# Patient Record
Sex: FEMALE | Race: WHITE | Employment: OTHER | URBAN - METROPOLITAN AREA
[De-identification: names, ages, dates, MRNs, and addresses within clinical notes are randomized per-mention and may not be internally consistent; named-entity substitution may affect disease eponyms.]

---

## 2017-01-12 PROBLEM — R07.2 PRECORDIAL PAIN: Status: ACTIVE | Noted: 2017-01-12

## 2017-07-19 ENCOUNTER — HOSPITAL ENCOUNTER (OUTPATIENT)
Dept: LAB | Age: 72
Discharge: HOME | End: 2017-07-19
Attending: INTERNAL MEDICINE
Payer: MEDICARE

## 2017-07-19 DIAGNOSIS — E78.2 MIXED HYPERLIPIDEMIA: ICD-10-CM

## 2017-07-19 LAB
ALBUMIN SERPL BCP-MCNC: 3.9 G/DL (ref 3.2–4.6)
ALBUMIN/GLOB SERPL: 1.1 {RATIO}
ALP SERPL-CCNC: 66 U/L (ref 50–136)
ALT SERPL-CCNC: 25 U/L (ref 12–65)
AST SERPL W P-5'-P-CCNC: 17 U/L (ref 15–37)
BILIRUB DIRECT SERPL-MCNC: <0.1 MG/DL
BILIRUB SERPL-MCNC: 0.3 MG/DL (ref 0.2–1.1)
GLOBULIN SER CALC-MCNC: 3.5 G/DL
PROT SERPL-MCNC: 7.4 G/DL (ref 6.3–8.2)

## 2017-07-19 PROCEDURE — 36415 COLL VENOUS BLD VENIPUNCTURE: CPT | Performed by: INTERNAL MEDICINE

## 2017-07-19 PROCEDURE — 80076 HEPATIC FUNCTION PANEL: CPT | Performed by: INTERNAL MEDICINE

## 2017-11-16 ENCOUNTER — HOSPITAL ENCOUNTER (OUTPATIENT)
Dept: MAMMOGRAPHY | Age: 72
Discharge: HOME | End: 2017-11-16
Attending: INTERNAL MEDICINE
Payer: MEDICARE

## 2017-11-16 DIAGNOSIS — Z12.31 VISIT FOR SCREENING MAMMOGRAM: ICD-10-CM

## 2017-11-16 PROCEDURE — 77067 SCR MAMMO BI INCL CAD: CPT

## 2018-02-05 ENCOUNTER — HOSPITAL ENCOUNTER (OUTPATIENT)
Dept: MAMMOGRAPHY | Age: 73
Discharge: HOME | End: 2018-02-05
Attending: INTERNAL MEDICINE
Payer: MEDICARE

## 2018-02-05 DIAGNOSIS — M94.9 DISORDER OF BONE AND CARTILAGE: ICD-10-CM

## 2018-02-05 DIAGNOSIS — M89.9 DISORDER OF BONE AND CARTILAGE: ICD-10-CM

## 2018-02-05 PROCEDURE — 77080 DXA BONE DENSITY AXIAL: CPT

## 2019-02-21 ENCOUNTER — HOSPITAL ENCOUNTER (OUTPATIENT)
Dept: MAMMOGRAPHY | Age: 74
Discharge: HOME | End: 2019-02-21
Attending: INTERNAL MEDICINE
Payer: MEDICARE

## 2019-02-21 DIAGNOSIS — Z12.31 VISIT FOR SCREENING MAMMOGRAM: ICD-10-CM

## 2019-02-21 PROCEDURE — 77067 SCR MAMMO BI INCL CAD: CPT

## 2022-01-05 ENCOUNTER — HOSPITAL ENCOUNTER (OUTPATIENT)
Dept: MAMMOGRAPHY | Age: 77
Discharge: HOME | End: 2022-01-05
Attending: INTERNAL MEDICINE
Payer: MEDICARE

## 2022-01-05 DIAGNOSIS — Z12.31 ENCOUNTER FOR SCREENING MAMMOGRAM FOR MALIGNANT NEOPLASM OF BREAST: ICD-10-CM

## 2022-01-05 DIAGNOSIS — Z78.0 POSTMENOPAUSAL STATE: ICD-10-CM

## 2022-01-05 PROCEDURE — 77080 DXA BONE DENSITY AXIAL: CPT

## 2022-01-05 PROCEDURE — 77067 SCR MAMMO BI INCL CAD: CPT

## 2022-01-22 ENCOUNTER — APPOINTMENT (OUTPATIENT)
Dept: CT IMAGING | Age: 77
DRG: 329 | End: 2022-01-22
Attending: EMERGENCY MEDICINE
Payer: MEDICARE

## 2022-01-22 ENCOUNTER — ANESTHESIA (OUTPATIENT)
Dept: SURGERY | Age: 77
DRG: 329 | End: 2022-01-22
Payer: MEDICARE

## 2022-01-22 ENCOUNTER — ANESTHESIA EVENT (OUTPATIENT)
Dept: SURGERY | Age: 77
DRG: 329 | End: 2022-01-22
Payer: MEDICARE

## 2022-01-22 ENCOUNTER — HOSPITAL ENCOUNTER (INPATIENT)
Age: 77
LOS: 9 days | Discharge: HOME HEALTH | DRG: 329 | End: 2022-01-31
Attending: EMERGENCY MEDICINE | Admitting: SURGERY
Payer: MEDICARE

## 2022-01-22 DIAGNOSIS — K63.1 PERFORATION OF COLON (HCC): ICD-10-CM

## 2022-01-22 DIAGNOSIS — K56.600 PARTIAL INTESTINAL OBSTRUCTION, UNSPECIFIED CAUSE (HCC): ICD-10-CM

## 2022-01-22 DIAGNOSIS — K63.1 BOWEL PERFORATION (HCC): ICD-10-CM

## 2022-01-22 DIAGNOSIS — K63.89 COLONIC MASS: ICD-10-CM

## 2022-01-22 DIAGNOSIS — C18.2 PRIMARY ADENOCARCINOMA OF ASCENDING COLON (HCC): ICD-10-CM

## 2022-01-22 DIAGNOSIS — K65.9 PERITONITIS (HCC): Primary | ICD-10-CM

## 2022-01-22 LAB
ABO + RH BLD: NORMAL
ALBUMIN SERPL-MCNC: 3.2 G/DL (ref 3.2–4.6)
ALBUMIN/GLOB SERPL: 0.9 {RATIO} (ref 1.2–3.5)
ALP SERPL-CCNC: 74 U/L (ref 50–136)
ALT SERPL-CCNC: 26 U/L (ref 12–65)
ANION GAP SERPL CALC-SCNC: 7 MMOL/L (ref 7–16)
AST SERPL-CCNC: 15 U/L (ref 15–37)
BACTERIA URNS QL MICRO: 0 /HPF
BASOPHILS # BLD: 0 K/UL (ref 0–0.2)
BASOPHILS NFR BLD: 0 % (ref 0–2)
BILIRUB SERPL-MCNC: 0.3 MG/DL (ref 0.2–1.1)
BLOOD GROUP ANTIBODIES SERPL: NORMAL
BUN SERPL-MCNC: 13 MG/DL (ref 8–23)
CALCIUM SERPL-MCNC: 8.5 MG/DL (ref 8.3–10.4)
CASTS URNS QL MICRO: NORMAL /LPF
CHLORIDE SERPL-SCNC: 112 MMOL/L (ref 98–107)
CO2 SERPL-SCNC: 22 MMOL/L (ref 21–32)
COVID-19 RAPID TEST, COVR: NOT DETECTED
CREAT SERPL-MCNC: 0.79 MG/DL (ref 0.6–1)
DIFFERENTIAL METHOD BLD: ABNORMAL
EOSINOPHIL # BLD: 0.2 K/UL (ref 0–0.8)
EOSINOPHIL NFR BLD: 1 % (ref 0.5–7.8)
EPI CELLS #/AREA URNS HPF: 0 /HPF
ERYTHROCYTE [DISTWIDTH] IN BLOOD BY AUTOMATED COUNT: 13.7 % (ref 11.9–14.6)
GLOBULIN SER CALC-MCNC: 3.5 G/DL (ref 2.3–3.5)
GLUCOSE SERPL-MCNC: 141 MG/DL (ref 65–100)
HCT VFR BLD AUTO: 34.1 % (ref 35.8–46.3)
HGB BLD-MCNC: 10.7 G/DL (ref 11.7–15.4)
IMM GRANULOCYTES # BLD AUTO: 0.1 K/UL (ref 0–0.5)
IMM GRANULOCYTES NFR BLD AUTO: 0 % (ref 0–5)
LACTATE SERPL-SCNC: 1.2 MMOL/L (ref 0.4–2)
LACTATE SERPL-SCNC: 2.6 MMOL/L (ref 0.4–2)
LACTATE SERPL-SCNC: 3.4 MMOL/L (ref 0.4–2)
LIPASE SERPL-CCNC: 82 U/L (ref 73–393)
LYMPHOCYTES # BLD: 0.4 K/UL (ref 0.5–4.6)
LYMPHOCYTES NFR BLD: 3 % (ref 13–44)
MCH RBC QN AUTO: 26.6 PG (ref 26.1–32.9)
MCHC RBC AUTO-ENTMCNC: 31.4 G/DL (ref 31.4–35)
MCV RBC AUTO: 84.8 FL (ref 79.6–97.8)
MONOCYTES # BLD: 1.2 K/UL (ref 0.1–1.3)
MONOCYTES NFR BLD: 9 % (ref 4–12)
NEUTS SEG # BLD: 12.6 K/UL (ref 1.7–8.2)
NEUTS SEG NFR BLD: 87 % (ref 43–78)
NRBC # BLD: 0 K/UL (ref 0–0.2)
PLATELET # BLD AUTO: 202 K/UL (ref 150–450)
PMV BLD AUTO: 10.8 FL (ref 9.4–12.3)
POTASSIUM SERPL-SCNC: 3.7 MMOL/L (ref 3.5–5.1)
PROT SERPL-MCNC: 6.7 G/DL (ref 6.3–8.2)
RBC # BLD AUTO: 4.02 M/UL (ref 4.05–5.2)
RBC #/AREA URNS HPF: NORMAL /HPF
SODIUM SERPL-SCNC: 141 MMOL/L (ref 136–145)
SOURCE, COVRS: NORMAL
SPECIMEN EXP DATE BLD: NORMAL
WBC # BLD AUTO: 14.5 K/UL (ref 4.3–11.1)
WBC URNS QL MICRO: NORMAL /HPF

## 2022-01-22 PROCEDURE — 96375 TX/PRO/DX INJ NEW DRUG ADDON: CPT

## 2022-01-22 PROCEDURE — 77030002996 HC SUT SLK J&J -A: Performed by: SURGERY

## 2022-01-22 PROCEDURE — 88307 TISSUE EXAM BY PATHOLOGIST: CPT

## 2022-01-22 PROCEDURE — 76010000162 HC OR TIME 1.5 TO 2 HR INTENSV-TIER 1: Performed by: SURGERY

## 2022-01-22 PROCEDURE — 80053 COMPREHEN METABOLIC PANEL: CPT

## 2022-01-22 PROCEDURE — 85025 COMPLETE CBC W/AUTO DIFF WBC: CPT

## 2022-01-22 PROCEDURE — 74011000258 HC RX REV CODE- 258: Performed by: EMERGENCY MEDICINE

## 2022-01-22 PROCEDURE — 74011250636 HC RX REV CODE- 250/636: Performed by: EMERGENCY MEDICINE

## 2022-01-22 PROCEDURE — 87040 BLOOD CULTURE FOR BACTERIA: CPT

## 2022-01-22 PROCEDURE — 74177 CT ABD & PELVIS W/CONTRAST: CPT

## 2022-01-22 PROCEDURE — 74011250636 HC RX REV CODE- 250/636: Performed by: ANESTHESIOLOGY

## 2022-01-22 PROCEDURE — 77030016154: Performed by: SURGERY

## 2022-01-22 PROCEDURE — 74011000258 HC RX REV CODE- 258: Performed by: NURSE PRACTITIONER

## 2022-01-22 PROCEDURE — 77030003602 HC NDL NRV BLK BBMI -B: Performed by: ANESTHESIOLOGY

## 2022-01-22 PROCEDURE — 96365 THER/PROPH/DIAG IV INF INIT: CPT

## 2022-01-22 PROCEDURE — 81479 UNLISTED MOLECULAR PATHOLOGY: CPT

## 2022-01-22 PROCEDURE — 83690 ASSAY OF LIPASE: CPT

## 2022-01-22 PROCEDURE — 83605 ASSAY OF LACTIC ACID: CPT

## 2022-01-22 PROCEDURE — 81003 URINALYSIS AUTO W/O SCOPE: CPT

## 2022-01-22 PROCEDURE — 77030009979 HC RELD STPLR TCR J&J -C: Performed by: SURGERY

## 2022-01-22 PROCEDURE — 77030002966 HC SUT PDS J&J -A: Performed by: SURGERY

## 2022-01-22 PROCEDURE — 81301 MICROSATELLITE INSTABILITY: CPT

## 2022-01-22 PROCEDURE — 74011250636 HC RX REV CODE- 250/636: Performed by: NURSE PRACTITIONER

## 2022-01-22 PROCEDURE — 81015 MICROSCOPIC EXAM OF URINE: CPT

## 2022-01-22 PROCEDURE — 87635 SARS-COV-2 COVID-19 AMP PRB: CPT

## 2022-01-22 PROCEDURE — 0DTF0ZZ RESECTION OF RIGHT LARGE INTESTINE, OPEN APPROACH: ICD-10-PCS | Performed by: SURGERY

## 2022-01-22 PROCEDURE — 74011000250 HC RX REV CODE- 250: Performed by: NURSE ANESTHETIST, CERTIFIED REGISTERED

## 2022-01-22 PROCEDURE — 65270000029 HC RM PRIVATE

## 2022-01-22 PROCEDURE — 74011250636 HC RX REV CODE- 250/636: Performed by: NURSE ANESTHETIST, CERTIFIED REGISTERED

## 2022-01-22 PROCEDURE — 74011000258 HC RX REV CODE- 258: Performed by: NURSE ANESTHETIST, CERTIFIED REGISTERED

## 2022-01-22 PROCEDURE — 77030036731 HC STPLR ENDOSC J&J -F: Performed by: SURGERY

## 2022-01-22 PROCEDURE — 74011000250 HC RX REV CODE- 250: Performed by: EMERGENCY MEDICINE

## 2022-01-22 PROCEDURE — 76060000034 HC ANESTHESIA 1.5 TO 2 HR: Performed by: SURGERY

## 2022-01-22 PROCEDURE — 86900 BLOOD TYPING SEROLOGIC ABO: CPT

## 2022-01-22 PROCEDURE — 74011250636 HC RX REV CODE- 250/636: Performed by: SURGERY

## 2022-01-22 PROCEDURE — 2709999900 HC NON-CHARGEABLE SUPPLY: Performed by: SURGERY

## 2022-01-22 PROCEDURE — 77030019908 HC STETH ESOPH SIMS -A: Performed by: NURSE ANESTHETIST, CERTIFIED REGISTERED

## 2022-01-22 PROCEDURE — 77030037088 HC TUBE ENDOTRACH ORAL NSL COVD-A: Performed by: NURSE ANESTHETIST, CERTIFIED REGISTERED

## 2022-01-22 PROCEDURE — 36415 COLL VENOUS BLD VENIPUNCTURE: CPT

## 2022-01-22 PROCEDURE — 77030039425 HC BLD LARYNG TRULITE DISP TELE -A: Performed by: NURSE ANESTHETIST, CERTIFIED REGISTERED

## 2022-01-22 PROCEDURE — 76210000017 HC OR PH I REC 1.5 TO 2 HR: Performed by: SURGERY

## 2022-01-22 PROCEDURE — 77010033678 HC OXYGEN DAILY

## 2022-01-22 PROCEDURE — 99284 EMERGENCY DEPT VISIT MOD MDM: CPT

## 2022-01-22 PROCEDURE — 74011000636 HC RX REV CODE- 636: Performed by: EMERGENCY MEDICINE

## 2022-01-22 RX ORDER — SODIUM CHLORIDE, SODIUM LACTATE, POTASSIUM CHLORIDE, CALCIUM CHLORIDE 600; 310; 30; 20 MG/100ML; MG/100ML; MG/100ML; MG/100ML
75 INJECTION, SOLUTION INTRAVENOUS CONTINUOUS
Status: DISCONTINUED | OUTPATIENT
Start: 2022-01-22 | End: 2022-01-22 | Stop reason: HOSPADM

## 2022-01-22 RX ORDER — MORPHINE SULFATE 4 MG/ML
4 INJECTION INTRAVENOUS
Status: COMPLETED | OUTPATIENT
Start: 2022-01-22 | End: 2022-01-22

## 2022-01-22 RX ORDER — SODIUM CHLORIDE 0.9 % (FLUSH) 0.9 %
5-10 SYRINGE (ML) INJECTION EVERY 8 HOURS
Status: DISCONTINUED | OUTPATIENT
Start: 2022-01-22 | End: 2022-01-31 | Stop reason: HOSPADM

## 2022-01-22 RX ORDER — DIPHENHYDRAMINE HYDROCHLORIDE 50 MG/ML
12.5 INJECTION, SOLUTION INTRAMUSCULAR; INTRAVENOUS
Status: DISCONTINUED | OUTPATIENT
Start: 2022-01-22 | End: 2022-01-22 | Stop reason: HOSPADM

## 2022-01-22 RX ORDER — SODIUM CHLORIDE 0.9 % (FLUSH) 0.9 %
5-10 SYRINGE (ML) INJECTION AS NEEDED
Status: DISCONTINUED | OUTPATIENT
Start: 2022-01-22 | End: 2022-01-31 | Stop reason: HOSPADM

## 2022-01-22 RX ORDER — DEXAMETHASONE SODIUM PHOSPHATE 4 MG/ML
INJECTION, SOLUTION INTRA-ARTICULAR; INTRALESIONAL; INTRAMUSCULAR; INTRAVENOUS; SOFT TISSUE AS NEEDED
Status: DISCONTINUED | OUTPATIENT
Start: 2022-01-22 | End: 2022-01-22 | Stop reason: HOSPADM

## 2022-01-22 RX ORDER — PROPOFOL 10 MG/ML
INJECTION, EMULSION INTRAVENOUS AS NEEDED
Status: DISCONTINUED | OUTPATIENT
Start: 2022-01-22 | End: 2022-01-22 | Stop reason: HOSPADM

## 2022-01-22 RX ORDER — EPHEDRINE SULFATE/0.9% NACL/PF 50 MG/5 ML
SYRINGE (ML) INTRAVENOUS AS NEEDED
Status: DISCONTINUED | OUTPATIENT
Start: 2022-01-22 | End: 2022-01-22 | Stop reason: HOSPADM

## 2022-01-22 RX ORDER — SODIUM CHLORIDE, SODIUM LACTATE, POTASSIUM CHLORIDE, CALCIUM CHLORIDE 600; 310; 30; 20 MG/100ML; MG/100ML; MG/100ML; MG/100ML
25 INJECTION, SOLUTION INTRAVENOUS CONTINUOUS
Status: DISCONTINUED | OUTPATIENT
Start: 2022-01-22 | End: 2022-01-22 | Stop reason: HOSPADM

## 2022-01-22 RX ORDER — SODIUM CHLORIDE 9 MG/ML
75 INJECTION, SOLUTION INTRAVENOUS CONTINUOUS
Status: DISCONTINUED | OUTPATIENT
Start: 2022-01-22 | End: 2022-01-25

## 2022-01-22 RX ORDER — FENTANYL CITRATE 50 UG/ML
INJECTION, SOLUTION INTRAMUSCULAR; INTRAVENOUS AS NEEDED
Status: DISCONTINUED | OUTPATIENT
Start: 2022-01-22 | End: 2022-01-22 | Stop reason: HOSPADM

## 2022-01-22 RX ORDER — LIDOCAINE HYDROCHLORIDE 20 MG/ML
INJECTION, SOLUTION EPIDURAL; INFILTRATION; INTRACAUDAL; PERINEURAL AS NEEDED
Status: DISCONTINUED | OUTPATIENT
Start: 2022-01-22 | End: 2022-01-22 | Stop reason: HOSPADM

## 2022-01-22 RX ORDER — ONDANSETRON 2 MG/ML
4 INJECTION INTRAMUSCULAR; INTRAVENOUS
Status: DISCONTINUED | OUTPATIENT
Start: 2022-01-22 | End: 2022-01-31 | Stop reason: HOSPADM

## 2022-01-22 RX ORDER — NALOXONE HYDROCHLORIDE 0.4 MG/ML
0.1 INJECTION, SOLUTION INTRAMUSCULAR; INTRAVENOUS; SUBCUTANEOUS AS NEEDED
Status: DISCONTINUED | OUTPATIENT
Start: 2022-01-22 | End: 2022-01-22 | Stop reason: HOSPADM

## 2022-01-22 RX ORDER — OXYCODONE HYDROCHLORIDE 5 MG/1
5 TABLET ORAL
Status: DISCONTINUED | OUTPATIENT
Start: 2022-01-22 | End: 2022-01-22 | Stop reason: HOSPADM

## 2022-01-22 RX ORDER — FLUMAZENIL 0.1 MG/ML
0.2 INJECTION INTRAVENOUS
Status: DISCONTINUED | OUTPATIENT
Start: 2022-01-22 | End: 2022-01-22 | Stop reason: HOSPADM

## 2022-01-22 RX ORDER — HYDROMORPHONE HYDROCHLORIDE 2 MG/ML
0.5 INJECTION, SOLUTION INTRAMUSCULAR; INTRAVENOUS; SUBCUTANEOUS
Status: DISCONTINUED | OUTPATIENT
Start: 2022-01-22 | End: 2022-01-22 | Stop reason: HOSPADM

## 2022-01-22 RX ORDER — NEOSTIGMINE METHYLSULFATE 1 MG/ML
INJECTION, SOLUTION INTRAVENOUS AS NEEDED
Status: DISCONTINUED | OUTPATIENT
Start: 2022-01-22 | End: 2022-01-22 | Stop reason: HOSPADM

## 2022-01-22 RX ORDER — SODIUM CHLORIDE 0.9 % (FLUSH) 0.9 %
10 SYRINGE (ML) INJECTION
Status: COMPLETED | OUTPATIENT
Start: 2022-01-22 | End: 2022-01-22

## 2022-01-22 RX ORDER — DEXAMETHASONE SODIUM PHOSPHATE 4 MG/ML
INJECTION, SOLUTION INTRA-ARTICULAR; INTRALESIONAL; INTRAMUSCULAR; INTRAVENOUS; SOFT TISSUE
Status: COMPLETED | OUTPATIENT
Start: 2022-01-22 | End: 2022-01-22

## 2022-01-22 RX ORDER — CHOLECALCIFEROL TAB 125 MCG (5000 UNIT) 125 MCG
5000 TAB ORAL DAILY
COMMUNITY
End: 2022-05-20

## 2022-01-22 RX ORDER — MORPHINE SULFATE 4 MG/ML
2 INJECTION INTRAVENOUS
Status: DISCONTINUED | OUTPATIENT
Start: 2022-01-22 | End: 2022-01-23 | Stop reason: CLARIF

## 2022-01-22 RX ORDER — KETAMINE HYDROCHLORIDE 50 MG/ML
INJECTION, SOLUTION INTRAMUSCULAR; INTRAVENOUS AS NEEDED
Status: DISCONTINUED | OUTPATIENT
Start: 2022-01-22 | End: 2022-01-22 | Stop reason: HOSPADM

## 2022-01-22 RX ORDER — ROPIVACAINE HYDROCHLORIDE 5 MG/ML
INJECTION, SOLUTION EPIDURAL; INFILTRATION; PERINEURAL
Status: COMPLETED | OUTPATIENT
Start: 2022-01-22 | End: 2022-01-22

## 2022-01-22 RX ORDER — ROCURONIUM BROMIDE 10 MG/ML
INJECTION, SOLUTION INTRAVENOUS AS NEEDED
Status: DISCONTINUED | OUTPATIENT
Start: 2022-01-22 | End: 2022-01-22 | Stop reason: HOSPADM

## 2022-01-22 RX ORDER — ASCORBIC ACID 500 MG
1000 TABLET ORAL DAILY
COMMUNITY
End: 2022-04-13

## 2022-01-22 RX ORDER — LIDOCAINE HYDROCHLORIDE 10 MG/ML
0.1 INJECTION INFILTRATION; PERINEURAL AS NEEDED
Status: DISCONTINUED | OUTPATIENT
Start: 2022-01-22 | End: 2022-01-22 | Stop reason: HOSPADM

## 2022-01-22 RX ORDER — METRONIDAZOLE 500 MG/100ML
500 INJECTION, SOLUTION INTRAVENOUS EVERY 12 HOURS
Status: DISCONTINUED | OUTPATIENT
Start: 2022-01-22 | End: 2022-01-22

## 2022-01-22 RX ORDER — SUCCINYLCHOLINE CHLORIDE 20 MG/ML
INJECTION INTRAMUSCULAR; INTRAVENOUS AS NEEDED
Status: DISCONTINUED | OUTPATIENT
Start: 2022-01-22 | End: 2022-01-22 | Stop reason: HOSPADM

## 2022-01-22 RX ORDER — GLYCOPYRROLATE 0.2 MG/ML
INJECTION INTRAMUSCULAR; INTRAVENOUS AS NEEDED
Status: DISCONTINUED | OUTPATIENT
Start: 2022-01-22 | End: 2022-01-22 | Stop reason: HOSPADM

## 2022-01-22 RX ORDER — SODIUM CHLORIDE, SODIUM LACTATE, POTASSIUM CHLORIDE, CALCIUM CHLORIDE 600; 310; 30; 20 MG/100ML; MG/100ML; MG/100ML; MG/100ML
150 INJECTION, SOLUTION INTRAVENOUS CONTINUOUS
Status: DISCONTINUED | OUTPATIENT
Start: 2022-01-22 | End: 2022-01-25

## 2022-01-22 RX ORDER — ONDANSETRON 2 MG/ML
INJECTION INTRAMUSCULAR; INTRAVENOUS AS NEEDED
Status: DISCONTINUED | OUTPATIENT
Start: 2022-01-22 | End: 2022-01-22 | Stop reason: HOSPADM

## 2022-01-22 RX ORDER — SODIUM CHLORIDE, SODIUM LACTATE, POTASSIUM CHLORIDE, CALCIUM CHLORIDE 600; 310; 30; 20 MG/100ML; MG/100ML; MG/100ML; MG/100ML
1000 INJECTION, SOLUTION INTRAVENOUS
Status: COMPLETED | OUTPATIENT
Start: 2022-01-22 | End: 2022-01-22

## 2022-01-22 RX ORDER — MIDAZOLAM HYDROCHLORIDE 1 MG/ML
2 INJECTION, SOLUTION INTRAMUSCULAR; INTRAVENOUS
Status: COMPLETED | OUTPATIENT
Start: 2022-01-22 | End: 2022-01-22

## 2022-01-22 RX ORDER — ONDANSETRON 2 MG/ML
4 INJECTION INTRAMUSCULAR; INTRAVENOUS
Status: COMPLETED | OUTPATIENT
Start: 2022-01-22 | End: 2022-01-22

## 2022-01-22 RX ORDER — METRONIDAZOLE 500 MG/100ML
500 INJECTION, SOLUTION INTRAVENOUS EVERY 8 HOURS
Status: DISCONTINUED | OUTPATIENT
Start: 2022-01-22 | End: 2022-01-22

## 2022-01-22 RX ORDER — SODIUM CHLORIDE, SODIUM LACTATE, POTASSIUM CHLORIDE, CALCIUM CHLORIDE 600; 310; 30; 20 MG/100ML; MG/100ML; MG/100ML; MG/100ML
100 INJECTION, SOLUTION INTRAVENOUS CONTINUOUS
Status: DISCONTINUED | OUTPATIENT
Start: 2022-01-22 | End: 2022-01-22 | Stop reason: HOSPADM

## 2022-01-22 RX ADMIN — PHENYLEPHRINE HYDROCHLORIDE 120 MCG: 10 INJECTION INTRAVENOUS at 12:42

## 2022-01-22 RX ADMIN — MORPHINE SULFATE 2 MG: 4 INJECTION INTRAVENOUS at 20:21

## 2022-01-22 RX ADMIN — PHENYLEPHRINE HYDROCHLORIDE 120 MCG: 10 INJECTION INTRAVENOUS at 12:38

## 2022-01-22 RX ADMIN — ONDANSETRON 4 MG: 2 INJECTION INTRAMUSCULAR; INTRAVENOUS at 08:12

## 2022-01-22 RX ADMIN — Medication 4.2 MG: at 13:52

## 2022-01-22 RX ADMIN — SODIUM CHLORIDE, PRESERVATIVE FREE 10 ML: 5 INJECTION INTRAVENOUS at 21:53

## 2022-01-22 RX ADMIN — HYDROMORPHONE HYDROCHLORIDE 0.5 MG: 2 INJECTION, SOLUTION INTRAMUSCULAR; INTRAVENOUS; SUBCUTANEOUS at 15:11

## 2022-01-22 RX ADMIN — FENTANYL CITRATE 100 MCG: 50 INJECTION INTRAMUSCULAR; INTRAVENOUS at 12:35

## 2022-01-22 RX ADMIN — ROPIVACAINE HYDROCHLORIDE 15 ML: 5 INJECTION, SOLUTION EPIDURAL; INFILTRATION; PERINEURAL at 13:55

## 2022-01-22 RX ADMIN — DEXAMETHASONE SODIUM PHOSPHATE 4 MG: 4 INJECTION, SOLUTION INTRA-ARTICULAR; INTRALESIONAL; INTRAMUSCULAR; INTRAVENOUS; SOFT TISSUE at 13:55

## 2022-01-22 RX ADMIN — SODIUM CHLORIDE, SODIUM LACTATE, POTASSIUM CHLORIDE, AND CALCIUM CHLORIDE 100 ML/HR: 600; 310; 30; 20 INJECTION, SOLUTION INTRAVENOUS at 10:59

## 2022-01-22 RX ADMIN — LIDOCAINE HYDROCHLORIDE 60 MG: 20 INJECTION, SOLUTION EPIDURAL; INFILTRATION; INTRACAUDAL; PERINEURAL at 12:35

## 2022-01-22 RX ADMIN — ROPIVACAINE HYDROCHLORIDE 15 ML: 150 INJECTION, SOLUTION EPIDURAL; INFILTRATION; PERINEURAL at 13:58

## 2022-01-22 RX ADMIN — SODIUM CHLORIDE 1000 ML: 900 INJECTION, SOLUTION INTRAVENOUS at 04:58

## 2022-01-22 RX ADMIN — SODIUM CHLORIDE 125 ML/HR: 900 INJECTION, SOLUTION INTRAVENOUS at 16:51

## 2022-01-22 RX ADMIN — MIDAZOLAM 2 MG: 1 INJECTION INTRAMUSCULAR; INTRAVENOUS at 11:02

## 2022-01-22 RX ADMIN — MORPHINE SULFATE 4 MG: 4 INJECTION INTRAVENOUS at 08:12

## 2022-01-22 RX ADMIN — CEFTRIAXONE 1 G: 1 INJECTION, POWDER, FOR SOLUTION INTRAMUSCULAR; INTRAVENOUS at 12:47

## 2022-01-22 RX ADMIN — SODIUM CHLORIDE 100 ML: 900 INJECTION, SOLUTION INTRAVENOUS at 06:24

## 2022-01-22 RX ADMIN — ROCURONIUM BROMIDE 5 MG: 10 INJECTION, SOLUTION INTRAVENOUS at 13:20

## 2022-01-22 RX ADMIN — DEXAMETHASONE SODIUM PHOSPHATE 4 MG: 4 INJECTION, SOLUTION INTRAMUSCULAR; INTRAVENOUS at 12:39

## 2022-01-22 RX ADMIN — ONDANSETRON 4 MG: 2 INJECTION INTRAMUSCULAR; INTRAVENOUS at 20:21

## 2022-01-22 RX ADMIN — GLYCOPYRROLATE 0.3 MG: 0.2 INJECTION, SOLUTION INTRAMUSCULAR; INTRAVENOUS at 13:52

## 2022-01-22 RX ADMIN — ONDANSETRON 4 MG: 2 INJECTION INTRAMUSCULAR; INTRAVENOUS at 12:39

## 2022-01-22 RX ADMIN — KETAMINE HYDROCHLORIDE 20 MG: 50 INJECTION INTRAMUSCULAR; INTRAVENOUS at 12:56

## 2022-01-22 RX ADMIN — PHENYLEPHRINE HYDROCHLORIDE 120 MCG: 10 INJECTION INTRAVENOUS at 12:44

## 2022-01-22 RX ADMIN — ROCURONIUM BROMIDE 20 MG: 10 INJECTION, SOLUTION INTRAVENOUS at 12:50

## 2022-01-22 RX ADMIN — DEXAMETHASONE SODIUM PHOSPHATE 4 MG: 4 INJECTION, SOLUTION INTRA-ARTICULAR; INTRALESIONAL; INTRAMUSCULAR; INTRAVENOUS; SOFT TISSUE at 13:58

## 2022-01-22 RX ADMIN — DIATRIZOATE MEGLUMINE AND DIATRIZOATE SODIUM 15 ML: 660; 100 LIQUID ORAL; RECTAL at 04:53

## 2022-01-22 RX ADMIN — IOPAMIDOL 100 ML: 755 INJECTION, SOLUTION INTRAVENOUS at 06:23

## 2022-01-22 RX ADMIN — MORPHINE SULFATE 2 MG: 4 INJECTION INTRAVENOUS at 22:36

## 2022-01-22 RX ADMIN — SUCCINYLCHOLINE CHLORIDE 140 MG: 20 INJECTION, SOLUTION INTRAMUSCULAR; INTRAVENOUS at 12:35

## 2022-01-22 RX ADMIN — SODIUM CHLORIDE, PRESERVATIVE FREE 10 ML: 5 INJECTION INTRAVENOUS at 16:52

## 2022-01-22 RX ADMIN — PROPOFOL 150 MG: 10 INJECTION, EMULSION INTRAVENOUS at 12:35

## 2022-01-22 RX ADMIN — CEFTRIAXONE 1 G: 1 INJECTION, POWDER, FOR SOLUTION INTRAMUSCULAR; INTRAVENOUS at 08:35

## 2022-01-22 RX ADMIN — SODIUM CHLORIDE, SODIUM LACTATE, POTASSIUM CHLORIDE, AND CALCIUM CHLORIDE 1000 ML: 600; 310; 30; 20 INJECTION, SOLUTION INTRAVENOUS at 08:12

## 2022-01-22 RX ADMIN — Medication 10 MG: at 12:44

## 2022-01-22 RX ADMIN — Medication 10 ML: at 06:24

## 2022-01-22 RX ADMIN — HYDROMORPHONE HYDROCHLORIDE 0.5 MG: 2 INJECTION, SOLUTION INTRAMUSCULAR; INTRAVENOUS; SUBCUTANEOUS at 15:26

## 2022-01-22 NOTE — OP NOTES
1/22/22    Pre op dx; perforated cecum    Post ope: perforated cecum, + ascending colon obstructive mass     Procedure; Right extended colectomy with Ileo-traverse colon stapled anastomosis     Surgeon ; Bean Norwood     Specimen; right colon    IVF; 2000 cc    EBL; minimal    No drains    Antibiotics: Mefoxin 2 gm     findings : 3 cm mass in the ascending colon, with proximal cecal dilatation, patchy necrosis and perforation , no SB dilatation     Specimen; right colon     Description; Taken to the OR, sedated, intubated and general anesthesia provided. Midline laparotomy performed. Access to the abdomen obtained. Greater omentum and transverse colon packed in the upper abdomen. Terminal ileum and right colon were mobilized Along the white line of told, hepatic flexure of the colon was mobilized until the duodenum was clearly appreciated. The entire right colon was eviscerated, terminal ileum mesentery was enter and the TI divided with an Endo KINGA 75 mm linear cutter. The mid trans verse was also divided in the same manner. With the help of the ligature device the gretaer omentum was  from the proximal transverse colon. The mesentery of the right colon and proximal transverse was divided with the liga sure device, the ileocecal pedical and right colic were suture ligated with 2-0 silk . The anastomosis was created along the anti-mesenteric border using a 75 mm blue load of the Endo KINGA linear cutter. All the staples line were imbricated with 3-0 silk. The anastomosis was well perfused. Instruments and sponge count was correct  Abdominal fascia was closed with 0 loop PDS.   Skin was approximated with skin staples

## 2022-01-22 NOTE — ANESTHESIA PROCEDURE NOTES
Peripheral Block    Start time: 1/22/2022 1:57 PM  End time: 1/22/2022 1:59 PM  Performed by: Daniel Jasso MD  Authorized by: Daniel Jasso MD       Pre-procedure:    Indications: at surgeon's request and post-op pain management    Preanesthetic Checklist: patient identified, risks and benefits discussed, site marked, timeout performed, anesthesia consent given and patient being monitored    Timeout Time: 13:57 EST          Block Type:   Block Type:  TAP (Quadratus Lumborum)  Laterality:  Right  Monitoring:  Standard ASA monitoring, continuous pulse ox, frequent vital sign checks, heart rate and oxygen  Injection Technique:  Single shot  Procedures: ultrasound guided    Patient Position: supine  Prep: chlorhexidine    Location:  Abdominal  Needle Gauge:  20 G  Needle Localization:  Ultrasound guidance  Medication Injected:  Ropivacaine (PF) (NAROPIN)(0.5%) 5 mg/mL injection, 15 mL  dexamethasone (DECADRON) 4 mg/mL injection, 4 mg  Med Admin Time: 1/22/2022 1:58 PM    Assessment:  Number of attempts:  1  Injection Assessment:  Incremental injection every 5 mL, negative aspiration for blood, no intravascular symptoms and ultrasound image on chart  Patient tolerance:  Patient tolerated the procedure well with no immediate complications

## 2022-01-22 NOTE — ED PROVIDER NOTES
The history is provided by the patient. Abdominal Pain   This is a new problem. The current episode started yesterday. The problem occurs constantly. The problem has been gradually worsening. The pain is associated with vomiting. The pain is located in the epigastric region. The quality of the pain is sharp and cramping. The pain is severe. Associated symptoms include nausea, vomiting and constipation. Pertinent negatives include no fever, no diarrhea, no hematochezia, no melena, no dysuria and no frequency. The pain is worsened by certain positions and palpation. The pain is relieved by nothing (Better when remaining still). Her past medical history is significant for GERD. Past medical history comments: Coronary artery disease with 1 stent.  Colonoscopy 10 years ago, corneal transplant, breast cyst removal       Past Medical History:   Diagnosis Date    Actinic keratosis     Adjustment disorder with mixed anxiety and depressed mood 2/11/2015    Adverse effect of anesthesia     cystoscope - was awake but could not move - dont remember the anesthesetic given    Corneal dystrophy 2/11/2015    Coronary atherosclerosis of native coronary artery 2/11/2015    GERD (gastroesophageal reflux disease)     Irritable bowel syndrome 2/11/2015    Menopause     Mixed hyperlipidemia 2/11/2015    Psychiatric disorder     anxiety       Past Surgical History:   Procedure Laterality Date    HX BREAST BIOPSY Left     HX COLONOSCOPY      2014    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    HX CORNEAL TRANSPLANT Left     HX CYST REMOVAL      breast         Family History:   Problem Relation Age of Onset    Breast Cancer Maternal Aunt     Heart Disease Father         Arterosclerotic Cardiovascular Disease    Heart Attack Father        Social History     Socioeconomic History    Marital status: LEGALLY      Spouse name: Not on file    Number of children: Not on file    Years of education: Not on file    Highest education level: Not on file   Occupational History    Not on file   Tobacco Use    Smoking status: Never Smoker    Smokeless tobacco: Never Used   Substance and Sexual Activity    Alcohol use: No     Alcohol/week: 0.0 standard drinks    Drug use: Yes     Types: Prescription, OTC    Sexual activity: Not on file   Other Topics Concern    Not on file   Social History Narrative    Not on file     Social Determinants of Health     Financial Resource Strain:     Difficulty of Paying Living Expenses: Not on file   Food Insecurity:     Worried About Running Out of Food in the Last Year: Not on file    Onur of Food in the Last Year: Not on file   Transportation Needs:     Lack of Transportation (Medical): Not on file    Lack of Transportation (Non-Medical): Not on file   Physical Activity:     Days of Exercise per Week: Not on file    Minutes of Exercise per Session: Not on file   Stress:     Feeling of Stress : Not on file   Social Connections:     Frequency of Communication with Friends and Family: Not on file    Frequency of Social Gatherings with Friends and Family: Not on file    Attends Orthodoxy Services: Not on file    Active Member of [address] [place] [address] or Organizations: Not on file    Attends Club or Organization Meetings: Not on file    Marital Status: Not on file   Intimate Partner Violence:     Fear of Current or Ex-Partner: Not on file    Emotionally Abused: Not on file    Physically Abused: Not on file    Sexually Abused: Not on file   Housing Stability:     Unable to Pay for Housing in the Last Year: Not on file    Number of [place] in the Last Year: Not on file    Unstable Housing in the Last Year: Not on file         ALLERGIES: Bactrim [sulfamethoprim ds]    Review of Systems   Constitutional: Negative for chills and fever. HENT: Negative for congestion and rhinorrhea. Respiratory: Negative for cough and shortness of breath. Gastrointestinal: Positive for abdominal pain, constipation, nausea and vomiting. Negative for diarrhea, hematochezia and melena. Genitourinary: Negative for dysuria and frequency. All other systems reviewed and are negative. Vitals:    01/22/22 0158 01/22/22 0512   BP: (!) 134/56 (!) 137/47   Pulse: 82 82   Resp: 18 19   Temp: 98.5 °F (36.9 °C) 98 °F (36.7 °C)   SpO2: 96% 99%   Weight: 59.9 kg (132 lb)    Height: 5' 2\" (1.575 m)             Physical Exam  Vitals and nursing note reviewed. Constitutional:       General: She is not in acute distress. Appearance: She is well-developed. She is not ill-appearing. HENT:      Head: Normocephalic. Eyes:      Pupils: Pupils are equal, round, and reactive to light. Cardiovascular:      Rate and Rhythm: Normal rate and regular rhythm. Heart sounds: Normal heart sounds. Pulmonary:      Effort: Pulmonary effort is normal.      Breath sounds: Normal breath sounds. Abdominal:      General: There is no distension. Palpations: Abdomen is soft. There is no mass. Tenderness: There is generalized abdominal tenderness. There is guarding and rebound. Musculoskeletal:         General: Normal range of motion. Lymphadenopathy:      Cervical: No cervical adenopathy. Skin:     General: Skin is warm and dry. Neurological:      Mental Status: She is alert. MDM  Number of Diagnoses or Management Options  Diagnosis management comments: Patient does not appear ill or toxic but clinically has peritonitis and CT scan imaging shows some free air as a result of a ascending colonic mass and partial obstruction. IV hydration, blood cultures and antibiotics provided. Surgery consulted for admission. If they wish the hospitalist service to do the admission then I will contact them. Analgesia provided. Patient had COVID in September and rapid COVID ordered as screening.        Amount and/or Complexity of Data Reviewed  Clinical lab tests: reviewed and ordered (Results for orders placed or performed during the hospital encounter of 01/22/22  -CBC WITH AUTOMATED DIFF:        Result                      Value             Ref Range           WBC                         14.5 (H)          4.3 - 11.1 K*       RBC                         4.02 (L)          4.05 - 5.2 M*       HGB                         10.7 (L)          11.7 - 15.4 *       HCT                         34.1 (L)          35.8 - 46.3 %       MCV                         84.8              79.6 - 97.8 *       MCH                         26.6              26.1 - 32.9 *       MCHC                        31.4              31.4 - 35.0 *       RDW                         13.7              11.9 - 14.6 %       PLATELET                    202               150 - 450 K/*       MPV                         10.8              9.4 - 12.3 FL       ABSOLUTE NRBC               0.00              0.0 - 0.2 K/*       DF                          AUTOMATED                             NEUTROPHILS                 87 (H)            43 - 78 %           LYMPHOCYTES                 3 (L)             13 - 44 %           MONOCYTES                   9                 4.0 - 12.0 %        EOSINOPHILS                 1                 0.5 - 7.8 %         BASOPHILS                   0                 0.0 - 2.0 %         IMMATURE GRANULOCYTES       0                 0.0 - 5.0 %         ABS. NEUTROPHILS            12.6 (H)          1.7 - 8.2 K/*       ABS. LYMPHOCYTES            0.4 (L)           0.5 - 4.6 K/*       ABS. MONOCYTES              1.2               0.1 - 1.3 K/*       ABS. EOSINOPHILS            0.2               0.0 - 0.8 K/*       ABS. BASOPHILS              0.0               0.0 - 0.2 K/*       ABS. IMM.  GRANS.            0.1               0.0 - 0.5 K/*  -METABOLIC PANEL, COMPREHENSIVE:        Result                      Value             Ref Range           Sodium                      141               136 - 145 mm* Potassium                   3.7               3.5 - 5.1 mm*       Chloride                    112 (H)           98 - 107 mmo*       CO2                         22                21 - 32 mmol*       Anion gap                   7                 7 - 16 mmol/L       Glucose                     141 (H)           65 - 100 mg/*       BUN                         13                8 - 23 MG/DL        Creatinine                  0.79              0.6 - 1.0 MG*       GFR est AA                  >60               >60 ml/min/1*       GFR est non-AA              >60               >60 ml/min/1*       Calcium                     8.5               8.3 - 10.4 M*       Bilirubin, total            0.3               0.2 - 1.1 MG*       ALT (SGPT)                  26                12 - 65 U/L         AST (SGOT)                  15                15 - 37 U/L         Alk. phosphatase            74                50 - 136 U/L        Protein, total              6.7               6.3 - 8.2 g/*       Albumin                     3.2               3.2 - 4.6 g/*       Globulin                    3.5               2.3 - 3.5 g/*       A-G Ratio                   0.9 (L)           1.2 - 3.5      -LIPASE:        Result                      Value             Ref Range           Lipase                      82                73 - 393 U/L   -LACTIC ACID:        Result                      Value             Ref Range           Lactic acid                 2.6 (H)           0.4 - 2.0 MM*  )  Tests in the radiology section of CPT®: reviewed and ordered (CT ABD PELV W CONT    Result Date: 1/22/2022  EXAM: CT ABD PELV W CONT HISTORY: abd pain. TECHNIQUE: Axial images of the abdomen and pelvis were performed following the administration of intravenous contrast. Images were obtained in the axial plane and coronal reformatted images were created and reviewed. Dose reduction technique used:  Automated exposure control/Adjustment of the mA and/or kV according to patient size/Use of iterative reconstruction technique. COMPARISON: None. FINDINGS: The visualized lung bases show chronic changes. The visualized mediastinum is unremarkable. The liver, spleen, pancreas, adrenal glands, gallbladder, and kidneys are within normal limits. Small low-density renal lesion. This is too small to characterize. The bladder appears grossly normal. Distal esophagus and stomach are unremarkable. Small bowel shows no evidence of obstruction. There is a 2.3 cm enhancing mass in the mid ascending colon. There are few adjacent enlarged lymph nodes. Distal to this point the colon is normal in caliber. The proximal ascending colon and cecum are significantly distended and contain stool and an air-fluid level. There is a small amount of free fluid in the pelvis and in the right abdomen. There are multiple small foci of free intraperitoneal air. No abdominal aortic aneurysm. There is a an enlarged lymph node in the anterior aspect of the mid lower abdomen. Osseous structures are without evidence of acute fracture or suspicious lesion. There is a 2.3 cm enhancing mass in the mid ascending colon. There are few adjacent enlarged lymph nodes. The mass is causing a degree of obstruction as the ascending colon and cecum are quite distended and contain a large air-fluid level. Multiple small foci of free intraperitoneal air are seen suggesting perforation. I cannot identify a definitive point of perforation. There is a small amount of free fluid associated with the mass and in the pelvis. There is a an enlarged lymph node in the anterior aspect of the mid lower abdomen. This case was discussed with Dr. Valeria Tirado 7:17 AM at on 1/22/2022.  He verbalized his understanding.    )  Decide to obtain previous medical records or to obtain history from someone other than the patient: yes  Discuss the patient with other providers: yes    Risk of Complications, Morbidity, and/or Mortality  Presenting problems: moderate  Diagnostic procedures: low  Management options: moderate    Patient Progress  Patient progress: (serious)         Procedures

## 2022-01-22 NOTE — ANESTHESIA PROCEDURE NOTES
Peripheral Block    Start time: 1/22/2022 1:55 PM  End time: 1/22/2022 1:57 PM  Performed by: Liana Grimaldo MD  Authorized by: Liana Grimaldo MD       Pre-procedure:    Indications: at surgeon's request and post-op pain management    Preanesthetic Checklist: patient identified, risks and benefits discussed, site marked, timeout performed, anesthesia consent given and patient being monitored    Timeout Time: 13:55 EST          Block Type:   Block Type:  TAP (Quadratus Lumborum)  Laterality:  Left  Monitoring:  Standard ASA monitoring, continuous pulse ox, frequent vital sign checks, heart rate and oxygen  Injection Technique:  Single shot  Procedures: ultrasound guided    Patient Position: supine  Prep: chlorhexidine    Location:  Abdominal  Needle Gauge:  20 G  Needle Localization:  Ultrasound guidance  Medication Injected:  Ropivacaine (PF) (NAROPIN)(0.5%) 5 mg/mL injection, 15 mL  dexamethasone (DECADRON) 4 mg/mL injection, 4 mg  Med Admin Time: 1/22/2022 1:55 PM    Assessment:  Number of attempts:  1  Injection Assessment:  Incremental injection every 5 mL, negative aspiration for blood, no intravascular symptoms and ultrasound image on chart  Patient tolerance:  Patient tolerated the procedure well with no immediate complications

## 2022-01-22 NOTE — ANESTHESIA POSTPROCEDURE EVALUATION
Procedure(s):  LAPAROTOMY EXPLORATORY/RIGHT COLECTOMY.     general    Anesthesia Post Evaluation      Multimodal analgesia: multimodal analgesia used between 6 hours prior to anesthesia start to PACU discharge  Patient location during evaluation: PACU  Patient participation: complete - patient participated  Level of consciousness: awake and alert  Pain management: adequate  Airway patency: patent  Anesthetic complications: no  Cardiovascular status: acceptable  Respiratory status: acceptable  Hydration status: acceptable  Post anesthesia nausea and vomiting:  controlled  Final Post Anesthesia Temperature Assessment:  Normothermia (36.0-37.5 degrees C)      INITIAL Post-op Vital signs:   Vitals Value Taken Time   /55 01/22/22 1549   Temp 36.7 °C (98 °F) 01/22/22 1549   Pulse 96 01/22/22 1553   Resp 16 01/22/22 1549   SpO2 96 % 01/22/22 1553

## 2022-01-22 NOTE — PERIOP NOTES
TRANSFER - OUT REPORT:    Verbal report given to Jacqueline Smith RN on [id number] [place] [address]  being transferred to [phone] for routine post - op       Report consisted of patients Situation, Background, Assessment and   Recommendations(SBAR). Information from the following report(s) SBAR, OR Summary, MAR and Cardiac Rhythm nsr was reviewed with the receiving nurse. Lines:   Peripheral IV Left Antecubital (Active)   Site Assessment Clean, dry, & intact 01/22/22 1414   Phlebitis Assessment 0 01/22/22 1414   Infiltration Assessment 0 01/22/22 1414   Dressing Status Clean, dry, & intact 01/22/22 1414   Dressing Type Transparent;Tape 01/22/22 1414   Hub Color/Line Status Patent 01/22/22 1414   Alcohol Cap Used No 01/22/22 1414       Peripheral IV 01/22/22 Right Antecubital (Active)   Site Assessment Clean, dry, & intact 01/22/22 1414   Phlebitis Assessment 0 01/22/22 1414   Infiltration Assessment 0 01/22/22 1414   Dressing Status Clean, dry, & intact 01/22/22 1414   Dressing Type Transparent;Tape 01/22/22 1414   Hub Color/Line Status Patent 01/22/22 1414   Alcohol Cap Used No 01/22/22 1414        Opportunity for questions and clarification was provided. Patient transported with:   O2 @ 3 liters  Tech    VTE prophylaxis orders have been written for [id number]. Patient and family given floor number and nurses name. Family updated re: pt status after security code verified.

## 2022-01-22 NOTE — H&P
CC: abdominal pain       HPI;  67year old woman who has presented worsening abominal pain for the last 48 H. She came to the ER (CT showed a mass in the right colon and proximal dilatation of the cecum with microperforation of the cecum)  She had a colonoscopy  10 years ago. She had coronary stents placed (not on antiplatelet except baby aspirin)        PMH  CAD  GERD        Family History:   Problem Relation Age of Onset    Breast Cancer Maternal Aunt      Heart Disease Father           Arterosclerotic Cardiovascular Disease    Heart Attack Father           Social History            Socioeconomic History    Marital status: LEGALLY        Spouse name: Not on file    Number of children: Not on file    Years of education: Not on file    Highest education level: Not on file   Occupational History    Not on file   Tobacco Use    Smoking status: Never Smoker    Smokeless tobacco: Never Used   Substance and Sexual Activity    Alcohol use: No       Alcohol/week: 0.0 standard drinks    Drug use: Yes       Types: Prescription, OTC         ALLERGIES: Bactrim [sulfamethoprim ds]     Review of Systems   Constitutional: Negative for chills and fever. HENT: Negative for congestion and rhinorrhea. Respiratory: Negative for cough and shortness of breath. Gastrointestinal: Positive for abdominal pain, constipation, nausea and vomiting. Negative for diarrhea, hematochezia and melena. Genitourinary: Negative for dysuria and frequency. All other systems reviewed and are negative. Physical Exam  Vitals and nursing note reviewed. Constitutional:       General: She is not in acute distress. Appearance: She is well-developed. She is not ill-appearing. HENT:      Head: Normocephalic. Eyes:      Pupils: Pupils are equal, round, and reactive to light. Cardiovascular:      Rate and Rhythm: Normal rate and regular rhythm. Heart sounds: Normal heart sounds.    Pulmonary:      Effort: Pulmonary effort is normal.      Breath sounds: Normal breath sounds. Abdominal:      General: There is no distension. Palpations: Abdomen is soft. There is no mass. Tenderness: There is generalized abdominal tenderness. There is guarding and rebound. Musculoskeletal:         General: Normal range of motion. Lymphadenopathy:      Cervical: No cervical adenopathy. Skin:     General: Skin is warm and dry. Neurological:      Mental Status: She is alert. A/P ; Right colon obstructive mass with microperforation. WBC 15K  CT showed dilatation of cecum and SB.   On exam clear peritoneal irritation with guarding     Will Perform a Right colectomy

## 2022-01-22 NOTE — PERIOP NOTES
TRANSFER - IN REPORT:    Verbal report received from Jace Gabriel, [facility] on [id number] being received from ER 13 for ordered procedure      Report consisted of patients Situation, Background, Assessment and Recommendations(SBAR). Information from the following report(s) SBAR, Kardex, ED Summary, MAR, Recent Results, Procedure Verification and Quality Measures was reviewed with the receiving nurse. Opportunity for questions and clarification was provided. Assessment completed upon patients arrival to unit and care assumed.

## 2022-01-22 NOTE — ED TRIAGE NOTES
Pt arrives from home via [facility]. Called for abdominal pain. Pt states the abdominal pain started this evening. Last BM today. Has had some n/v. Denies dysuria or frequency. 800ml NS and 4mg Zofran with EMS. Had Covid in September, not vaccinated. No known exposure per patient.

## 2022-01-22 NOTE — ANESTHESIA PREPROCEDURE EVALUATION
Relevant Problems   NEUROLOGY   (+) Adjustment disorder with mixed anxiety and depressed mood      CARDIOVASCULAR   (+) Coronary atherosclerosis of native coronary artery       Anesthetic History          Comments: Sounds like she had awareness with a sedation eye case that gives her extreme anxiety over anesthesia      Review of Systems / Medical History  Patient summary reviewed and pertinent labs reviewed    Pulmonary  Within defined limits                 Neuro/Psych         Psychiatric history     Cardiovascular              CAD, cardiac stents (2016 - on bASA) and hyperlipidemia    Exercise tolerance: >4 METS: Active and denied recent chest pain, SOB, syncope      GI/Hepatic/Renal     GERD          Comments: Colonic mass with obstruction and small bowel perf/free air Endo/Other        Arthritis and anemia     Other Findings              Physical Exam    Airway  Mallampati: II  TM Distance: 4 - 6 cm  Neck ROM: normal range of motion   Mouth opening: Normal     Cardiovascular    Rhythm: regular  Rate: normal         Dental  No notable dental hx       Pulmonary  Breath sounds clear to auscultation               Abdominal  GI exam deferred       Other Findings            Anesthetic Plan    ASA: 3, emergent  Anesthesia type: general  ETT, RSI  Ketamine     Post-op pain plan if not by surgeon: peripheral nerve block single    Induction: Intravenous  Anesthetic plan and risks discussed with: Patient and Family      Patient has significant anxiety over being awake for an eye procedure. Will plan on versed, GETA supplemented with ketamine and abdominal wall blocks. Patient and family endorsed understanding and wish to proceed.

## 2022-01-23 PROCEDURE — 65270000029 HC RM PRIVATE

## 2022-01-23 PROCEDURE — 74011250636 HC RX REV CODE- 250/636: Performed by: SURGERY

## 2022-01-23 PROCEDURE — 2709999900 HC NON-CHARGEABLE SUPPLY

## 2022-01-23 PROCEDURE — 74011000250 HC RX REV CODE- 250: Performed by: EMERGENCY MEDICINE

## 2022-01-23 PROCEDURE — 74011000258 HC RX REV CODE- 258: Performed by: NURSE PRACTITIONER

## 2022-01-23 PROCEDURE — 74011250636 HC RX REV CODE- 250/636: Performed by: NURSE PRACTITIONER

## 2022-01-23 PROCEDURE — 74011250637 HC RX REV CODE- 250/637: Performed by: NURSE PRACTITIONER

## 2022-01-23 RX ORDER — NITROGLYCERIN 0.4 MG/1
0.4 TABLET SUBLINGUAL
Status: DISCONTINUED | OUTPATIENT
Start: 2022-01-23 | End: 2022-01-31 | Stop reason: HOSPADM

## 2022-01-23 RX ORDER — DOCUSATE SODIUM 100 MG/1
100 CAPSULE, LIQUID FILLED ORAL 2 TIMES DAILY
Status: DISCONTINUED | OUTPATIENT
Start: 2022-01-23 | End: 2022-01-31 | Stop reason: HOSPADM

## 2022-01-23 RX ORDER — OXYCODONE AND ACETAMINOPHEN 5; 325 MG/1; MG/1
1 TABLET ORAL
Status: DISCONTINUED | OUTPATIENT
Start: 2022-01-23 | End: 2022-01-31 | Stop reason: HOSPADM

## 2022-01-23 RX ORDER — AMLODIPINE BESYLATE 5 MG/1
2.5 TABLET ORAL DAILY
Status: DISCONTINUED | OUTPATIENT
Start: 2022-01-23 | End: 2022-01-31 | Stop reason: HOSPADM

## 2022-01-23 RX ORDER — MORPHINE SULFATE 2 MG/ML
2 INJECTION, SOLUTION INTRAMUSCULAR; INTRAVENOUS
Status: DISCONTINUED | OUTPATIENT
Start: 2022-01-23 | End: 2022-01-31 | Stop reason: HOSPADM

## 2022-01-23 RX ADMIN — MORPHINE SULFATE 2 MG: 4 INJECTION INTRAVENOUS at 06:16

## 2022-01-23 RX ADMIN — SODIUM CHLORIDE 125 ML/HR: 900 INJECTION, SOLUTION INTRAVENOUS at 07:36

## 2022-01-23 RX ADMIN — OXYCODONE AND ACETAMINOPHEN 1 TABLET: 5; 325 TABLET ORAL at 15:56

## 2022-01-23 RX ADMIN — OXYCODONE AND ACETAMINOPHEN 1 TABLET: 5; 325 TABLET ORAL at 21:50

## 2022-01-23 RX ADMIN — CEFTRIAXONE 1 G: 1 INJECTION, POWDER, FOR SOLUTION INTRAMUSCULAR; INTRAVENOUS at 07:36

## 2022-01-23 RX ADMIN — ALUMINUM HYDROXIDE AND MAGNESIUM CARBONATE 15 ML: 95; 358 LIQUID ORAL at 12:12

## 2022-01-23 RX ADMIN — DOCUSATE SODIUM 100 MG: 100 CAPSULE, LIQUID FILLED ORAL at 15:56

## 2022-01-23 RX ADMIN — DOCUSATE SODIUM 100 MG: 100 CAPSULE, LIQUID FILLED ORAL at 10:25

## 2022-01-23 RX ADMIN — MORPHINE SULFATE 2 MG: 4 INJECTION INTRAVENOUS at 08:44

## 2022-01-23 RX ADMIN — MORPHINE SULFATE 2 MG: 4 INJECTION INTRAVENOUS at 03:08

## 2022-01-23 RX ADMIN — SODIUM CHLORIDE, PRESERVATIVE FREE 5 ML: 5 INJECTION INTRAVENOUS at 14:28

## 2022-01-23 RX ADMIN — OXYCODONE AND ACETAMINOPHEN 1 TABLET: 5; 325 TABLET ORAL at 10:25

## 2022-01-23 RX ADMIN — SODIUM CHLORIDE, PRESERVATIVE FREE 10 ML: 5 INJECTION INTRAVENOUS at 05:22

## 2022-01-23 RX ADMIN — SODIUM CHLORIDE, PRESERVATIVE FREE 10 ML: 5 INJECTION INTRAVENOUS at 22:28

## 2022-01-23 RX ADMIN — MORPHINE SULFATE 2 MG: 2 INJECTION, SOLUTION INTRAMUSCULAR; INTRAVENOUS at 11:28

## 2022-01-23 RX ADMIN — AMLODIPINE BESYLATE 2.5 MG: 5 TABLET ORAL at 10:25

## 2022-01-23 NOTE — PROGRESS NOTES
01/22/22 1638   Dual Skin Pressure Injury Assessment   Dual Skin Pressure Injury Assessment WDL   Second Care Provider (Based on [address] [place] [address]) Og Vick RN    Skin Integumentary   Skin Integumentary (WDL) X   Skin Color Appropriate for ethnicity   Skin Condition/Temp Warm;Dry;Fragile   Skin Integrity Incision (comment)  (ABD)   Wound Prevention and Protection Methods   Orientation of Wound Prevention Posterior   Location of Wound Prevention Sacrum/Coccyx   Dressing Present  No   Wound Offloading (Prevention Methods) Bed, pressure reduction mattress

## 2022-01-23 NOTE — PROGRESS NOTES
PLAN:  Clear Liquids  OOB  Decrease IVF to 75mL  Add Colace  DC Sanchez  Use IS  Pain/ nausea control  IV abx - Rocephin  SCD  Add home BP medication    ASSESSMENT:  Admit Date: 1/22/2022   1 Day Post-Op  Procedure(s):  LAPAROTOMY EXPLORATORY/RIGHT COLECTOMY    Principal Problem:    Perforated sigmoid colon ([address] (1/22/2022)         SUBJECTIVE:  Pt awake in bed. Pain controlled. No nausea/ vomiting. Sanchez patent. Instructed on use of IS. AF, NAD. 3L via NC with O2 sat 93%    OBJECTIVE:  Constitutional: Alert oriented cooperative patient in no acute distress; appears stated age   Visit Vitals  BP (!) 108/52 (BP Patient Position: Supine)   Pulse 75   Temp 98.3 °F (36.8 °C)   Resp 16   Ht 5' 2\" (1.575 m)   Wt 130 lb (59 kg)   LMP  (LMP Unknown)   SpO2 93%   BMI 23.78 kg/m²     Eyes: Sclera are clear. ENMT: no external lesions gross hearing normal; no obvious neck masses, no ear or lip lesions  CV: RRR. Normal perfusion  Resp: No JVD. Breathing is  non-labored; no audible wheezing. GI: soft and non-distended, appropriately ttp, dressing c/d/i     : Sanchez  Musculoskeletal: unremarkable with normal function. No embolic signs or cyanosis.    Neuro:  Oriented; moves all 4; no focal deficits  Psychiatric: normal affect and mood, no memory impairment      Patient Vitals for the past 24 hrs:   BP Temp Pulse Resp SpO2 Height Weight   01/23/22 0815 (!) 108/52 98.3 °F (36.8 °C) 75 16 93 %     01/23/22 0318 (!) 112/54 98.3 °F (36.8 °C) 80 18 98 %     01/23/22 0012 (!) 114/55 98.1 °F (36.7 °C) 76 20 96 %     01/22/22 2027 110/62 97.5 °F (36.4 °C) 83 18 96 %     01/22/22 1634 (!) 112/54 98.9 °F (37.2 °C) 88 16 96 %     01/22/22 1553   96  96 %     01/22/22 1549 (!) 111/55 98 °F (36.7 °C) 85 16 96 %     01/22/22 1545 (!) 116/56  85 18 97 %     01/22/22 1540 (!) 115/56  85 18 97 %     01/22/22 1535 (!) 115/57  82 16 98 %     01/22/22 1530 (!) 122/56  81 18 98 %     01/22/22 1525 (!) 124/59  85 16 99 %     01/22/22 1519 (!) 126/59  85 18 98 %     01/22/22 1514 (!) 129/57  83 18 97 %     01/22/22 1509 (!) 134/55  84 16 97 %     01/22/22 1504 124/60  86 16 98 %     01/22/22 1459 125/60  81 18 98 %     01/22/22 1454 (!) 120/59  82 18 97 %     01/22/22 1449 129/60  81 18 98 %     01/22/22 1445 127/60  82 16 99 %     01/22/22 1440 130/60  83 16 98 %     01/22/22 1435 129/61  84 18 99 %     01/22/22 1430 129/60  86 18 98 %     01/22/22 1425 127/60  86 16 98 %     01/22/22 1414 (!) 126/56 98.3 °F (36.8 °C) 94 17 98 %     01/22/22 1140     90 %     01/22/22 1035 (!) 121/56 98.6 °F (37 °C) 76 20 94 % 5' 2\" (1.575 m) 130 lb (59 kg)   01/22/22 1024  99.2 °F (37.3 °C)        01/22/22 1015 (!) 126/57    93 %     01/22/22 1009 (!) 132/53    93 %     01/22/22 0954     93 %     01/22/22 0939     93 %       Labs:    Recent Labs     01/22/22  0203   WBC 14.5*   HGB 10.7*         K 3.7   *   CO2 22   BUN 13   CREA 0.79   *   TBILI 0.3   ALT 26   AP 74   LPSE 82       Marcelene Deter, NP

## 2022-01-23 NOTE — PROGRESS NOTES
Problem: Falls - Risk of  Goal: *Absence of Falls  Description: Document Josefina Ground Fall Risk and appropriate interventions in the flowsheet.   Outcome: Progressing Towards Goal  Note: Fall Risk Interventions:                 Elimination Interventions: Bed/chair exit alarm,Call light in reach              Problem: Patient Education: Go to Patient Education Activity  Goal: Patient/Family Education  Outcome: Progressing Towards Goal     Problem: General Medical Care Plan  Goal: *Vital signs within specified parameters  Outcome: Progressing Towards Goal  Goal: *Labs within defined limits  Outcome: Progressing Towards Goal  Goal: *Absence of infection signs and symptoms  Outcome: Progressing Towards Goal  Goal: *Optimal pain control at patient's stated goal  Outcome: Progressing Towards Goal  Goal: *Skin integrity maintained  Outcome: Progressing Towards Goal  Goal: *Fluid volume balance  Outcome: Progressing Towards Goal  Goal: *Optimize nutritional status  Outcome: Progressing Towards Goal  Goal: *Anxiety reduced or absent  Outcome: Progressing Towards Goal  Goal: *Progressive mobility and function (eg: ADL's)  Outcome: Progressing Towards Goal     Problem: Patient Education: Go to Patient Education Activity  Goal: Patient/Family Education  Outcome: Progressing Towards Goal

## 2022-01-24 LAB
ALBUMIN SERPL-MCNC: 2.1 G/DL (ref 3.2–4.6)
ALBUMIN/GLOB SERPL: 0.7 {RATIO} (ref 1.2–3.5)
ALP SERPL-CCNC: 50 U/L (ref 50–136)
ALT SERPL-CCNC: 23 U/L (ref 12–65)
ANION GAP SERPL CALC-SCNC: 5 MMOL/L (ref 7–16)
AST SERPL-CCNC: 15 U/L (ref 15–37)
BASOPHILS # BLD: 0 K/UL (ref 0–0.2)
BASOPHILS NFR BLD: 0 % (ref 0–2)
BILIRUB SERPL-MCNC: 0.2 MG/DL (ref 0.2–1.1)
BUN SERPL-MCNC: 13 MG/DL (ref 8–23)
CALCIUM SERPL-MCNC: 8 MG/DL (ref 8.3–10.4)
CHLORIDE SERPL-SCNC: 112 MMOL/L (ref 98–107)
CO2 SERPL-SCNC: 25 MMOL/L (ref 21–32)
CREAT SERPL-MCNC: 0.63 MG/DL (ref 0.6–1)
DIFFERENTIAL METHOD BLD: ABNORMAL
EOSINOPHIL # BLD: 0 K/UL (ref 0–0.8)
EOSINOPHIL NFR BLD: 0 % (ref 0.5–7.8)
ERYTHROCYTE [DISTWIDTH] IN BLOOD BY AUTOMATED COUNT: 14.5 % (ref 11.9–14.6)
GLOBULIN SER CALC-MCNC: 3.2 G/DL (ref 2.3–3.5)
GLUCOSE SERPL-MCNC: 103 MG/DL (ref 65–100)
HCT VFR BLD AUTO: 23.8 % (ref 35.8–46.3)
HGB BLD-MCNC: 7.2 G/DL (ref 11.7–15.4)
IMM GRANULOCYTES # BLD AUTO: 0.1 K/UL (ref 0–0.5)
IMM GRANULOCYTES NFR BLD AUTO: 1 % (ref 0–5)
LYMPHOCYTES # BLD: 1.1 K/UL (ref 0.5–4.6)
LYMPHOCYTES NFR BLD: 10 % (ref 13–44)
MCH RBC QN AUTO: 25.5 PG (ref 26.1–32.9)
MCHC RBC AUTO-ENTMCNC: 30.3 G/DL (ref 31.4–35)
MCV RBC AUTO: 84.4 FL (ref 79.6–97.8)
MONOCYTES # BLD: 0.8 K/UL (ref 0.1–1.3)
MONOCYTES NFR BLD: 8 % (ref 4–12)
NEUTS SEG # BLD: 8.9 K/UL (ref 1.7–8.2)
NEUTS SEG NFR BLD: 81 % (ref 43–78)
NRBC # BLD: 0 K/UL (ref 0–0.2)
PLATELET # BLD AUTO: 132 K/UL (ref 150–450)
PMV BLD AUTO: 11.1 FL (ref 9.4–12.3)
POTASSIUM SERPL-SCNC: 3.5 MMOL/L (ref 3.5–5.1)
PROT SERPL-MCNC: 5.3 G/DL (ref 6.3–8.2)
RBC # BLD AUTO: 2.82 M/UL (ref 4.05–5.2)
SODIUM SERPL-SCNC: 142 MMOL/L (ref 136–145)
WBC # BLD AUTO: 11 K/UL (ref 4.3–11.1)

## 2022-01-24 PROCEDURE — 74011250637 HC RX REV CODE- 250/637: Performed by: NURSE PRACTITIONER

## 2022-01-24 PROCEDURE — 74011000250 HC RX REV CODE- 250: Performed by: EMERGENCY MEDICINE

## 2022-01-24 PROCEDURE — 2709999900 HC NON-CHARGEABLE SUPPLY

## 2022-01-24 PROCEDURE — 74011000250 HC RX REV CODE- 250: Performed by: NURSE PRACTITIONER

## 2022-01-24 PROCEDURE — C9113 INJ PANTOPRAZOLE SODIUM, VIA: HCPCS | Performed by: NURSE PRACTITIONER

## 2022-01-24 PROCEDURE — 36415 COLL VENOUS BLD VENIPUNCTURE: CPT

## 2022-01-24 PROCEDURE — 97165 OT EVAL LOW COMPLEX 30 MIN: CPT

## 2022-01-24 PROCEDURE — 74011250636 HC RX REV CODE- 250/636: Performed by: NURSE PRACTITIONER

## 2022-01-24 PROCEDURE — 97161 PT EVAL LOW COMPLEX 20 MIN: CPT

## 2022-01-24 PROCEDURE — 74011000258 HC RX REV CODE- 258: Performed by: NURSE PRACTITIONER

## 2022-01-24 PROCEDURE — 80053 COMPREHEN METABOLIC PANEL: CPT

## 2022-01-24 PROCEDURE — 65270000029 HC RM PRIVATE

## 2022-01-24 PROCEDURE — 97530 THERAPEUTIC ACTIVITIES: CPT

## 2022-01-24 PROCEDURE — 97535 SELF CARE MNGMENT TRAINING: CPT

## 2022-01-24 PROCEDURE — 85025 COMPLETE CBC W/AUTO DIFF WBC: CPT

## 2022-01-24 RX ADMIN — CEFTRIAXONE 1 G: 1 INJECTION, POWDER, FOR SOLUTION INTRAMUSCULAR; INTRAVENOUS at 09:53

## 2022-01-24 RX ADMIN — PANTOPRAZOLE SODIUM 40 MG: 40 INJECTION, POWDER, FOR SOLUTION INTRAVENOUS at 12:00

## 2022-01-24 RX ADMIN — SODIUM CHLORIDE 75 ML/HR: 900 INJECTION, SOLUTION INTRAVENOUS at 09:45

## 2022-01-24 RX ADMIN — AMLODIPINE BESYLATE 2.5 MG: 5 TABLET ORAL at 09:52

## 2022-01-24 RX ADMIN — SODIUM CHLORIDE, PRESERVATIVE FREE 10 ML: 5 INJECTION INTRAVENOUS at 17:54

## 2022-01-24 RX ADMIN — OXYCODONE AND ACETAMINOPHEN 1 TABLET: 5; 325 TABLET ORAL at 05:56

## 2022-01-24 RX ADMIN — SODIUM CHLORIDE, PRESERVATIVE FREE 10 ML: 5 INJECTION INTRAVENOUS at 05:01

## 2022-01-24 RX ADMIN — SODIUM CHLORIDE 75 ML/HR: 900 INJECTION, SOLUTION INTRAVENOUS at 23:37

## 2022-01-24 RX ADMIN — OXYCODONE AND ACETAMINOPHEN 1 TABLET: 5; 325 TABLET ORAL at 14:22

## 2022-01-24 RX ADMIN — DOCUSATE SODIUM 100 MG: 100 CAPSULE, LIQUID FILLED ORAL at 17:53

## 2022-01-24 RX ADMIN — DOCUSATE SODIUM 100 MG: 100 CAPSULE, LIQUID FILLED ORAL at 09:52

## 2022-01-24 RX ADMIN — OXYCODONE AND ACETAMINOPHEN 1 TABLET: 5; 325 TABLET ORAL at 23:20

## 2022-01-24 RX ADMIN — SODIUM CHLORIDE, PRESERVATIVE FREE 10 ML: 5 INJECTION INTRAVENOUS at 23:33

## 2022-01-24 RX ADMIN — OXYCODONE AND ACETAMINOPHEN 1 TABLET: 5; 325 TABLET ORAL at 09:53

## 2022-01-24 RX ADMIN — OXYCODONE AND ACETAMINOPHEN 1 TABLET: 5; 325 TABLET ORAL at 18:32

## 2022-01-24 RX ADMIN — OXYCODONE AND ACETAMINOPHEN 1 TABLET: 5; 325 TABLET ORAL at 02:08

## 2022-01-24 NOTE — PROGRESS NOTES
PLAN:  Clear Liquids  OOB  IVF @ 75mL  Colace  DC Sanchez  Use IS  Protonix  Pain/ nausea control  IV abx - Rocephin  SCD  home BP medication  PT/OT consult  Consult Respiratory for IS education  Ok to shower  Mag/ Phos    ASSESSMENT:  Admit Date: 1/22/2022   2 Day Post-Op  Procedure(s):  LAPAROTOMY EXPLORATORY/RIGHT COLECTOMY    Principal Problem:    Perforated sigmoid colon ([address] (1/22/2022)         SUBJECTIVE:  Pt awake in bed. Pain controlled. Has only been oob to go to bathroom. On Clears. No nausea/ vomiting.-flatus/-BM. C/o acid reflux. Voiding without difficulty. AF, NAD. 2L via NC. OBJECTIVE:  Constitutional: Alert oriented cooperative patient in no acute distress; appears stated age   Visit Vitals  BP (!) 116/58   Pulse 73   Temp 98.7 °F (37.1 °C)   Resp 18   Ht 5' 2\" (1.575 m)   Wt 130 lb (59 kg)   LMP  (LMP Unknown)   SpO2 93%   BMI 23.78 kg/m²     Eyes: Sclera are clear. ENMT: no external lesions gross hearing normal; no obvious neck masses, no ear or lip lesions  CV: RRR. Normal perfusion  Resp: No JVD. Breathing is  non-labored; no audible wheezing. GI: soft and non-distended, appropriately ttp, dressing c/d/i     Musculoskeletal: unremarkable with normal function. No embolic signs or cyanosis.    Neuro:  Oriented; moves all 4; no focal deficits  Psychiatric: normal affect and mood, no memory impairment      Patient Vitals for the past 24 hrs:   BP Temp Pulse Resp SpO2   01/24/22 0745 (!) 116/58 98.7 °F (37.1 °C) 73 18 93 %   01/24/22 0600 (!) 120/59 98.1 °F (36.7 °C) 71 20 95 %   01/24/22 0215 (!) 111/59 97.4 °F (36.3 °C) 72 18 94 %   01/23/22 2156 117/64 98.3 °F (36.8 °C) 72 24 95 %   01/23/22 1600 (!) 106/50 98.2 °F (36.8 °C) 77 16 93 %     Labs:    Recent Labs     01/24/22  0548 01/22/22  0203 01/22/22  0203   WBC 11.0   < > 14.5*   HGB 7.2*   < > 10.7*   *   < > 202      < > 141   K 3.5   < > 3.7   *   < > 112*   CO2 25   < > 22   BUN 13   < > 13   CREA 0.63   < > 0.79 *   < > 141*   TBILI 0.2   < > 0.3   ALT 23   < > 26   AP 50   < > 74   LPSE  --   --  82    < > = values in this interval not displayed.        Valeriano Feldman, NP

## 2022-01-24 NOTE — PROGRESS NOTES
ACUTE OT GOALS:  (Developed with and agreed upon by patient and/or caregiver.)  1. Patient will complete lower body bathing and dressing with setup and adaptive equipment as needed. 2. Patient will complete toileting with supervision. 3. Patient will tolerate 20 minutes of OT treatment with as needed rest breaks to increase activity tolerance for ADLs. 4. Patient will complete functional transfers with modified independence and adaptive equipment as needed. 5. Patient will complete grooming in standing at sink with modified independence. Timeframe: 7 visits     OCCUPATIONAL THERAPY ASSESSMENT: Initial Assessment and Daily Note OT Treatment Day # 1    Nicole CRUZ Case is a 67 y.o. female   PRIMARY DIAGNOSIS: Perforated sigmoid colon ([facility]  Perforated sigmoid colon ([facility] [K63.1]  Procedure(s) (LRB):  LAPAROTOMY EXPLORATORY/RIGHT COLECTOMY (N/A)  2 Days Post-Op  Reason for Referral:  S/p above procedure   ICD-10: Treatment Diagnosis: Generalized Muscle Weakness (M62.81)  Dizziness and Giddiness (R42)  [place] / [place] / Product Type: Medicare /   ASSESSMENT:     REHAB RECOMMENDATIONS:   Recommendation to date pending progress:  [address] [place] [address]  Equipment:    To Be Determined     PRIOR LEVEL OF FUNCTION:  (Prior to Hospitalization)  INITIAL/CURRENT LEVEL OF FUNCTION:  (Based on today's evaluation)   Bathing:   Independent  Dressing:   Independent  Feeding/Grooming:   Independent  Toileting:   Independent  Functional Mobility:   Independent Bathing:   Minimal Assistance  Dressing:   Minimal Assistance  Feeding/Grooming:   Set Up  Toileting:   Minimal Assistance  Functional Mobility:   Contact Guard Assistance     ASSESSMENT:  Ms. Case is a 66 y/o F who is s/p R colectomy. Baseline independent & active. Today reports abdominal pain 6/10. Required CGA HHA for mobility & demonstrates ADLs in standing with CGA.  Supportive daughter at bedside. Will follow for acute OT to increase independence & safety post-op. SUBJECTIVE:   Ms. Banks states, \"I was active before this. \"    SOCIAL HISTORY/LIVING ENVIRONMENT: Lives alone but has supportive children in area. Independent with ADLs, IADLs, driving. Active.    Home Environment: Private residence  # Steps to Enter: 3  One/Two Story Residence: One story  Living Alone: Yes  Support Systems: Child(manisha),Other Family Member(s)    OBJECTIVE:     PAIN: VITAL SIGNS: LINES/DRAINS:   Pre Treatment: Pain Screen  Pain Scale 1: Numeric (0 - 10)  Pain Intensity 1: 6  Pain Location 1: Abdomen  Pain Intervention(s) 1: Position  Post Treatment: same  Vital Signs  O2 Sat (%): 92 %  O2 Device: Nasal cannula  O2 Flow Rate (L/min): 2 l/min IV  O2 Device: Nasal cannula     GROSS EVALUATION:  BUE Within Functional Limits Abnormal/ Functional Abnormal/ Non-Functional (see comments) Not Tested Comments:   AROM [x] [] [] []    PROM [] [] [] [x]    Strength [x] [] [] []    Balance [] [x] [] []    Posture [] [] [] [x]    Sensation [] [] [] [x]    Coordination [] [] [] [x]    Tone [] [] [] [x]    Edema [] [] [] [x]    Activity Tolerance [] [x] [] []     [] [] [] []      COGNITION/  PERCEPTION: Intact Impaired   (see comments) Comments:   Orientation [x] []    Vision [x] []    Hearing [x] []    Judgment/ Insight [x] []    Attention [x] []    Memory [x] []    Command Following [x] []    Emotional Regulation [x] []     [] []      ACTIVITIES OF DAILY LIVING: I Mod I S SBA CGA Min Mod Max Total NT Comments   BASIC ADLs:              Bathing/ Showering [] [] [] [] [] [] [] [] [] []    Toileting [] [] [] [] [] [] [] [] [] []    Dressing [] [] [] [] [x] [] [] [] [] [] Upper body    Feeding [] [] [] [] [] [] [] [] [] []    Grooming [] [] [] [] [x] [] [] [] [] [] Brushing teeth, washing hands in standing    Personal Device Care [] [] [] [] [] [] [] [] [] []    Functional Mobility [] [] [] [] [x] [] [] [] [] []    I=Independent, Mod I=Modified Independent, S=Supervision, SBA=Standby Assistance, CGA=Contact Guard Assistance,   Min=Minimal Assistance, Mod=Moderate Assistance, Max=Maximal Assistance, Total=Total Assistance, NT=Not Tested    MOBILITY: I Mod I S SBA CGA Min Mod Max Total  NT x2 Comments:   Supine to sit [] [] [] [] [] [] [] [] [] [x] []    Sit to supine [] [] [] [] [] [] [] [] [] [x] []    Sit to stand [] [] [] [] [x] [] [] [] [] [] []    Bed to chair [] [] [] [] [x] [] [] [] [] [] []    I=Independent, Mod I=Modified Independent, S=Supervision, SBA=Standby Assistance, CGA=Contact Guard Assistance,   Min=Minimal Assistance, Mod=Moderate Assistance, Max=Maximal Assistance, Total=Total Assistance, NT=Not [facility] [place] [id number]   Daily Activity Inpatient Short Form        How much help from another person does the patient currently need. .. Total A Lot A Little None   1. Putting on and taking off regular lower body clothing? [] 1   [] 2   [x] 3   [] 4   2. Bathing (including washing, rinsing, drying)? [] 1   [] 2   [x] 3   [] 4   3. Toileting, which includes using toilet, bedpan or urinal?   [] 1   [] 2   [x] 3   [] 4   4. Putting on and taking off regular upper body clothing? [] 1   [] 2   [x] 3   [] 4   5. Taking care of personal grooming such as brushing teeth? [] 1   [] 2   [x] 3   [] 4   6. Eating meals? [] 1   [] 2   [x] 3   [] 4   © 2007, Trustees of [address] [place] [address] [address] [address], under license to [email or web address]. All rights reserved     Score:  Initial: 18 Most Recent: X (Date: -- )   Interpretation of Tool:  Represents activities that are increasingly more difficult (i.e. Bed mobility, Transfers, Gait). PLAN:   FREQUENCY/DURATION: OT Plan of Care: 3 times/week for duration of hospital stay or until stated goals are met, whichever comes first.    PROBLEM LIST:   (Skilled intervention is medically necessary to address:)  1. Decreased ADL/Functional Activities  2.  Decreased Activity Tolerance  3. Decreased Balance  4. Decreased Transfer Abilities  5. Increased Pain   INTERVENTIONS PLANNED:   (Benefits and precautions of occupational therapy have been discussed with the patient.)  1. Self Care Training  2. Therapeutic Activity  3. Therapeutic Exercise/HEP  4. Neuromuscular Re-education  5. Education     TREATMENT:     EVALUATION: Low Complexity : (Untimed Charge)    TREATMENT:   ($$ Self Care/Home Management: 8-22 mins    )  Self Care (8 Minutes): Self care including Upper Body Dressing and Grooming to increase independence and activity tolerance. Based on results of today's evaluation, co-treatment by PT/OT at future sessions is likely not warranted.        TREATMENT GRID:  N/A    AFTER TREATMENT POSITION/PRECAUTIONS:  Chair, Needs within reach, RN notified and Visitors at bedside    INTERDISCIPLINARY COLLABORATION:  RN/PCT, PT/PTA and OT/GIVENS    TOTAL TREATMENT DURATION:  OT Patient Time In/Time Out  Time In: 1340  Time Out: Yulissa Johnson [address], OTR/L

## 2022-01-24 NOTE — PROGRESS NOTES
ACUTE PHYSICAL THERAPY GOALS:  (Developed with and agreed upon by patient and/or caregiver. )  LTG:  (1.)Ms. Case will move from supine to sit and sit to supine , scoot up and down and roll side to side in bed with INDEPENDENCE within 7 treatment day(s). (2.)Ms. Case will transfer from bed to chair and chair to bed with MODIFIED INDEPENDENCE using the least restrictive device within 7 treatment day(s). (3.)Ms. Case will ambulate with SUPERVISION for 500 feet with the least restrictive device within 7 treatment day(s). (4.)Ms. Case will ascend and descend 3 steps with STAND BY ASSIST using handrail(s) within 7 days.   ________________________________________________________________________________________________    PHYSICAL THERAPY ASSESSMENT: Initial Assessment and PM PT Treatment Day # 1      Nicole CRUZ Case is a 67 y.o. female   PRIMARY DIAGNOSIS: Perforated sigmoid colon ([address]  Perforated sigmoid colon ([address] [K63.1]  Procedure(s) (LRB):  LAPAROTOMY EXPLORATORY/RIGHT COLECTOMY (N/A)  2 Days Post-Op  Reason for Referral:  Ex lap, R yumiko colectomy  ICD-10: Treatment Diagnosis: Generalized Muscle Weakness (M62.81)  Difficulty in walking, Not elsewhere classified (R26.2)  Other abnormalities of gait and mobility (R26.89)  [place] / Product Type: Medicare /     ASSESSMENT:     REHAB RECOMMENDATIONS:   Recommendation to date pending progress:  [address] [place] [address]  Equipment:    To Be Determined     PRIOR LEVEL OF FUNCTION:  (Prior to Hospitalization) INITIAL/CURRENT LEVEL OF FUNCTION:  (Most Recently Demonstrated)   Bed Mobility:   Independent  Sit to Stand:   Independent  Transfers:   Independent  Gait/Mobility:   Independent Bed Mobility:   Minimal Assistance  Sit to Stand:   Contact Guard Assistance  Transfers:   Contact Guard Assistance  Gait/Mobility:   Contact Guard Assistance-minimal assistance     ASSESSMENT:  Ms. Banks is seen for initial therapy assessment following exploratory laparotomy, right yumiko colectomy. She lives alone and is independent, active at baseline without DME use. Presents today with expected post op soreness, weakness, and impaired activity tolerance. Reviewed log roll technique and transfers to sitting with min assist. Worked on seated and standing activities, functional transfers, standing pre-gait at edge of bed, and ambulation in hallway x 250 ft with RW, CGA-min assist. Pt with slow pace and slightly forward flexed posture, no loss of balance noted or assistance needed. Returned to room for seated rest break, SpO2 90-91% on room air. Performs standing functional tasks at sink to address balance, activity tolerance. Positioned comfortably in chair post session with needs in reach, daughter at bedside. SpO2 was found 86% after activity on room air so 2L re applied. Ms. Banks will benefit from continued therapy during hospital stay to address deficits/maximize safety and independence with mobility. Hopefully can dc home with [place] [facility] and great family support. SUBJECTIVE:   Ms. Banks states, \"I'm ready\"    SOCIAL HISTORY/LIVING ENVIRONMENT: Lives alone. Daughter next door. Independent/active without DME use.   Home Environment: Private residence  # Steps to Enter: 3  One/Two Story Residence: One story  Living Alone: Yes  Support Systems: Child(manisha),Other Family Member(s)  OBJECTIVE:     PAIN: VITAL SIGNS: LINES/DRAINS:   Pre Treatment:    Post Treatment: 0/10   IV  O2 Device: None (Room air)     GROSS EVALUATION:   Within Functional Limits Abnormal/ Functional Abnormal/ Non-Functional (see comments) Not Tested Comments:   AROM [x] [] [] []    PROM [] [] [] []    Strength [] [x] [] []    Balance [] [x] [] []    Posture [] [x] [] []    Sensation [] [] [] []    Coordination [] [] [] []    Tone [] [] [] []    Edema [] [] [] []    Activity Tolerance [] [x] [] []     [] [] [] []      COGNITION/  PERCEPTION: Intact Impaired (see comments) Comments:   Orientation [x] []    Vision [x] []    Hearing [x] []    Command Following [x] []    Safety Awareness [x] []     [] []      MOBILITY: I Mod I S SBA CGA Min Mod Max Total  NT x2 Comments:   Bed Mobility    Rolling [] [] [] [] [] [x] [] [] [] [] []    Supine to Sit [] [] [] [] [] [x] [] [] [] [] []    Scooting [] [] [] [] [x] [] [] [] [] [] []    Sit to Supine [] [] [] [] [] [] [] [] [] [] []    Transfers    Sit to Stand [] [] [] [] [] [x] [] [] [] [] []    Bed to Chair [] [] [] [] [] [x] [] [] [] [] []    Stand to Sit [] [] [] [] [] [x] [] [] [] [] []    I=Independent, Mod I=Modified Independent, S=Supervision, SBA=Standby Assistance, CGA=Contact Guard Assistance,   Min=Minimal Assistance, Mod=Moderate Assistance, Max=Maximal Assistance, Total=Total Assistance, NT=Not Tested  GAIT: I Mod I S SBA CGA Min Mod Max Total  NT x2 Comments:   Level of Assistance [] [] [] [] [] [x] [] [] [] [] []    Distance 250'     DME Rolling Walker    Gait Quality Slow, steady    Weightbearing Status N/A     I=Independent, Mod I=Modified Independent, S=Supervision, SBA=Standby Assistance, CGA=Contact Guard Assistance,   Min=Minimal Assistance, Mod=Moderate Assistance, Max=Maximal Assistance, Total=Total Assistance, NT=Not Tested    [address] [facility] [address] [address] [facility] [address] [facility] Mobility Inpatient Short Form       How much difficulty does the patient currently have. .. Unable A Lot A Little None   1. Turning over in bed (including adjusting bedclothes, sheets and blankets)? [] 1   [] 2   [x] 3   [] 4   2. Sitting down on and standing up from a chair with arms ( e.g., wheelchair, bedside commode, etc.)   [] 1   [] 2   [x] 3   [] 4   3. Moving from lying on back to sitting on the side of the bed? [] 1   [] 2   [x] 3   [] 4   How much help from another person does the patient currently need. .. Total A Lot A Little None   4. Moving to and from a bed to a chair (including a wheelchair)?    [] 1   [] 2   [x] 3 [] 4   5. Need to walk in hospital room? [] 1   [] 2   [x] 3   [] 4   6. Climbing 3-5 steps with a railing? [] 1   [x] 2   [] 3   [] 4   © 2007, Trustees of [facility] [employer], under license to [employer]. All rights reserved     Score:  Initial: 17 Most Recent: X (Date: -- )    Interpretation of Tool:  Represents activities that are increasingly more difficult (i.e. Bed mobility, Transfers, Gait). PLAN:   FREQUENCY/DURATION: PT Plan of Care: 3 times/week for duration of hospital stay or until stated goals are met, whichever comes first.    PROBLEM LIST:   (Skilled intervention is medically necessary to address:)  1. Decreased Activity Tolerance  2. Decreased Balance  3. Decreased Gait Ability  4. Decreased Strength  5. Decreased Transfer Abilities   INTERVENTIONS PLANNED:   (Benefits and precautions of physical therapy have been discussed with the patient.)  1. Therapeutic Activity  2. Therapeutic Exercise/HEP  3. Neuromuscular Re-education  4. Gait Training  5. Manual Therapy  6. Education     TREATMENT:     EVALUATION: Low Complexity : (Untimed Charge)    TREATMENT:   ($$ Therapeutic Activity: 23-37 mins    )  Co-Treatment PT/OT necessary due to patient's decreased overall endurance/tolerance levels, as well as need for high level skilled assistance to complete functional transfers/mobility and functional tasks  Therapeutic Activity (25 Minutes): Therapeutic activity included Rolling, Supine to Sit, Scooting, Transfer Training, Ambulation on level ground, Sitting balance  and Standing balance to improve functional Mobility, Strength, Activity tolerance and balance.     TREATMENT GRID:  N/A    AFTER TREATMENT POSITION/PRECAUTIONS:  Chair, Needs within reach, RN notified and Visitors at bedside    INTERDISCIPLINARY COLLABORATION:  RN/PCT, PT/PTA and OT/GIVENS    TOTAL TREATMENT DURATION:  PT Patient Time In/Time Out  Time In: 1323  Time Out: [address], DPT

## 2022-01-25 LAB
MAGNESIUM SERPL-MCNC: 2.5 MG/DL (ref 1.8–2.4)
PHOSPHATE SERPL-MCNC: 2 MG/DL (ref 2.3–3.7)

## 2022-01-25 PROCEDURE — 74011250637 HC RX REV CODE- 250/637: Performed by: SURGERY

## 2022-01-25 PROCEDURE — 74011250636 HC RX REV CODE- 250/636: Performed by: SURGERY

## 2022-01-25 PROCEDURE — C9113 INJ PANTOPRAZOLE SODIUM, VIA: HCPCS | Performed by: NURSE PRACTITIONER

## 2022-01-25 PROCEDURE — 74011000258 HC RX REV CODE- 258: Performed by: NURSE PRACTITIONER

## 2022-01-25 PROCEDURE — 74011250636 HC RX REV CODE- 250/636: Performed by: NURSE PRACTITIONER

## 2022-01-25 PROCEDURE — 83735 ASSAY OF MAGNESIUM: CPT

## 2022-01-25 PROCEDURE — 74011000250 HC RX REV CODE- 250: Performed by: EMERGENCY MEDICINE

## 2022-01-25 PROCEDURE — 74011000250 HC RX REV CODE- 250: Performed by: NURSE PRACTITIONER

## 2022-01-25 PROCEDURE — 65270000029 HC RM PRIVATE

## 2022-01-25 PROCEDURE — 84100 ASSAY OF PHOSPHORUS: CPT

## 2022-01-25 PROCEDURE — 36415 COLL VENOUS BLD VENIPUNCTURE: CPT

## 2022-01-25 PROCEDURE — 74011250637 HC RX REV CODE- 250/637: Performed by: NURSE PRACTITIONER

## 2022-01-25 RX ORDER — SODIUM,POTASSIUM PHOSPHATES 280-250MG
2 POWDER IN PACKET (EA) ORAL 2 TIMES DAILY
Status: COMPLETED | OUTPATIENT
Start: 2022-01-25 | End: 2022-01-25

## 2022-01-25 RX ORDER — DEXTROSE, SODIUM CHLORIDE, AND POTASSIUM CHLORIDE 5; .45; .15 G/100ML; G/100ML; G/100ML
50 INJECTION INTRAVENOUS CONTINUOUS
Status: DISCONTINUED | OUTPATIENT
Start: 2022-01-25 | End: 2022-01-29

## 2022-01-25 RX ORDER — PANTOPRAZOLE SODIUM 40 MG/1
40 TABLET, DELAYED RELEASE ORAL
Status: DISCONTINUED | OUTPATIENT
Start: 2022-01-25 | End: 2022-01-31 | Stop reason: HOSPADM

## 2022-01-25 RX ADMIN — OXYCODONE AND ACETAMINOPHEN 1 TABLET: 5; 325 TABLET ORAL at 18:32

## 2022-01-25 RX ADMIN — OXYCODONE AND ACETAMINOPHEN 1 TABLET: 5; 325 TABLET ORAL at 03:22

## 2022-01-25 RX ADMIN — POTASSIUM & SODIUM PHOSPHATES POWDER PACK 280-160-250 MG 2 PACKET: 280-160-250 PACK at 08:08

## 2022-01-25 RX ADMIN — AMLODIPINE BESYLATE 2.5 MG: 5 TABLET ORAL at 08:09

## 2022-01-25 RX ADMIN — OXYCODONE AND ACETAMINOPHEN 1 TABLET: 5; 325 TABLET ORAL at 13:06

## 2022-01-25 RX ADMIN — DEXTROSE MONOHYDRATE, SODIUM CHLORIDE, AND POTASSIUM CHLORIDE 75 ML/HR: 50; 4.5; 1.49 INJECTION, SOLUTION INTRAVENOUS at 22:39

## 2022-01-25 RX ADMIN — CEFTRIAXONE 1 G: 1 INJECTION, POWDER, FOR SOLUTION INTRAMUSCULAR; INTRAVENOUS at 09:29

## 2022-01-25 RX ADMIN — SODIUM CHLORIDE, PRESERVATIVE FREE 10 ML: 5 INJECTION INTRAVENOUS at 22:41

## 2022-01-25 RX ADMIN — POTASSIUM & SODIUM PHOSPHATES POWDER PACK 280-160-250 MG 2 PACKET: 280-160-250 PACK at 18:31

## 2022-01-25 RX ADMIN — DOCUSATE SODIUM 100 MG: 100 CAPSULE, LIQUID FILLED ORAL at 18:31

## 2022-01-25 RX ADMIN — SODIUM CHLORIDE, PRESERVATIVE FREE 10 ML: 5 INJECTION INTRAVENOUS at 14:07

## 2022-01-25 RX ADMIN — OXYCODONE AND ACETAMINOPHEN 1 TABLET: 5; 325 TABLET ORAL at 22:39

## 2022-01-25 RX ADMIN — PANTOPRAZOLE SODIUM 40 MG: 40 INJECTION, POWDER, FOR SOLUTION INTRAVENOUS at 08:11

## 2022-01-25 RX ADMIN — OXYCODONE AND ACETAMINOPHEN 1 TABLET: 5; 325 TABLET ORAL at 08:13

## 2022-01-25 RX ADMIN — DEXTROSE MONOHYDRATE, SODIUM CHLORIDE, AND POTASSIUM CHLORIDE 75 ML/HR: 50; 4.5; 1.49 INJECTION, SOLUTION INTRAVENOUS at 08:14

## 2022-01-25 RX ADMIN — DOCUSATE SODIUM 100 MG: 100 CAPSULE, LIQUID FILLED ORAL at 08:10

## 2022-01-25 NOTE — PROGRESS NOTES
PT note: Attempted to see pt multiple times this morning but house supervisor was in room talking with patient and daughter on multiple occasions. Will check back on pt later as schedule/time allows.     Joanne Gardner, PTA

## 2022-01-26 LAB
ANION GAP SERPL CALC-SCNC: 4 MMOL/L (ref 7–16)
BUN SERPL-MCNC: 3 MG/DL (ref 8–23)
CALCIUM SERPL-MCNC: 7.9 MG/DL (ref 8.3–10.4)
CHLORIDE SERPL-SCNC: 110 MMOL/L (ref 98–107)
CO2 SERPL-SCNC: 27 MMOL/L (ref 21–32)
CREAT SERPL-MCNC: 0.56 MG/DL (ref 0.6–1)
ERYTHROCYTE [DISTWIDTH] IN BLOOD BY AUTOMATED COUNT: 13.8 % (ref 11.9–14.6)
GLUCOSE SERPL-MCNC: 116 MG/DL (ref 65–100)
HCT VFR BLD AUTO: 24.8 % (ref 35.8–46.3)
HGB BLD-MCNC: 7.8 G/DL (ref 11.7–15.4)
MAGNESIUM SERPL-MCNC: 2.5 MG/DL (ref 1.8–2.4)
MCH RBC QN AUTO: 26.2 PG (ref 26.1–32.9)
MCHC RBC AUTO-ENTMCNC: 31.5 G/DL (ref 31.4–35)
MCV RBC AUTO: 83.2 FL (ref 79.6–97.8)
NRBC # BLD: 0 K/UL (ref 0–0.2)
PHOSPHATE SERPL-MCNC: 3.3 MG/DL (ref 2.3–3.7)
PLATELET # BLD AUTO: 176 K/UL (ref 150–450)
PMV BLD AUTO: 10.6 FL (ref 9.4–12.3)
POTASSIUM SERPL-SCNC: 3.3 MMOL/L (ref 3.5–5.1)
RBC # BLD AUTO: 2.98 M/UL (ref 4.05–5.2)
SODIUM SERPL-SCNC: 141 MMOL/L (ref 136–145)
WBC # BLD AUTO: 4.1 K/UL (ref 4.3–11.1)

## 2022-01-26 PROCEDURE — 65270000029 HC RM PRIVATE

## 2022-01-26 PROCEDURE — 74011250636 HC RX REV CODE- 250/636: Performed by: NURSE PRACTITIONER

## 2022-01-26 PROCEDURE — 74011250637 HC RX REV CODE- 250/637: Performed by: NURSE PRACTITIONER

## 2022-01-26 PROCEDURE — 74011250636 HC RX REV CODE- 250/636: Performed by: SURGERY

## 2022-01-26 PROCEDURE — 74011000258 HC RX REV CODE- 258: Performed by: NURSE PRACTITIONER

## 2022-01-26 PROCEDURE — 36415 COLL VENOUS BLD VENIPUNCTURE: CPT

## 2022-01-26 PROCEDURE — 74011000250 HC RX REV CODE- 250: Performed by: NURSE PRACTITIONER

## 2022-01-26 PROCEDURE — 80048 BASIC METABOLIC PNL TOTAL CA: CPT

## 2022-01-26 PROCEDURE — 74011250637 HC RX REV CODE- 250/637: Performed by: SURGERY

## 2022-01-26 PROCEDURE — 74011000250 HC RX REV CODE- 250: Performed by: EMERGENCY MEDICINE

## 2022-01-26 PROCEDURE — 84100 ASSAY OF PHOSPHORUS: CPT

## 2022-01-26 PROCEDURE — 85027 COMPLETE CBC AUTOMATED: CPT

## 2022-01-26 PROCEDURE — 83735 ASSAY OF MAGNESIUM: CPT

## 2022-01-26 RX ORDER — POTASSIUM CHLORIDE 20 MEQ/1
40 TABLET, EXTENDED RELEASE ORAL
Status: COMPLETED | OUTPATIENT
Start: 2022-01-26 | End: 2022-01-26

## 2022-01-26 RX ORDER — POTASSIUM CHLORIDE 14.9 MG/ML
20 INJECTION INTRAVENOUS ONCE
Status: COMPLETED | OUTPATIENT
Start: 2022-01-26 | End: 2022-01-26

## 2022-01-26 RX ORDER — PREDNISOLONE ACETATE 10 MG/ML
1 SUSPENSION/ DROPS OPHTHALMIC
Status: DISCONTINUED | OUTPATIENT
Start: 2022-01-26 | End: 2022-01-31 | Stop reason: HOSPADM

## 2022-01-26 RX ADMIN — DEXTROSE MONOHYDRATE, SODIUM CHLORIDE, AND POTASSIUM CHLORIDE 75 ML/HR: 50; 4.5; 1.49 INJECTION, SOLUTION INTRAVENOUS at 22:45

## 2022-01-26 RX ADMIN — PREDNISOLONE ACETATE 1 DROP: 10 SUSPENSION/ DROPS OPHTHALMIC at 21:31

## 2022-01-26 RX ADMIN — OXYCODONE AND ACETAMINOPHEN 1 TABLET: 5; 325 TABLET ORAL at 18:34

## 2022-01-26 RX ADMIN — OXYCODONE AND ACETAMINOPHEN 1 TABLET: 5; 325 TABLET ORAL at 03:48

## 2022-01-26 RX ADMIN — DOCUSATE SODIUM 100 MG: 100 CAPSULE, LIQUID FILLED ORAL at 18:29

## 2022-01-26 RX ADMIN — OXYCODONE AND ACETAMINOPHEN 1 TABLET: 5; 325 TABLET ORAL at 12:50

## 2022-01-26 RX ADMIN — AMLODIPINE BESYLATE 2.5 MG: 5 TABLET ORAL at 08:38

## 2022-01-26 RX ADMIN — CEFTRIAXONE 1 G: 1 INJECTION, POWDER, FOR SOLUTION INTRAMUSCULAR; INTRAVENOUS at 11:30

## 2022-01-26 RX ADMIN — OXYCODONE AND ACETAMINOPHEN 1 TABLET: 5; 325 TABLET ORAL at 22:43

## 2022-01-26 RX ADMIN — POTASSIUM CHLORIDE 20 MEQ: 14.9 INJECTION, SOLUTION INTRAVENOUS at 11:34

## 2022-01-26 RX ADMIN — SODIUM CHLORIDE, PRESERVATIVE FREE 5 ML: 5 INJECTION INTRAVENOUS at 16:09

## 2022-01-26 RX ADMIN — DOCUSATE SODIUM 100 MG: 100 CAPSULE, LIQUID FILLED ORAL at 08:38

## 2022-01-26 RX ADMIN — OXYCODONE AND ACETAMINOPHEN 1 TABLET: 5; 325 TABLET ORAL at 08:38

## 2022-01-26 RX ADMIN — DEXTROSE MONOHYDRATE, SODIUM CHLORIDE, AND POTASSIUM CHLORIDE 75 ML/HR: 50; 4.5; 1.49 INJECTION, SOLUTION INTRAVENOUS at 11:29

## 2022-01-26 RX ADMIN — POTASSIUM CHLORIDE 40 MEQ: 20 TABLET, EXTENDED RELEASE ORAL at 18:28

## 2022-01-26 RX ADMIN — SODIUM CHLORIDE, PRESERVATIVE FREE 10 ML: 5 INJECTION INTRAVENOUS at 21:33

## 2022-01-26 RX ADMIN — PANTOPRAZOLE SODIUM 40 MG: 40 TABLET, DELAYED RELEASE ORAL at 05:35

## 2022-01-26 NOTE — PROGRESS NOTES
OT attempted to see two times. Pt was asleep the first time and daughter requested that she would not be woke up. The second time pt declined. She had been up all morning. Will attempt at another time/date as schedule permits.        Thank you   CHON Gilliland/MARK

## 2022-01-26 NOTE — PROGRESS NOTES
PLAN:  Clear Liquids  OOB  IVF @ 75mL  Colace  DC Sanchez  Use IS  Protonix  Pain/ nausea control  IV abx - Rocephin  SCD  home BP medication  PT/OT consult  Consult Respiratory for IS education  Ok to shower  Mag/ Phos    ASSESSMENT:  Admit Date: 1/22/2022   3 Day Post-Op  Procedure(s):  LAPAROTOMY EXPLORATORY/RIGHT COLECTOMY    Principal Problem:    Perforation of colon ([address] (1/22/2022)         SUBJECTIVE:  Pt awake in bed. Pain controlled. Has only been oob to go to bathroom. Ambulated with PT. On Clears. No nausea/ vomiting.-flatus/-BM. Protonix improved acid reflux. Voiding without difficulty. AF, NAD. 2L via NC. OBJECTIVE:  Constitutional: Alert oriented cooperative patient in no acute distress; appears stated age   Visit Vitals  BP (!) 125/55 (BP 1 Location: Right upper arm, BP Patient Position: Lying left side)   Pulse 78   Temp 97.3 °F (36.3 °C)   Resp 18   Ht 5' 2\" (1.575 m)   Wt 130 lb (59 kg)   LMP  (LMP Unknown)   SpO2 90%   BMI 23.78 kg/m²     Eyes: Sclera are clear. ENMT: no external lesions gross hearing normal; no obvious neck masses, no ear or lip lesions  CV: RRR. Normal perfusion  Resp: No JVD. Breathing is  non-labored; no audible wheezing. GI: soft and non-distended, appropriately ttp, dressing c/d/i     Musculoskeletal: unremarkable with normal function. No embolic signs or cyanosis.    Neuro:  Oriented; moves all 4; no focal deficits  Psychiatric: normal affect and mood, no memory impairment      Patient Vitals for the past 24 hrs:   BP Temp Pulse Resp SpO2   01/25/22 2051 (!) 125/55 97.3 °F (36.3 °C) 78 18 90 %   01/25/22 1607 (!) 122/58 98.4 °F (36.9 °C) 72 18 95 %   01/25/22 1149 (!) 144/66 97.9 °F (36.6 °C) 79 18 94 %   01/25/22 0809 137/62 97.3 °F (36.3 °C) 78 16 95 %   01/25/22 0258 130/63 99.1 °F (37.3 °C) 76 16 92 %     Labs:    Recent Labs     01/24/22  0548   WBC 11.0   HGB 7.2*   *      K 3.5   *   CO2 25   BUN 13   CREA 0.63   *   TBILI 0.2 ALT 23   AP 50     Trae Benavidez MD

## 2022-01-26 NOTE — PROGRESS NOTES
PLAN:  Clear Liquids  OOB  IVF @ 75mL  Colace  Sanchez out  Use IS  Protonix  Pain/ nausea control  IV abx - Rocephin  SCD  home BP medication  PT/OT consulted  Respiratory consulted for IS education  Ok to shower  Follow labs    ASSESSMENT:  Admit Date: 1/22/2022   4 Day Post-Op  Procedure(s):  LAPAROTOMY EXPLORATORY/RIGHT COLECTOMY    Principal Problem:    Perforation of colon ([address] (1/22/2022)         SUBJECTIVE:  Pt awake in bed. Pain controlled. Reflux significantly improved. Tolerating Clears. No nausea/ vomiting.-flatus/-BM. Voiding without difficulty. AF, NAD. 2L via NC. K 3.3. Pt and daughter extremely upset that daughter is not allowed to continue to stay overnight. Pt/Daughter are concerned about pt receiving eye gtts as ordered. Maintaining schedule of eye gtts is critical for pt. Pt/daughter have letter from pts eye doctor recommending daughter stay with pt to administer medication and monitor for problems. OBJECTIVE:  Constitutional: Alert oriented cooperative patient in no acute distress; appears stated age   Visit Vitals  /89 (BP 1 Location: Right upper arm, BP Patient Position: Sitting)   Pulse 74   Temp 97.9 °F (36.6 °C)   Resp 18   Ht 5' 2\" (1.575 m)   Wt 130 lb (59 kg)   LMP  (LMP Unknown)   SpO2 94%   BMI 23.78 kg/m²     Eyes: Sclera are clear. ENMT: no external lesions gross hearing normal; no obvious neck masses, no ear or lip lesions  CV: RRR. Normal perfusion  Resp: No JVD. Breathing is  non-labored; no audible wheezing. GI: soft and non-distended, appropriately ttp, dressing removed staples c/d/i     Musculoskeletal: unremarkable with normal function. No embolic signs or cyanosis.    Neuro:  Oriented; moves all 4; no focal deficits  Psychiatric: normal affect and mood, no memory impairment      Patient Vitals for the past 24 hrs:   BP Temp Pulse Resp SpO2   01/26/22 0730 117/89 97.9 °F (36.6 °C) 74 18 94 %   01/26/22 0310 134/66 97.7 °F (36.5 °C) 80 18 96 %   01/26/22 0027 (!) 118/56 98.8 °F (37.1 °C) 70 18 92 %   01/25/22 2051 (!) 125/55 97.3 °F (36.3 °C) 78 18 90 %   01/25/22 1607 (!) 122/58 98.4 °F (36.9 °C) 72 18 95 %   01/25/22 1149 (!) 144/66 97.9 °F (36.6 °C) 79 18 94 %     Labs:    Recent Labs     01/26/22  0530 01/24/22  0548 01/24/22  0548   WBC 4.1*   < > 11.0   HGB 7.8*   < > 7.2*      < > 132*      < > 142   K 3.3*   < > 3.5   *   < > 112*   CO2 27   < > 25   BUN 3*   < > 13   CREA 0.56*   < > 0.63   *   < > 103*   TBILI  --   --  0.2   ALT  --   --  23   AP  --   --  50    < > = values in this interval not displayed.      Elier Schmid, NP

## 2022-01-26 NOTE — PROGRESS NOTES
CM screened chart for potential discharge needs. No CM consult received. Patient followed by therapy services. CM in to see patient at bedside. Patient is alert and lying in bed. Demographics, PCP and insurance reviewed. CM [facility] and patient would like to see how she progressed inpatient until she is [facility]able to [facility]. CM will continue to follow and assist with any discharge needs. Care Management Interventions  PCP Verified by CM: Yes  Mode of Transport at Discharge: Other (see comment) (Daughter)  Physical Therapy Consult: Yes  Occupational Therapy Consult: Yes  Support Systems: Child(manisha)  Confirm Follow Up Transport: Family  The Plan for Transition of Care is Related to the Following Treatment Goals : Patient will participate in [facility] therapies in order to return to safe baseline independence in ADLs.   The Patient and/or Patient Representative was Provided with a Choice of Provider and Agrees with the Discharge Plan?: Yes  Freedom of Choice List was Provided with Basic Dialogue that Supports the Patient's Individualized Plan of Care/Goals, Treatment Preferences and Shares the Quality Data Associated with the Providers?: Yes  Discharge Location  Patient Expects to be Discharged to[de-identified] Unable to determine at this time

## 2022-01-26 NOTE — PROGRESS NOTES
PT note: Attempted to see patient twice this PM.  Patient was sleeping soundly with daughter present requesting we not wake her. On second attempt patient initially agreeable to exercises only, but ultimately decided to decline treatment today. Daughter did discourage patient from participation as they are afraid mobility will increase pain. Will check back on patient at a later date/time as schedule allows and is appropriate.       Jeannette Appiah, PTA

## 2022-01-27 LAB
25(OH)D3 SERPL-MCNC: 19.6 NG/ML (ref 30–100)
ANION GAP SERPL CALC-SCNC: 5 MMOL/L (ref 7–16)
BACTERIA SPEC CULT: NORMAL
BACTERIA SPEC CULT: NORMAL
BUN SERPL-MCNC: 5 MG/DL (ref 8–23)
CALCIUM SERPL-MCNC: 8.6 MG/DL (ref 8.3–10.4)
CEA SERPL-MCNC: 12.3 NG/ML (ref 0–3)
CHLORIDE SERPL-SCNC: 110 MMOL/L (ref 98–107)
CO2 SERPL-SCNC: 25 MMOL/L (ref 21–32)
CREAT SERPL-MCNC: 0.59 MG/DL (ref 0.6–1)
ERYTHROCYTE [DISTWIDTH] IN BLOOD BY AUTOMATED COUNT: 14 % (ref 11.9–14.6)
FERRITIN SERPL-MCNC: 29 NG/ML (ref 8–388)
FOLATE SERPL-MCNC: 22.5 NG/ML (ref 3.1–17.5)
GLUCOSE SERPL-MCNC: 103 MG/DL (ref 65–100)
HCT VFR BLD AUTO: 26.7 % (ref 35.8–46.3)
HGB BLD-MCNC: 8.4 G/DL (ref 11.7–15.4)
IRON SATN MFR SERPL: 7 %
IRON SERPL-MCNC: 18 UG/DL (ref 35–150)
MAGNESIUM SERPL-MCNC: 2.3 MG/DL (ref 1.8–2.4)
MCH RBC QN AUTO: 26.3 PG (ref 26.1–32.9)
MCHC RBC AUTO-ENTMCNC: 31.5 G/DL (ref 31.4–35)
MCV RBC AUTO: 83.7 FL (ref 79.6–97.8)
NRBC # BLD: 0 K/UL (ref 0–0.2)
PHOSPHATE SERPL-MCNC: 3.6 MG/DL (ref 2.3–3.7)
PLATELET # BLD AUTO: 213 K/UL (ref 150–450)
PMV BLD AUTO: 10.6 FL (ref 9.4–12.3)
POTASSIUM SERPL-SCNC: 4.3 MMOL/L (ref 3.5–5.1)
RBC # BLD AUTO: 3.19 M/UL (ref 4.05–5.2)
SERVICE CMNT-IMP: NORMAL
SERVICE CMNT-IMP: NORMAL
SODIUM SERPL-SCNC: 140 MMOL/L (ref 136–145)
TIBC SERPL-MCNC: 270 UG/DL (ref 250–450)
VIT B12 SERPL-MCNC: 450 PG/ML (ref 193–986)
WBC # BLD AUTO: 4.6 K/UL (ref 4.3–11.1)

## 2022-01-27 PROCEDURE — 97530 THERAPEUTIC ACTIVITIES: CPT

## 2022-01-27 PROCEDURE — 74011250636 HC RX REV CODE- 250/636: Performed by: NURSE PRACTITIONER

## 2022-01-27 PROCEDURE — 82306 VITAMIN D 25 HYDROXY: CPT

## 2022-01-27 PROCEDURE — 82728 ASSAY OF FERRITIN: CPT

## 2022-01-27 PROCEDURE — 97110 THERAPEUTIC EXERCISES: CPT

## 2022-01-27 PROCEDURE — 74011000258 HC RX REV CODE- 258: Performed by: NURSE PRACTITIONER

## 2022-01-27 PROCEDURE — 84100 ASSAY OF PHOSPHORUS: CPT

## 2022-01-27 PROCEDURE — 82746 ASSAY OF FOLIC ACID SERUM: CPT

## 2022-01-27 PROCEDURE — 85027 COMPLETE CBC AUTOMATED: CPT

## 2022-01-27 PROCEDURE — 65270000029 HC RM PRIVATE

## 2022-01-27 PROCEDURE — 80048 BASIC METABOLIC PNL TOTAL CA: CPT

## 2022-01-27 PROCEDURE — 83735 ASSAY OF MAGNESIUM: CPT

## 2022-01-27 PROCEDURE — 97116 GAIT TRAINING THERAPY: CPT

## 2022-01-27 PROCEDURE — 36415 COLL VENOUS BLD VENIPUNCTURE: CPT

## 2022-01-27 PROCEDURE — 74011250636 HC RX REV CODE- 250/636: Performed by: SURGERY

## 2022-01-27 PROCEDURE — 83540 ASSAY OF IRON: CPT

## 2022-01-27 PROCEDURE — APPNB180 APP NON BILLABLE TIME > 60 MINS: Performed by: NURSE PRACTITIONER

## 2022-01-27 PROCEDURE — 99223 1ST HOSP IP/OBS HIGH 75: CPT | Performed by: INTERNAL MEDICINE

## 2022-01-27 PROCEDURE — 74011250637 HC RX REV CODE- 250/637: Performed by: SURGERY

## 2022-01-27 PROCEDURE — 74011000250 HC RX REV CODE- 250: Performed by: EMERGENCY MEDICINE

## 2022-01-27 PROCEDURE — 82378 CARCINOEMBRYONIC ANTIGEN: CPT

## 2022-01-27 PROCEDURE — 82607 VITAMIN B-12: CPT

## 2022-01-27 PROCEDURE — 74011250637 HC RX REV CODE- 250/637: Performed by: NURSE PRACTITIONER

## 2022-01-27 RX ADMIN — PREDNISOLONE ACETATE 1 DROP: 10 SUSPENSION/ DROPS OPHTHALMIC at 22:05

## 2022-01-27 RX ADMIN — CEFTRIAXONE 1 G: 1 INJECTION, POWDER, FOR SOLUTION INTRAMUSCULAR; INTRAVENOUS at 09:14

## 2022-01-27 RX ADMIN — OXYCODONE AND ACETAMINOPHEN 1 TABLET: 5; 325 TABLET ORAL at 12:09

## 2022-01-27 RX ADMIN — AMLODIPINE BESYLATE 2.5 MG: 5 TABLET ORAL at 09:14

## 2022-01-27 RX ADMIN — SODIUM CHLORIDE, PRESERVATIVE FREE 10 ML: 5 INJECTION INTRAVENOUS at 05:37

## 2022-01-27 RX ADMIN — DOCUSATE SODIUM 100 MG: 100 CAPSULE, LIQUID FILLED ORAL at 09:14

## 2022-01-27 RX ADMIN — OXYCODONE AND ACETAMINOPHEN 1 TABLET: 5; 325 TABLET ORAL at 02:31

## 2022-01-27 RX ADMIN — PANTOPRAZOLE SODIUM 40 MG: 40 TABLET, DELAYED RELEASE ORAL at 05:37

## 2022-01-27 RX ADMIN — OXYCODONE AND ACETAMINOPHEN 1 TABLET: 5; 325 TABLET ORAL at 19:10

## 2022-01-27 RX ADMIN — DOCUSATE SODIUM 100 MG: 100 CAPSULE, LIQUID FILLED ORAL at 17:25

## 2022-01-27 RX ADMIN — DEXTROSE MONOHYDRATE, SODIUM CHLORIDE, AND POTASSIUM CHLORIDE 75 ML/HR: 50; 4.5; 1.49 INJECTION, SOLUTION INTRAVENOUS at 17:17

## 2022-01-27 RX ADMIN — OXYCODONE AND ACETAMINOPHEN 1 TABLET: 5; 325 TABLET ORAL at 07:12

## 2022-01-27 RX ADMIN — SODIUM CHLORIDE, PRESERVATIVE FREE 10 ML: 5 INJECTION INTRAVENOUS at 22:05

## 2022-01-27 NOTE — PROGRESS NOTES
ACUTE OT GOALS:  (Developed with and agreed upon by patient and/or caregiver.)    1. Patient will complete lower body bathing and dressing with setup and adaptive equipment as needed. 2. Patient will complete toileting with supervision. 3. Patient will tolerate 20 minutes of OT treatment with as needed rest breaks to increase activity tolerance for ADLs. 4. Patient will complete functional transfers with modified independence and adaptive equipment as needed. 5. Patient will complete grooming in standing at sink with modified independence. OCCUPATIONAL THERAPY: Daily Note OT Treatment Day # 2    Vero CRUZ Case is a 67 y.o. female   PRIMARY DIAGNOSIS: Perforation of colon ([facility] [facility]  Perforated sigmoid colon ([facility] [address] [K63.1]  Procedure(s) (LRB):  LAPAROTOMY EXPLORATORY/RIGHT COLECTOMY (N/A)  5 Days Post-Op  [place] / [place] / Product Type: Medicare /   ASSESSMENT:     REHAB RECOMMENDATIONS: CURRENT LEVEL OF FUNCTION:  (Most Recently Demonstrated)   Recommendation to date pending progress:  [address] [place] [address] Therapy  Equipment:    Rolling Walker   shower chair  Bathing:   Not tested  Dressing:   Not tested  Feeding/Grooming:   Not tested  Toileting:   Not tested  Functional Mobility:   Supervision     ASSESSMENT:  Ms. Banks presents in supine with her daughter in the room. Pt stated that she had completed a.m self care with some assistance from her daughter for LB ADL's. Discussed AE available to assist with LB bathing and dressing. Pt's daughter stated that they have the equipment at home from a previous surgery. Pt  demonstrated good body mechanics with bed mobility to decrease stress on surgical site. Pt was supervision with all mobility in the room. Pt was issued a theraband and completed exercises below. Will follow up 1-2 more sessions to review HEP with pt.      SUBJECTIVE:   Ms. Banks states, \"I'm getting up on my on and cleaning up with a little help from my daughter. \"    SOCIAL HISTORY/LIVING ENVIRONMENT: Home Environment: Private residence  # Steps to Enter: 3  One/Two Story Residence: One story  Living Alone: Yes  Support Systems: Child(manisha)    OBJECTIVE:     PAIN: VITAL SIGNS: LINES/DRAINS:   Pre Treatment:    Post Treatment: none   none  O2 Device: Nasal cannula     ACTIVITIES OF DAILY LIVING: I Mod I S SBA CGA Min Mod Max Total NT Comments   BASIC ADLs:              Bathing/ Showering [] [] [] [] [] [] [] [] [] [x]    Toileting [] [] [] [] [] [] [] [] [] [x]    Dressing [] [] [] [] [] [] [] [] [] [x]    Feeding [] [] [] [] [] [] [] [] [] [x]    Grooming [] [] [] [] [] [] [] [] [] [x]    Personal Device Care [] [] [] [] [] [] [] [] [] [x]    Functional Mobility [] [] [x] [] [] [] [] [] [] []    I=Independent, Mod I=Modified Independent, S=Supervision, SBA=Standby Assistance, CGA=Contact Guard Assistance,   Min=Minimal Assistance, Mod=Moderate Assistance, Max=Maximal Assistance, Total=Total Assistance, NT=Not Tested    MOBILITY: I Mod I S SBA CGA Min Mod Max Total  NT x2 Comments:   Supine to sit [] [] [] [x] [] [] [] [] [] [] []    Sit to supine [] [] [] [x] [] [] [] [] [] [] []    Sit to stand [] [] [x] [x] [] [] [] [] [] [] []    Bed to chair [] [] [] [] [] [] [] [] [] [x] []    I=Independent, Mod I=Modified Independent, S=Supervision, SBA=Standby Assistance, CGA=Contact Guard Assistance,   Min=Minimal Assistance, Mod=Moderate Assistance, Max=Maximal Assistance, Total=Total Assistance, NT=Not Tested    BALANCE: Good Fair+ Fair Fair- Poor NT Comments   Sitting Static [x] [] [] [] [] []    Sitting Dynamic [x] [] [] [] [] []              Standing Static [x] [] [] [] [] []    Standing Dynamic [x] [] [] [] [] []      PLAN:   FREQUENCY/DURATION: OT Plan of Care: 3 times/week for duration of hospital stay or until stated goals are met, whichever comes first.    TREATMENT:   TREATMENT:   ( $$ Therapeutic Activity: 23-37 mins  $$ Therapeutic Exercises: 8-22 mins )  Co-Treatment PT/OT necessary due to patient's decreased overall endurance/tolerance levels, as well as need for high level skilled assistance to complete functional transfers/mobility and functional tasks  Therapeutic Activity (30 Minutes): Therapeutic activity included Rolling, Supine to Sit, Sit to Supine, Lateral Scooting, Transfer Training, Ambulation on level ground, Standing balance and activity tolerance to improve functional Mobility, Strength and Activity tolerance. Therapeutic Exercise (14 Minutes): Therapeutic exercises noted below to improve functional activity tolerance, AROM, strength and mobility.      TREATMENT GRID:   Date:  1/27/22 Date:   Date:     Activity/Exercise Parameters Parameters Parameters   Shoulder flexion/extension 10 reps each arm with red theraband     Shoulder horizontal add/abd 10 reps each arm with red theraband     Punches  10 reps each arm with red theraband     Bicep curls 15 reps each arm with red theraband     Tricep extension 10 reps each arm with red theraband                       AFTER TREATMENT POSITION/PRECAUTIONS:  Bed, Needs within reach, RN notified and family in the room    INTERDISCIPLINARY COLLABORATION:  RN/PCT, PT/PTA and OT/GIVENS    TOTAL TREATMENT DURATION:  OT Patient Time In/Time Out  Time In: [address]   Time Out: [address]

## 2022-01-27 NOTE — PROGRESS NOTES
Comprehensive Nutrition Assessment    Type and Reason for Visit: Initial,NPO/clear liquid    Nutrition Recommendations/Plan:   Meals and Snacks:  Continue current diet. Advance per MD.  Nutrition Supplement Therapy:   Medical food supplement therapy:  Initiate Ensure Clear three times per day (this provides 240 kcal and 8 grams protein per bottle)   If anticipated patient will remain NPO/Clear liquid for greater than 3 more days, consider nutrition support for primary needs.  If nutrition support pursued, order appropriate route and an IP Nutrition Consult for RD to manage. Malnutrition Assessment:  Malnutrition Status: At risk for malnutrition (specify) (s/p colectomy, NPO/CLQ day 5 on assessment)    Nutrition Assessment:   Nutrition History: Pt reports ~6lb weight loss following COVID-19 infection 9/2021. Pt reports loss of taste and smell previously inhibting appetite. Pt states in recent months her appetite and intake improve, enjoys cooking for herself. Pt states UBW of ~130lb. Nutrition Background: Pt with PMH significant for CAD, IBS, and HLD presented [facility] 1/22 w/ worsening abominal pain. CT w/ findings of Right colon obstructive mass with microperforation. Pt s/p colectomy 1/22. 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 Nutrition Interval:  Pt seen at bedside, daughter present. Pt and daughter with appropriate questions regarding diet advancement and current nutrition status, RD answered questions appropriately. Pt and daughter provided nutrition history as above. Pt agreeable to starting Ensure Clear TID, willing to try other supplements pending diet advancement. Discussed pt with RN.     Lab Results   Component Value Date/Time    Sodium 140 01/27/2022 05:47 AM    Potassium 4.3 01/27/2022 05:47 AM    Chloride 110 (H) 01/27/2022 05:47 AM    CO2 25 01/27/2022 05:47 AM    Anion gap 5 (L) 01/27/2022 05:47 AM    Glucose 103 (H) 01/27/2022 05:47 AM    BUN 5 (L) 01/27/2022 05:47 AM    Creatinine 0.59 (L) 01/27/2022 05:47 AM    Calcium 8.6 01/27/2022 05:47 AM    Albumin 2.1 (L) 01/24/2022 05:48 AM    Magnesium 2.3 01/27/2022 05:47 AM    Phosphorus 3.6 01/27/2022 05:47 AM       Nutrition Related Findings:   NFPE without signs of fat or muscle wasting. CLQ day 5 at time of assessment 1/27. Wound Type: Surgical incision    Current Nutrition Therapies:  ADULT DIET Clear Liquid    Current Intake:   Average Meal Intake: 51-75% (of CLQ diet ) Average Supplement Intake: None ordered      Anthropometric Measures:  Height: 5' 2\" (157.5 cm)  Current Body Wt: 63.6 kg (140 lb 3.2 oz) (1/27), Weight source: Bed scale  BMI: 25.6, Overweight (BMI 25.0-29. 9)     Ideal Body Weight (lbs) (Calculated): 110 lbs (50 kg), 127.5 %  Usual Body Wt: 59 kg (130 lb) (per pt), Percent weight change: 7.8          Edema: No data recorded   Estimated Daily Nutrient Needs:  Energy (kcal/day): 0712-7594 (Kcal/kg (22-26), Weight Used: Current (63.6kg))  Protein (g/day): 64-80 (1-1.25g/kg) Weight Used: (Current (63.6kg))  Fluid (ml/day):   (1 ml/kcal)    Nutrition Diagnosis:   · Inadequate oral intake related to altered GI function as evidenced by NPO or clear liquid status due to medical condition (s/p colectomy )       Nutrition Interventions:   Food and/or Nutrient Delivery: Continue current diet,Start oral nutrition supplement (advance diet as medically able)     Coordination of Nutrition Care: Continue to monitor while inpatient,Interdisciplinary rounds  Plan of Care discussed with Dejon Muro RN    Goals: Active Goal: Advance and tolerate diet >CLQ by next RD assessment    Nutrition Monitoring and Evaluation:      Food/Nutrient Intake Outcomes: Diet advancement/tolerance,Food and nutrient intake,Supplement intake  Physical Signs/Symptoms Outcomes: Biochemical data,GI status,Meal time behavior    Discharge Planning:     Too soon to determine    Carnella Oas) KIMBERLY Lauren, LD  Contact: [phone]      Disaster Mode Active

## 2022-01-27 NOTE — PROGRESS NOTES
ACUTE PHYSICAL THERAPY GOALS:  (Developed with and agreed upon by patient and/or caregiver. )  LTG:  (1.)Ms. Banks will move from supine to sit and sit to supine , scoot up and down and roll side to side in bed with INDEPENDENCE within 7 treatment day(s). (2.)Ms. Banks will transfer from bed to chair and chair to bed with MODIFIED INDEPENDENCE using the least restrictive device within 7 treatment day(s). (3.)Ms. Banks will ambulate with SUPERVISION for 500 feet with the least restrictive device within 7 treatment day(s). (4.)Ms. Banks will ascend and descend 3 steps with STAND BY ASSIST using handrail(s) within 7 days. PHYSICAL THERAPY: Daily Note and AM Treatment Day # 2    Yelena Banks is a 67 y.o. female   PRIMARY DIAGNOSIS: Perforation of colon ([address]  Perforated sigmoid colon ([facility] [address] [K63.1]  Procedure(s) (LRB):  LAPAROTOMY EXPLORATORY/RIGHT COLECTOMY (N/A)  5 Days Post-Op    ASSESSMENT:     REHAB RECOMMENDATIONS: CURRENT LEVEL OF FUNCTION:  (Most Recently Demonstrated)   Recommendation to date pending progress:  [address] [place] [address] Therapy  Equipment:    Rolling Walker   shower chair Bed Mobility:   Supervision  Sit to Stand:   Modified Independent  Transfers:   Modified Independent  Gait/Mobility:   Supervision     ASSESSMENT:  Ms. Banks is making good progress toward goals. At this time she is declining ambulation in the crowder due to fear of infections. Bed mobility with supervision and good use of log roll technique. Static and dynamic standing balance during standing exercises with mod I and minimal cueing for exercises. Normal gait pattern in the room with the RW. Discussion with patient and family regarding needs at discharge and goals of OT/PT/mobility. Patient is moving very well and demonstrating good balance and good safety awareness, up ad maddy in the room. See one more time for review of HEP and increase gait distances if patient is agreeable.       SUBJECTIVE:   Ms. Banks states, \"I am walking around the room, bathing, and toileting on my own\"    SOCIAL HISTORY/ LIVING ENVIRONMENT:   Home Environment: Private residence  # Steps to Enter: 3  One/Two Story Residence: One story  Living Alone: Yes  Support Systems: Child(manisha)  OBJECTIVE:     PAIN: VITAL SIGNS: LINES/DRAINS:   Pre Treatment: Pain Screen  Pain Scale 1: Numeric (0 - 10)  Pain Intensity 1: 0  Post Treatment: 0  O2 at 97 on 1L  94% on RA at rest  93% on RA post ambulation IV  O2 Device: Nasal cannula     MOBILITY: I Mod I S SBA CGA Min Mod Max Total  NT x2 Comments:   Bed Mobility    Rolling [x] [] [] [] [] [] [] [] [] [] []    Supine to Sit [] [] [x] [] [] [] [] [] [] [] []    Scooting [] [x] [] [] [] [] [] [] [] [] []    Sit to Supine [] [x] [] [] [] [] [] [] [] [] []    Transfers    Sit to Stand [] [x] [] [] [] [] [] [] [] [] []    Bed to Chair [] [x] [] [] [] [] [] [] [] [] []    Stand to Sit [] [x] [] [] [] [] [] [] [] [] []    I=Independent, Mod I=Modified Independent, S=Supervision, SBA=Standby Assistance, CGA=Contact Guard Assistance,   Min=Minimal Assistance, Mod=Moderate Assistance, Max=Maximal Assistance, Total=Total Assistance, NT=Not Tested    BALANCE: Good Fair+ Fair Fair- Poor NT Comments   Sitting Static [x] [] [] [] [] []    Sitting Dynamic [x] [] [] [] [] []              Standing Static [x] [] [] [] [] []    Standing Dynamic [x] [] [] [] [] []      GAIT: I Mod I S SBA CGA Min Mod Max Total  NT x2 Comments:   Level of Assistance [] [x] [x] [] [] [] [] [] [] [] []    Distance 40'    DME Rolling Walker    Gait Quality WFL    Weightbearing  Status N/A     I=Independent, Mod I=Modified Independent, S=Supervision, SBA=Standby Assistance, CGA=Contact Guard Assistance,   Min=Minimal Assistance, Mod=Moderate Assistance, Max=Maximal Assistance, Total=Total Assistance, NT=Not Tested    PLAN:   FREQUENCY/DURATION: PT Plan of Care: 3 times/week for duration of hospital stay or until stated goals are met, whichever comes first.  TREATMENT:     TREATMENT:   ($$ Gait Trainin-37 mins  $$ Therapeutic Exercises: 8-22 mins    )  Therapeutic Exercise (14 Minutes): Therapeutic exercises noted below to improve functional activity tolerance, strength and mobility. Gait Training (30 Minutes): Gait training for 40 feet utilizing [facility]. Patient required Verbal cueing to improve Activity Pacing. Co-treat appropriate at this time to improve functional activity tolerance for multiple therapy sessions due to repeated refusals.     TREATMENT GRID:   Date:  22 Date:   Date:     Activity/Exercise Parameters Parameters Parameters   Calf raises x10     Standing marching x15     STS x5                                   AFTER TREATMENT POSITION/PRECAUTIONS:  Bed, Needs within reach, RN notified and Visitors at bedside    INTERDISCIPLINARY COLLABORATION:  RN/PCT, PT/PTA and OT/GIVENS    TOTAL TREATMENT DURATION:  PT Patient Time In/Time Out  Time In: 934  Time Out: [address], PTA

## 2022-01-27 NOTE — MANAGEMENT PLAN
[facility] Hematology & Oncology        Inpatient Hematology / Oncology Plan of Care    Reason for Consult:  Perforated sigmoid colon [facility]) [K63.1]  Referring Physician:  Avery Holland MD    History of Present Illness:  Ms. Banks is a 67 y.o. female admitted on 1/22/2022. The primary encounter diagnosis was Peritonitis ([address] Diagnoses of Colonic mass, Partial intestinal obstruction, unspecified cause ([address] and Bowel perforation ([address] were also pertinent to this visit. Ria Maot Her PMH includes CAD s/p stents and GERD. She presented with c/o worsening abdominal pain x 2 days. CT AP with 2.3cm mass in mid-ascending colon with few adjacent enlarged LNs with obstruction and perforation as well as enlarged LN in anterior aspect of mid lower abdomen. She is POD #5 s/p R extended colectomy with ileo-transverse colon staped anastomosis. Path +mod to poorly differentiated adenocarcinoma invades through muscularis propria into pericolonic soft tissue. Margins neg.  2/14 LNs +ve. pT3 N1b. Hgb 8.4. We were consulted for her newly diagnosed colon cancer.     Allergies   Allergen Reactions    Bactrim [Sulfamethoprim Ds] Hives and Rash     Past Medical History:   Diagnosis Date    Actinic keratosis     Adjustment disorder with mixed anxiety and depressed mood 2/11/2015    Adverse effect of anesthesia     cystoscope - was awake but could not move - dont remember the anesthesetic given    Corneal dystrophy 2/11/2015    Coronary atherosclerosis of native coronary artery 2/11/2015    GERD (gastroesophageal reflux disease)     Irritable bowel syndrome 2/11/2015    Menopause     Mixed hyperlipidemia 2/11/2015    Psychiatric disorder     anxiety     Past Surgical History:   Procedure Laterality Date    HX BREAST BIOPSY Left     HX COLONOSCOPY      2014    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    HX CORNEAL TRANSPLANT Left     HX CYST REMOVAL      breast     Family History Problem Relation Age of Onset    Breast Cancer Maternal Aunt     Heart Disease Father         Arterosclerotic Cardiovascular Disease    Heart Attack Father      Social History     Socioeconomic History    Marital status: LEGALLY      Spouse name: Not on file    Number of children: Not on file    Years of education: Not on file    Highest education level: Not on file   Occupational History    Not on file   Tobacco Use    Smoking status: Never Smoker    Smokeless tobacco: Never Used   Substance and Sexual Activity    Alcohol use: No     Alcohol/week: 0.0 standard drinks    Drug use: Yes     Types: Prescription, OTC    Sexual activity: Not on file   Other Topics Concern    Not on file   Social History Narrative    Not on file     Social Determinants of Health     Financial Resource Strain:     Difficulty of Paying Living Expenses: Not on file   Food Insecurity:     Worried About Running Out of Food in the Last Year: Not on file    Onur of Food in the Last Year: Not on file   Transportation Needs:     Lack of Transportation (Medical): Not on file    Lack of Transportation (Non-Medical):  Not on file   Physical Activity:     Days of Exercise per Week: Not on file    Minutes of Exercise per Session: Not on file   Stress:     Feeling of Stress : Not on file   Social Connections:     Frequency of Communication with Friends and Family: Not on file    Frequency of Social Gatherings with Friends and Family: Not on file    Attends Nondenominational Services: Not on file    Active Member of Clubs or Organizations: Not on file    Attends Club or Organization Meetings: Not on file    Marital Status: Not on file   Intimate Partner Violence:     Fear of Current or Ex-Partner: Not on file    Emotionally Abused: Not on file    Physically Abused: Not on file    Sexually Abused: Not on file   Housing Stability:     Unable to Pay for Housing in the Last Year: Not on file    Number of [facility] in the Last Year: Not on file    Unstable Housing in the Last Year: Not on file     Current Facility-Administered Medications   Medication Dose Route Frequency Provider Last Rate Last Admin    prednisoLONE acetate (PRED FORTE) 1 % ophthalmic suspension 1 Drop  1 Drop Both Eyes QHS Frommel, Kimberly A, NP   1 Drop at 01/26/22 2131    dextrose 5% - 0.45% NaCl with KCl 20 mEq/L infusion  75 mL/hr IntraVENous CONTINUOUS Trevor Escalante MD 75 mL/hr at 01/26/22 2245 75 mL/hr at 01/26/22 2245    pantoprazole (PROTONIX) tablet 40 mg  40 mg Oral ACB Adrian RIVERA MD   40 mg at 01/27/22 0537    docusate sodium (COLACE) capsule 100 mg  100 mg Oral BID Richard BACA NP   100 mg at 01/27/22 0914    oxyCODONE-acetaminophen (PERCOCET) 5-325 mg per tablet 1 Tablet  1 Tablet Oral Q4H PRN Dymorro Harness NP   1 Tablet at 01/27/22 2202    amLODIPine (NORVASC) tablet 2.5 mg  2.5 mg Oral DAILY Richard BACA NP   2.5 mg at 01/27/22 0914    nitroglycerin (NITROSTAT) tablet 0.4 mg  0.4 mg SubLINGual Q5MIN PRN Dyke Harness, NP        aluminum hydrox-magnesium carb (GAVISCON) oral suspension 15 mL  15 mL Oral QID PRN Richard BACA NP   15 mL at 01/23/22 1212    morphine injection 2 mg  2 mg IntraVENous Q2H PRN Mi Simpson MD   2 mg at 01/23/22 1128    sodium chloride (NS) flush 5-10 mL  5-10 mL IntraVENous Q8H Daphney Jackson MD   10 mL at 01/27/22 0537    sodium chloride (NS) flush 5-10 mL  5-10 mL IntraVENous PRN Daphney Jackson MD        cefTRIAXone (ROCEPHIN) 1 g in 0.9% sodium chloride (MBP/ADV) 50 mL MBP  1 g IntraVENous Q24H Stefan SNELL  mL/hr at 01/27/22 0914 1 g at 01/27/22 0914    ondansetron (ZOFRAN) injection 4 mg  4 mg IntraVENous Q4H PRN Mi Simpson MD   4 mg at 01/22/22 2021       OBJECTIVE:  Patient Vitals for the past 8 hrs:   BP Temp Pulse Resp SpO2   01/27/22 1108 115/78 98.2 °F (36.8 °C) 73 18 95 %   01/27/22 0824 (!) 123/47 97.7 °F (36.5 °C) 73 18 95 %   22 0427 138/62 98.1 °F (36.7 °C) 66 18 96 %     Temp (24hrs), Av.9 °F (36.6 °C), Min:97.4 °F (36.3 °C), Max:98.5 °F (36.9 °C)    No intake/output data recorded. Physical Exam:  Exam per Dr. Berna Alfaro:    Recent Results (from the past 24 hour(s))   CBC W/O DIFF    Collection Time: 22  5:47 AM   Result Value Ref Range    WBC 4.6 4.3 - 11.1 K/uL    RBC 3.19 (L) 4.05 - 5.2 M/uL    HGB 8.4 (L) 11.7 - 15.4 g/dL    HCT 26.7 (L) 35.8 - 46.3 %    MCV 83.7 79.6 - 97.8 FL    MCH 26.3 26.1 - 32.9 PG    MCHC 31.5 31.4 - 35.0 g/dL    RDW 14.0 11.9 - 14.6 %    PLATELET 797 921 - 001 K/uL    MPV 10.6 9.4 - 12.3 FL    ABSOLUTE NRBC 0.00 0.0 - 0.2 K/uL   METABOLIC PANEL, BASIC    Collection Time: 22  5:47 AM   Result Value Ref Range    Sodium 140 136 - 145 mmol/L    Potassium 4.3 3.5 - 5.1 mmol/L    Chloride 110 (H) 98 - 107 mmol/L    CO2 25 21 - 32 mmol/L    Anion gap 5 (L) 7 - 16 mmol/L    Glucose 103 (H) 65 - 100 mg/dL    BUN 5 (L) 8 - 23 MG/DL    Creatinine 0.59 (L) 0.6 - 1.0 MG/DL    GFR est AA >60 >60 ml/min/1.73m2    GFR est non-AA >60 >60 ml/min/1.73m2    Calcium 8.6 8.3 - 10.4 MG/DL   PHOSPHORUS    Collection Time: 22  5:47 AM   Result Value Ref Range    Phosphorus 3.6 2.3 - 3.7 MG/DL   MAGNESIUM    Collection Time: 22  5:47 AM   Result Value Ref Range    Magnesium 2.3 1.8 - 2.4 mg/dL       Imaging:  CT ABD PELV W CONT [[id number]] Collected: 22 5016   Order Status: Completed Updated: 22   Narrative:     EXAM: CT ABD PELV W CONT     HISTORY: abd pain. TECHNIQUE: Axial images of the abdomen and pelvis were performed following the   administration of intravenous contrast. Images were obtained in the axial plane   and coronal reformatted images were created and reviewed. Dose reduction technique used: Automated exposure control/Adjustment of the mA   and/or kV according to patient size/Use of iterative reconstruction technique. COMPARISON: None. FINDINGS:   The visualized lung bases show chronic changes. The visualized mediastinum is   unremarkable. The liver, spleen, pancreas, adrenal glands, gallbladder, and kidneys are within   normal limits. Small low-density renal lesion. This is too small to   characterize. The bladder appears grossly normal.     Distal esophagus and stomach are unremarkable. Small bowel shows no evidence of   obstruction. There is a 2.3 cm enhancing mass in the mid ascending colon. There are few   adjacent enlarged lymph nodes. Distal to this point the colon is normal in   caliber. The proximal ascending colon and cecum are significantly distended and   contain stool and an air-fluid level. There is a small amount of free fluid in the pelvis and in the right abdomen. There are multiple small foci of free intraperitoneal air. No abdominal aortic aneurysm. There is a an enlarged lymph node in the anterior   aspect of the mid lower abdomen. Osseous structures are without evidence of acute fracture or suspicious lesion. Impression:     There is a 2.3 cm enhancing mass in the mid ascending colon. There are few   adjacent enlarged lymph nodes. The mass is causing a degree of obstruction as the ascending colon and cecum are   quite distended and contain a large air-fluid level. Multiple small foci of free intraperitoneal air are seen suggesting perforation. I cannot identify a definitive point of perforation. There is a small amount of free fluid associated with the mass and in the   pelvis. There is a an enlarged lymph node in the anterior aspect of the mid lower   abdomen. This case was discussed with Dr. Geena Maya 7:17 AM at on 1/22/2022. He   verbalized his understanding.          ASSESSMENT:  Problem List  Date Reviewed: 10/27/2021          Codes Class Noted    * (Principal) Perforation of colon ([facility] ICD-10-CM: K63.1  ICD-9-CM: 569.83  1/22/2022        Precordial pain ICD-10-CM: R07.2  ICD-9-CM: 786.51  1/12/2017        Mixed hyperlipidemia ICD-10-CM: E78.2  ICD-9-CM: 272.2  2/11/2015        Adjustment disorder with mixed anxiety and depressed mood ICD-10-CM: F43.23  ICD-9-CM: 309.28  2/11/2015        Corneal dystrophy ICD-10-CM: H18.509  ICD-9-CM: 371.50  2/11/2015        Irritable bowel syndrome ICD-10-CM: K58.9  ICD-9-CM: 564.1  2/11/2015        Coronary atherosclerosis of native coronary artery ICD-10-CM: I25.10  ICD-9-CM: 414.01  2/11/2015                RECOMMENDATIONS:  Colon adenocarcinoma, pT3 N1b  - CT AP with 2.3cm mass in mid-ascending colon with few adjacent enlarged LNs with obstruction and perforation as well as enlarged LN in anterior aspect of mid lower abdomen. She is POD #5 s/p R extended colectomy with ileo-transverse colon staped anastomosis. Path +mod to poorly differentiated adenocarcinoma invades through muscularis propria into pericolonic soft tissue. Margins neg.  2/14 LNs +ve. - Check CEA    Anemia  - Check iron studies, B12, folate, Vit D  - Transfuse prn to keep Hgb >7-8    Lab studies and imaging studies were personally reviewed. Thank you for allowing us to participate in the care of Ms. Banks. Formal consult note by Dr. Niesha Contreras to follow. Our office will arrange f/u with Dr. Niesha Contreras upon discharge.          Isidro Romero NP   [facility] Hematology & Oncology  [address] [address]  Office : [phone]  Fax : [phone]

## 2022-01-27 NOTE — PROGRESS NOTES
Problem: Falls - Risk of  Goal: *Absence of Falls  Description: Document Ryan Ghosh Fall Risk and appropriate interventions in the flowsheet. Outcome: Progressing Towards Goal  Note: Fall Risk Interventions:  Mobility Interventions: OT consult for ADLs,Patient to call before getting OOB,PT Consult for mobility concerns         Medication Interventions: Patient to call before getting OOB,Teach patient to arise slowly    Elimination Interventions: Call light in reach,Patient to call for help with toileting needs,Toileting schedule/hourly rounds              Problem: Patient Education: Go to Patient Education Activity  Goal: Patient/Family Education  Outcome: Progressing Towards Goal     Problem: General Medical Care Plan  Goal: *Vital signs within specified parameters  Outcome: Progressing Towards Goal  Goal: *Labs within defined limits  Outcome: Progressing Towards Goal  Goal: *Absence of infection signs and symptoms  Outcome: Progressing Towards Goal  Goal: *Optimal pain control at patient's stated goal  Outcome: Progressing Towards Goal  Goal: *Skin integrity maintained  Outcome: Progressing Towards Goal  Goal: *Fluid volume balance  Outcome: Progressing Towards Goal  Goal: *Optimize nutritional status  Outcome: Progressing Towards Goal  Goal: *Anxiety reduced or absent  Outcome: Progressing Towards Goal  Goal: *Progressive mobility and function (eg: ADL's)  Outcome: Progressing Towards Goal     Problem: Patient Education: Go to Patient Education Activity  Goal: Patient/Family Education  Outcome: Progressing Towards Goal     Problem: Pressure Injury - Risk of  Goal: *Prevention of pressure injury  Description: Document Jesus Scale and appropriate interventions in the flowsheet.   Outcome: Progressing Towards Goal  Note: Pressure Injury Interventions:  Sensory Interventions: Minimize linen layers    Moisture Interventions: Absorbent underpads    Activity Interventions: Increase time out of bed,PT/OT evaluation    Mobility Interventions: HOB 30 degrees or less,Pressure redistribution bed/mattress (bed type)    Nutrition Interventions: Document food/fluid/supplement intake,Discuss nutritional consult with provider,Offer support with meals,snacks and hydration    Friction and Shear Interventions: Minimize layers                Problem: Patient Education: Go to Patient Education Activity  Goal: Patient/Family Education  Outcome: Progressing Towards Goal     Problem: Patient Education: Go to Patient Education Activity  Goal: Patient/Family Education  Outcome: Progressing Towards Goal     Problem: Patient Education: Go to Patient Education Activity  Goal: Patient/Family Education  Outcome: Progressing Towards Goal     Problem: Pain  Goal: *Control of Pain  Outcome: Progressing Towards Goal     Problem: Patient Education: Go to Patient Education Activity  Goal: Patient/Family Education  Outcome: Progressing Towards Goal

## 2022-01-27 NOTE — PROGRESS NOTES
Problem: Falls - Risk of  Goal: *Absence of Falls  Description: Document Skylar Ellis Fall Risk and appropriate interventions in the flowsheet. Outcome: Progressing Towards Goal  Note: Fall Risk Interventions:  Mobility Interventions: Communicate number of staff needed for ambulation/transfer,Patient to call before getting OOB         Medication Interventions: Patient to call before getting OOB,Teach patient to arise slowly    Elimination Interventions: Call light in reach,Patient to call for help with toileting needs              Problem: Pressure Injury - Risk of  Goal: *Prevention of pressure injury  Description: Document Jesus Scale and appropriate interventions in the flowsheet.   Outcome: Progressing Towards Goal  Note: Pressure Injury Interventions:  Sensory Interventions: Minimize linen layers    Moisture Interventions: Absorbent underpads    Activity Interventions: Increase time out of bed    Mobility Interventions: HOB 30 degrees or less,Pressure redistribution bed/mattress (bed type)    Nutrition Interventions: Offer support with meals,snacks and hydration,Document food/fluid/supplement intake    Friction and Shear Interventions: Minimize layers                Problem: Pain  Goal: *Control of Pain  Outcome: Progressing Towards Goal

## 2022-01-27 NOTE — PROGRESS NOTES
PLAN:  Clear Liquids  OOB  IVF @ 75mL  Colace  Sanchez out  Use IS  Protonix  Pain/ nausea control  IV abx - Rocephin  SCD  home BP medication  PT/OT consulted  Respiratory consulted for IS education  Ok to shower  Follow labs  Path reviewed with pt/ daughter 1/26  Oncology Consulted    ASSESSMENT:  Admit Date: 1/22/2022   5 Day Post-Op  Procedure(s):  LAPAROTOMY EXPLORATORY/RIGHT COLECTOMY    Principal Problem:    Perforation of colon ([address] (1/22/2022)         SUBJECTIVE:  Pt up walking in room. Pain controlled. No reflux complaints. Tolerating Clears. No nausea/ vomiting.-flatus/-BM. Voiding without difficulty. AF, NAD. 2L via NC. K 4.3  OBJECTIVE:  Constitutional: Alert oriented cooperative patient in no acute distress; appears stated age   Visit Vitals  /78 (BP 1 Location: Right upper arm, BP Patient Position: Other (Comment))   Pulse 73   Temp 98.2 °F (36.8 °C)   Resp 18   Ht 5' 2\" (1.575 m)   Wt 130 lb (59 kg)   LMP  (LMP Unknown)   SpO2 95%   BMI 23.78 kg/m²     Eyes: Sclera are clear. ENMT: no external lesions gross hearing normal; no obvious neck masses, no ear or lip lesions  CV: RRR. Normal perfusion  Resp: No JVD. Breathing is  non-labored; no audible wheezing. GI: soft and non-distended, appropriately ttp, staples c/d/i     Musculoskeletal: unremarkable with normal function. No embolic signs or cyanosis.    Neuro:  Oriented; moves all 4; no focal deficits  Psychiatric: normal affect and mood, no memory impairment      Patient Vitals for the past 24 hrs:   BP Temp Pulse Resp SpO2   01/27/22 1108 115/78 98.2 °F (36.8 °C) 73 18 95 %   01/27/22 0824 (!) 123/47 97.7 °F (36.5 °C) 73 18 95 %   01/27/22 0427 138/62 98.1 °F (36.7 °C) 66 18 96 %   01/27/22 0001 119/63 98.5 °F (36.9 °C) 69 18 94 %   01/26/22 1941 (!) 121/48 97.4 °F (36.3 °C) 66 18 96 %   01/26/22 1500 119/72 97.7 °F (36.5 °C) 76 18    01/26/22 1144 131/63 97.8 °F (36.6 °C) 73 17 96 %     Labs:    Recent Labs     01/27/22  0547   WBC 4. 6   HGB 8.4*         K 4.3   *   CO2 25   BUN 5*   CREA 0.59*   *     Yazmin Breed, NP

## 2022-01-28 ENCOUNTER — APPOINTMENT (OUTPATIENT)
Dept: CT IMAGING | Age: 77
DRG: 329 | End: 2022-01-28
Attending: NURSE PRACTITIONER
Payer: MEDICARE

## 2022-01-28 LAB — CREAT SERPL-MCNC: 0.66 MG/DL (ref 0.6–1)

## 2022-01-28 PROCEDURE — 36415 COLL VENOUS BLD VENIPUNCTURE: CPT

## 2022-01-28 PROCEDURE — 65270000029 HC RM PRIVATE

## 2022-01-28 PROCEDURE — 74011250637 HC RX REV CODE- 250/637: Performed by: NURSE PRACTITIONER

## 2022-01-28 PROCEDURE — 74011250637 HC RX REV CODE- 250/637: Performed by: SURGERY

## 2022-01-28 PROCEDURE — 74011000258 HC RX REV CODE- 258: Performed by: SURGERY

## 2022-01-28 PROCEDURE — 74011000636 HC RX REV CODE- 636: Performed by: SURGERY

## 2022-01-28 PROCEDURE — 74011000250 HC RX REV CODE- 250: Performed by: EMERGENCY MEDICINE

## 2022-01-28 PROCEDURE — 74011250636 HC RX REV CODE- 250/636: Performed by: NURSE PRACTITIONER

## 2022-01-28 PROCEDURE — 82565 ASSAY OF CREATININE: CPT

## 2022-01-28 PROCEDURE — 74011250636 HC RX REV CODE- 250/636: Performed by: SURGERY

## 2022-01-28 PROCEDURE — 2709999900 HC NON-CHARGEABLE SUPPLY

## 2022-01-28 PROCEDURE — APPNB30 APP NON BILLABLE TIME 0-30 MINS: Performed by: NURSE PRACTITIONER

## 2022-01-28 PROCEDURE — 71260 CT THORAX DX C+: CPT

## 2022-01-28 PROCEDURE — 74011000258 HC RX REV CODE- 258: Performed by: NURSE PRACTITIONER

## 2022-01-28 RX ORDER — DIPHENHYDRAMINE HCL 25 MG
25 CAPSULE ORAL
Status: DISCONTINUED | OUTPATIENT
Start: 2022-01-28 | End: 2022-01-31 | Stop reason: HOSPADM

## 2022-01-28 RX ORDER — MELATONIN
1000 DAILY
Status: DISCONTINUED | OUTPATIENT
Start: 2022-01-28 | End: 2022-01-31 | Stop reason: HOSPADM

## 2022-01-28 RX ORDER — SODIUM CHLORIDE 0.9 % (FLUSH) 0.9 %
10 SYRINGE (ML) INJECTION
Status: COMPLETED | OUTPATIENT
Start: 2022-01-28 | End: 2022-01-28

## 2022-01-28 RX ADMIN — DOCUSATE SODIUM 100 MG: 100 CAPSULE, LIQUID FILLED ORAL at 17:45

## 2022-01-28 RX ADMIN — SODIUM CHLORIDE 100 ML: 900 INJECTION, SOLUTION INTRAVENOUS at 14:34

## 2022-01-28 RX ADMIN — OXYCODONE AND ACETAMINOPHEN 1 TABLET: 5; 325 TABLET ORAL at 04:22

## 2022-01-28 RX ADMIN — DIPHENHYDRAMINE HYDROCHLORIDE 25 MG: 25 CAPSULE ORAL at 02:13

## 2022-01-28 RX ADMIN — SODIUM CHLORIDE 25 MG: 9 INJECTION, SOLUTION INTRAVENOUS at 10:20

## 2022-01-28 RX ADMIN — SODIUM CHLORIDE, PRESERVATIVE FREE 10 ML: 5 INJECTION INTRAVENOUS at 21:58

## 2022-01-28 RX ADMIN — DOCUSATE SODIUM 100 MG: 100 CAPSULE, LIQUID FILLED ORAL at 09:28

## 2022-01-28 RX ADMIN — OXYCODONE AND ACETAMINOPHEN 1 TABLET: 5; 325 TABLET ORAL at 17:45

## 2022-01-28 RX ADMIN — Medication 10 ML: at 14:35

## 2022-01-28 RX ADMIN — SODIUM CHLORIDE 975 MG: 9 INJECTION, SOLUTION INTRAVENOUS at 13:18

## 2022-01-28 RX ADMIN — VITAMIN D, TAB 1000IU (100/BT) 1000 UNITS: 25 TAB at 10:20

## 2022-01-28 RX ADMIN — DEXTROSE MONOHYDRATE, SODIUM CHLORIDE, AND POTASSIUM CHLORIDE 75 ML/HR: 50; 4.5; 1.49 INJECTION, SOLUTION INTRAVENOUS at 05:44

## 2022-01-28 RX ADMIN — SODIUM CHLORIDE, PRESERVATIVE FREE 10 ML: 5 INJECTION INTRAVENOUS at 05:45

## 2022-01-28 RX ADMIN — IOPAMIDOL 80 ML: 755 INJECTION, SOLUTION INTRAVENOUS at 14:34

## 2022-01-28 RX ADMIN — PREDNISOLONE ACETATE 1 DROP: 10 SUSPENSION/ DROPS OPHTHALMIC at 21:59

## 2022-01-28 RX ADMIN — OXYCODONE AND ACETAMINOPHEN 1 TABLET: 5; 325 TABLET ORAL at 09:28

## 2022-01-28 RX ADMIN — OXYCODONE AND ACETAMINOPHEN 1 TABLET: 5; 325 TABLET ORAL at 00:47

## 2022-01-28 RX ADMIN — PANTOPRAZOLE SODIUM 40 MG: 40 TABLET, DELAYED RELEASE ORAL at 05:44

## 2022-01-28 RX ADMIN — OXYCODONE AND ACETAMINOPHEN 1 TABLET: 5; 325 TABLET ORAL at 22:29

## 2022-01-28 NOTE — PROGRESS NOTES
PLAN:  Advance to Full Liquids  OOB  Decrease IVF to 50mL  Colace  Sanchez out  Use IS  Protonix  Pain/ nausea control  SCD  home BP medication  PT/OT following  Respiratory consulted for IS education  [facility] to shower  Follow labs  Path reviewed with pt/ daughter 1/26  Oncology Consulted    ASSESSMENT:  Admit Date: 1/22/2022   6 Day Post-Op  Procedure(s):  LAPAROTOMY EXPLORATORY/RIGHT COLECTOMY    Principal Problem:    Perforation of colon ([address] (1/22/2022)         SUBJECTIVE:  Pt sitting in chair. Pain controlled. Tolerating Clears. No nausea/ vomiting.+flatus/-BM. Voiding without difficulty. AF, NAD. On room air. OBJECTIVE:  Constitutional: Alert oriented cooperative patient in no acute distress; appears stated age   Visit Vitals  BP (!) 110/51   Pulse 74   Temp 97.9 °F (36.6 °C)   Resp 18   Ht 5' 2\" (1.575 m)   Wt 140 lb 3.2 oz (63.6 kg)   LMP  (LMP Unknown)   SpO2 94%   BMI 25.64 kg/m²     Eyes: Sclera are clear. ENMT: no external lesions gross hearing normal; no obvious neck masses, no ear or lip lesions  CV: RRR. Normal perfusion  Resp: No JVD. Breathing is  non-labored; no audible wheezing. GI: soft and non-distended, appropriately ttp, staples c/d/i     Musculoskeletal: unremarkable with normal function. No embolic signs or cyanosis.    Neuro:  Oriented; moves all 4; no focal deficits  Psychiatric: normal affect and mood, no memory impairment      Patient Vitals for the past 24 hrs:   BP Temp Pulse Resp SpO2 Height Weight   01/28/22 1059 (!) 110/51 97.9 °F (36.6 °C) 74 18 94 %     01/28/22 0928 (!) 108/57  84       01/28/22 0754 (!) 113/48 98.3 °F (36.8 °C) (!) 116 16 99 %     01/28/22 0408 (!) 113/50 98 °F (36.7 °C) 67 18 93 %     01/27/22 2335 (!) 107/56 98.6 °F (37 °C) 73 19 92 %     01/27/22 1931 (!) 127/58 98.2 °F (36.8 °C) 75 18 94 %     01/27/22 1648 131/65  94       01/27/22 1458 (!) 102/39 97.9 °F (36.6 °C) 76 18 93 %     01/27/22 1439      5' 2\" (1.575 m) 140 lb 3.2 oz (63.6 kg)     Labs:    Recent Labs     01/27/22  0547   WBC 4.6   HGB 8.4*         K 4.3   *   CO2 25   BUN 5*   CREA 0.59*   *     Haskel Share, NP

## 2022-01-28 NOTE — PROGRESS NOTES
Problem: Falls - Risk of  Goal: *Absence of Falls  Description: Document Lauri Blunt Fall Risk and appropriate interventions in the flowsheet.   Outcome: Progressing Towards Goal  Note: Fall Risk Interventions:  Mobility Interventions: OT consult for ADLs,Communicate number of staff needed for ambulation/transfer         Medication Interventions: Bed/chair exit alarm,Evaluate medications/consider consulting pharmacy,Teach patient to arise slowly    Elimination Interventions: Elevated toilet seat,Call light in reach,Bed/chair exit alarm              Problem: Patient Education: Go to Patient Education Activity  Goal: Patient/Family Education  Outcome: Progressing Towards Goal     Problem: General Medical Care Plan  Goal: *Vital signs within specified parameters  Outcome: Progressing Towards Goal  Goal: *Labs within defined limits  Outcome: Progressing Towards Goal  Goal: *Absence of infection signs and symptoms  Outcome: Progressing Towards Goal  Goal: *Optimal pain control at patient's stated goal  Outcome: Progressing Towards Goal  Goal: *Skin integrity maintained  Outcome: Progressing Towards Goal  Goal: *Fluid volume balance  Outcome: Progressing Towards Goal  Goal: *Optimize nutritional status  Outcome: Progressing Towards Goal  Goal: *Anxiety reduced or absent  Outcome: Progressing Towards Goal  Goal: *Progressive mobility and function (eg: ADL's)  Outcome: Progressing Towards Goal

## 2022-01-28 NOTE — CONSULTS
[address] [facility] Hematology and Oncology: Inpatient Hematology / Oncology Consult Note    Reason for Consult:    Referring Physician:  Shaneka Briscoe MD    History of Present Illness:  Ms. Case is a 67 y.o. female admitted on 1/22/2022 with a primary diagnosis of   Encounter Diagnoses   Name Primary?  Peritonitis ([facility] Yes    Colonic mass     Partial intestinal obstruction, unspecified cause ([facility]     Bowel perforation [facility])      Ms Case is a 65yo woman admitted on 1/22/22 with peritonitis, colon mass, obstruction and bowel perforation. PMhx: anxiety, corneal dystrophy s/p transplants (bilat) on steroid gtt, CAD, GERD, IBS, HLD. She p/w abd pain x2 days. CT AP c/w 2.3cm mass in mid-ascending colon and LAD with obstruction and perforation. S/p subsequent right extended colectomy with ileo-transverse staped anastomosis. Path c/w mod-poorly diff adenocarcinoma - [id number] (2/14 LN +). We were consulted for recs. During today's consultation, we discussed the pathophysiology of colon cancer and its staging. We also discussed patient's treatment options. Patient's daughter at bedside and son present via telephone for the consultation (Mr Lupe Noguera). Discussed role of port/logistics of tx administration. Multiple questions were answered. Review of Systems:  Constitutional Denies fever or chills. +fatigue. Denies night sweats. HEENT S/p corneal transplants    Skin Denies lesions or rashes. Lungs Denies dyspnea, cough, sputum production or hemoptysis. Cardiovascular Denies chest pain, palpitations, or lower extremity edema. Gastrointestinal Pain post sx    Denies dysuria, frequency or hesitancy of urination. Neuro Denies headaches, visual changes or ataxia. Denies dizziness, tingling, tremors, sensory change, speech change, focal weakness. Hematology Denies easy bruising or bleeding, denies gingival bleeding or epistaxis.    Endo Denies heat/cold intolerance   MSK Denies back pain, arthralgias, myalgias or frequent falls.      Psychiatric/Behavioral + anxiety          Allergies   Allergen Reactions    Bactrim [Sulfamethoprim Ds] Hives and Rash     Past Medical History:   Diagnosis Date    Actinic keratosis     Adjustment disorder with mixed anxiety and depressed mood 2/11/2015    Adverse effect of anesthesia     cystoscope - was awake but could not move - dont remember the anesthesetic given    Corneal dystrophy 2/11/2015    Coronary atherosclerosis of native coronary artery 2/11/2015    GERD (gastroesophageal reflux disease)     Irritable bowel syndrome 2/11/2015    Menopause     Mixed hyperlipidemia 2/11/2015    Psychiatric disorder     anxiety     Past Surgical History:   Procedure Laterality Date    HX BREAST BIOPSY Left     HX COLONOSCOPY      2014    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    HX CORNEAL TRANSPLANT Left     HX CYST REMOVAL      breast     Family History   Problem Relation Age of Onset    Breast Cancer Maternal Aunt     Heart Disease Father         Arterosclerotic Cardiovascular Disease    Heart Attack Father      Social History     Socioeconomic History    Marital status: LEGALLY      Spouse name: Not on file    Number of children: Not on file    Years of education: Not on file    Highest education level: Not on file   Occupational History    Not on file   Tobacco Use    Smoking status: Never Smoker    Smokeless tobacco: Never Used   Substance and Sexual Activity    Alcohol use: No     Alcohol/week: 0.0 standard drinks    Drug use: Yes     Types: Prescription, OTC    Sexual activity: Not on file   Other Topics Concern    Not on file   Social History Narrative    Not on file     Social Determinants of Health     Financial Resource Strain:     Difficulty of Paying Living Expenses: Not on file   Food Insecurity:     Worried About Running Out of Food in the Last Year: Not on file    [address] in the Last Year: Not on file   Transportation Needs:     Lack of Transportation (Medical): Not on file    Lack of Transportation (Non-Medical):  Not on file   Physical Activity:     Days of Exercise per Week: Not on file    Minutes of Exercise per Session: Not on file   Stress:     Feeling of Stress : Not on file   Social Connections:     Frequency of Communication with Friends and Family: Not on file    Frequency of Social Gatherings with Friends and Family: Not on file    Attends [employer] Services: Not on file    Active Member of [address] [place] [address] or Organizations: Not on file    Attends Club or Organization Meetings: Not on file    Marital Status: Not on file   Intimate Partner Violence:     Fear of Current or Ex-Partner: Not on file    Emotionally Abused: Not on file    Physically Abused: Not on file    Sexually Abused: Not on file   Housing Stability:     Unable to Pay for Housing in the Last Year: Not on file    Number of [place] in the Last Year: Not on file    Unstable Housing in the Last Year: Not on file     Current Facility-Administered Medications   Medication Dose Route Frequency Provider Last Rate Last Admin    prednisoLONE acetate (PRED FORTE) 1 % ophthalmic suspension 1 Drop  1 Drop Both Eyes QHS Frommel, Adonis Sinks, NP   1 Drop at 01/26/22 2131    dextrose 5% - 0.45% NaCl with KCl 20 mEq/L infusion  75 mL/hr IntraVENous CONTINUOUS Gela Escalante MD 75 mL/hr at 01/27/22 1717 75 mL/hr at 01/27/22 1717    pantoprazole (PROTONIX) tablet 40 mg  40 mg Oral ACB Gela Escalante MD   40 mg at 01/27/22 0537    docusate sodium (COLACE) capsule 100 mg  100 mg Oral BID Jose BACA NP   100 mg at 01/27/22 1725    oxyCODONE-acetaminophen (PERCOCET) 5-325 mg per tablet 1 Tablet  1 Tablet Oral Q4H PRN Ross Rolle NP   1 Tablet at 01/27/22 1910    amLODIPine (NORVASC) tablet 2.5 mg  2.5 mg Oral DAILY Jose BACA NP   2.5 mg at 01/27/22 0914    nitroglycerin (NITROSTAT) tablet 0.4 mg  0.4 mg SubLINGual Q5MIN PRN Ulysses Linsey, NP        aluminum hydrox-magnesium carb (GAVISCON) oral suspension 15 mL  15 mL Oral QID PRN Ulysses Linsey, NP   15 mL at 22 1212    morphine injection 2 mg  2 mg IntraVENous Q2H PRN Lemuel Hurtado MD   2 mg at 22 1128    sodium chloride (NS) flush 5-10 mL  5-10 mL IntraVENous Q8H Radha Edwards MD   10 mL at 22 0537    sodium chloride (NS) flush 5-10 mL  5-10 mL IntraVENous PRN Maritza Skinner MD        ondansetron [facility]) injection 4 mg  4 mg IntraVENous Q4H PRN Lemuel Hurtado MD   4 mg at 22       OBJECTIVE:  Patient Vitals for the past 8 hrs:   BP Temp Pulse Resp SpO2 Height Weight   22 1931 (!) 127/58 98.2 °F (36.8 °C) 75 18 94 %     22 1648 131/65  94       22 1458 (!) 102/39 97.9 °F (36.6 °C) 76 18 93 %     22 1439      5' 2\" (1.575 m) 140 lb 3.2 oz (63.6 kg)     Temp (24hrs), Av.1 °F (36.7 °C), Min:97.7 °F (36.5 °C), Max:98.5 °F (36.9 °C)    No intake/output data recorded. Physical Exam:  Constitutional: ECOG - 1-2  Well developed, well nourished female in no acute distress, sitting comfortably in the hospital bed. Daughter at bedside    HEENT: Normocephalic and atraumatic. Oropharynx is clear, mucous membranes are moist.  Pupils are equal, round, and reactive to light. Extraocular muscles are intact. Sclerae anicteric. Lymph node   No palpable submandibular, cervical lymph nodes. Skin Warm and dry. No bruising and no rash noted. No erythema. No pallor. Respiratory Lungs are clear to auscultation bilaterally without wheezes, rales or rhonchi, normal air exchange without accessory muscle use. CVS Normal rate, regular rhythm and normal S1 and S2. Abdomen Slightly distended; staples CDI, no bleeding, no erythema no drainage    Neuro Grossly nonfocal with no obvious sensory or motor deficits.    MSK Normal range of motion in general.  No edema and no tenderness. Psych Appropriate mood and affect.       Labs:    Recent Results (from the past 24 hour(s))   CBC W/O DIFF    Collection Time: 01/27/22  5:47 AM   Result Value Ref Range    WBC 4.6 4.3 - 11.1 K/uL    RBC 3.19 (L) 4.05 - 5.2 M/uL    HGB 8.4 (L) 11.7 - 15.4 g/dL    HCT 26.7 (L) 35.8 - 46.3 %    MCV 83.7 79.6 - 97.8 FL    MCH 26.3 26.1 - 32.9 PG    MCHC 31.5 31.4 - 35.0 g/dL    RDW 14.0 11.9 - 14.6 %    PLATELET 354 094 - 314 K/uL    MPV 10.6 9.4 - 12.3 FL    ABSOLUTE NRBC 0.00 0.0 - 0.2 K/uL   METABOLIC PANEL, BASIC    Collection Time: 01/27/22  5:47 AM   Result Value Ref Range    Sodium 140 136 - 145 mmol/L    Potassium 4.3 3.5 - 5.1 mmol/L    Chloride 110 (H) 98 - 107 mmol/L    CO2 25 21 - 32 mmol/L    Anion gap 5 (L) 7 - 16 mmol/L    Glucose 103 (H) 65 - 100 mg/dL    BUN 5 (L) 8 - 23 MG/DL    Creatinine 0.59 (L) 0.6 - 1.0 MG/DL    GFR est AA >60 >60 ml/min/1.73m2    GFR est non-AA >60 >60 ml/min/1.73m2    Calcium 8.6 8.3 - 10.4 MG/DL   PHOSPHORUS    Collection Time: 01/27/22  5:47 AM   Result Value Ref Range    Phosphorus 3.6 2.3 - 3.7 MG/DL   MAGNESIUM    Collection Time: 01/27/22  5:47 AM   Result Value Ref Range    Magnesium 2.3 1.8 - 2.4 mg/dL   CEA    Collection Time: 01/27/22  1:15 PM   Result Value Ref Range    CEA 12.3 (H) 0.0 - 3.0 ng/mL   TRANSFERRIN SATURATION    Collection Time: 01/27/22  1:15 PM   Result Value Ref Range    Iron 18 (L) 35 - 150 ug/dL    TIBC 270 250 - 450 ug/dL    Transferrin Saturation 7 (L) >20 %   FERRITIN    Collection Time: 01/27/22  1:15 PM   Result Value Ref Range    Ferritin 29 8 - 388 NG/ML   VITAMIN B12    Collection Time: 01/27/22  1:15 PM   Result Value Ref Range    Vitamin B12 450 193 - 986 pg/mL   FOLATE    Collection Time: 01/27/22  1:15 PM   Result Value Ref Range    Folate 22.5 (H) 3.1 - 17.5 ng/mL   VITAMIN D, 25 HYDROXY    Collection Time: 01/27/22  1:15 PM   Result Value Ref Range    Vitamin D 25-Hydroxy 19.6 (L) 30.0 - 100.0 ng/mL       Imaging: reviewed     Pathology:               ASSESSMENT:    Principal Problem:    Perforation of colon ([address] (1/22/2022)        PLAN / RECOMMENDATIONS:  Lab studies and imaging studies were personally reviewed. Pertinent old records were reviewed. 1. Mid-ascending colon adenocarcinoma - [id number] - Stage IIIB (high risk due to poorly diff/perforation/obstruction)  - During today's consultation, we discussed the pathophysiology of colon cancer in general, we then reviewed her pathology and staging. We then discussed her different treatment options which can include FOLFOX versus [address] ox. Logistics of both options reviewed. Role of port discussed. Patient aware that she will FU with me upon d/c at [facility]. MS status will be requested - may be a candidate for clinical trial.    - CEA 12.3 post op   - CT chest to complete staging. FOLFOX: A cycle is every 14 days (6mo)  ? Oxaliplatin   (Eloxatin)  85 mg/m² IV once on day 1  ? Leucovorin  400 mg/m² IV once on day 1  ? Fluorouracil  400 mg/m² IV once on day 1  ? Fluorouracil  1,200 mg/m²/day CIV on days 1 and 2. Total dose = 2,400 mg/m²/cycle. OR CAPoX: A cycle is every 21 days   ? Capecitabine   (Xeloda)  850 mg/m² orally twice daily on days 1-14, then off 7 days  ? Oxaliplatin   (Eloxatin)  130 mg/m² IV day 1    2. Anemia   - will check nutritional studies     Lab studies and imaging studies were personally reviewed. Thank you for allowing us to participate in the care of Ms. Banks. Thank you for allowing me to participate in the care of Ms. Banks.         Radha Leyva MD  [facility] Hematology and Oncology  [address] [place] [address]  Office : [phone]  Fax : [phone]

## 2022-01-28 NOTE — PROGRESS NOTES
Problem: Falls - Risk of  Goal: *Absence of Falls  Description: Document [place] Va Fall Risk and appropriate interventions in the flowsheet. Outcome: Progressing Towards Goal  Note: Fall Risk Interventions:  Mobility Interventions: OT consult for ADLs         Medication Interventions: Teach patient to arise slowly    Elimination Interventions: Call light in reach,Patient to call for help with toileting needs              Problem: Pressure Injury - Risk of  Goal: *Prevention of pressure injury  Description: Document Jesus Scale and appropriate interventions in the flowsheet.   Outcome: Progressing Towards Goal  Note: Pressure Injury Interventions:  Sensory Interventions: Minimize linen layers    Moisture Interventions: Absorbent underpads    Activity Interventions: Increase time out of bed,Pressure redistribution bed/mattress(bed type)    Mobility Interventions: HOB 30 degrees or less    Nutrition Interventions: Document food/fluid/supplement intake    Friction and Shear Interventions: Minimize layers                Problem: Pain  Goal: *Control of Pain  Outcome: Progressing Towards Goal

## 2022-01-28 NOTE — PROGRESS NOTES
01/28/22 0134   Skin Integumentary   Skin Integrity Other (comment); Excoriation   pt noted to have 5 \"itchy\" pin prick spots on the back of her neck. Pt states she noticed them yesterday morning.   gen surg on call notified of pt request for benadryl

## 2022-01-29 PROCEDURE — 99233 SBSQ HOSP IP/OBS HIGH 50: CPT | Performed by: INTERNAL MEDICINE

## 2022-01-29 PROCEDURE — 74011250637 HC RX REV CODE- 250/637: Performed by: NURSE PRACTITIONER

## 2022-01-29 PROCEDURE — 74011250636 HC RX REV CODE- 250/636: Performed by: NURSE PRACTITIONER

## 2022-01-29 PROCEDURE — 65270000029 HC RM PRIVATE

## 2022-01-29 PROCEDURE — 74011000250 HC RX REV CODE- 250: Performed by: EMERGENCY MEDICINE

## 2022-01-29 PROCEDURE — 74011250637 HC RX REV CODE- 250/637: Performed by: SURGERY

## 2022-01-29 RX ORDER — ACETAMINOPHEN 325 MG/1
650 TABLET ORAL
Status: DISCONTINUED | OUTPATIENT
Start: 2022-01-29 | End: 2022-01-31 | Stop reason: HOSPADM

## 2022-01-29 RX ADMIN — OXYCODONE AND ACETAMINOPHEN 1 TABLET: 5; 325 TABLET ORAL at 08:12

## 2022-01-29 RX ADMIN — SODIUM CHLORIDE, PRESERVATIVE FREE 10 ML: 5 INJECTION INTRAVENOUS at 06:00

## 2022-01-29 RX ADMIN — SODIUM CHLORIDE, PRESERVATIVE FREE 10 ML: 5 INJECTION INTRAVENOUS at 22:04

## 2022-01-29 RX ADMIN — OXYCODONE AND ACETAMINOPHEN 1 TABLET: 5; 325 TABLET ORAL at 03:41

## 2022-01-29 RX ADMIN — SODIUM CHLORIDE, PRESERVATIVE FREE 10 ML: 5 INJECTION INTRAVENOUS at 14:58

## 2022-01-29 RX ADMIN — OXYCODONE AND ACETAMINOPHEN 1 TABLET: 5; 325 TABLET ORAL at 20:44

## 2022-01-29 RX ADMIN — ACETAMINOPHEN 650 MG: 325 TABLET ORAL at 11:46

## 2022-01-29 RX ADMIN — PANTOPRAZOLE SODIUM 40 MG: 40 TABLET, DELAYED RELEASE ORAL at 05:45

## 2022-01-29 RX ADMIN — DOCUSATE SODIUM 100 MG: 100 CAPSULE, LIQUID FILLED ORAL at 08:12

## 2022-01-29 RX ADMIN — PREDNISOLONE ACETATE 1 DROP: 10 SUSPENSION/ DROPS OPHTHALMIC at 20:45

## 2022-01-29 RX ADMIN — DOCUSATE SODIUM 100 MG: 100 CAPSULE, LIQUID FILLED ORAL at 17:24

## 2022-01-29 RX ADMIN — AMLODIPINE BESYLATE 2.5 MG: 5 TABLET ORAL at 08:12

## 2022-01-29 RX ADMIN — VITAMIN D, TAB 1000IU (100/BT) 1000 UNITS: 25 TAB at 08:12

## 2022-01-29 RX ADMIN — OXYCODONE AND ACETAMINOPHEN 1 TABLET: 5; 325 TABLET ORAL at 14:57

## 2022-01-29 NOTE — PROGRESS NOTES
Problem: Falls - Risk of  Goal: *Absence of Falls  Description: Document Josefina Ground Fall Risk and appropriate interventions in the flowsheet.   Outcome: Progressing Towards Goal  Note: Fall Risk Interventions:  Mobility Interventions: PT Consult for mobility concerns,OT consult for ADLs         Medication Interventions: Teach patient to arise slowly    Elimination Interventions: Call light in reach,Patient to call for help with toileting needs

## 2022-01-29 NOTE — PROGRESS NOTES
PLAN:  Full Liquids  OOB  DC IVF  Colace  Sanchez out  Use IS  Protonix  Pain/ nausea control  SCD  home BP medication  PT/OT following  Respiratory consulted for IS education  Jackelin Lou to shower  Follow labs  Path reviewed with pt/ daughter 1/26  Oncology Consulted  Possibly home 24-48 hours    ASSESSMENT:  Admit Date: 1/22/2022   7 Day Post-Op  Procedure(s):  LAPAROTOMY EXPLORATORY/RIGHT COLECTOMY    Principal Problem:    Perforation of colon ([address] (1/22/2022)         SUBJECTIVE:  Pt awake in bed. Pain controlled. Tolerating Fulls. No nausea/ vomiting.+flatus/-BM. Voiding without difficulty. Has been ambulating in room. AF, NAD. On room air. 01/29/22 CT Chest  IMPRESSION     1. No definite evidence of metastatic disease in the chest.     2.  A 0.6 cm right middle lobe nodule abuts the major fissure and is most likely  a lymph node. Continued attention on follow-up exams is recommended to exclude  the unlikely possibility of an isolated nodular metastasis. 3.  Small bilateral pleural effusions and bibasilar atelectasis. 4.  Small volume free fluid adjacent to the tip of the spleen. OBJECTIVE:  Constitutional: Alert oriented cooperative patient in no acute distress; appears stated age   Visit Vitals  /65 (BP Patient Position: Sitting)   Pulse 68   Temp 98.7 °F (37.1 °C)   Resp 15   Ht 5' 2\" (1.575 m)   Wt 140 lb 3.2 oz (63.6 kg)   LMP  (LMP Unknown)   SpO2 96%   BMI 25.64 kg/m²     Eyes: Sclera are clear. ENMT: no external lesions gross hearing normal; no obvious neck masses, no ear or lip lesions  CV: RRR. Normal perfusion  Resp: No JVD. Breathing is  non-labored; no audible wheezing. GI: soft and non-distended, appropriately ttp, staples c/d/i     Musculoskeletal: unremarkable with normal function. No embolic signs or cyanosis.    Neuro:  Oriented; moves all 4; no focal deficits  Psychiatric: normal affect and mood, no memory impairment      Patient Vitals for the past 24 hrs:   BP Temp Pulse Resp SpO2   01/29/22 0748 134/65 98.7 °F (37.1 °C) 68 15 96 %   01/29/22 0340 (!) 122/51 97.6 °F (36.4 °C) 73 16 92 %   01/28/22 2334 119/62 98.2 °F (36.8 °C) 65 14 92 %   01/28/22 2113 93/77 99.3 °F (37.4 °C) 69 12 91 %   01/28/22 1556 (!) 110/55 98.8 °F (37.1 °C) 69 15 93 %     Labs:    Recent Labs     01/28/22  1153 01/27/22  0547 01/27/22  0547   WBC  --   --  4.6   HGB  --   --  8.4*   PLT  --   --  213   NA  --   --  140   K  --   --  4.3   CL  --   --  110*   CO2  --   --  25   BUN  --   --  5*   CREA 0.66   < > 0.59*   GLU  --   --  103*    < > = values in this interval not displayed.      Riri Eng NP    I personally interviewed and examined this patient and have discussed plans with SALOME

## 2022-01-30 PROCEDURE — 74011250637 HC RX REV CODE- 250/637: Performed by: NURSE PRACTITIONER

## 2022-01-30 PROCEDURE — 74011250637 HC RX REV CODE- 250/637: Performed by: SURGERY

## 2022-01-30 PROCEDURE — 65270000029 HC RM PRIVATE

## 2022-01-30 PROCEDURE — 74011000250 HC RX REV CODE- 250: Performed by: EMERGENCY MEDICINE

## 2022-01-30 RX ADMIN — ACETAMINOPHEN 650 MG: 325 TABLET ORAL at 00:38

## 2022-01-30 RX ADMIN — DOCUSATE SODIUM 100 MG: 100 CAPSULE, LIQUID FILLED ORAL at 17:36

## 2022-01-30 RX ADMIN — ACETAMINOPHEN 650 MG: 325 TABLET ORAL at 13:25

## 2022-01-30 RX ADMIN — VITAMIN D, TAB 1000IU (100/BT) 1000 UNITS: 25 TAB at 09:28

## 2022-01-30 RX ADMIN — SODIUM CHLORIDE, PRESERVATIVE FREE 5 ML: 5 INJECTION INTRAVENOUS at 22:39

## 2022-01-30 RX ADMIN — DOCUSATE SODIUM 100 MG: 100 CAPSULE, LIQUID FILLED ORAL at 09:28

## 2022-01-30 RX ADMIN — PANTOPRAZOLE SODIUM 40 MG: 40 TABLET, DELAYED RELEASE ORAL at 06:21

## 2022-01-30 RX ADMIN — PREDNISOLONE ACETATE 1 DROP: 10 SUSPENSION/ DROPS OPHTHALMIC at 22:36

## 2022-01-30 RX ADMIN — ACETAMINOPHEN 650 MG: 325 TABLET ORAL at 06:21

## 2022-01-30 RX ADMIN — SODIUM CHLORIDE, PRESERVATIVE FREE 10 ML: 5 INJECTION INTRAVENOUS at 06:23

## 2022-01-30 RX ADMIN — SODIUM CHLORIDE, PRESERVATIVE FREE 10 ML: 5 INJECTION INTRAVENOUS at 15:08

## 2022-01-30 RX ADMIN — ACETAMINOPHEN 650 MG: 325 TABLET ORAL at 18:44

## 2022-01-30 NOTE — PROGRESS NOTES
3 [facility] Hematology and Oncology: Inpatient Hematology / Oncology Progress Note    Admission Date: 2022  5:28 AM    SUBJECTIVE:  No significant events overnight. Daughter at bedside and other daughter via tel. Discussed path and tx options again in detail   Also reviewed SE   + flatus, no BM yet     ROS:  Constitutional: negative for fever, chills, malaise. CV: negative for chest pain, palpitations, edema. Respiratory: negative for dyspnea, cough, wheezing. GI: negative for nausea, diarrhea. 10 point review of systems is otherwise negative with the exception of the elements mentioned above in the HPI. Allergies   Allergen Reactions    Bactrim [Sulfamethoprim Ds] Hives and Rash       OBJECTIVE:  Patient Vitals for the past 8 hrs:   BP Temp Pulse Resp SpO2   229 (!) 111/47 98.4 °F (36.9 °C) 71 16 95 %   22 1641 108/61 98 °F (36.7 °C) 70 16 94 %     Temp (24hrs), Av.2 °F (36.8 °C), Min:97.5 °F (36.4 °C), Max:99.3 °F (37.4 °C)    No intake/output data recorded. Physical Exam:  Constitutional: ECOG - 1 Well developed, well nourished female in no acute distress, sitting comfortably in the hospital bed. HEENT: Normocephalic and atraumatic. Oropharynx is clear, mucous membranes are moist.  Pupils are equal, round, and reactive to light. Extraocular muscles are intact. Sclerae anicteric. Neck supple    Lymph node   Deferred    Skin Warm and dry. No bruising and no rash noted. No erythema. No pallor. Respiratory Lungs are clear to auscultation bilaterally without wheezes, rales or rhonchi, normal air exchange without accessory muscle use. CVS Normal rate, regular rhythm and normal S1 and S2. Abdomen Soft, mildly tender on deeper palpation but less distended today and non-tender. Neuro Grossly nonfocal with no obvious sensory or motor deficits. MSK Normal range of motion in general.  No edema and no tenderness. Psych Appropriate mood and affect. Labs:      Recent Labs     01/27/22  0547   WBC 4.6   RBC 3.19*   HGB 8.4*   HCT 26.7*   MCV 83.7   MCH 26.3   MCHC 31.5   RDW 14.0           Recent Labs     01/28/22  1153 01/27/22  0547   NA  --  140   K  --  4.3   CL  --  110*   CO2  --  25   AGAP  --  5*   GLU  --  103*   BUN  --  5*   CREA 0.66 0.59*   GFRAA  --  >60   GFRNA  --  >60   CA  --  8.6   MG  --  2.3   PHOS  --  3.6       Imaging:      ASSESSMENT:    Principal Problem:    Perforation of colon ([address] (1/22/2022)        PLAN:  Ms Case is a 65yo woman admitted on 1/22/22 with peritonitis, colon mass, obstruction and bowel perforation. PMhx: anxiety, corneal dystrophy s/p transplants (bilat) on steroid drops, CAD, GERD, IBS, HLD. She p/w abd pain x2 days. CT AP c/w 2.3cm mass in mid-ascending colon and LAD with obstruction and perforation. S/p subsequent right extended colectomy with ileo-transverse staped anastomosis. Path c/w mod-poorly diff adenocarcinoma - [id number] (2/14 LN +). We were consulted for recs. 1. Mid-ascending colon adenocarcinoma - [id number] - Stage IIIB (high risk due to poorly diff/perforation/obstruction)  - During today's visit, we discussed the pathophysiology of colon cancer in general, we then reviewed her pathology and staging again. We then discussed her different treatment options which can include FOLFOX versus [address] ox. Logistics of both options reviewed. Role of port discussed. Patient aware that she will FU with me upon d/c at [facility]. MS status will be requested - may be a candidate for clinical trial.    - CEA 12.3 post op   - CT chest to complete staging with small RML nodule - ? LN - will monitor on re-imaging, no definite evid of disease.       FOLFOX: A cycle is every 14 days (6mo)  ? Oxaliplatin   (Eloxatin)  85 mg/m² IV once on day 1  ? Leucovorin  400 mg/m² IV once on day 1  ? Fluorouracil  400 mg/m² IV once on day 1  ? Fluorouracil  1,200 mg/m²/day CIV on days 1 and 2.  Total dose = 2,400 mg/m²/cycle. OR CAPoX: A cycle is every 21 days   ? Capecitabine   (Xeloda)  850 mg/m² orally twice daily on days 1-14, then off 7 days  ? Oxaliplatin   (Eloxatin)  130 mg/m² IV day 1     2.  Supportive care   - JAMES - rec IV iron  - vit D defic - rec replacement   - s/p bilat corneal transplant - sees Dr Cristiana Gomez [phone]    [70min encounter]      Araceli Read MD  [address] [place] [address] Hematology and Oncology  [address] [place] [address]  Office : [phone]  Fax : [phone]

## 2022-01-30 NOTE — PROGRESS NOTES
PLAN:  Full Liquids  OOB  DC IVF  Colace  Sanchez out  Use IS  Protonix  Pain/ nausea control  SCD  home BP medication  PT/OT following  Respiratory consulted for IS education  Heather Noel to shower  Follow labs  Path reviewed with pt/ daughter 1/26  Oncology Consulted  Hopefully home in am    ASSESSMENT:  Admit Date: 1/22/2022   7 Day Post-Op  Procedure(s):  LAPAROTOMY EXPLORATORY/RIGHT COLECTOMY    Principal Problem:    Perforation of colon ([address] (1/22/2022)         SUBJECTIVE:  Pt sitting in chair. Pain controlled. Tolerating Clears. No nausea/ vomiting.+flatus/-BM. Voiding without difficulty. AF, NAD. On room air. OBJECTIVE:  Constitutional: Alert oriented cooperative patient in no acute distress; appears stated age   Visit Vitals  BP (!) 112/96   Pulse 77   Temp 98.4 °F (36.9 °C)   Resp 16   Ht 5' 2\" (1.575 m)   Wt 140 lb 3.2 oz (63.6 kg)   LMP  (LMP Unknown)   SpO2 94%   BMI 25.64 kg/m²     Eyes: Sclera are clear. ENMT: no external lesions gross hearing normal; no obvious neck masses, no ear or lip lesions  CV: RRR. Normal perfusion  Resp: No JVD. Breathing is  non-labored; no audible wheezing. GI: soft and non-distended, appropriately ttp, staples c/d/i     Musculoskeletal: unremarkable with normal function. No embolic signs or cyanosis.    Neuro:  Oriented; moves all 4; no focal deficits  Psychiatric: normal affect and mood, no memory impairment      Patient Vitals for the past 24 hrs:   BP Temp Pulse Resp SpO2   01/30/22 0933 (!) 112/96  77  94 %   01/30/22 0803 96/76 98.4 °F (36.9 °C) 75 16 95 %   01/30/22 0402 126/66 98.3 °F (36.8 °C) 76 17 96 %   01/29/22 2359 (!) 107/49 98.6 °F (37 °C) 70 16 94 %   01/29/22 2039 (!) 111/47 98.4 °F (36.9 °C) 71 16 95 %   01/29/22 1641 108/61 98 °F (36.7 °C) 70 16 94 %   01/29/22 1153 (!) 121/57 97.5 °F (36.4 °C) 73 16 93 %     Labs:    Recent Labs     01/28/22  1153   CREA 0.66     John Schuster NP

## 2022-01-30 NOTE — PROGRESS NOTES
Problem: Falls - Risk of  Goal: *Absence of Falls  Description: Document Moira Nap Fall Risk and appropriate interventions in the flowsheet. Outcome: Progressing Towards Goal  Note: Fall Risk Interventions:  Mobility Interventions: Patient to call before getting OOB         Medication Interventions: Bed/chair exit alarm    Elimination Interventions: Call light in reach,Patient to call for help with toileting needs              Problem: Patient Education: Go to Patient Education Activity  Goal: Patient/Family Education  Outcome: Progressing Towards Goal     Problem: General Medical Care Plan  Goal: *Vital signs within specified parameters  Outcome: Progressing Towards Goal  Goal: *Labs within defined limits  Outcome: Progressing Towards Goal  Goal: *Absence of infection signs and symptoms  Outcome: Progressing Towards Goal  Goal: *Optimal pain control at patient's stated goal  Outcome: Progressing Towards Goal  Goal: *Skin integrity maintained  Outcome: Progressing Towards Goal  Goal: *Fluid volume balance  Outcome: Progressing Towards Goal  Goal: *Optimize nutritional status  Outcome: Progressing Towards Goal  Goal: *Anxiety reduced or absent  Outcome: Progressing Towards Goal  Goal: *Progressive mobility and function (eg: ADL's)  Outcome: Progressing Towards Goal     Problem: Patient Education: Go to Patient Education Activity  Goal: Patient/Family Education  Outcome: Progressing Towards Goal     Problem: Pressure Injury - Risk of  Goal: *Prevention of pressure injury  Description: Document Jesus Scale and appropriate interventions in the flowsheet.   Outcome: Progressing Towards Goal  Note: Pressure Injury Interventions:  Sensory Interventions: Assess changes in LOC    Moisture Interventions: Absorbent underpads,Minimize layers    Activity Interventions: Increase time out of bed,PT/OT evaluation    Mobility Interventions: HOB 30 degrees or less,PT/OT evaluation    Nutrition Interventions: Document food/fluid/supplement intake    Friction and Shear Interventions: HOB 30 degrees or less                Problem: Patient Education: Go to Patient Education Activity  Goal: Patient/Family Education  Outcome: Progressing Towards Goal     Problem: Patient Education: Go to Patient Education Activity  Goal: Patient/Family Education  Outcome: Progressing Towards Goal     Problem: Patient Education: Go to Patient Education Activity  Goal: Patient/Family Education  Outcome: Progressing Towards Goal     Problem: Pain  Goal: *Control of Pain  Outcome: Progressing Towards Goal     Problem: Patient Education: Go to Patient Education Activity  Goal: Patient/Family Education  Outcome: Progressing Towards Goal

## 2022-01-31 VITALS
SYSTOLIC BLOOD PRESSURE: 118 MMHG | WEIGHT: 140.2 LBS | OXYGEN SATURATION: 94 % | RESPIRATION RATE: 16 BRPM | BODY MASS INDEX: 25.8 KG/M2 | DIASTOLIC BLOOD PRESSURE: 56 MMHG | HEART RATE: 70 BPM | TEMPERATURE: 98.2 F | HEIGHT: 62 IN

## 2022-01-31 PROBLEM — C18.2 PRIMARY ADENOCARCINOMA OF ASCENDING COLON (HCC): Status: ACTIVE | Noted: 2022-01-31

## 2022-01-31 PROCEDURE — 74011000250 HC RX REV CODE- 250: Performed by: EMERGENCY MEDICINE

## 2022-01-31 PROCEDURE — 74011250637 HC RX REV CODE- 250/637: Performed by: SURGERY

## 2022-01-31 PROCEDURE — 74011250637 HC RX REV CODE- 250/637: Performed by: NURSE PRACTITIONER

## 2022-01-31 RX ORDER — ACETAMINOPHEN 325 MG/1
650 TABLET ORAL
Qty: 1 TABLET | Refills: 0 | Status: SHIPPED | OUTPATIENT
Start: 2022-01-31

## 2022-01-31 RX ORDER — DOCUSATE SODIUM 100 MG/1
100 CAPSULE, LIQUID FILLED ORAL 2 TIMES DAILY
Qty: 60 CAPSULE | Refills: 2 | Status: SHIPPED | OUTPATIENT
Start: 2022-01-31 | End: 2022-04-13

## 2022-01-31 RX ORDER — PANTOPRAZOLE SODIUM 40 MG/1
40 TABLET, DELAYED RELEASE ORAL
Qty: 30 TABLET | Refills: 0 | Status: SHIPPED | OUTPATIENT
Start: 2022-01-31 | End: 2022-02-28 | Stop reason: SDUPTHER

## 2022-01-31 RX ADMIN — ACETAMINOPHEN 650 MG: 325 TABLET ORAL at 06:22

## 2022-01-31 RX ADMIN — DOCUSATE SODIUM 100 MG: 100 CAPSULE, LIQUID FILLED ORAL at 09:33

## 2022-01-31 RX ADMIN — ACETAMINOPHEN 650 MG: 325 TABLET ORAL at 13:03

## 2022-01-31 RX ADMIN — PANTOPRAZOLE SODIUM 40 MG: 40 TABLET, DELAYED RELEASE ORAL at 06:22

## 2022-01-31 RX ADMIN — VITAMIN D, TAB 1000IU (100/BT) 1000 UNITS: 25 TAB at 09:34

## 2022-01-31 RX ADMIN — SODIUM CHLORIDE, PRESERVATIVE FREE 5 ML: 5 INJECTION INTRAVENOUS at 06:29

## 2022-01-31 RX ADMIN — ACETAMINOPHEN 650 MG: 325 TABLET ORAL at 00:28

## 2022-01-31 NOTE — PROGRESS NOTES
Comprehensive Nutrition Assessment    Type and Reason for Visit: Reassess    Nutrition Recommendations/Plan:   Meals and Snacks:  Continue current diet. Nutrition Supplement Therapy:   Medical food supplement therapy:  Continue Ensure Clear three times per day (this provides 240 kcal and 8 grams protein per bottle) --pt to d/c today; RN has provided x2 Ensure Enlive for pt to take home   Diet education provided for GI soft diet. Pt would benefit with further nutrition counseling outpatient. Noted pt to see oncologist outpatient. Encouraged pt to request seeing oncology RD in outpatient for nutrition follow up. Malnutrition Assessment:  Malnutrition Status: At risk for malnutrition (specify) (s/p colectomy, NPO/CLQ day 5 on assessment)    Nutrition Assessment:   Nutrition History: Pt reports ~6lb weight loss following COVID-19 infection 9/2021. Pt reports loss of taste and smell previously inhibting appetite. Pt states in recent months her appetite and intake improve, enjoys cooking for herself. Pt states UBW of ~130lb. Nutrition Background: Pt with PMH significant for CAD, IBS, and HLD presented to [facility] 1/22 w/ worsening abominal pain. CT w/ findings of Right colon obstructive mass with microperforation. Pt s/p colectomy 1/22. 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 Nutrition Interval:  Pt seen after receiving call from pt's daughter. Diet education provided regarding GI soft diet. Questions answered appropriately. Encouraged pt to continue Ensure after discharge due to pt reporting early satiety while appetite returns. Nutrition Related Findings:   NFPE without signs of fat or muscle wasting.  CLQ day 5 at time of assessment 1/27. (1/28) diet advanced to full liquids; (1/31) diet advanced to GI soft Wound Type: Surgical incision    Current Nutrition Therapies:  ADULT ORAL NUTRITION SUPPLEMENT Breakfast, Lunch, Dinner; Clear Liquid  ADULT DIET Regular; GI [place] (GERD/Peptic Ulcer); Low Fiber    Current Intake:   Average Meal Intake: 1-25% Average Supplement Intake: %      Anthropometric Measures:  Height: 5' 2\" (157.5 cm)  Current Body Wt: 63.6 kg (140 lb 3.2 oz) (1/27), Weight source: Bed scale  BMI: 25.6, Overweight (BMI 25.0-29. 9)     Ideal Body Weight (lbs) (Calculated): 110 lbs (50 kg), 127.5 %  Usual Body Wt: 59 kg (130 lb) (per pt), Percent weight change: 7.8          Edema: No data recorded   Estimated Daily Nutrient Needs:  Energy (kcal/day): 6042-1309 (Kcal/kg (22-26), Weight Used: Current (63.6kg))  Protein (g/day): 64-80 (1-1.25g/kg) Weight Used: (Current (63.6kg))  Fluid (ml/day):   (1 ml/kcal)    Nutrition Diagnosis:   · Inadequate oral intake related to early satiety as evidenced by  (pt stated barrier to intake)    Nutrition Interventions:   Food and/or Nutrient Delivery: Continue current diet,Continue oral nutrition supplement     Coordination of Nutrition Care: Continue to monitor while inpatient  Plan of Care discussed with Yolanda Lugo RN    Goals:   Previous Goal Met: Goal(s) achieved  Active Goal: Meet >75% estimated nutrition needs by RD follow up.     Nutrition Monitoring and Evaluation:      Food/Nutrient Intake Outcomes: Food and nutrient intake,Supplement intake  Physical Signs/Symptoms Outcomes: GI status,Meal time behavior,Weight    Discharge Planning:    Continue oral nutrition supplement,Recommend pursue outpatient nutrition counseling    Electronically signed by Chelsi Pinto MS, RDN, LD 1/31/2022 at 1:58 PM  Contact: [phone]      Disaster Mode Active

## 2022-01-31 NOTE — DISCHARGE SUMMARY
[facility] [address]  [place], [address] [facility]  [phone]   Discharge Summary     Keith Banks  MRN: [id number]     : 1945     Age: 67 y.o. Admit date: 2022     Discharge date:  2022  Attending Physician: Rai Lambert MD, FACS  Primary Discharge Diagnosis:   Principal Problem:    Perforation of colon [facility]) (2022)    Active Problems:    Primary adenocarcinoma of ascending colon ([address] (2022)      Primary Operations or Procedures Performed :  Procedure(s):  LAPAROTOMY EXPLORATORY/RIGHT COLECTOMY     Brief History and Reason for Admission: Monse CRUZ Case was admitted with the following history of present illness. The primary encounter diagnosis was Peritonitis ([address] Diagnoses of Colonic mass, Partial intestinal obstruction, unspecified cause ([address] and Bowel perforation ([address] were also pertinent to this visit. .       Her PMH includes CAD s/p stents and GERD. She presented with c/o worsening abdominal pain x 2 days. CT AP with 2.3cm mass in mid-ascending colon with few adjacent enlarged LNs with obstruction and perforation as well as enlarged LN in anterior aspect of mid lower abdomen. Hospital Course:  CT AP c/w 2.3cm mass in mid-ascending colon and LAD with obstruction and perforation. She underwent R extended colectomy with ileo-transverse colon staped anastomosis by Dr. Susan Eden. Path +mod to poorly differentiated adenocarcinoma invades through muscularis propria into pericolonic soft tissue. Margins neg.  2/14 LNs +ve. pT3 N1b. Hgb 8. 4.      S/p subsequent right extended colectomy with ileo-transverse staped anastomosis. Path c/w mod-poorly diff adenocarcinoma - [id number] (2/14 LN +). Oncology consulted and seen by Dr. Chace Isbell. She progressed and had return of bowel function and is ready for discharge.     Condition at Discharge: good    Discharge Medications:   Current Discharge Medication List      START taking these medications    Details   docusate sodium (COLACE) 100 mg capsule Take 1 Capsule by mouth two (2) times a day for 90 days. Qty: 60 Capsule, Refills: 2      pantoprazole (PROTONIX) 40 mg tablet Take 1 Tablet by mouth Daily (before breakfast). Qty: 30 Tablet, Refills: 0      acetaminophen (TYLENOL) 325 mg tablet Take 2 Tablets by mouth every six (6) hours as needed for Pain. Qty: 1 Tablet, Refills: 0    Comments: No Rx for OTC med         CONTINUE these medications which have NOT CHANGED    Details   ascorbic acid, vitamin C, (Vitamin C) 500 mg tablet Take 1,000 mg by mouth daily. cholecalciferol (VITAMIN D3) (5000 Units/125 mcg) tab tablet Take 5,000 Units by mouth daily. diphenhydrAMINE (BenadryL) 25 mg capsule Take 25 mg by mouth as needed. atorvastatin (LIPITOR) 40 mg tablet Take 1 Tablet by mouth daily. Qty: 90 Tablet, Refills: 3      amLODIPine (NORVASC) 2.5 mg tablet Take 1 Tablet by mouth daily. Qty: 90 Tablet, Refills: 3      clindinium-chlordiazePOXIDE (Librax, with Clinidium,) 5-2.5 mg per capsule 1 bid prn  Indications: adjunct therapy for irritable bowel syndrome  Qty: 60 Cap, Refills: 5    Associated Diagnoses: Irritable bowel syndrome with both constipation and diarrhea      CALCIUM-MAGNESIUM-ZINC PO Take  by mouth.      mag/aluminum/sod bicarb/alginc (GAVISCON PO) Take  by mouth. KRILL OIL-OMEGA-3-DHA-EPA PO Take  by mouth. ubidecarenone/vitamin E mixed (COQ10  PO) Take  by mouth.      prednisoLONE acetate (PRED FORTE) 1 % ophthalmic suspension Administer 1 Drop to both eyes nightly. EC ASPIRIN PO Take 81 mg by mouth.          STOP taking these medications       nitroglycerin (Nitrostat) 0.4 mg SL tablet Comments:   Reason for Stopping:               Disposition: Home    Discharge Instructions/Follow-up Care:    GI soft diet  No lifting >10 lbs for 6 weeks  May shower (no submerged bathing)        Follow-up Appointments   Procedures    FOLLOW UP VISIT Appointment in: One Week Call [facility] for appt time at [phone] to see Luann Croft or Dr. Dillon Jaimes F/SEMAJ with Dr. Carline Veras at [address] [facility] per their recs     Call [facility] for appt time at [phone] to see Luann Croft or Dr. Dillon SUNG/SEMAJ with Dr. Carline Veras at [address] [place] [address] per their recs     Standing Status:   Standing     Number of Occurrences:   1     Order Specific Question:   Appointment in     Answer: One Week        Total time discharging patient took greater than 30 minutes.     Signed:  Kings Felder MD  January 31, 2022  8:16 AM

## 2022-01-31 NOTE — PROGRESS NOTES
Patient is medically ready for discharge. CM in to speak with patient and patient's daughter at bedside. Therapy recommendations reviewed and patient and daughter are agreeable to [facility] services and DME recommendations. List of Medicare certified  [address] reviewed and they would like referral sent to [address] [place] [address]. Liaison notified and referral sent. Patient was provided with rolling walker and signed consent/acknowledgement faxed to [facility].  Patient's daughter will transport her home. Care Management Interventions  PCP Verified by CM: Yes  Mode of Transport at Discharge: Other (see comment) (Daughter)  Transition of Care Consult (CM Consult): [address]: No  Reason Outside [place]:  (Patient preference)  Discharge Durable Medical Equipment: Yes (Rolling Walker)  Physical Therapy Consult: Yes  Occupational Therapy Consult: Yes  Support Systems: Child(manisha)  Confirm Follow Up Transport: Family  The Plan for Transition of Care is Related to the Following Treatment Goals : Patient will participate in [facility] therapies in order to return to safe baseline independence in ADLs.   The Patient and/or Patient Representative was Provided with a Choice of Provider and Agrees with the Discharge Plan?: Yes  Freedom of Choice List was Provided with Basic Dialogue that Supports the Patient's Individualized Plan of Care/Goals, Treatment Preferences and Shares the Quality Data Associated with the Providers?: Yes  Discharge Location  Patient Expects to be Discharged to[de-identified] Home with home health

## 2022-01-31 NOTE — PROGRESS NOTES
PLAN:  Full Liquids tolerated, adv to GI soft  OOB  Colace  Use IS  Protonix  Pain/ nausea control  SCD  home BP medication  PT/OT following  Respiratory consulted for IS education  [place] Ready to shower  Follow labs  Path reviewed with pt/ daughter 1/26  Oncology Consulted  Home today    ASSESSMENT:  Admit Date: 1/22/2022   8 Day Post-Op  Procedure(s):  LAPAROTOMY EXPLORATORY/RIGHT COLECTOMY    Principal Problem:    Perforation of colon ([facility] (1/22/2022)    Active Problems:    Primary adenocarcinoma of ascending colon ([facility] (1/31/2022)         SUBJECTIVE:  Pt sitting in chair. Pain controlled. Tolerating FLD. No nausea/ vomiting.+flatus/+BM. Voiding without difficulty. AF, NAD. On room air. OBJECTIVE:  Constitutional: Alert oriented cooperative patient in no acute distress; appears stated age   Visit Vitals  BP (!) 118/56 (BP 1 Location: Right upper arm, BP Patient Position: Supine)   Pulse 70   Temp 98.2 °F (36.8 °C)   Resp 16   Ht 5' 2\" (1.575 m)   Wt 140 lb 3.2 oz (63.6 kg)   LMP  (LMP Unknown)   SpO2 94%   BMI 25.64 kg/m²     Eyes: Sclera are clear. ENMT: no external lesions gross hearing normal; no obvious neck masses, no ear or lip lesions  CV: RRR. Normal perfusion  Resp: No JVD. Breathing is  non-labored; no audible wheezing. GI: soft and non-distended, appropriately ttp, staples c/d/i     Musculoskeletal: unremarkable with normal function. No embolic signs or cyanosis.    Neuro:  Oriented; moves all 4; no focal deficits  Psychiatric: normal affect and mood, no memory impairment      Patient Vitals for the past 24 hrs:   BP Temp Pulse Resp SpO2   01/31/22 0801 (!) 118/56 98.2 °F (36.8 °C) 70 16 94 %   01/31/22 0554 118/62 97.3 °F (36.3 °C) 71 19 97 %   01/31/22 0031 (!) 101/55 97.7 °F (36.5 °C) 79 18 93 %   01/30/22 1955 131/66 97.6 °F (36.4 °C) 74 18 96 %   01/30/22 1611 (!) 114/59 97.8 °F (36.6 °C) 72 15 96 %   01/30/22 1206 (!) 116/51 98 °F (36.7 °C) 72 16 95 %   01/30/22 0933 (!) 112/96  77  94 % Labs:    Recent Labs     01/28/22  1153   CREA 0.66     Taqueria Love MD

## 2022-01-31 NOTE — PROGRESS NOTES
Problem: Patient Education: Go to Patient Education Activity  Goal: Patient/Family Education  1/31/2022 1259 by Aziza Dover RN  Outcome: Progressing Towards Goal  1/31/2022 1231 by Aziza Dover RN  Outcome: Progressing Towards Goal     Problem: Major Small and Large Bowel Procedure: Day of Surgery (Initiate SCIP Measures for Post-Op Care)  Goal: Off Pathway (Use only if patient is Off Pathway)  1/31/2022 1259 by Aziza Dover RN  Outcome: Progressing Towards Goal  1/31/2022 1231 by Aziza Dover RN  Outcome: Progressing Towards Goal  Goal: Activity/Safety  1/31/2022 1259 by Aziza Dover RN  Outcome: Progressing Towards Goal  1/31/2022 1231 by Aziza Dover RN  Outcome: Progressing Towards Goal  Goal: Consults, if ordered  1/31/2022 1259 by Aziza Dover RN  Outcome: Progressing Towards Goal  1/31/2022 1231 by Aziza Dover RN  Outcome: Progressing Towards Goal  Goal: Nutrition/Diet  1/31/2022 1259 by Aziza Dover RN  Outcome: Progressing Towards Goal  1/31/2022 1231 by Aziza Dover RN  Outcome: Progressing Towards Goal  Goal: Medications  1/31/2022 1259 by Aziza Dover RN  Outcome: Progressing Towards Goal  1/31/2022 1231 by Aziza Dover RN  Outcome: Progressing Towards Goal  Goal: Respiratory  1/31/2022 1259 by Aziza Dover RN  Outcome: Progressing Towards Goal  1/31/2022 1231 by Aziza Dover RN  Outcome: Progressing Towards Goal  Goal: Treatments/Interventions/Procedures  1/31/2022 1259 by Aziza Dover RN  Outcome: Progressing Towards Goal  1/31/2022 1231 by Aziza Dover RN  Outcome: Progressing Towards Goal  Goal: Psychosocial  1/31/2022 1259 by Aziza Dover RN  Outcome: Progressing Towards Goal  1/31/2022 1231 by Aziza Dover RN  Outcome: Progressing Towards Goal  Goal: *Optimal pain control at patient's stated goal  1/31/2022 1259 by Aziza Jazzmine, RN  Outcome: Progressing Towards Goal  1/31/2022 1231 by Aziza Dover, RN  Outcome: Progressing Towards Goal  Goal: *Adequate urinary output (equal to or greater than 30 milliliters/hour)  1/31/2022 1259 by Mali Glass RN  Outcome: Progressing Towards Goal  1/31/2022 1231 by Mali Glass RN  Outcome: Progressing Towards Goal  Goal: *Hemodynamically stable  1/31/2022 1259 by Mali Glass RN  Outcome: Progressing Towards Goal  1/31/2022 1231 by Mali Glass RN  Outcome: Progressing Towards Goal  Goal: *Tolerating diet  1/31/2022 1259 by Mali Glass RN  Outcome: Progressing Towards Goal  1/31/2022 1231 by Mali Glass RN  Outcome: Progressing Towards Goal  Goal: *Demonstrates progressive activity  1/31/2022 1259 by Mali Glass RN  Outcome: Progressing Towards Goal  1/31/2022 1231 by Mali Glass RN  Outcome: Progressing Towards Goal     Problem: Major Small and Large Bowel Procedure: Post-Op Day 1  Goal: Off Pathway (Use only if patient is Off Pathway)  1/31/2022 1259 by Mali Glass RN  Outcome: Progressing Towards Goal  1/31/2022 1231 by Mali Glass RN  Outcome: Progressing Towards Goal  Goal: Activity/Safety  1/31/2022 1259 by Mali Glass RN  Outcome: Progressing Towards Goal  1/31/2022 1231 by Mali Glass RN  Outcome: Progressing Towards Goal  Goal: Consults, if ordered  1/31/2022 1259 by Mali Glass RN  Outcome: Progressing Towards Goal  1/31/2022 1231 by Mali Glass RN  Outcome: Progressing Towards Goal  Goal: Diagnostic Test/Procedures  1/31/2022 1259 by Mali Glass RN  Outcome: Progressing Towards Goal  1/31/2022 1231 by Mali Glass RN  Outcome: Progressing Towards Goal  Goal: Nutrition/Diet  1/31/2022 1259 by Mali Glass RN  Outcome: Progressing Towards Goal  1/31/2022 1231 by Mali Glass RN  Outcome: Progressing Towards Goal  Goal: Discharge Planning  1/31/2022 1259 by Mali Glass RN  Outcome: Progressing Towards Goal  1/31/2022 1231 by Mali Glass RN  Outcome: Progressing Towards Goal  Goal: Medications  1/31/2022 1259 by Mali Glass RN  Outcome: Progressing Towards Goal  1/31/2022 1231 by Mali Glass RN  Outcome: Progressing Towards Goal  Goal: Respiratory  1/31/2022 1259 by Fracisco Hargrove RN  Outcome: Progressing Towards Goal  1/31/2022 1231 by Fracisco Hargrove RN  Outcome: Progressing Towards Goal  Goal: Treatments/Interventions/Procedures  1/31/2022 1259 by Fracisco Hargrove RN  Outcome: Progressing Towards Goal  1/31/2022 1231 by Fracisco Hargrove RN  Outcome: Progressing Towards Goal  Goal: Psychosocial  1/31/2022 1259 by Fracisco Hargrove RN  Outcome: Progressing Towards Goal  1/31/2022 1231 by Fracisco Hargrove RN  Outcome: Progressing Towards Goal  Goal: *Optimal pain control at patient's stated goal  1/31/2022 1259 by Fracisco Hargrove RN  Outcome: Progressing Towards Goal  1/31/2022 1231 by Fracisco Hargrove RN  Outcome: Progressing Towards Goal  Goal: *Adequate urinary output (equal to or greater than 30 milliliters/hour)  1/31/2022 1259 by Fracisco Hargrove RN  Outcome: Progressing Towards Goal  1/31/2022 1231 by Fracisco Hargrove RN  Outcome: Progressing Towards Goal  Goal: *Hemodynamically stable  1/31/2022 1259 by Fracisco Hargrove RN  Outcome: Progressing Towards Goal  1/31/2022 1231 by Fracisco Hargrove RN  Outcome: Progressing Towards Goal  Goal: *Tolerating diet  1/31/2022 1259 by Fracisco Hargrove RN  Outcome: Progressing Towards Goal  1/31/2022 1231 by Fracisco Hargrove RN  Outcome: Progressing Towards Goal  Goal: *Demonstrates progressive activity  1/31/2022 1259 by Fracisco Hargrove RN  Outcome: Progressing Towards Goal  1/31/2022 1231 by Fracisco Hargrove RN  Outcome: Progressing Towards Goal  Goal: *Lungs clear or at baseline  1/31/2022 1259 by Fracisco Hargrove RN  Outcome: Progressing Towards Goal  1/31/2022 1231 by Fracisco Hargrove RN  Outcome: Progressing Towards Goal     Problem: Major Small and Large Bowel Procedure: Post-Op Day 2  Goal: Off Pathway (Use only if patient is Off Pathway)  1/31/2022 1259 by Fracisco Hargrove RN  Outcome: Progressing Towards Goal  1/31/2022 1231 by Fracisco Hargrove RN  Outcome: Progressing Towards Goal  Goal: Activity/Safety  1/31/2022 1259 by Fracisco Hargrove RN  Outcome: Progressing Towards Goal  1/31/2022 1231 by Fracisco Hargrove RN  Outcome: Progressing Towards Goal  Goal: Consults, if ordered  1/31/2022 1259 by Julia Perez RN  Outcome: Progressing Towards Goal  1/31/2022 1231 by Julia Perez RN  Outcome: Progressing Towards Goal  Goal: Nutrition/Diet  1/31/2022 1259 by Julia Perez RN  Outcome: Progressing Towards Goal  1/31/2022 1231 by Julia Perez RN  Outcome: Progressing Towards Goal  Goal: Discharge Planning  1/31/2022 1259 by Julia Perez RN  Outcome: Progressing Towards Goal  1/31/2022 1231 by Julia Perez RN  Outcome: Progressing Towards Goal  Goal: Medications  1/31/2022 1259 by Julia Perez RN  Outcome: Progressing Towards Goal  1/31/2022 1231 by Julia Perez RN  Outcome: Progressing Towards Goal  Goal: Respiratory  1/31/2022 1259 by Julia Perez RN  Outcome: Progressing Towards Goal  1/31/2022 1231 by Julia Perez RN  Outcome: Progressing Towards Goal  Goal: Treatments/Interventions/Procedures  1/31/2022 1259 by Julia Perez RN  Outcome: Progressing Towards Goal  1/31/2022 1231 by Julia Perez RN  Outcome: Progressing Towards Goal  Goal: Psychosocial  1/31/2022 1259 by Julia Perez RN  Outcome: Progressing Towards Goal  1/31/2022 1231 by Julia Perez RN  Outcome: Progressing Towards Goal  Goal: *Optimal pain control at patient's stated goal  1/31/2022 1259 by Julia Perez RN  Outcome: Progressing Towards Goal  1/31/2022 1231 by Julia Perez RN  Outcome: Progressing Towards Goal  Goal: *Adequate urinary output (equal to or greater than 30 milliliters/hour)  1/31/2022 1259 by Julia Perez RN  Outcome: Progressing Towards Goal  1/31/2022 1231 by Julia Perez RN  Outcome: Progressing Towards Goal  Goal: *Hemodynamically stable  1/31/2022 1259 by Julia Perez RN  Outcome: Progressing Towards Goal  1/31/2022 1231 by Julia Perez RN  Outcome: Progressing Towards Goal  Goal: *Tolerating diet  1/31/2022 1259 by Julia Perez RN  Outcome: Progressing Towards Goal  1/31/2022 1231 by Julia Perez RN  Outcome: Progressing Towards Goal  Goal: *Demonstrates progressive activity  1/31/2022 1259 by Saima Falcon RN  Outcome: Progressing Towards Goal  1/31/2022 1231 by Saima Falcon RN  Outcome: Progressing Towards Goal  Goal: *Lungs clear or at baseline  1/31/2022 1259 by Saima Falcon RN  Outcome: Progressing Towards Goal  1/31/2022 1231 by Saima Falcon RN  Outcome: Progressing Towards Goal     Problem: Major Small and Large Bowel Procedure: Post-Op Day 3  Goal: Off Pathway (Use only if patient is Off Pathway)  1/31/2022 1259 by Saima Falcon, RN  Outcome: Progressing Towards Goal  1/31/2022 1231 by Saima Falcon RN  Outcome: Progressing Towards Goal  Goal: Activity/Safety  1/31/2022 1259 by Saima Falcon, RN  Outcome: Progressing Towards Goal  1/31/2022 1231 by Saima Falcon RN  Outcome: Progressing Towards Goal  Goal: Nutrition/Diet  1/31/2022 1259 by Saima Falcon, RN  Outcome: Progressing Towards Goal  1/31/2022 1231 by Saima Falcon RN  Outcome: Progressing Towards Goal  Goal: Discharge Planning  1/31/2022 1259 by Saima Falcon RN  Outcome: Progressing Towards Goal  1/31/2022 1231 by Saima Falcon RN  Outcome: Progressing Towards Goal  Goal: Medications  1/31/2022 1259 by Saima Falcon, RN  Outcome: Progressing Towards Goal  1/31/2022 1231 by Saima Falcon RN  Outcome: Progressing Towards Goal  Goal: Respiratory  1/31/2022 1259 by Saima Falcon, RN  Outcome: Progressing Towards Goal  1/31/2022 1231 by Saima Falcon RN  Outcome: Progressing Towards Goal  Goal: Treatments/Interventions/Procedures  1/31/2022 1259 by aSima Falcon RN  Outcome: Progressing Towards Goal  1/31/2022 1231 by Saima Falcon RN  Outcome: Progressing Towards Goal  Goal: Psychosocial  1/31/2022 1259 by Saima Falcon, RN  Outcome: Progressing Towards Goal  1/31/2022 1231 by Saima Falcon RN  Outcome: Progressing Towards Goal  Goal: *Optimal pain control at patient's stated goal  1/31/2022 1259 by Saima Falcon RN  Outcome: Progressing Towards Goal  1/31/2022 1231 by Saima Falcon, RN  Outcome: Progressing Towards Goal  Goal: *Adequate urinary output (equal to or greater than 30 milliliters/hour)  1/31/2022 1259 by Syd Fry RN  Outcome: Progressing Towards Goal  1/31/2022 1231 by Syd Fry, RN  Outcome: Progressing Towards Goal  Goal: *Hemodynamically stable  1/31/2022 1259 by Syd Fry, RN  Outcome: Progressing Towards Goal  1/31/2022 1231 by Syd Fry, RN  Outcome: Progressing Towards Goal  Goal: *Tolerating diet  1/31/2022 1259 by Syd Fry, RN  Outcome: Progressing Towards Goal  1/31/2022 1231 by Syd Fry, RN  Outcome: Progressing Towards Goal  Goal: *Demonstrates progressive activity  1/31/2022 1259 by Syd Fry, RN  Outcome: Progressing Towards Goal  1/31/2022 1231 by Syd Fry, RN  Outcome: Progressing Towards Goal  Goal: *Lungs clear or at baseline  1/31/2022 1259 by Syd Fry, RN  Outcome: Progressing Towards Goal  1/31/2022 1231 by Syd Fry, RN  Outcome: Progressing Towards Goal  Goal: *Active bowel sounds  1/31/2022 1259 by Syd Fry, RN  Outcome: Progressing Towards Goal  1/31/2022 1231 by Syd Fry, RN  Outcome: Progressing Towards Goal  Goal: *Participates in self care  1/31/2022 1259 by Syd Fry, RN  Outcome: Progressing Towards Goal  1/31/2022 1231 by Syd Fry RN  Outcome: Progressing Towards Goal  Goal: *Without signs and symptoms of complications  6/60/2898 1259 by Syd Fry, RN  Outcome: Progressing Towards Goal  1/31/2022 1231 by Syd Fry, RN  Outcome: Progressing Towards Goal     Problem: Major Small and Large Bowel Procedure: Post-Op Day 4  Goal: Off Pathway (Use only if patient is Off Pathway)  1/31/2022 1259 by Syd Fry RN  Outcome: Progressing Towards Goal  1/31/2022 1231 by Syd Fry RN  Outcome: Progressing Towards Goal  Goal: Activity/Safety  1/31/2022 1259 by Syd Fry, RN  Outcome: Progressing Towards Goal  1/31/2022 1231 by Syd Fry, RN  Outcome: Progressing Towards Goal  Goal: Nutrition/Diet  1/31/2022 1259 by Syd Fry RN  Outcome: Progressing Towards Goal  1/31/2022 1231 by Syd Fry RN  Outcome: Progressing Towards Goal  Goal: Discharge Planning  1/31/2022 1259 by Carolina Jones RN  Outcome: Progressing Towards Goal  1/31/2022 1231 by Carolina Jones RN  Outcome: Progressing Towards Goal  Goal: Medications  1/31/2022 1259 by Carolina Jones RN  Outcome: Progressing Towards Goal  1/31/2022 1231 by Carolina Jones RN  Outcome: Progressing Towards Goal  Goal: Respiratory  1/31/2022 1259 by Carolina Jones, RN  Outcome: Progressing Towards Goal  1/31/2022 1231 by Carolina Jones RN  Outcome: Progressing Towards Goal  Goal: Treatments/Interventions/Procedures  1/31/2022 1259 by Carolina Jones RN  Outcome: Progressing Towards Goal  1/31/2022 1231 by Carolina Jones RN  Outcome: Progressing Towards Goal  Goal: Psychosocial  1/31/2022 1259 by Carolina Jones, RN  Outcome: Progressing Towards Goal  1/31/2022 1231 by Carolina Jones RN  Outcome: Progressing Towards Goal  Goal: *Optimal pain control at patient's stated goal  1/31/2022 1259 by Carolina Jones, RN  Outcome: Progressing Towards Goal  1/31/2022 1231 by Carolina Jones RN  Outcome: Progressing Towards Goal  Goal: *Adequate urinary output (equal to or greater than 30 milliliters per hour)  1/31/2022 1259 by Carolina Jones, RN  Outcome: Progressing Towards Goal  1/31/2022 1231 by Carolina Jones RN  Outcome: Progressing Towards Goal  Goal: *Hemodynamically stable  1/31/2022 1259 by Carolina Jones, RN  Outcome: Progressing Towards Goal  1/31/2022 1231 by Carolina Jones, RN  Outcome: Progressing Towards Goal  Goal: *Tolerating diet  1/31/2022 1259 by Carolina Jones, RN  Outcome: Progressing Towards Goal  1/31/2022 1231 by Carolina Jones RN  Outcome: Progressing Towards Goal  Goal: *Demonstrates progressive activity  1/31/2022 1259 by Carolina Jones, RN  Outcome: Progressing Towards Goal  1/31/2022 1231 by Carolina Jones RN  Outcome: Progressing Towards Goal  Goal: *Lungs clear or at baseline  1/31/2022 1259 by Carolina Jones, RN  Outcome: Progressing Towards Goal  1/31/2022 1231 by Carolina Jones, RN  Outcome: Progressing Towards Goal  Goal: *Active bowel sounds  1/31/2022 1259 by Johnnie Better, RN  Outcome: Progressing Towards Goal  1/31/2022 1231 by Johnnie Better, RN  Outcome: Progressing Towards Goal  Goal: *Participates in self care  1/31/2022 1259 by Johnnie Better, RN  Outcome: Progressing Towards Goal  1/31/2022 1231 by Johnnie Better, RN  Outcome: Progressing Towards Goal  Goal: *Without signs and symptoms of complications  4/99/5545 1259 by Johnnie Better, RN  Outcome: Progressing Towards Goal  1/31/2022 1231 by Johnnie Better, RN  Outcome: Progressing Towards Goal     Problem: Major Small and Large Bowel Procedure: Post-Op Day 5  Goal: Off Pathway (Use only if patient is Off Pathway)  1/31/2022 1259 by Johnnie Better, RN  Outcome: Progressing Towards Goal  1/31/2022 1231 by Johnnie Better, RN  Outcome: Progressing Towards Goal  Goal: Activity/Safety  1/31/2022 1259 by Johnnie Better, RN  Outcome: Progressing Towards Goal  1/31/2022 1231 by Johnnie Better, RN  Outcome: Progressing Towards Goal  Goal: Consults, if ordered  1/31/2022 1259 by Johnnie Better, RN  Outcome: Progressing Towards Goal  1/31/2022 1231 by Johnnie Better, RN  Outcome: Progressing Towards Goal  Goal: Nutrition/Diet  1/31/2022 1259 by Johnnie Better, RN  Outcome: Progressing Towards Goal  1/31/2022 1231 by Johnnie Better, RN  Outcome: Progressing Towards Goal  Goal: Discharge Planning  1/31/2022 1259 by Johnnie Better, RN  Outcome: Progressing Towards Goal  1/31/2022 1231 by Johnnie Better, RN  Outcome: Progressing Towards Goal  Goal: Medications  1/31/2022 1259 by Johnnie Better, RN  Outcome: Progressing Towards Goal  1/31/2022 1231 by Johnnie Better, RN  Outcome: Progressing Towards Goal  Goal: Respiratory  1/31/2022 1259 by Johnnie Better, RN  Outcome: Progressing Towards Goal  1/31/2022 1231 by Johnnie Better, RN  Outcome: Progressing Towards Goal  Goal: Treatments/Interventions/Procedures  1/31/2022 1259 by Johnnie Better, RN  Outcome: Progressing Towards Goal  1/31/2022 1231 by Johnnie Better, RN  Outcome: Progressing Towards Goal  Goal: Psychosocial  1/31/2022 1259 by Jo Harding, RN  Outcome: Progressing Towards Goal  1/31/2022 1231 by Jo Harding, RN  Outcome: Progressing Towards Goal  Goal: *Optimal pain control at patient's stated goal  1/31/2022 1259 by Jo Harding, RN  Outcome: Progressing Towards Goal  1/31/2022 1231 by Jo Harding, RN  Outcome: Progressing Towards Goal  Goal: *Adequate urinary output (equal to or greater than 30 milliliters per hour)  1/31/2022 1259 by Jo Harding, RN  Outcome: Progressing Towards Goal  1/31/2022 1231 by Jo Harding, RN  Outcome: Progressing Towards Goal  Goal: *Hemodynamically stable  1/31/2022 1259 by Jo Harding, RN  Outcome: Progressing Towards Goal  1/31/2022 1231 by Jo Harding, RN  Outcome: Progressing Towards Goal  Goal: *Tolerating diet  1/31/2022 1259 by Jo Harding, RN  Outcome: Progressing Towards Goal  1/31/2022 1231 by Jo Harding, RN  Outcome: Progressing Towards Goal  Goal: *Demonstrates progressive activity  1/31/2022 1259 by Jo Harding, RN  Outcome: Progressing Towards Goal  1/31/2022 1231 by Jo Harding, RN  Outcome: Progressing Towards Goal  Goal: *Lungs clear or at baseline  1/31/2022 1259 by Jo Harding, RN  Outcome: Progressing Towards Goal  1/31/2022 1231 by Jo Harding, RN  Outcome: Progressing Towards Goal  Goal: *Active bowel function  1/31/2022 1259 by Jo Harding, RN  Outcome: Progressing Towards Goal  1/31/2022 1231 by Jo Harding, RN  Outcome: Progressing Towards Goal  Goal: *Participates in self care  1/31/2022 1259 by Jo Harding, RN  Outcome: Progressing Towards Goal  1/31/2022 1231 by Jo Harding, RN  Outcome: Progressing Towards Goal  Goal: *Without signs and symptoms of complications  8/59/8207 1259 by Jo Harding, RN  Outcome: Progressing Towards Goal  1/31/2022 1231 by Jo Harding, RN  Outcome: Progressing Towards Goal     Problem: Major Small and Large Bowel Procedure: Post-Op Day 6  Goal: Off Pathway (Use only if patient is Off Pathway)  1/31/2022 1259 by Jo Harding, RN  Outcome: Progressing Towards Goal  1/31/2022 1231 by Alia Monroe RN  Outcome: Progressing Towards Goal  Goal: Activity/Safety  1/31/2022 1259 by Alia Monroe RN  Outcome: Progressing Towards Goal  1/31/2022 1231 by Alia Monroe RN  Outcome: Progressing Towards Goal  Goal: Nutrition/Diet  1/31/2022 1259 by Alia Monroe RN  Outcome: Progressing Towards Goal  1/31/2022 1231 by Alia Monroe RN  Outcome: Progressing Towards Goal  Goal: Discharge Planning  1/31/2022 1259 by Alia Monroe RN  Outcome: Progressing Towards Goal  1/31/2022 1231 by Alia Monroe RN  Outcome: Progressing Towards Goal  Goal: Medications  1/31/2022 1259 by Alia Monroe RN  Outcome: Progressing Towards Goal  1/31/2022 1231 by Alia Monroe RN  Outcome: Progressing Towards Goal  Goal: Respiratory  1/31/2022 1259 by Alia Monroe RN  Outcome: Progressing Towards Goal  1/31/2022 1231 by Alia Monroe RN  Outcome: Progressing Towards Goal  Goal: Treatments/Interventions/Procedures  1/31/2022 1259 by Alia Monroe RN  Outcome: Progressing Towards Goal  1/31/2022 1231 by Alia Monroe RN  Outcome: Progressing Towards Goal  Goal: Psychosocial  1/31/2022 1259 by Alia Monroe RN  Outcome: Progressing Towards Goal  1/31/2022 1231 by Alia Monroe RN  Outcome: Progressing Towards Goal     Problem: Major Small and Large Bowel Procedures:  Discharge Outcomes  Goal: *Hemodynamically stable  1/31/2022 1259 by Alia Monroe RN  Outcome: Progressing Towards Goal  1/31/2022 1231 by Alia Monroe RN  Outcome: Progressing Towards Goal  Goal: *Lungs clear or at baseline  1/31/2022 1259 by Alia Monroe RN  Outcome: Progressing Towards Goal  1/31/2022 1231 by Alia Monroe RN  Outcome: Progressing Towards Goal  Goal: *Demonstrates independent activity or return to baseline  1/31/2022 1259 by Alia Monroe RN  Outcome: Progressing Towards Goal  1/31/2022 1231 by Alia Monroe RN  Outcome: Progressing Towards Goal  Goal: *Optimal pain control at patient's stated goal  1/31/2022 1259 by Alia Monroe RN  Outcome: Progressing Towards Goal  1/31/2022 1231 by Nirmal Smith RN  Outcome: Progressing Towards Goal  Goal: *Verbalizes understanding and describes prescribed diet  1/31/2022 1259 by Nirmal Smith RN  Outcome: Progressing Towards Goal  1/31/2022 1231 by Nirmal Smith RN  Outcome: Progressing Towards Goal  Goal: *Tolerating diet  1/31/2022 1259 by Nirmal Smith RN  Outcome: Progressing Towards Goal  1/31/2022 1231 by Nirmal Smith RN  Outcome: Progressing Towards Goal  Goal: *Verbalizes name, dosage, time, side effects, and number of days to continue medications  1/31/2022 1259 by Nirmal Smith RN  Outcome: Progressing Towards Goal  1/31/2022 1231 by Nirmal Smith RN  Outcome: Progressing Towards Goal  Goal: *No signs and symptoms of infection or wound complications  9/67/4932 1259 by Nirmal Smith RN  Outcome: Progressing Towards Goal  1/31/2022 1231 by Nirmal Smith RN  Outcome: Progressing Towards Goal  Goal: *Anxiety reduced or absent  1/31/2022 1259 by Nirmal Smith RN  Outcome: Progressing Towards Goal  1/31/2022 1231 by Nirmal Smith RN  Outcome: Progressing Towards Goal  Goal: *Verbalizes and demonstrates incision/wound/ostomy care  1/31/2022 1259 by Nirmal Smith RN  Outcome: Progressing Towards Goal  1/31/2022 1231 by Nirmal Smith RN  Outcome: Progressing Towards Goal  Goal: *Understands and describes signs and symptoms to report to providers(Stroke Metric)  1/31/2022 1259 by Nirmal Smith RN  Outcome: Progressing Towards Goal  1/31/2022 1231 by Nirmal Smith RN  Outcome: Progressing Towards Goal  Goal: *Describes follow-up/return visits to physicians  1/31/2022 1259 by Nirmal Smith RN  Outcome: Progressing Towards Goal  1/31/2022 1231 by Nirmal Smith RN  Outcome: Progressing Towards Goal  Goal: *Describes available resources and support systems  1/31/2022 1259 by Nirmal Smith RN  Outcome: Progressing Towards Goal  1/31/2022 1231 by Nirmal Smith RN  Outcome: Progressing Towards Goal

## 2022-01-31 NOTE — DISCHARGE INSTRUCTIONS
Patient Education        Learning About Colon Cancer  What is colon cancer? Colon cancer happens when cells that are not normal grow in your colon. These cells often form in polyps, which are small growths in the colon. Not all colon polyps turn into cancer. But most colon cancer starts in a polyp. Colon cancer occurs most often in people older than 48. What happens when you have colon cancer? Colon cancer usually grows very slowly. It usually takes years for the cancer to become large enough to cause symptoms. If the cancer is not removed and keeps growing, it eventually will invade and destroy nearby tissues and then spread farther, first to nearby lymph nodes. From there it may spread to other parts of the body. What are the symptoms? Colon cancer in its early stages usually doesn't cause any symptoms. Symptoms occur later, when the cancer may be harder to treat. The most common symptoms include:  · Blood in your stool or very dark stools. · A change in your bowel habits, such as more frequent stools or a feeling that your bowels are not emptying completely. · Pain in the belly or rectal pain. · Low energy. How can you prevent colon cancer? Screening tests can find or prevent many cases of colon cancer. They look for a certain disease or condition before any symptoms appear. Screening tests that may find colon cancer early include:  · Stool tests, such as the fecal immunochemical test or the guaiac fecal occult blood test.  · Sigmoidoscopy, which lets your doctor look at the inside of the lower part of your colon using a lighted tube. · Colonoscopy, which lets your doctor look at the inside of your entire colon using a thin, flexible tube. Your risk for colorectal cancer gets higher as you get older. Experts recommend starting screening at age 39 for people who are at average risk. Talk with your doctor about your risk and when to start and stop screening.   People with a higher risk, such as those with a strong family history of colon cancer, should be tested earlier than those with an average risk. Here are other things you can do to help prevent colon cancer:  · Watch your weight. Being very overweight may increase your chance of getting colon cancer. · Eat well. Eat more whole grains, fruits, vegetables, poultry, and fish. And eat less red meat, refined grains, and sweets. · If you drink alcohol, limit how much you drink. Any amount of alcohol may increase your risk for some types of cancer. · Get active. Keep up a physically active lifestyle. · Quit smoking. If you smoke cigarettes, quit smoking to reduce your chance of getting colon cancer. How is colon cancer treated? Treatment for colon cancer is based on the stage and location of the cancer. It's also based on other things, such as your overall health. Most people have surgery to remove the cancer. Chemotherapy, radiation therapy, or both may also be used. In some cases, targeted therapy or immunotherapy may be an option. Follow-up care is a key part of your treatment and safety. Be sure to make and go to all appointments, and call your doctor if you are having problems. It's also a good idea to know your test results and keep a list of the medicines you take. Where can you learn more? Go to http://www.gray.com/  Enter M414 in the search box to learn more about \"Learning About Colon Cancer. \"  Current as of: December 17, 2020               Content Version: 13.0  © 2006-2021 Healthwise, Incorporated. Care instructions adapted under license by [employer] (which disclaims liability or warranty for this information). If you have questions about a medical condition or this instruction, always ask your healthcare professional. [address] any warranty or liability for your use of this information.          Patient Education        Open Bowel Resection: What to Expect at Home  Your Recovery     You are likely to have pain that comes and goes for the next few days after bowel surgery. You may have bowel cramps, and your cut (incision) may hurt. You may also feel like you have the flu. You may have a low fever and feel tired and nauseated. This is common. You should feel better after a week and will probably be back to normal in 2 to 3 weeks. This care sheet gives you a general idea about how long it will take for you to recover. But each person recovers at a different pace. Follow the steps below to get better as quickly as possible. How can you care for yourself at home? Activity    · Rest when you feel tired. Getting enough sleep will help you recover.     · Try to walk each day. Start by walking a little more than you did the day before. Bit by bit, increase the amount you walk. Walking boosts blood flow and helps prevent pneumonia and constipation.     · Avoid strenuous activities, such as biking, jogging, weight lifting, or aerobic exercise, until your doctor says it is okay.     · Ask your doctor when you can drive again.     · You will probably need to take 3 to 4 weeks off from work. It depends on the type of work you do and how you feel. You may need to take off 4 to 6 weeks if you lift heavy objects in your job.     · You may shower 24 to 48 hours after surgery, if your doctor says it is okay. Pat the cut (incision) dry. Do not take a bath for the first 2 weeks, or until your doctor tells you it is okay.     · Ask your doctor when it is okay for you to have sex. Diet    · You may not have much appetite after the surgery. But try to eat a healthy diet. Your doctor will tell you about any foods you should not eat.     · Eat a low-fiber diet for several weeks after surgery. Eat many small meals throughout the day. Add high-fiber foods a little at a time.     · Eat yogurt.  It puts good bacteria into your colon and helps prevent diarrhea.     · Try to avoid nuts, seeds, and corn for a while. They may be hard to digest.     · You may need to take vitamins that contain sodium and potassium. Ask your doctor.     · Drink plenty of fluids to avoid becoming dehydrated. Medicines    · Your doctor will tell you if and when you can restart your medicines. He or she will also give you instructions about taking any new medicines.     · If you take aspirin or some other blood thinner, ask your doctor if and when to start taking it again. Make sure that you understand exactly what your doctor wants you to do.     · Take pain medicines exactly as directed. ? If the doctor gave you a prescription medicine for pain, take it as prescribed. ? If you are not taking a prescription pain medicine, ask your doctor if you can take an over-the-counter medicine. ? Do not take two or more pain medicines at the same time unless the doctor told you to. Many pain medicines have acetaminophen, which is Tylenol. Too much acetaminophen (Tylenol) can be harmful.     · If you think your pain medicine is making you sick to your stomach:  ? Take your medicine after meals (unless your doctor tells you not to). ? Ask your doctor for a different pain medicine.     · If your doctor prescribed antibiotics, take them as directed. Do not stop taking them just because you feel better. You need to take the full course of antibiotics.     · You may need to take some medicines in a different form. You will be told whether to crush pills or take a liquid form of the medicine.     · If your doctor gives you a stool softener, take it as directed. Incision care    · If you have strips of tape on the incision, leave the tape on for a week or until it falls off.     · Wash the area daily with warm, soapy water, and pat it dry. Follow-up care is a key part of your treatment and safety. Be sure to make and go to all appointments, and call your doctor if you are having problems.  It's also a good idea to know your test results and keep a list of the medicines you take. When should you call for help? Call 911 anytime you think you may need emergency care. For example, call if:    · You passed out (lost consciousness).     · You are short of breath. Call your doctor now or seek immediate medical care if:    · You are sick to your stomach and cannot drink fluids or keep them down.     · You have signs of a blood clot in your leg (called a deep vein thrombosis), such as:  ? Pain in your calf, back of the knee, thigh, or groin. ? Redness and swelling in your leg or groin.     · You have signs of infection, such as:  ? Increased pain, swelling, warmth, or redness. ? Red streaks leading from the incision. ? Pus draining from the incision. ? A fever.     · You have pain that does not get better after you take pain medicine.     · You have loose stitches, or your incision comes open.     · Bright red blood has soaked through the bandage.     · You cannot pass stools or gas. Watch closely for any changes in your health, and be sure to contact your doctor if you have any problems. Where can you learn more? Go to http://www.gray.com/  Enter Z281 in the search box to learn more about \"Open Bowel Resection: What to Expect at Home. \"  Current as of: February 10, 2021               Content Version: 13.0  © 3183-0982 Healthwise, Incorporated. Care instructions adapted under license by [employer] (which disclaims liability or warranty for this information). If you have questions about a medical condition or this instruction, always ask your healthcare professional. [place] [address] any warranty or liability for your use of this information.        Discharge Instructions/Follow-up Care:    GI soft diet  No lifting >10 lbs for 6 weeks  May shower (no submerged bathing)     Bowel Blockage (Intestinal Obstruction): Care Instructions  Your Care Instructions  A bowel blockage, also called an intestinal obstruction, can prevent gas, fluids, or solids from moving through the intestines normally. It can cause constipation and, rarely, diarrhea. You may have pain, nausea, vomiting, and cramping. Most of the time, complete blockages require a stay in the hospital and possibly surgery. But if your bowel is only partly blocked, your doctor may tell you to wait until it clears on its own and you are able to pass gas and stool. If so, there are things you can do at home to help make you feel better. If you have had surgery for a bowel blockage, there are things you can do at home to make sure you heal well. You can also make some changes to keep your bowel from becoming blocked again. Follow-up care is a key part of your treatment and safety. Be sure to make and go to all appointments, and call your doctor if you are having problems. It's also a good idea to know your test results and keep a list of the medicines you take. How can you care for yourself at home? If your doctor has told you to wait at home for a blockage to clear on its own:  · Follow your doctor's instructions. These may include eating a liquid diet to avoid complete blockage. · Be safe with medicines. Take your medicines exactly as prescribed. Call your doctor if you think you are having a problem with your medicine. · Put a heating pad set on low on your belly to relieve mild cramps and pain. To prevent another blockage  · Try to eat smaller amounts of food more often. For example, have 5 or 6 small meals throughout the day instead of 2 or 3 large meals. · Chew your food very well. Try to chew each bite about 20 times or until it is liquid. · Avoid high-fiber foods and raw fruits and vegetables with skins, husks, strings, or seeds. These can form a ball of undigested material that can cause a blockage if a part of your bowel is scarred or narrowed.   · Check with your doctor before you eat whole-grain products or use a fiber supplement such as Citrucel or Metamucil. · To help you have regular bowel movements, eat at regular times, do not strain during a bowel movement, and drink plenty of water. If you have kidney, heart, or liver disease and have to limit fluids, talk with your doctor before you increase the amount of fluids you drink. · Drink high-calorie liquid formulas if your doctor says to. Severe symptoms may make it hard for your body to take in vitamins and minerals. · Get regular exercise. It helps you digest your food better. Get at least 30 minutes of physical activity on most days of the week. Walking is a good choice. When should you call for help? Call your doctor now or seek immediate medical care if:    · You have a fever.     · You are vomiting.     · You have new or worse belly pain.     · You cannot pass stools or gas. Watch closely for changes in your health, and be sure to contact your doctor if you have any problems. Where can you learn more? Go to http://www.gray.com/  Enter B082 in the search box to learn more about \"Bowel Blockage (Intestinal Obstruction): Care Instructions. \"  Current as of: February 10, 2021               Content Version: 13.0  © 5873-2997 Healthwise, Incorporated. Care instructions adapted under license by [employer] (which disclaims liability or warranty for this information). If you have questions about a medical condition or this instruction, always ask your healthcare professional. [place] [address] any warranty or liability for your use of this information.

## 2022-01-31 NOTE — PROGRESS NOTES
Problem: Patient Education: Go to Patient Education Activity  Goal: Patient/Family Education  Outcome: Progressing Towards Goal     Problem: Major Small and Large Bowel Procedure: Day of Surgery (Initiate SCIP Measures for Post-Op Care)  Goal: Off Pathway (Use only if patient is Off Pathway)  Outcome: Progressing Towards Goal  Goal: Activity/Safety  Outcome: Progressing Towards Goal  Goal: Consults, if ordered  Outcome: Progressing Towards Goal  Goal: Nutrition/Diet  Outcome: Progressing Towards Goal  Goal: Medications  Outcome: Progressing Towards Goal  Goal: Respiratory  Outcome: Progressing Towards Goal  Goal: Treatments/Interventions/Procedures  Outcome: Progressing Towards Goal  Goal: Psychosocial  Outcome: Progressing Towards Goal  Goal: *Optimal pain control at patient's stated goal  Outcome: Progressing Towards Goal  Goal: *Adequate urinary output (equal to or greater than 30 milliliters/hour)  Outcome: Progressing Towards Goal  Goal: *Hemodynamically stable  Outcome: Progressing Towards Goal  Goal: *Tolerating diet  Outcome: Progressing Towards Goal  Goal: *Demonstrates progressive activity  Outcome: Progressing Towards Goal     Problem: Major Small and Large Bowel Procedure: Post-Op Day 1  Goal: Off Pathway (Use only if patient is Off Pathway)  Outcome: Progressing Towards Goal  Goal: Activity/Safety  Outcome: Progressing Towards Goal  Goal: Consults, if ordered  Outcome: Progressing Towards Goal  Goal: Diagnostic Test/Procedures  Outcome: Progressing Towards Goal  Goal: Nutrition/Diet  Outcome: Progressing Towards Goal  Goal: Discharge Planning  Outcome: Progressing Towards Goal  Goal: Medications  Outcome: Progressing Towards Goal  Goal: Respiratory  Outcome: Progressing Towards Goal  Goal: Treatments/Interventions/Procedures  Outcome: Progressing Towards Goal  Goal: Psychosocial  Outcome: Progressing Towards Goal  Goal: *Optimal pain control at patient's stated goal  Outcome: Progressing Towards Goal  Goal: *Adequate urinary output (equal to or greater than 30 milliliters/hour)  Outcome: Progressing Towards Goal  Goal: *Hemodynamically stable  Outcome: Progressing Towards Goal  Goal: *Tolerating diet  Outcome: Progressing Towards Goal  Goal: *Demonstrates progressive activity  Outcome: Progressing Towards Goal  Goal: *Lungs clear or at baseline  Outcome: Progressing Towards Goal     Problem: Major Small and Large Bowel Procedure: Post-Op Day 2  Goal: Off Pathway (Use only if patient is Off Pathway)  Outcome: Progressing Towards Goal  Goal: Activity/Safety  Outcome: Progressing Towards Goal  Goal: Consults, if ordered  Outcome: Progressing Towards Goal  Goal: Nutrition/Diet  Outcome: Progressing Towards Goal  Goal: Discharge Planning  Outcome: Progressing Towards Goal  Goal: Medications  Outcome: Progressing Towards Goal  Goal: Respiratory  Outcome: Progressing Towards Goal  Goal: Treatments/Interventions/Procedures  Outcome: Progressing Towards Goal  Goal: Psychosocial  Outcome: Progressing Towards Goal  Goal: *Optimal pain control at patient's stated goal  Outcome: Progressing Towards Goal  Goal: *Adequate urinary output (equal to or greater than 30 milliliters/hour)  Outcome: Progressing Towards Goal  Goal: *Hemodynamically stable  Outcome: Progressing Towards Goal  Goal: *Tolerating diet  Outcome: Progressing Towards Goal  Goal: *Demonstrates progressive activity  Outcome: Progressing Towards Goal  Goal: *Lungs clear or at baseline  Outcome: Progressing Towards Goal     Problem: Major Small and Large Bowel Procedure: Post-Op Day 3  Goal: Off Pathway (Use only if patient is Off Pathway)  Outcome: Progressing Towards Goal  Goal: Activity/Safety  Outcome: Progressing Towards Goal  Goal: Nutrition/Diet  Outcome: Progressing Towards Goal  Goal: Discharge Planning  Outcome: Progressing Towards Goal  Goal: Medications  Outcome: Progressing Towards Goal  Goal: Respiratory  Outcome: Progressing Towards Goal  Goal: Treatments/Interventions/Procedures  Outcome: Progressing Towards Goal  Goal: Psychosocial  Outcome: Progressing Towards Goal  Goal: *Optimal pain control at patient's stated goal  Outcome: Progressing Towards Goal  Goal: *Adequate urinary output (equal to or greater than 30 milliliters/hour)  Outcome: Progressing Towards Goal  Goal: *Hemodynamically stable  Outcome: Progressing Towards Goal  Goal: *Tolerating diet  Outcome: Progressing Towards Goal  Goal: *Demonstrates progressive activity  Outcome: Progressing Towards Goal  Goal: *Lungs clear or at baseline  Outcome: Progressing Towards Goal  Goal: *Active bowel sounds  Outcome: Progressing Towards Goal  Goal: *Participates in self care  Outcome: Progressing Towards Goal  Goal: *Without signs and symptoms of complications  Outcome: Progressing Towards Goal     Problem: Major Small and Large Bowel Procedure: Post-Op Day 4  Goal: Off Pathway (Use only if patient is Off Pathway)  Outcome: Progressing Towards Goal  Goal: Activity/Safety  Outcome: Progressing Towards Goal  Goal: Nutrition/Diet  Outcome: Progressing Towards Goal  Goal: Discharge Planning  Outcome: Progressing Towards Goal  Goal: Medications  Outcome: Progressing Towards Goal  Goal: Respiratory  Outcome: Progressing Towards Goal  Goal: Treatments/Interventions/Procedures  Outcome: Progressing Towards Goal  Goal: Psychosocial  Outcome: Progressing Towards Goal  Goal: *Optimal pain control at patient's stated goal  Outcome: Progressing Towards Goal  Goal: *Adequate urinary output (equal to or greater than 30 milliliters per hour)  Outcome: Progressing Towards Goal  Goal: *Hemodynamically stable  Outcome: Progressing Towards Goal  Goal: *Tolerating diet  Outcome: Progressing Towards Goal  Goal: *Demonstrates progressive activity  Outcome: Progressing Towards Goal  Goal: *Lungs clear or at baseline  Outcome: Progressing Towards Goal  Goal: *Active bowel sounds  Outcome: Progressing Towards Goal  Goal: *Participates in self care  Outcome: Progressing Towards Goal  Goal: *Without signs and symptoms of complications  Outcome: Progressing Towards Goal     Problem: Major Small and Large Bowel Procedure: Post-Op Day 5  Goal: Off Pathway (Use only if patient is Off Pathway)  Outcome: Progressing Towards Goal  Goal: Activity/Safety  Outcome: Progressing Towards Goal  Goal: Consults, if ordered  Outcome: Progressing Towards Goal  Goal: Nutrition/Diet  Outcome: Progressing Towards Goal  Goal: Discharge Planning  Outcome: Progressing Towards Goal  Goal: Medications  Outcome: Progressing Towards Goal  Goal: Respiratory  Outcome: Progressing Towards Goal  Goal: Treatments/Interventions/Procedures  Outcome: Progressing Towards Goal  Goal: Psychosocial  Outcome: Progressing Towards Goal  Goal: *Optimal pain control at patient's stated goal  Outcome: Progressing Towards Goal  Goal: *Adequate urinary output (equal to or greater than 30 milliliters per hour)  Outcome: Progressing Towards Goal  Goal: *Hemodynamically stable  Outcome: Progressing Towards Goal  Goal: *Tolerating diet  Outcome: Progressing Towards Goal  Goal: *Demonstrates progressive activity  Outcome: Progressing Towards Goal  Goal: *Lungs clear or at baseline  Outcome: Progressing Towards Goal  Goal: *Active bowel function  Outcome: Progressing Towards Goal  Goal: *Participates in self care  Outcome: Progressing Towards Goal  Goal: *Without signs and symptoms of complications  Outcome: Progressing Towards Goal     Problem: Major Small and Large Bowel Procedure: Post-Op Day 6  Goal: Off Pathway (Use only if patient is Off Pathway)  Outcome: Progressing Towards Goal  Goal: Activity/Safety  Outcome: Progressing Towards Goal  Goal: Nutrition/Diet  Outcome: Progressing Towards Goal  Goal: Discharge Planning  Outcome: Progressing Towards Goal  Goal: Medications  Outcome: Progressing Towards Goal  Goal: Respiratory  Outcome: Progressing Towards Goal  Goal: Treatments/Interventions/Procedures  Outcome: Progressing Towards Goal  Goal: Psychosocial  Outcome: Progressing Towards Goal     Problem: Major Small and Large Bowel Procedures:  Discharge Outcomes  Goal: *Hemodynamically stable  Outcome: Progressing Towards Goal  Goal: *Lungs clear or at baseline  Outcome: Progressing Towards Goal  Goal: *Demonstrates independent activity or return to baseline  Outcome: Progressing Towards Goal  Goal: *Optimal pain control at patient's stated goal  Outcome: Progressing Towards Goal  Goal: *Verbalizes understanding and describes prescribed diet  Outcome: Progressing Towards Goal  Goal: *Tolerating diet  Outcome: Progressing Towards Goal  Goal: *Verbalizes name, dosage, time, side effects, and number of days to continue medications  Outcome: Progressing Towards Goal  Goal: *No signs and symptoms of infection or wound complications  Outcome: Progressing Towards Goal  Goal: *Anxiety reduced or absent  Outcome: Progressing Towards Goal  Goal: *Verbalizes and demonstrates incision/wound/ostomy care  Outcome: Progressing Towards Goal  Goal: *Understands and describes signs and symptoms to report to providers(Stroke Metric)  Outcome: Progressing Towards Goal  Goal: *Describes follow-up/return visits to physicians  Outcome: Progressing Towards Goal  Goal: *Describes available resources and support systems  Outcome: Progressing Towards Goal

## 2022-02-06 ENCOUNTER — TELEPHONE (OUTPATIENT)
Dept: NUTRITION | Age: 77
End: 2022-02-06

## 2022-02-06 NOTE — TELEPHONE ENCOUNTER
Nutrition:   Patient's daughter Andie Lee left message Wednesday to call her regarding some diet questions following recent hospitalization. States discrepancies between what she was served in the hospital, what was listed on education sheet and what doctor is recommending. This RD called daughter today to discuss. Advised that everyone tolerates foods little differently and some foods on the list may be tolerated by most people which is why they were given in the hospital (ie small amounts of black pepper or chocolate). Daughter noted that patient is interested in knowing the rules and being strict so that she doesn't feel the way she felt during admission again. Reiterated that the most important restriction is fiber at this point and other foods can be tolerated in small/moderate amounts. Example was instructed to be low fat, but patient wants butter on toast at breakfast and butter on sweet potato for lunch. Advised if overall meal is low/moderate in fat that it is okay to have butter on both foods- especially if this will help her eat better. Reminded about access to outpatient nutrition counseling. Pts daughter stated she plans to make contact with Yesy Chapa RD at the [facility] once patient is onboarded there. Pts daughter now also has my cell phone number if needed. No further follow up planned.    Rufino Stafford RD, LD  Contact: [phone]

## 2022-02-10 ENCOUNTER — PATIENT OUTREACH (OUTPATIENT)
Dept: CASE MANAGEMENT | Age: 77
End: 2022-02-10

## 2022-02-10 NOTE — PROGRESS NOTES
I saw patient on 2-9-22 with Dr Sheba Carlos for oncology consult. Pt presents here with her daughter in wheelchair. Pt and daughter received info concerning Capox v Folfox in hospital and today we reviewed again. Pt was shown 5FU pump and port book and both regimen were explained in detail. We discussed Urea based cream for Capox regimen. We discussed schedule of each drug regimen. Pt was also shown infusion area. Pt also will be started on Keflex for yellowish drainage from umbilicus and excision. Pt also has appt tomorrow on 2-11-22 with surgeon. Pt and daughter will continue to decide which regimen would be best. Pt will still need port ordered and date and start date. Chemo date for both drugs is set. Pt was given my contact info and encouraged to call with any questions or concerns.  Nurse navigation will be following

## 2022-02-11 PROBLEM — L76.34 POSTOPERATIVE SEROMA OF SUBCUTANEOUS TISSUE AFTER NON-DERMATOLOGIC PROCEDURE: Status: ACTIVE | Noted: 2022-02-11

## 2022-02-15 ENCOUNTER — PATIENT OUTREACH (OUTPATIENT)
Dept: CASE MANAGEMENT | Age: 77
End: 2022-02-15

## 2022-02-15 PROBLEM — Z01.818 EXAMINATION PRIOR TO CHEMOTHERAPY: Status: ACTIVE | Noted: 2022-02-15

## 2022-02-15 PROBLEM — L53.9 REDNESS OF SKIN: Status: ACTIVE | Noted: 2022-02-15

## 2022-02-15 PROBLEM — Z94.7 CORNEAL TRANSPLANT STATUS: Status: ACTIVE | Noted: 2022-02-15

## 2022-02-15 NOTE — PROGRESS NOTES
Spoke with patient and daughter on speaker phone and they stated they wanted FOLFOX regimen. We will schedule port placement, chemo ed and chemo start and inform pt.

## 2022-02-17 ENCOUNTER — DOCUMENTATION ONLY (OUTPATIENT)
Dept: HEMATOLOGY | Age: 77
End: 2022-02-17

## 2022-02-17 NOTE — OCM COST ESTIMATE
I spoke with Dong Banks regarding her  Medicare and [employer] Jazmyn Cruz will  the 80% coinsurance left over from Medicare. I gave patient the Oncology Care Model participation letter. I let her know that the average patient responsibility for 6 months of treatment for type cancer is $8,054.00 after Medicare pays. Note that this is an estimate only and the final charges may vary.

## 2022-02-17 NOTE — PROGRESS NOTES
I spoke with Ms. Banks regarding her insurance coverage, potential oral medication authorizations, enrollment in the [address] [facility] ([address]), and assistance organization resource sheet. Ms. Banks has Medicare A&B and a plan J supplement with [employer]. She is well covered for Medicare approved claims. I went over the LPOA document with Ms. Banks. She wanted to take it home for review before making a decision about signing it or not. Next, I spoke with Ms. Banks regarding potential oral medication authorizations. I told her that if she ever had any problems getting her oral medications filled to give the dedicated [facility] , Angel Gaviria, a call. Most of the time, it is simply an authorization that needs to be done with the insurance company. Next, I spoke with Ms. Banks regarding enrolling with [facility]. I went over some of the services that [facility] offers and the enrollment process. Lastly, I gave Ms. Banks a form with various resource organizations that could assist with specific needs (example:  transportation, lodging, preparing meals, home cleaning)     Faxed Physician's Statement to the [facility] at [phone]. Phone [phone]. Form scanned into chart. Patient expressed understanding of the information above and all questions were answered to her satisfaction.

## 2022-02-21 ENCOUNTER — HOSPITAL ENCOUNTER (OUTPATIENT)
Dept: INTERVENTIONAL RADIOLOGY/VASCULAR | Age: 77
Discharge: HOME | End: 2022-02-21
Attending: INTERNAL MEDICINE
Payer: MEDICARE

## 2022-02-21 VITALS
DIASTOLIC BLOOD PRESSURE: 56 MMHG | OXYGEN SATURATION: 94 % | HEART RATE: 94 BPM | RESPIRATION RATE: 16 BRPM | SYSTOLIC BLOOD PRESSURE: 120 MMHG | HEIGHT: 62 IN | WEIGHT: 125 LBS | TEMPERATURE: 97.6 F | BODY MASS INDEX: 23 KG/M2

## 2022-02-21 DIAGNOSIS — C18.2 PRIMARY ADENOCARCINOMA OF ASCENDING COLON (HCC): ICD-10-CM

## 2022-02-21 PROCEDURE — 36561 INSERT TUNNELED CV CATH: CPT

## 2022-02-21 PROCEDURE — C1788 PORT, INDWELLING, IMP: HCPCS

## 2022-02-21 PROCEDURE — 77030010507 HC ADH SKN DERMBND J&J -B

## 2022-02-21 PROCEDURE — 74011250636 HC RX REV CODE- 250/636: Performed by: PHYSICIAN ASSISTANT

## 2022-02-21 PROCEDURE — 74011000250 HC RX REV CODE- 250: Performed by: PHYSICIAN ASSISTANT

## 2022-02-21 PROCEDURE — C1894 INTRO/SHEATH, NON-LASER: HCPCS

## 2022-02-21 PROCEDURE — 77030031139 HC SUT VCRL2 J&J -A

## 2022-02-21 PROCEDURE — 77030031131 HC SUT MXN P COVD -B

## 2022-02-21 RX ORDER — MIDAZOLAM HYDROCHLORIDE 1 MG/ML
.5-2 INJECTION, SOLUTION INTRAMUSCULAR; INTRAVENOUS
Status: DISCONTINUED | OUTPATIENT
Start: 2022-02-21 | End: 2022-02-25 | Stop reason: HOSPADM

## 2022-02-21 RX ORDER — LIDOCAINE HYDROCHLORIDE AND EPINEPHRINE 10; 10 MG/ML; UG/ML
1-20 INJECTION, SOLUTION INFILTRATION; PERINEURAL
Status: DISCONTINUED | OUTPATIENT
Start: 2022-02-21 | End: 2022-02-25 | Stop reason: HOSPADM

## 2022-02-21 RX ORDER — CEFAZOLIN SODIUM/WATER 2 G/20 ML
2 SYRINGE (ML) INTRAVENOUS ONCE
Status: COMPLETED | OUTPATIENT
Start: 2022-02-21 | End: 2022-02-21

## 2022-02-21 RX ORDER — HEPARIN 100 UNIT/ML
500 SYRINGE INTRAVENOUS ONCE
Status: COMPLETED | OUTPATIENT
Start: 2022-02-21 | End: 2022-02-21

## 2022-02-21 RX ORDER — FENTANYL CITRATE 50 UG/ML
25-100 INJECTION, SOLUTION INTRAMUSCULAR; INTRAVENOUS
Status: DISCONTINUED | OUTPATIENT
Start: 2022-02-21 | End: 2022-02-25 | Stop reason: HOSPADM

## 2022-02-21 RX ORDER — SODIUM CHLORIDE 9 MG/ML
25 INJECTION, SOLUTION INTRAVENOUS ONCE
Status: COMPLETED | OUTPATIENT
Start: 2022-02-21 | End: 2022-02-21

## 2022-02-21 RX ADMIN — CEFAZOLIN SODIUM 2 G: 100 INJECTION, POWDER, LYOPHILIZED, FOR SOLUTION INTRAVENOUS at 10:32

## 2022-02-21 RX ADMIN — HEPARIN SODIUM (PORCINE) LOCK FLUSH IV SOLN 100 UNIT/ML 500 UNITS: 100 SOLUTION at 10:55

## 2022-02-21 RX ADMIN — FENTANYL CITRATE 25 MCG: 50 INJECTION INTRAMUSCULAR; INTRAVENOUS at 10:44

## 2022-02-21 RX ADMIN — LIDOCAINE HYDROCHLORIDE,EPINEPHRINE BITARTRATE 80 MG: 10; .01 INJECTION, SOLUTION INFILTRATION; PERINEURAL at 10:49

## 2022-02-21 RX ADMIN — SODIUM CHLORIDE 25 ML/HR: 900 INJECTION, SOLUTION INTRAVENOUS at 10:32

## 2022-02-21 RX ADMIN — MIDAZOLAM 0.5 MG: 1 INJECTION INTRAMUSCULAR; INTRAVENOUS at 10:44

## 2022-02-21 RX ADMIN — MIDAZOLAM 0.5 MG: 1 INJECTION INTRAMUSCULAR; INTRAVENOUS at 10:37

## 2022-02-21 RX ADMIN — FENTANYL CITRATE 25 MCG: 50 INJECTION INTRAMUSCULAR; INTRAVENOUS at 10:47

## 2022-02-21 RX ADMIN — FENTANYL CITRATE 25 MCG: 50 INJECTION INTRAMUSCULAR; INTRAVENOUS at 10:37

## 2022-02-21 RX ADMIN — FENTANYL CITRATE 25 MCG: 50 INJECTION INTRAMUSCULAR; INTRAVENOUS at 10:51

## 2022-02-21 RX ADMIN — MIDAZOLAM 1 MG: 1 INJECTION INTRAMUSCULAR; INTRAVENOUS at 10:47

## 2022-02-21 NOTE — PROCEDURES
Department of Interventional Radiology  [phone]        Interventional Radiology Brief Procedure Note    Patient: Jazzmine Juan Case MRN: [id number]  SSN: [id number]    YOB: 1945  Age: 67 y.o.   Sex: female      Date of Procedure: 2/21/2022    Pre-Procedure Diagnosis: colon cancer    Post-Procedure Diagnosis: SAME    Procedure(s): Venous Chest Port Placement    Brief Description of Procedure: as above    Performed By: Abril Rae PA-C     Assistants: None    Anesthesia:Moderate Sedation per Lanie Loco MD    Estimated Blood Loss: Less than 10ml    Specimens:  None    Implants:  Chest Port Placement    Findings: catheter tip in right atrium     Complications: None    Recommendations: ok to use prot     Follow Up: prn    Signed By: Abril Rae PA-C     February 21, 2022

## 2022-02-21 NOTE — H&P
Department of Interventional Radiology  [phone]    History and Physical    Patient:  Miguel Quinones Case MRN:  [id number]  SSN:  [id number]    YOB: 1945  Age:  67 y.o. Sex:  female      Primary Care Provider:  Haley Piña DO  Referring Physician:  Mario Alberto Randolph MD    Subjective:     Chief Complaint: port    History of the Present Illness: The patient is a 67 y.o. female with colon cancer who presents for venous chest port placement. NPO. Past Medical History:   Diagnosis Date    Actinic keratosis     Adjustment disorder with mixed anxiety and depressed mood 2/11/2015    Adverse effect of anesthesia     cystoscope - was awake but could not move - dont remember the anesthesetic given    Corneal dystrophy 2/11/2015    Coronary atherosclerosis of native coronary artery 2/11/2015    GERD (gastroesophageal reflux disease)     Irritable bowel syndrome 2/11/2015    Menopause     Mixed hyperlipidemia 2/11/2015    Primary adenocarcinoma of ascending colon ([address] 1/31/2022    Psychiatric disorder     anxiety     Past Surgical History:   Procedure Laterality Date    HX BREAST BIOPSY Left     HX COLONOSCOPY      2014    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 Prior to Admission medications    Medication Sig Start Date End Date Taking? Authorizing Provider   docusate sodium (COLACE) 100 mg capsule Take 1 Capsule by mouth two (2) times a day for 90 days. 1/31/22 5/1/22 Yes Tatyana Emanuel MD   pantoprazole (PROTONIX) 40 mg tablet Take 1 Tablet by mouth Daily (before breakfast). 1/31/22  Yes Tatyana Emanuel MD   acetaminophen (TYLENOL) 325 mg tablet Take 2 Tablets by mouth every six (6) hours as needed for Pain.  1/31/22  Yes Tatyana Emanuel MD   ascorbic acid, vitamin C, (Vitamin C) 500 mg tablet Take 1,000 mg by mouth daily. Yes Provider, Historical   cholecalciferol (VITAMIN D3) (5000 Units/125 mcg) tab tablet Take 5,000 Units by mouth daily. Yes Provider, Historical   atorvastatin (LIPITOR) 40 mg tablet Take 1 Tablet by mouth daily. 10/1/21  Yes Justyna Metz MD   prednisoLONE acetate (PRED FORTE) 1 % ophthalmic suspension Administer 1 Drop to both eyes nightly. Yes Provider, Historical   EC ASPIRIN PO Take 81 mg by mouth. Yes Provider, Historical   ondansetron hcl (ZOFRAN) 8 mg tablet Take 1 Tablet by mouth every eight (8) hours as needed for Nausea or Vomiting. Indications: nausea and vomiting caused by cancer drugs  Patient not taking: Reported on 2/21/2022 2/17/22   Anju Mcclure NP   prochlorperazine (Compazine) 10 mg tablet Take 1 Tablet by mouth every six (6) hours as needed for Nausea or Vomiting for up to 120 doses. Indications: nausea and vomiting caused by cancer drugs  Patient not taking: Reported on 2/21/2022 2/17/22   Anju Mcclure NP   lidocaine-prilocaine (EMLA) topical cream Apply  to affected area as needed for PRN Reason (Other) (port site). Apply to port site (large glob) 30-60 minutes prior to be accessed with needle. Do not rub in to site, cover with saran wrap. Patient not taking: Reported on 2/21/2022 2/17/22   Anju Mcclure NP   cephALEXin (KEFLEX) 500 mg capsule Take 1 Capsule by mouth four (4) times daily. Patient not taking: Reported on 2/21/2022 2/9/22   Wilma Joseph MD   diphenhydrAMINE (BenadryL) 25 mg capsule Take 25 mg by mouth as needed. Patient not taking: Reported on 2/21/2022    Provider, Historical   amLODIPine (NORVASC) 2.5 mg tablet Take 1 Tablet by mouth daily.   Patient not taking: Reported on 2/21/2022 10/1/21   Justyna Metz MD   clindinium-chlordiazePOXIDE (Librax, with Clinidium,) 5-2.5 mg per capsule 1 bid prn  Indications: adjunct therapy for irritable bowel syndrome  Patient not taking: Reported on 2/21/2022 4/27/21 Shaan Ramirez,    KRILL OIL-OMEGA-3-DHA-EPA PO Take  by mouth.   Patient not taking: Reported on 2/21/2022    Provider, Historical        Allergies   Allergen Reactions    Bactrim [Sulfamethoprim Ds] Hives and Rash       Family History   Problem Relation Age of Onset    Breast Cancer Maternal Aunt     Heart Disease Father         Arterosclerotic Cardiovascular Disease    Heart Attack Father      Social History     Tobacco Use    Smoking status: Never Smoker    Smokeless tobacco: Never Used   Substance Use Topics    Alcohol use: No     Alcohol/week: 0.0 standard drinks        Objective:       Physical Examination:    Vitals:    02/21/22 0905   BP: 128/61   Pulse: 74   Resp: 16   Temp: 97.6 °F (36.4 °C)   SpO2: 96%   Weight: 56.7 kg (125 lb)   Height: 5' 2\" (1.575 m)       Pain Assessment  Pain Intensity 1: 0 (02/21/22 0905)        Patient Stated Pain Goal: 0      HEART: regular rate and rhythm  LUNG: clear to auscultation bilaterally  ABDOMEN: soft  EXTREMITIES: warm, no edema    Laboratory:     Lab Results   Component Value Date/Time    Sodium 140 01/27/2022 05:47 AM    Sodium 141 01/26/2022 05:30 AM    Potassium 4.3 01/27/2022 05:47 AM    Potassium 3.3 (L) 01/26/2022 05:30 AM    Chloride 110 (H) 01/27/2022 05:47 AM    Chloride 110 (H) 01/26/2022 05:30 AM    CO2 25 01/27/2022 05:47 AM    CO2 27 01/26/2022 05:30 AM    Anion gap 5 (L) 01/27/2022 05:47 AM    Anion gap 4 (L) 01/26/2022 05:30 AM    Glucose 103 (H) 01/27/2022 05:47 AM    Glucose 116 (H) 01/26/2022 05:30 AM    BUN 5 (L) 01/27/2022 05:47 AM    BUN 3 (L) 01/26/2022 05:30 AM    Creatinine 0.66 01/28/2022 11:53 AM    Creatinine 0.59 (L) 01/27/2022 05:47 AM    GFR est AA >60 01/27/2022 05:47 AM    GFR est AA >60 01/26/2022 05:30 AM    GFR est non-AA >60 01/27/2022 05:47 AM    GFR est non-AA >60 01/26/2022 05:30 AM    Calcium 8.6 01/27/2022 05:47 AM    Calcium 7.9 (L) 01/26/2022 05:30 AM    Magnesium 2.3 01/27/2022 05:47 AM    Magnesium 2.5 (H) 01/26/2022 05:30 AM    Phosphorus 3.6 01/27/2022 05:47 AM    Phosphorus 3.3 01/26/2022 05:30 AM    Albumin 2.1 (L) 01/24/2022 05:48 AM    Albumin 3.2 01/22/2022 02:03 AM    Protein, total 5.3 (L) 01/24/2022 05:48 AM    Protein, total 6.7 01/22/2022 02:03 AM    Globulin 3.2 01/24/2022 05:48 AM    Globulin 3.5 01/22/2022 02:03 AM    A-G Ratio 0.7 (L) 01/24/2022 05:48 AM    A-G Ratio 0.9 (L) 01/22/2022 02:03 AM    ALT (SGPT) 23 01/24/2022 05:48 AM    ALT (SGPT) 26 01/22/2022 02:03 AM     Lab Results   Component Value Date/Time    WBC 4.6 01/27/2022 05:47 AM    WBC 4.1 (L) 01/26/2022 05:30 AM    HGB 8.4 (L) 01/27/2022 05:47 AM    HGB 7.8 (L) 01/26/2022 05:30 AM    HCT 26.7 (L) 01/27/2022 05:47 AM    HCT 24.8 (L) 01/26/2022 05:30 AM    PLATELET 565 18/14/4928 05:47 AM    PLATELET 266 74/54/3643 05:30 AM     Lab Results   Component Value Date/Time    Prothrombin time 10.0 09/01/2016 03:46 PM    INR 0.9 09/01/2016 03:46 PM       Assessment:     Colon cancer    Hospital Problems  Date Reviewed: 2/18/2022    None          Plan:     Planned Procedure:  Port placement    Risks, benefits, and alternatives reviewed with patient and she agrees to proceed with the procedure.       Signed By: Neo Fallon PA-C     February 21, 2022

## 2022-02-21 NOTE — PROGRESS NOTES
To IR room 2 via stretcher. Name and  verified. Transferred to exam table, secured and monitor attached.

## 2022-02-21 NOTE — DISCHARGE INSTRUCTIONS
[address]  Department of Interventional Radiology  [facility]  [phone] Office  [phone] Fax  Implanted Port Discharge Instructions      General Instructions:   A port is like an implanted IV. They are usually ordered for patients who will be getting chemotherapy, but can also be used as an IV for long term antibiotics, large amounts of fluids, and/or blood products. Your blood can be drawn from your port for labs also. Those patients who do not have good veins find the ports convenient as they can get the IV they need with one stick. The port can be used long term, and the care is easy. The device is under the skin, and once the skin heals, care is minimal. All that is required is the nurse who accesses the port will need to flush it with heparinized saline after each use. Ports are usually placed in the chest wall, usually on the right side. But they can be place in the arms and in the abdomen. Home Care Instructions: If your port is in your arm, do not allow blood pressure or other IVs to be place in that arm. Do not allow bra straps or any clothing to rub the skin over the port. Do not bathe or swim until the skin has healed and if the port is accessed. Once it is healed, and when the port is not accessed, it is okay to bathe and swim. Restrict yourself to light activity for the first 5 days after getting the port put in, after that, resume normal activity slowly. You may resume your normal diet and medications. Follow-Up Instructions: Please see your oncologist, or whatever physician ordered the port as he/she has requested of you. Call If: You should call your Physician and/or the Radiology Nurse if you notice redness, pus, swelling, or pain from the area of your incision. Call if you should develop a fever. The nurses who access your port will know to call your doctor if the port does not seem to be working properly. You need to tell the nurses who use the port if you should have any pain or swelling at the site during an infusion. To Reach Us: If you have any questions about your procedure, please call the Interventional Radiology department at [phone]. After business hours (5pm) and weekends, call the answering service at [phone] and ask for the Radiologist on call to be paged. Si tiene Preguntas acerca del procedimiento, por favor llame al departamento de Radiología Intervencional al [phone]. Después de horas de oficina (5 pm) y los fines de [place], llamar al Nathalia Alonso al [phone] y pregunte por el Radiologo de Andra Weaver. Interventional Radiology General Nurse Discharge    After general anesthesia or intravenous sedation, for 24 hours or while taking prescription Narcotics:  · Limit your activities  · Do not drive and operate hazardous machinery  · Do not make important personal or business decisions  · Do  not drink alcoholic beverages  · If you have not urinated within 8 hours after discharge, please contact your surgeon on call. * Please give a list of your current medications to your Primary Care Provider. * Please update this list whenever your medications are discontinued, doses are     changed, or new medications (including over-the-counter products) are added. * Please carry medication information at all times in case of emergency situations. These are general instructions for a healthy lifestyle:    No smoking/ No tobacco products/ Avoid exposure to second hand smoke  Surgeon General's Warning:  Quitting smoking now greatly reduces serious risk to your health.     Obesity, smoking, and sedentary lifestyle greatly increases your risk for illness  A healthy diet, regular physical exercise & weight monitoring are important for maintaining a healthy lifestyle    You may be retaining fluid if you have a history of heart failure or if you experience any of the following symptoms:  Weight gain of 3 pounds or more overnight or 5 pounds in a week, increased swelling in our hands or feet or shortness of breath while lying flat in bed. Please call your doctor as soon as you notice any of these symptoms; do not wait until your next office visit. Recognize signs and symptoms of STROKE:  F-face looks uneven    A-arms unable to move or move unevenly    S-speech slurred or non-existent    T-time-call 911 as soon as signs and symptoms begin-DO NOT go       Back to bed or wait to see if you get better-TIME IS BRAIN.       Patient Signature:  Date: 2/21/2022  Discharging Nurse: Zafar Panchal RN

## 2022-02-21 NOTE — PROGRESS NOTES
TRANSFER - OUT REPORT:    Verbal report given to [address] RN(name) on Nicole CRUZ Case  being transferred to IR recovery(unit) for routine progression of care       Report consisted of patients Situation, Background, Assessment and   Recommendations(SBAR). Information from the following report(s) SBAR, Procedure Summary and MAR was reviewed with the receiving nurse. Opportunity for questions and clarification was provided. Conscious Sedation:   100 Mcg of Fentanyl administered  2 Mg of Versed administered    Pt tolerated procedure well.      Visit Vitals  /63   Pulse 94   Temp 97.6 °F (36.4 °C)   Resp 14   Ht 5' 2\" (1.575 m)   Wt 56.7 kg (125 lb)   SpO2 95%   Breastfeeding No   BMI 22.86 kg/m²     Past Medical History:   Diagnosis Date    Actinic keratosis     Adjustment disorder with mixed anxiety and depressed mood 2/11/2015    Adverse effect of anesthesia     cystoscope - was awake but could not move - dont remember the anesthesetic given    Corneal dystrophy 2/11/2015    Coronary atherosclerosis of native coronary artery 2/11/2015    GERD (gastroesophageal reflux disease)     Irritable bowel syndrome 2/11/2015    Menopause     Mixed hyperlipidemia 2/11/2015    Primary adenocarcinoma of ascending colon ([facility] 1/31/2022    Psychiatric disorder     anxiety     Peripheral IV 02/21/22 Left Antecubital (Active)              Venous Access Device PowerPort isp 02/21/22 Upper chest (subclavicular area, right (Active)

## 2022-02-28 ENCOUNTER — PATIENT OUTREACH (OUTPATIENT)
Dept: CASE MANAGEMENT | Age: 77
End: 2022-02-28

## 2022-02-28 ENCOUNTER — HOSPITAL ENCOUNTER (OUTPATIENT)
Dept: INFUSION THERAPY | Age: 77
Discharge: HOME | End: 2022-02-28
Payer: MEDICARE

## 2022-02-28 DIAGNOSIS — C18.2 PRIMARY ADENOCARCINOMA OF ASCENDING COLON (HCC): Primary | ICD-10-CM

## 2022-02-28 DIAGNOSIS — C18.9 MALIGNANT NEOPLASM OF COLON, UNSPECIFIED PART OF COLON (HCC): ICD-10-CM

## 2022-02-28 PROBLEM — Z79.899 HIGH RISK MEDICATION USE: Status: ACTIVE | Noted: 2022-02-28

## 2022-02-28 PROBLEM — Z09 CHEMOTHERAPY FOLLOW-UP EXAMINATION: Status: ACTIVE | Noted: 2022-02-28

## 2022-02-28 PROBLEM — D50.9 IRON DEFICIENCY ANEMIA: Status: ACTIVE | Noted: 2022-02-28

## 2022-02-28 LAB
ALBUMIN SERPL-MCNC: 3.4 G/DL (ref 3.2–4.6)
ALBUMIN/GLOB SERPL: 1 {RATIO} (ref 1.2–3.5)
ALP SERPL-CCNC: 79 U/L (ref 50–136)
ALT SERPL-CCNC: 29 U/L (ref 12–65)
ANION GAP SERPL CALC-SCNC: 6 MMOL/L (ref 7–16)
AST SERPL-CCNC: 20 U/L (ref 15–37)
BASOPHILS # BLD: 0 K/UL (ref 0–0.2)
BASOPHILS NFR BLD: 1 % (ref 0–2)
BILIRUB SERPL-MCNC: 0.2 MG/DL (ref 0.2–1.1)
BUN SERPL-MCNC: 13 MG/DL (ref 8–23)
CALCIUM SERPL-MCNC: 9 MG/DL (ref 8.3–10.4)
CHLORIDE SERPL-SCNC: 108 MMOL/L (ref 98–107)
CO2 SERPL-SCNC: 26 MMOL/L (ref 21–32)
CREAT SERPL-MCNC: 0.7 MG/DL (ref 0.6–1)
DIFFERENTIAL METHOD BLD: ABNORMAL
EOSINOPHIL # BLD: 0.1 K/UL (ref 0–0.8)
EOSINOPHIL NFR BLD: 2 % (ref 0.5–7.8)
ERYTHROCYTE [DISTWIDTH] IN BLOOD BY AUTOMATED COUNT: 15.2 % (ref 11.9–14.6)
GLOBULIN SER CALC-MCNC: 3.4 G/DL (ref 2.3–3.5)
GLUCOSE SERPL-MCNC: 122 MG/DL (ref 65–100)
HCT VFR BLD AUTO: 34.2 % (ref 35.8–46.3)
HGB BLD-MCNC: 10.8 G/DL (ref 11.7–15.4)
IMM GRANULOCYTES # BLD AUTO: 0 K/UL (ref 0–0.5)
IMM GRANULOCYTES NFR BLD AUTO: 0 % (ref 0–5)
LYMPHOCYTES # BLD: 1.1 K/UL (ref 0.5–4.6)
LYMPHOCYTES NFR BLD: 29 % (ref 13–44)
MCH RBC QN AUTO: 26.7 PG (ref 26.1–32.9)
MCHC RBC AUTO-ENTMCNC: 31.6 G/DL (ref 31.4–35)
MCV RBC AUTO: 84.7 FL (ref 79.6–97.8)
MONOCYTES # BLD: 0.5 K/UL (ref 0.1–1.3)
MONOCYTES NFR BLD: 14 % (ref 4–12)
NEUTS SEG # BLD: 2.1 K/UL (ref 1.7–8.2)
NEUTS SEG NFR BLD: 55 % (ref 43–78)
NRBC # BLD: 0 K/UL (ref 0–0.2)
PLATELET # BLD AUTO: 171 K/UL (ref 150–450)
PMV BLD AUTO: 9.8 FL (ref 9.4–12.3)
POTASSIUM SERPL-SCNC: 3.7 MMOL/L (ref 3.5–5.1)
PROT SERPL-MCNC: 6.8 G/DL (ref 6.3–8.2)
RBC # BLD AUTO: 4.04 M/UL (ref 4.05–5.2)
SODIUM SERPL-SCNC: 140 MMOL/L (ref 136–145)
WBC # BLD AUTO: 3.8 K/UL (ref 4.3–11.1)

## 2022-02-28 PROCEDURE — 74011000258 HC RX REV CODE- 258: Performed by: INTERNAL MEDICINE

## 2022-02-28 PROCEDURE — 96411 CHEMO IV PUSH ADDL DRUG: CPT

## 2022-02-28 PROCEDURE — G0498 CHEMO EXTEND IV INFUS W/PUMP: HCPCS

## 2022-02-28 PROCEDURE — 80053 COMPREHEN METABOLIC PANEL: CPT

## 2022-02-28 PROCEDURE — 74011250636 HC RX REV CODE- 250/636: Performed by: INTERNAL MEDICINE

## 2022-02-28 PROCEDURE — 96415 CHEMO IV INFUSION ADDL HR: CPT

## 2022-02-28 PROCEDURE — 96413 CHEMO IV INFUSION 1 HR: CPT

## 2022-02-28 PROCEDURE — 74011000250 HC RX REV CODE- 250: Performed by: INTERNAL MEDICINE

## 2022-02-28 PROCEDURE — 96368 THER/DIAG CONCURRENT INF: CPT

## 2022-02-28 PROCEDURE — 85025 COMPLETE CBC W/AUTO DIFF WBC: CPT

## 2022-02-28 PROCEDURE — 36591 DRAW BLOOD OFF VENOUS DEVICE: CPT

## 2022-02-28 PROCEDURE — 96375 TX/PRO/DX INJ NEW DRUG ADDON: CPT

## 2022-02-28 RX ORDER — ONDANSETRON 2 MG/ML
8 INJECTION INTRAMUSCULAR; INTRAVENOUS ONCE
Status: COMPLETED | OUTPATIENT
Start: 2022-02-28 | End: 2022-02-28

## 2022-02-28 RX ORDER — SODIUM CHLORIDE 0.9 % (FLUSH) 0.9 %
10 SYRINGE (ML) INJECTION AS NEEDED
Status: ACTIVE | OUTPATIENT
Start: 2022-02-28 | End: 2022-02-28

## 2022-02-28 RX ORDER — SODIUM CHLORIDE 0.9 % (FLUSH) 0.9 %
10 SYRINGE (ML) INJECTION AS NEEDED
Status: DISCONTINUED | OUTPATIENT
Start: 2022-02-28 | End: 2022-03-02 | Stop reason: HOSPADM

## 2022-02-28 RX ORDER — FLUOROURACIL 50 MG/ML
400 INJECTION, SOLUTION INTRAVENOUS ONCE
Status: COMPLETED | OUTPATIENT
Start: 2022-02-28 | End: 2022-02-28

## 2022-02-28 RX ORDER — DEXTROSE MONOHYDRATE 50 MG/ML
25 INJECTION, SOLUTION INTRAVENOUS CONTINUOUS
Status: ACTIVE | OUTPATIENT
Start: 2022-02-28 | End: 2022-02-28

## 2022-02-28 RX ADMIN — DEXTROSE MONOHYDRATE 25 ML/HR: 5 INJECTION, SOLUTION INTRAVENOUS at 10:30

## 2022-02-28 RX ADMIN — DEXAMETHASONE SODIUM PHOSPHATE 12 MG: 4 INJECTION INTRA-ARTICULAR; INTRALESIONAL; INTRAMUSCULAR; INTRAVENOUS; SOFT TISSUE at 10:40

## 2022-02-28 RX ADMIN — FLUOROURACIL 640 MG: 50 INJECTION, SOLUTION INTRAVENOUS at 13:35

## 2022-02-28 RX ADMIN — SODIUM CHLORIDE, PRESERVATIVE FREE 10 ML: 5 INJECTION INTRAVENOUS at 10:30

## 2022-02-28 RX ADMIN — LEUCOVORIN CALCIUM 640 MG: 350 INJECTION, POWDER, LYOPHILIZED, FOR SOLUTION INTRAMUSCULAR; INTRAVENOUS at 11:24

## 2022-02-28 RX ADMIN — FLUOROURACIL 3840 MG: 50 INJECTION, SOLUTION INTRAVENOUS at 13:39

## 2022-02-28 RX ADMIN — ONDANSETRON 8 MG: 2 INJECTION INTRAMUSCULAR; INTRAVENOUS at 10:34

## 2022-02-28 RX ADMIN — OXALIPLATIN 109 MG: 5 INJECTION, SOLUTION INTRAVENOUS at 11:25

## 2022-02-28 RX ADMIN — Medication 10 ML: at 09:01

## 2022-02-28 NOTE — PROGRESS NOTES
I saw patient today with daughter at her side for start Folfox. Pt states she is having acid reflux so will restart Protonix and new script sent to [facility]. We also reviewed Zofran and Compazine use. Pt will have IV iron to be given in 1 week (first dose given at tox check) Pt encouraged to call with any uncontrolled symptoms.  Nurse navigation will be following

## 2022-02-28 NOTE — PROGRESS NOTES
Pt arrived ambulatory to [facility] with port previously accessed with good blood return. D1C1. D5 infusing. Pre meds given as ordered. oxaliplatin and leucovorin infusing. 5FU iv push. Pump infusing at 1340. Pt discharged ambulatory.

## 2022-03-02 ENCOUNTER — HOSPITAL ENCOUNTER (OUTPATIENT)
Dept: INFUSION THERAPY | Age: 77
Discharge: HOME | End: 2022-03-02
Payer: MEDICARE

## 2022-03-02 VITALS
SYSTOLIC BLOOD PRESSURE: 108 MMHG | OXYGEN SATURATION: 95 % | RESPIRATION RATE: 18 BRPM | DIASTOLIC BLOOD PRESSURE: 51 MMHG | HEART RATE: 80 BPM | TEMPERATURE: 97.9 F

## 2022-03-02 DIAGNOSIS — C18.2 PRIMARY ADENOCARCINOMA OF ASCENDING COLON (HCC): Primary | ICD-10-CM

## 2022-03-02 PROCEDURE — 74011250636 HC RX REV CODE- 250/636: Performed by: INTERNAL MEDICINE

## 2022-03-02 PROCEDURE — 74011000250 HC RX REV CODE- 250: Performed by: INTERNAL MEDICINE

## 2022-03-02 PROCEDURE — 96360 HYDRATION IV INFUSION INIT: CPT

## 2022-03-02 RX ORDER — SODIUM CHLORIDE 0.9 % (FLUSH) 0.9 %
10 SYRINGE (ML) INJECTION AS NEEDED
Status: DISCONTINUED | OUTPATIENT
Start: 2022-03-02 | End: 2022-03-03 | Stop reason: HOSPADM

## 2022-03-02 RX ORDER — SODIUM CHLORIDE 9 MG/ML
1000 INJECTION, SOLUTION INTRAVENOUS ONCE
Status: COMPLETED | OUTPATIENT
Start: 2022-03-02 | End: 2022-03-02

## 2022-03-02 RX ADMIN — SODIUM CHLORIDE, PRESERVATIVE FREE 10 ML: 5 INJECTION INTRAVENOUS at 17:30

## 2022-03-02 RX ADMIN — SODIUM CHLORIDE 1000 ML: 9 INJECTION, SOLUTION INTRAVENOUS at 16:25

## 2022-03-02 RX ADMIN — SODIUM CHLORIDE, PRESERVATIVE FREE 10 ML: 5 INJECTION INTRAVENOUS at 16:25

## 2022-03-02 NOTE — PROGRESS NOTES
Arrived to the [facility]. D/C pump and hydration completed. Patient tolerated well. Any issues or concerns during appointment: none. Patient aware of next infusion appointment on 3/7 (date) at 3:30 PM (time). Patient instructed to call provider with temperature of 100.4 or greater or nausea/vomiting/ diarrhea or pain not controlled by medications  Discharged ambulatory.

## 2022-03-07 ENCOUNTER — HOSPITAL ENCOUNTER (OUTPATIENT)
Dept: INFUSION THERAPY | Age: 77
Discharge: HOME | End: 2022-03-07
Payer: MEDICARE

## 2022-03-07 ENCOUNTER — PATIENT OUTREACH (OUTPATIENT)
Dept: CASE MANAGEMENT | Age: 77
End: 2022-03-07

## 2022-03-07 ENCOUNTER — HOSPITAL ENCOUNTER (OUTPATIENT)
Dept: LAB | Age: 77
Discharge: HOME | End: 2022-03-07
Payer: MEDICARE

## 2022-03-07 VITALS
OXYGEN SATURATION: 98 % | DIASTOLIC BLOOD PRESSURE: 60 MMHG | HEART RATE: 73 BPM | SYSTOLIC BLOOD PRESSURE: 118 MMHG | RESPIRATION RATE: 18 BRPM

## 2022-03-07 DIAGNOSIS — C18.2 PRIMARY ADENOCARCINOMA OF ASCENDING COLON (HCC): ICD-10-CM

## 2022-03-07 DIAGNOSIS — D50.8 OTHER IRON DEFICIENCY ANEMIA: Primary | ICD-10-CM

## 2022-03-07 LAB
ALBUMIN SERPL-MCNC: 3.5 G/DL (ref 3.2–4.6)
ALBUMIN/GLOB SERPL: 1 {RATIO} (ref 1.2–3.5)
ALP SERPL-CCNC: 63 U/L (ref 50–136)
ALT SERPL-CCNC: 23 U/L (ref 12–65)
ANION GAP SERPL CALC-SCNC: 5 MMOL/L (ref 7–16)
AST SERPL-CCNC: 15 U/L (ref 15–37)
BASOPHILS # BLD: 0 K/UL (ref 0–0.2)
BASOPHILS NFR BLD: 0 % (ref 0–2)
BILIRUB SERPL-MCNC: 0.3 MG/DL (ref 0.2–1.1)
BUN SERPL-MCNC: 14 MG/DL (ref 8–23)
CALCIUM SERPL-MCNC: 9.1 MG/DL (ref 8.3–10.4)
CHLORIDE SERPL-SCNC: 104 MMOL/L (ref 98–107)
CO2 SERPL-SCNC: 25 MMOL/L (ref 21–32)
CREAT SERPL-MCNC: 0.9 MG/DL (ref 0.6–1)
DIFFERENTIAL METHOD BLD: ABNORMAL
EOSINOPHIL # BLD: 0.2 K/UL (ref 0–0.8)
EOSINOPHIL NFR BLD: 5 % (ref 0.5–7.8)
ERYTHROCYTE [DISTWIDTH] IN BLOOD BY AUTOMATED COUNT: 14.4 % (ref 11.9–14.6)
GLOBULIN SER CALC-MCNC: 3.4 G/DL (ref 2.3–3.5)
GLUCOSE SERPL-MCNC: 129 MG/DL (ref 65–100)
HCT VFR BLD AUTO: 33.7 % (ref 35.8–46.3)
HGB BLD-MCNC: 10.9 G/DL (ref 11.7–15.4)
IMM GRANULOCYTES # BLD AUTO: 0 K/UL (ref 0–0.5)
IMM GRANULOCYTES NFR BLD AUTO: 0 % (ref 0–5)
LYMPHOCYTES # BLD: 0.8 K/UL (ref 0.5–4.6)
LYMPHOCYTES NFR BLD: 23 % (ref 13–44)
MAGNESIUM SERPL-MCNC: 2.1 MG/DL (ref 1.8–2.4)
MCH RBC QN AUTO: 27.3 PG (ref 26.1–32.9)
MCHC RBC AUTO-ENTMCNC: 32.3 G/DL (ref 31.4–35)
MCV RBC AUTO: 84.3 FL (ref 79.6–97.8)
MONOCYTES # BLD: 0.2 K/UL (ref 0.1–1.3)
MONOCYTES NFR BLD: 7 % (ref 4–12)
NEUTS SEG # BLD: 2.1 K/UL (ref 1.7–8.2)
NEUTS SEG NFR BLD: 64 % (ref 43–78)
NRBC # BLD: 0 K/UL (ref 0–0.2)
PLATELET # BLD AUTO: 166 K/UL (ref 150–450)
PMV BLD AUTO: 9.9 FL (ref 9.4–12.3)
POTASSIUM SERPL-SCNC: 3.6 MMOL/L (ref 3.5–5.1)
PROT SERPL-MCNC: 6.9 G/DL (ref 6.3–8.2)
RBC # BLD AUTO: 4 M/UL (ref 4.05–5.2)
SODIUM SERPL-SCNC: 134 MMOL/L (ref 136–145)
WBC # BLD AUTO: 3.3 K/UL (ref 4.3–11.1)

## 2022-03-07 PROCEDURE — 96365 THER/PROPH/DIAG IV INF INIT: CPT

## 2022-03-07 PROCEDURE — 85025 COMPLETE CBC W/AUTO DIFF WBC: CPT

## 2022-03-07 PROCEDURE — 83735 ASSAY OF MAGNESIUM: CPT

## 2022-03-07 PROCEDURE — 36415 COLL VENOUS BLD VENIPUNCTURE: CPT

## 2022-03-07 PROCEDURE — 74011250636 HC RX REV CODE- 250/636: Performed by: NURSE PRACTITIONER

## 2022-03-07 PROCEDURE — 74011000250 HC RX REV CODE- 250: Performed by: NURSE PRACTITIONER

## 2022-03-07 PROCEDURE — 80053 COMPREHEN METABOLIC PANEL: CPT

## 2022-03-07 RX ORDER — SODIUM CHLORIDE 0.9 % (FLUSH) 0.9 %
10 SYRINGE (ML) INJECTION AS NEEDED
Status: DISCONTINUED | OUTPATIENT
Start: 2022-03-07 | End: 2022-03-08 | Stop reason: HOSPADM

## 2022-03-07 RX ORDER — SODIUM CHLORIDE 9 MG/ML
25 INJECTION, SOLUTION INTRAVENOUS CONTINUOUS
Status: DISCONTINUED | OUTPATIENT
Start: 2022-03-07 | End: 2022-03-08 | Stop reason: HOSPADM

## 2022-03-07 RX ORDER — HYDROCORTISONE SODIUM SUCCINATE 100 MG/2ML
100 INJECTION, POWDER, FOR SOLUTION INTRAMUSCULAR; INTRAVENOUS AS NEEDED
Status: DISCONTINUED | OUTPATIENT
Start: 2022-03-07 | End: 2022-03-08 | Stop reason: HOSPADM

## 2022-03-07 RX ORDER — DIPHENHYDRAMINE HYDROCHLORIDE 50 MG/ML
50 INJECTION, SOLUTION INTRAMUSCULAR; INTRAVENOUS AS NEEDED
Status: DISCONTINUED | OUTPATIENT
Start: 2022-03-07 | End: 2022-03-08 | Stop reason: HOSPADM

## 2022-03-07 RX ADMIN — SODIUM CHLORIDE, PRESERVATIVE FREE 10 ML: 5 INJECTION INTRAVENOUS at 16:53

## 2022-03-07 RX ADMIN — FERRIC CARBOXYMALTOSE INJECTION 750 MG: 50 INJECTION, SOLUTION INTRAVENOUS at 16:02

## 2022-03-07 RX ADMIN — SODIUM CHLORIDE 25 ML/HR: 9 INJECTION, SOLUTION INTRAVENOUS at 16:22

## 2022-03-07 RX ADMIN — SODIUM CHLORIDE, PRESERVATIVE FREE 10 ML: 5 INJECTION INTRAVENOUS at 16:02

## 2022-03-07 NOTE — PROGRESS NOTES
Arrived to the [facility]. Injectafer completed. Patient tolerated well. Any issues or concerns during appointment: none. Patient aware of next infusion appointment on 3/14 (date) at 2:00 PM (time). Patient instructed to call provider with temperature of 100.4 or greater or nausea/vomiting/ diarrhea or pain not controlled by medications  Discharged ambulatory.

## 2022-03-07 NOTE — PROGRESS NOTES
3/7/2022 Saw the patient today for a toxicity check after her first cycle of chemo. She did have a little nausea but this was controlled with medication. Willam Naylor is also in the visit today. She has not had any diarrhea. She feels she is doing well eating but she feels she is struggling to drink and we have made some suggestions. She does have some numbness behind her toes on the left foot which was present prior to treatment and has not become any worse. She does have some mouth sores and we will send in compounded mouthwash to [address] [place] [address] [address] [place] [address]. Navigation will continue to follow.

## 2022-03-14 ENCOUNTER — HOSPITAL ENCOUNTER (OUTPATIENT)
Dept: INFUSION THERAPY | Age: 77
Discharge: HOME | End: 2022-03-14
Payer: MEDICARE

## 2022-03-14 ENCOUNTER — PATIENT OUTREACH (OUTPATIENT)
Dept: CASE MANAGEMENT | Age: 77
End: 2022-03-14

## 2022-03-14 DIAGNOSIS — C18.2 PRIMARY ADENOCARCINOMA OF ASCENDING COLON (HCC): Primary | ICD-10-CM

## 2022-03-14 DIAGNOSIS — D50.8 OTHER IRON DEFICIENCY ANEMIA: ICD-10-CM

## 2022-03-14 DIAGNOSIS — C18.9 MALIGNANT NEOPLASM OF COLON, UNSPECIFIED PART OF COLON (HCC): ICD-10-CM

## 2022-03-14 LAB
ALBUMIN SERPL-MCNC: 3.2 G/DL (ref 3.2–4.6)
ALBUMIN/GLOB SERPL: 1 {RATIO} (ref 1.2–3.5)
ALP SERPL-CCNC: 64 U/L (ref 50–136)
ALT SERPL-CCNC: 25 U/L (ref 12–65)
ANION GAP SERPL CALC-SCNC: 5 MMOL/L (ref 7–16)
AST SERPL-CCNC: 18 U/L (ref 15–37)
BASOPHILS # BLD: 0 K/UL (ref 0–0.2)
BASOPHILS NFR BLD: 0 % (ref 0–2)
BILIRUB SERPL-MCNC: 0.3 MG/DL (ref 0.2–1.1)
BUN SERPL-MCNC: 7 MG/DL (ref 8–23)
CALCIUM SERPL-MCNC: 8 MG/DL (ref 8.3–10.4)
CHLORIDE SERPL-SCNC: 110 MMOL/L (ref 98–107)
CO2 SERPL-SCNC: 24 MMOL/L (ref 21–32)
CREAT SERPL-MCNC: 0.9 MG/DL (ref 0.6–1)
DIFFERENTIAL METHOD BLD: ABNORMAL
EOSINOPHIL # BLD: 0.1 K/UL (ref 0–0.8)
EOSINOPHIL NFR BLD: 1 % (ref 0.5–7.8)
ERYTHROCYTE [DISTWIDTH] IN BLOOD BY AUTOMATED COUNT: 15.9 % (ref 11.9–14.6)
GLOBULIN SER CALC-MCNC: 3.3 G/DL (ref 2.3–3.5)
GLUCOSE SERPL-MCNC: 145 MG/DL (ref 65–100)
HCT VFR BLD AUTO: 31.1 % (ref 35.8–46.3)
HGB BLD-MCNC: 10 G/DL (ref 11.7–15.4)
IMM GRANULOCYTES # BLD AUTO: 0 K/UL (ref 0–0.5)
IMM GRANULOCYTES NFR BLD AUTO: 0 % (ref 0–5)
LYMPHOCYTES # BLD: 1 K/UL (ref 0.5–4.6)
LYMPHOCYTES NFR BLD: 29 % (ref 13–44)
MAGNESIUM SERPL-MCNC: 2.2 MG/DL (ref 1.8–2.4)
MCH RBC QN AUTO: 27.3 PG (ref 26.1–32.9)
MCHC RBC AUTO-ENTMCNC: 32.2 G/DL (ref 31.4–35)
MCV RBC AUTO: 85 FL (ref 79.6–97.8)
MONOCYTES # BLD: 0.6 K/UL (ref 0.1–1.3)
MONOCYTES NFR BLD: 16 % (ref 4–12)
NEUTS SEG # BLD: 1.8 K/UL (ref 1.7–8.2)
NEUTS SEG NFR BLD: 53 % (ref 43–78)
NRBC # BLD: 0 K/UL (ref 0–0.2)
PLATELET # BLD AUTO: 195 K/UL (ref 150–450)
PMV BLD AUTO: 9.5 FL (ref 9.4–12.3)
POTASSIUM SERPL-SCNC: 2.8 MMOL/L (ref 3.5–5.1)
PROT SERPL-MCNC: 6.5 G/DL (ref 6.3–8.2)
RBC # BLD AUTO: 3.66 M/UL (ref 4.05–5.2)
SODIUM SERPL-SCNC: 139 MMOL/L (ref 136–145)
WBC # BLD AUTO: 3.5 K/UL (ref 4.3–11.1)

## 2022-03-14 PROCEDURE — 96375 TX/PRO/DX INJ NEW DRUG ADDON: CPT

## 2022-03-14 PROCEDURE — 83735 ASSAY OF MAGNESIUM: CPT

## 2022-03-14 PROCEDURE — 85025 COMPLETE CBC W/AUTO DIFF WBC: CPT

## 2022-03-14 PROCEDURE — 96368 THER/DIAG CONCURRENT INF: CPT

## 2022-03-14 PROCEDURE — 80053 COMPREHEN METABOLIC PANEL: CPT

## 2022-03-14 PROCEDURE — 74011000258 HC RX REV CODE- 258: Performed by: NURSE PRACTITIONER

## 2022-03-14 PROCEDURE — 36591 DRAW BLOOD OFF VENOUS DEVICE: CPT

## 2022-03-14 PROCEDURE — G0498 CHEMO EXTEND IV INFUS W/PUMP: HCPCS

## 2022-03-14 PROCEDURE — 96415 CHEMO IV INFUSION ADDL HR: CPT

## 2022-03-14 PROCEDURE — 74011250636 HC RX REV CODE- 250/636: Performed by: NURSE PRACTITIONER

## 2022-03-14 PROCEDURE — 96411 CHEMO IV PUSH ADDL DRUG: CPT

## 2022-03-14 PROCEDURE — 96367 TX/PROPH/DG ADDL SEQ IV INF: CPT

## 2022-03-14 PROCEDURE — 74011000250 HC RX REV CODE- 250: Performed by: NURSE PRACTITIONER

## 2022-03-14 PROCEDURE — 74011250637 HC RX REV CODE- 250/637: Performed by: NURSE PRACTITIONER

## 2022-03-14 PROCEDURE — 96413 CHEMO IV INFUSION 1 HR: CPT

## 2022-03-14 RX ORDER — POTASSIUM CHLORIDE 14.9 MG/ML
20 INJECTION INTRAVENOUS ONCE
Status: COMPLETED | OUTPATIENT
Start: 2022-03-14 | End: 2022-03-14

## 2022-03-14 RX ORDER — HEPARIN 100 UNIT/ML
300-500 SYRINGE INTRAVENOUS AS NEEDED
Status: DISCONTINUED | OUTPATIENT
Start: 2022-03-14 | End: 2022-03-15 | Stop reason: HOSPADM

## 2022-03-14 RX ORDER — HYDROCORTISONE SODIUM SUCCINATE 100 MG/2ML
100 INJECTION, POWDER, FOR SOLUTION INTRAMUSCULAR; INTRAVENOUS AS NEEDED
Status: DISCONTINUED | OUTPATIENT
Start: 2022-03-14 | End: 2022-03-15 | Stop reason: HOSPADM

## 2022-03-14 RX ORDER — SODIUM CHLORIDE 0.9 % (FLUSH) 0.9 %
10 SYRINGE (ML) INJECTION AS NEEDED
Status: DISCONTINUED | OUTPATIENT
Start: 2022-03-14 | End: 2022-03-15 | Stop reason: HOSPADM

## 2022-03-14 RX ORDER — DIPHENHYDRAMINE HYDROCHLORIDE 50 MG/ML
50 INJECTION, SOLUTION INTRAMUSCULAR; INTRAVENOUS AS NEEDED
Status: DISCONTINUED | OUTPATIENT
Start: 2022-03-14 | End: 2022-03-15 | Stop reason: HOSPADM

## 2022-03-14 RX ORDER — ONDANSETRON 2 MG/ML
8 INJECTION INTRAMUSCULAR; INTRAVENOUS ONCE
Status: COMPLETED | OUTPATIENT
Start: 2022-03-14 | End: 2022-03-14

## 2022-03-14 RX ORDER — SODIUM CHLORIDE 9 MG/ML
10 INJECTION INTRAMUSCULAR; INTRAVENOUS; SUBCUTANEOUS AS NEEDED
Status: DISCONTINUED | OUTPATIENT
Start: 2022-03-14 | End: 2022-03-15 | Stop reason: HOSPADM

## 2022-03-14 RX ORDER — DEXTROSE MONOHYDRATE 50 MG/ML
25 INJECTION, SOLUTION INTRAVENOUS CONTINUOUS
Status: DISCONTINUED | OUTPATIENT
Start: 2022-03-14 | End: 2022-03-15 | Stop reason: HOSPADM

## 2022-03-14 RX ORDER — SODIUM CHLORIDE 9 MG/ML
25 INJECTION, SOLUTION INTRAVENOUS CONTINUOUS
Status: DISCONTINUED | OUTPATIENT
Start: 2022-03-14 | End: 2022-03-15 | Stop reason: HOSPADM

## 2022-03-14 RX ORDER — POTASSIUM CHLORIDE 750 MG/1
40 TABLET, EXTENDED RELEASE ORAL
Status: COMPLETED | OUTPATIENT
Start: 2022-03-14 | End: 2022-03-14

## 2022-03-14 RX ORDER — FLUOROURACIL 50 MG/ML
400 INJECTION, SOLUTION INTRAVENOUS ONCE
Status: COMPLETED | OUTPATIENT
Start: 2022-03-14 | End: 2022-03-14

## 2022-03-14 RX ORDER — SODIUM CHLORIDE 0.9 % (FLUSH) 0.9 %
10 SYRINGE (ML) INJECTION AS NEEDED
Status: DISCONTINUED | OUTPATIENT
Start: 2022-03-14 | End: 2022-03-16 | Stop reason: HOSPADM

## 2022-03-14 RX ADMIN — ONDANSETRON 8 MG: 2 INJECTION INTRAMUSCULAR; INTRAVENOUS at 14:15

## 2022-03-14 RX ADMIN — SODIUM CHLORIDE 25 ML/HR: 9 INJECTION, SOLUTION INTRAVENOUS at 17:45

## 2022-03-14 RX ADMIN — FOSAPREPITANT 150 MG: 150 INJECTION, POWDER, LYOPHILIZED, FOR SOLUTION INTRAVENOUS at 14:32

## 2022-03-14 RX ADMIN — DEXTROSE MONOHYDRATE 25 ML/HR: 5 INJECTION, SOLUTION INTRAVENOUS at 13:55

## 2022-03-14 RX ADMIN — SODIUM CHLORIDE, PRESERVATIVE FREE 10 ML: 5 INJECTION INTRAVENOUS at 13:55

## 2022-03-14 RX ADMIN — Medication 10 ML: at 10:52

## 2022-03-14 RX ADMIN — FLUOROURACIL 3840 MG: 50 INJECTION, SOLUTION INTRAVENOUS at 18:40

## 2022-03-14 RX ADMIN — FLUOROURACIL 640 MG: 50 INJECTION, SOLUTION INTRAVENOUS at 18:36

## 2022-03-14 RX ADMIN — POTASSIUM CHLORIDE 40 MEQ: 10 TABLET, EXTENDED RELEASE ORAL at 14:16

## 2022-03-14 RX ADMIN — DEXAMETHASONE SODIUM PHOSPHATE 12 MG: 4 INJECTION, SOLUTION INTRAMUSCULAR; INTRAVENOUS at 14:16

## 2022-03-14 RX ADMIN — FERRIC CARBOXYMALTOSE INJECTION 750 MG: 50 INJECTION, SOLUTION INTRAVENOUS at 17:49

## 2022-03-14 RX ADMIN — POTASSIUM CHLORIDE 20 MEQ: 200 INJECTION, SOLUTION INTRAVENOUS at 15:20

## 2022-03-14 RX ADMIN — OXALIPLATIN 109 MG: 5 INJECTION, SOLUTION INTRAVENOUS at 15:40

## 2022-03-14 RX ADMIN — LEUCOVORIN CALCIUM 640 MG: 350 INJECTION, POWDER, LYOPHILIZED, FOR SOLUTION INTRAMUSCULAR; INTRAVENOUS at 15:40

## 2022-03-14 NOTE — PROGRESS NOTES
Arrived to the [facility]. Assessment completed, labs reviewed. Folfox and Injectafer completed. Patient tolerated well. Elastomeric chemotherapy pump connected and infusing. Any issues or concerns during appointment: No.  Patient aware of next infusion appointment on 3/16/22 @1630. Patient instructed to call provider with temperature of 100.4 or greater or nausea/vomiting/ diarrhea or pain not controlled by medications  Discharged ambulatory.

## 2022-03-14 NOTE — PROGRESS NOTES
Patient arrived to port lab for port access and lab draw   Maday Rodriguez 45 accessed and labs drawn per protocol   *Port remains accessed   Patient discharged from port lab ambulatory*

## 2022-03-14 NOTE — PROGRESS NOTES
I saw patient prior to C2 Folfox. She is doing well overall-she has complaints of abdominal cramping and diarrhea mixed with bouts of constipation. We discussed pt drinking warm prune juice and using any Miralax or Imodium sparingly. Levsin will be sent to pts pharmacy. Pt will continue same regimen-labs are good--encouraged pt to call with any questions or concerns.

## 2022-03-16 ENCOUNTER — HOSPITAL ENCOUNTER (OUTPATIENT)
Dept: INFUSION THERAPY | Age: 77
Discharge: HOME | End: 2022-03-16
Payer: MEDICARE

## 2022-03-16 VITALS
OXYGEN SATURATION: 96 % | SYSTOLIC BLOOD PRESSURE: 105 MMHG | HEART RATE: 71 BPM | TEMPERATURE: 97.9 F | RESPIRATION RATE: 18 BRPM | DIASTOLIC BLOOD PRESSURE: 61 MMHG

## 2022-03-16 DIAGNOSIS — C18.2 PRIMARY ADENOCARCINOMA OF ASCENDING COLON (HCC): Primary | ICD-10-CM

## 2022-03-16 PROCEDURE — 74011000250 HC RX REV CODE- 250: Performed by: NURSE PRACTITIONER

## 2022-03-16 PROCEDURE — 96360 HYDRATION IV INFUSION INIT: CPT

## 2022-03-16 PROCEDURE — 74011250636 HC RX REV CODE- 250/636: Performed by: NURSE PRACTITIONER

## 2022-03-16 RX ORDER — SODIUM CHLORIDE 9 MG/ML
1000 INJECTION, SOLUTION INTRAVENOUS CONTINUOUS
Status: DISCONTINUED | OUTPATIENT
Start: 2022-03-16 | End: 2022-03-18 | Stop reason: HOSPADM

## 2022-03-16 RX ORDER — SODIUM CHLORIDE 0.9 % (FLUSH) 0.9 %
10 SYRINGE (ML) INJECTION AS NEEDED
Status: DISCONTINUED | OUTPATIENT
Start: 2022-03-16 | End: 2022-03-17 | Stop reason: HOSPADM

## 2022-03-16 RX ADMIN — SODIUM CHLORIDE 1000 ML: 9 INJECTION, SOLUTION INTRAVENOUS at 16:42

## 2022-03-16 RX ADMIN — SODIUM CHLORIDE, PRESERVATIVE FREE 10 ML: 5 INJECTION INTRAVENOUS at 17:43

## 2022-03-16 NOTE — PROGRESS NOTES
Arrived to the [facility]. Continuous chemo completed; elastomeric pump removed. 1 L NS infused. Patient tolerated well. Any issues or concerns during appointment: none  Patient instructed to call provider with temperature of 100.4 or greater or nausea/vomiting/ diarrhea or pain not controlled by medications. Discharged ambulatory.

## 2022-03-17 PROBLEM — Z01.818 EXAMINATION PRIOR TO CHEMOTHERAPY: Status: RESOLVED | Noted: 2022-02-15 | Resolved: 2022-03-17

## 2022-03-18 PROBLEM — R07.2 PRECORDIAL PAIN: Status: ACTIVE | Noted: 2017-01-12

## 2022-03-18 PROBLEM — Z09 CHEMOTHERAPY FOLLOW-UP EXAMINATION: Status: ACTIVE | Noted: 2022-02-28

## 2022-03-18 PROBLEM — Z79.899 HIGH RISK MEDICATION USE: Status: ACTIVE | Noted: 2022-02-28

## 2022-03-18 PROBLEM — D50.9 IRON DEFICIENCY ANEMIA: Status: ACTIVE | Noted: 2022-02-28

## 2022-03-19 PROBLEM — L76.34 POSTOPERATIVE SEROMA OF SUBCUTANEOUS TISSUE AFTER NON-DERMATOLOGIC PROCEDURE: Status: ACTIVE | Noted: 2022-02-11

## 2022-03-19 PROBLEM — C18.2 PRIMARY ADENOCARCINOMA OF ASCENDING COLON (HCC): Status: ACTIVE | Noted: 2022-01-31

## 2022-03-19 PROBLEM — K63.1 PERFORATION OF COLON (HCC): Status: ACTIVE | Noted: 2022-01-22

## 2022-03-19 PROBLEM — L53.9 REDNESS OF SKIN: Status: ACTIVE | Noted: 2022-02-15

## 2022-03-20 PROBLEM — Z94.7 CORNEAL TRANSPLANT STATUS: Status: ACTIVE | Noted: 2022-02-15

## 2022-03-20 PROBLEM — Z01.818 EXAMINATION PRIOR TO CHEMOTHERAPY: Status: RESOLVED | Noted: 2022-02-15 | Resolved: 2022-03-17

## 2022-03-28 ENCOUNTER — PATIENT OUTREACH (OUTPATIENT)
Dept: CASE MANAGEMENT | Age: 77
End: 2022-03-28

## 2022-03-28 ENCOUNTER — HOSPITAL ENCOUNTER (OUTPATIENT)
Dept: INFUSION THERAPY | Age: 77
Discharge: HOME | End: 2022-03-28
Payer: MEDICARE

## 2022-03-28 DIAGNOSIS — C18.2 PRIMARY ADENOCARCINOMA OF ASCENDING COLON (HCC): Primary | ICD-10-CM

## 2022-03-28 DIAGNOSIS — C18.9 MALIGNANT NEOPLASM OF COLON, UNSPECIFIED PART OF COLON (HCC): ICD-10-CM

## 2022-03-28 LAB
ALBUMIN SERPL-MCNC: 3.1 G/DL (ref 3.2–4.6)
ALBUMIN/GLOB SERPL: 0.9 {RATIO} (ref 1.2–3.5)
ALP SERPL-CCNC: 84 U/L (ref 50–136)
ALT SERPL-CCNC: 33 U/L (ref 12–65)
ANION GAP SERPL CALC-SCNC: 5 MMOL/L (ref 7–16)
AST SERPL-CCNC: 25 U/L (ref 15–37)
BASOPHILS # BLD: 0 K/UL (ref 0–0.2)
BASOPHILS NFR BLD: 0 % (ref 0–2)
BILIRUB SERPL-MCNC: 0.3 MG/DL (ref 0.2–1.1)
BUN SERPL-MCNC: 9 MG/DL (ref 8–23)
CALCIUM SERPL-MCNC: 8.1 MG/DL (ref 8.3–10.4)
CEA SERPL-MCNC: 8.4 NG/ML (ref 0–3)
CHLORIDE SERPL-SCNC: 107 MMOL/L (ref 98–107)
CO2 SERPL-SCNC: 26 MMOL/L (ref 21–32)
CREAT SERPL-MCNC: 0.7 MG/DL (ref 0.6–1)
DIFFERENTIAL METHOD BLD: ABNORMAL
EOSINOPHIL # BLD: 0 K/UL (ref 0–0.8)
EOSINOPHIL NFR BLD: 1 % (ref 0.5–7.8)
ERYTHROCYTE [DISTWIDTH] IN BLOOD BY AUTOMATED COUNT: 16.6 % (ref 11.9–14.6)
GLOBULIN SER CALC-MCNC: 3.3 G/DL (ref 2.3–3.5)
GLUCOSE SERPL-MCNC: 150 MG/DL (ref 65–100)
HCT VFR BLD AUTO: 33.3 % (ref 35.8–46.3)
HGB BLD-MCNC: 10.8 G/DL (ref 11.7–15.4)
IMM GRANULOCYTES # BLD AUTO: 0 K/UL (ref 0–0.5)
IMM GRANULOCYTES NFR BLD AUTO: 0 % (ref 0–5)
LYMPHOCYTES # BLD: 1 K/UL (ref 0.5–4.6)
LYMPHOCYTES NFR BLD: 32 % (ref 13–44)
MAGNESIUM SERPL-MCNC: 2.2 MG/DL (ref 1.8–2.4)
MCH RBC QN AUTO: 27.3 PG (ref 26.1–32.9)
MCHC RBC AUTO-ENTMCNC: 32.4 G/DL (ref 31.4–35)
MCV RBC AUTO: 84.3 FL (ref 79.6–97.8)
MONOCYTES # BLD: 0.8 K/UL (ref 0.1–1.3)
MONOCYTES NFR BLD: 26 % (ref 4–12)
NEUTS SEG # BLD: 1.2 K/UL (ref 1.7–8.2)
NEUTS SEG NFR BLD: 40 % (ref 43–78)
NRBC # BLD: 0 K/UL (ref 0–0.2)
PLATELET # BLD AUTO: 110 K/UL (ref 150–450)
PMV BLD AUTO: 9.9 FL (ref 9.4–12.3)
POTASSIUM SERPL-SCNC: 3.1 MMOL/L (ref 3.5–5.1)
PROT SERPL-MCNC: 6.4 G/DL (ref 6.3–8.2)
RBC # BLD AUTO: 3.95 M/UL (ref 4.05–5.2)
SODIUM SERPL-SCNC: 138 MMOL/L (ref 136–145)
WBC # BLD AUTO: 3 K/UL (ref 4.3–11.1)

## 2022-03-28 PROCEDURE — 85025 COMPLETE CBC W/AUTO DIFF WBC: CPT

## 2022-03-28 PROCEDURE — 74011250636 HC RX REV CODE- 250/636: Performed by: NURSE PRACTITIONER

## 2022-03-28 PROCEDURE — 74011000258 HC RX REV CODE- 258: Performed by: NURSE PRACTITIONER

## 2022-03-28 PROCEDURE — 96415 CHEMO IV INFUSION ADDL HR: CPT

## 2022-03-28 PROCEDURE — 82378 CARCINOEMBRYONIC ANTIGEN: CPT

## 2022-03-28 PROCEDURE — 96368 THER/DIAG CONCURRENT INF: CPT

## 2022-03-28 PROCEDURE — 96367 TX/PROPH/DG ADDL SEQ IV INF: CPT

## 2022-03-28 PROCEDURE — 36591 DRAW BLOOD OFF VENOUS DEVICE: CPT

## 2022-03-28 PROCEDURE — 83735 ASSAY OF MAGNESIUM: CPT

## 2022-03-28 PROCEDURE — 96411 CHEMO IV PUSH ADDL DRUG: CPT

## 2022-03-28 PROCEDURE — 96413 CHEMO IV INFUSION 1 HR: CPT

## 2022-03-28 PROCEDURE — 80053 COMPREHEN METABOLIC PANEL: CPT

## 2022-03-28 PROCEDURE — G0498 CHEMO EXTEND IV INFUS W/PUMP: HCPCS

## 2022-03-28 PROCEDURE — 96375 TX/PRO/DX INJ NEW DRUG ADDON: CPT

## 2022-03-28 PROCEDURE — 74011000250 HC RX REV CODE- 250: Performed by: NURSE PRACTITIONER

## 2022-03-28 RX ORDER — DEXTROSE MONOHYDRATE 50 MG/ML
25 INJECTION, SOLUTION INTRAVENOUS CONTINUOUS
Status: DISCONTINUED | OUTPATIENT
Start: 2022-03-28 | End: 2022-03-29 | Stop reason: HOSPADM

## 2022-03-28 RX ORDER — ONDANSETRON 2 MG/ML
8 INJECTION INTRAMUSCULAR; INTRAVENOUS ONCE
Status: COMPLETED | OUTPATIENT
Start: 2022-03-28 | End: 2022-03-28

## 2022-03-28 RX ORDER — SODIUM CHLORIDE 0.9 % (FLUSH) 0.9 %
10 SYRINGE (ML) INJECTION AS NEEDED
Status: DISCONTINUED | OUTPATIENT
Start: 2022-03-28 | End: 2022-03-30 | Stop reason: HOSPADM

## 2022-03-28 RX ORDER — HYDROCORTISONE SODIUM SUCCINATE 100 MG/2ML
100 INJECTION, POWDER, FOR SOLUTION INTRAMUSCULAR; INTRAVENOUS AS NEEDED
Status: DISCONTINUED | OUTPATIENT
Start: 2022-03-28 | End: 2022-03-29 | Stop reason: HOSPADM

## 2022-03-28 RX ORDER — SODIUM CHLORIDE 0.9 % (FLUSH) 0.9 %
10 SYRINGE (ML) INJECTION AS NEEDED
Status: DISCONTINUED | OUTPATIENT
Start: 2022-03-28 | End: 2022-03-29 | Stop reason: HOSPADM

## 2022-03-28 RX ORDER — DIPHENHYDRAMINE HYDROCHLORIDE 50 MG/ML
25 INJECTION, SOLUTION INTRAMUSCULAR; INTRAVENOUS AS NEEDED
Status: DISCONTINUED | OUTPATIENT
Start: 2022-03-28 | End: 2022-03-29 | Stop reason: HOSPADM

## 2022-03-28 RX ORDER — FLUOROURACIL 50 MG/ML
400 INJECTION, SOLUTION INTRAVENOUS ONCE
Status: COMPLETED | OUTPATIENT
Start: 2022-03-28 | End: 2022-03-28

## 2022-03-28 RX ADMIN — FLUOROURACIL 640 MG: 50 INJECTION, SOLUTION INTRAVENOUS at 17:25

## 2022-03-28 RX ADMIN — DEXTROSE MONOHYDRATE 25 ML/HR: 50 INJECTION, SOLUTION INTRAVENOUS at 13:45

## 2022-03-28 RX ADMIN — OXALIPLATIN 136 MG: 5 INJECTION, SOLUTION INTRAVENOUS at 15:05

## 2022-03-28 RX ADMIN — SODIUM CHLORIDE, PRESERVATIVE FREE 10 ML: 5 INJECTION INTRAVENOUS at 17:30

## 2022-03-28 RX ADMIN — Medication 10 ML: at 11:34

## 2022-03-28 RX ADMIN — FLUOROURACIL 3840 MG: 50 INJECTION, SOLUTION INTRAVENOUS at 17:30

## 2022-03-28 RX ADMIN — DEXAMETHASONE SODIUM PHOSPHATE 12 MG: 4 INJECTION, SOLUTION INTRAMUSCULAR; INTRAVENOUS at 14:15

## 2022-03-28 RX ADMIN — ONDANSETRON 8 MG: 2 INJECTION INTRAMUSCULAR; INTRAVENOUS at 14:11

## 2022-03-28 RX ADMIN — SODIUM CHLORIDE, PRESERVATIVE FREE 10 ML: 5 INJECTION INTRAVENOUS at 13:45

## 2022-03-28 RX ADMIN — FOSAPREPITANT 150 MG: 150 INJECTION, POWDER, LYOPHILIZED, FOR SOLUTION INTRAVENOUS at 14:35

## 2022-03-28 RX ADMIN — LEUCOVORIN CALCIUM 640 MG: 350 INJECTION, POWDER, LYOPHILIZED, FOR SOLUTION INTRAMUSCULAR; INTRAVENOUS at 15:05

## 2022-03-28 NOTE — ADDENDUM NOTE
Encounter addended by: Silvio Mendoza RPH on: 3/28/2022 1:45 PM   Actions taken: i-Vent created or edited

## 2022-03-28 NOTE — PROGRESS NOTES
3/28/22 saw pt today with [place], NP for pre chemo cycle 3 FOLFOX for colon cancer. She is maintaining weight ok, some minor fluctuation. Oral potassium sent to pharmacy. Nausea controlled with meds. Proceed to infusion. Follow up in 2 weeks. Encouraged to call with any concerns. Navigation will continue to follow.

## 2022-03-28 NOTE — PROGRESS NOTES
Patient tolerated chemotherapy well. No reactions. Fluorouracil in Elastomeric infusion pump connected to port to infuse over 46 hrs. See MAR. Pt states after her last treatment she started feeling like her lips were swelling and had difficulty swallowing. She states her throat feels sore today. Observed pt for 30 min post treatment today. She denies any adverse symptoms. Patient/family instructed to notify [facility] on call number [phone] of any problems, questions or concerns with pump. Patient discharged ambulatory with daughter. Maynor Flores Next appointment is 03/30/22 at [phone] with Rahel. Next OV is 04/11/22 @ 1130.

## 2022-03-30 ENCOUNTER — HOSPITAL ENCOUNTER (OUTPATIENT)
Dept: INFUSION THERAPY | Age: 77
Discharge: HOME | End: 2022-03-30
Payer: MEDICARE

## 2022-03-30 VITALS
RESPIRATION RATE: 18 BRPM | SYSTOLIC BLOOD PRESSURE: 99 MMHG | OXYGEN SATURATION: 98 % | TEMPERATURE: 97.4 F | HEART RATE: 75 BPM | DIASTOLIC BLOOD PRESSURE: 66 MMHG

## 2022-03-30 DIAGNOSIS — C18.2 PRIMARY ADENOCARCINOMA OF ASCENDING COLON (HCC): Primary | ICD-10-CM

## 2022-03-30 PROBLEM — Z09 CHEMOTHERAPY FOLLOW-UP EXAMINATION: Status: RESOLVED | Noted: 2022-02-28 | Resolved: 2022-03-30

## 2022-03-30 PROCEDURE — 96360 HYDRATION IV INFUSION INIT: CPT

## 2022-03-30 PROCEDURE — 74011000250 HC RX REV CODE- 250: Performed by: NURSE PRACTITIONER

## 2022-03-30 PROCEDURE — 74011250636 HC RX REV CODE- 250/636: Performed by: NURSE PRACTITIONER

## 2022-03-30 RX ORDER — SODIUM CHLORIDE 0.9 % (FLUSH) 0.9 %
10 SYRINGE (ML) INJECTION AS NEEDED
Status: DISCONTINUED | OUTPATIENT
Start: 2022-03-30 | End: 2022-03-31 | Stop reason: HOSPADM

## 2022-03-30 RX ORDER — SODIUM CHLORIDE 9 MG/ML
1000 INJECTION, SOLUTION INTRAVENOUS CONTINUOUS
Status: DISCONTINUED | OUTPATIENT
Start: 2022-03-30 | End: 2022-04-01 | Stop reason: HOSPADM

## 2022-03-30 RX ADMIN — SODIUM CHLORIDE, PRESERVATIVE FREE 10 ML: 5 INJECTION INTRAVENOUS at 17:52

## 2022-03-30 RX ADMIN — SODIUM CHLORIDE 1000 ML: 9 INJECTION, SOLUTION INTRAVENOUS at 16:45

## 2022-03-30 NOTE — PROGRESS NOTES
Arrived to the [facility] ambulatory. CI 5fu pump removed and 1lt NS bolus completed. Patient tolerated well. Any issues or concerns during appointment: no.  Patient aware of next infusion appointment on 4/11 at 1330  Patient instructed to call provider with temperature of 100.4 or greater or nausea/vomiting/ diarrhea or pain not controlled by medications  Discharged to home ambulatory with her daughter.

## 2022-04-11 ENCOUNTER — HOSPITAL ENCOUNTER (OUTPATIENT)
Dept: INFUSION THERAPY | Age: 77
Discharge: HOME | End: 2022-04-11
Payer: MEDICARE

## 2022-04-11 DIAGNOSIS — C18.2 PRIMARY ADENOCARCINOMA OF ASCENDING COLON (HCC): ICD-10-CM

## 2022-04-11 DIAGNOSIS — E83.42 HYPOMAGNESEMIA: Primary | ICD-10-CM

## 2022-04-11 LAB
ALBUMIN SERPL-MCNC: 3.1 G/DL (ref 3.2–4.6)
ALBUMIN/GLOB SERPL: 0.9 {RATIO} (ref 1.2–3.5)
ALP SERPL-CCNC: 81 U/L (ref 50–136)
ALT SERPL-CCNC: 40 U/L (ref 12–65)
ANION GAP SERPL CALC-SCNC: 6 MMOL/L (ref 7–16)
AST SERPL-CCNC: 31 U/L (ref 15–37)
BASOPHILS # BLD: 0 K/UL (ref 0–0.2)
BASOPHILS NFR BLD: 1 % (ref 0–2)
BILIRUB SERPL-MCNC: 0.4 MG/DL (ref 0.2–1.1)
BUN SERPL-MCNC: 7 MG/DL (ref 8–23)
CALCIUM SERPL-MCNC: 8.7 MG/DL (ref 8.3–10.4)
CHLORIDE SERPL-SCNC: 107 MMOL/L (ref 98–107)
CO2 SERPL-SCNC: 25 MMOL/L (ref 21–32)
CREAT SERPL-MCNC: 1 MG/DL (ref 0.6–1)
DIFFERENTIAL METHOD BLD: ABNORMAL
EOSINOPHIL # BLD: 0.1 K/UL (ref 0–0.8)
EOSINOPHIL NFR BLD: 3 % (ref 0.5–7.8)
ERYTHROCYTE [DISTWIDTH] IN BLOOD BY AUTOMATED COUNT: 17.4 % (ref 11.9–14.6)
GLOBULIN SER CALC-MCNC: 3.5 G/DL (ref 2.3–3.5)
GLUCOSE SERPL-MCNC: 123 MG/DL (ref 65–100)
HCT VFR BLD AUTO: 32.1 % (ref 35.8–46.3)
HGB BLD-MCNC: 10.8 G/DL (ref 11.7–15.4)
IMM GRANULOCYTES # BLD AUTO: 0 K/UL (ref 0–0.5)
IMM GRANULOCYTES NFR BLD AUTO: 0 % (ref 0–5)
LYMPHOCYTES # BLD: 1.1 K/UL (ref 0.5–4.6)
LYMPHOCYTES NFR BLD: 35 % (ref 13–44)
MAGNESIUM SERPL-MCNC: 1.7 MG/DL (ref 1.8–2.4)
MCH RBC QN AUTO: 28.3 PG (ref 26.1–32.9)
MCHC RBC AUTO-ENTMCNC: 33.6 G/DL (ref 31.4–35)
MCV RBC AUTO: 84.3 FL (ref 79.6–97.8)
MONOCYTES # BLD: 0.6 K/UL (ref 0.1–1.3)
MONOCYTES NFR BLD: 17 % (ref 4–12)
NEUTS SEG # BLD: 1.4 K/UL (ref 1.7–8.2)
NEUTS SEG NFR BLD: 44 % (ref 43–78)
NRBC # BLD: 0 K/UL (ref 0–0.2)
PLATELET # BLD AUTO: 77 K/UL (ref 150–450)
PMV BLD AUTO: 9.9 FL (ref 9.4–12.3)
POTASSIUM SERPL-SCNC: 3.5 MMOL/L (ref 3.5–5.1)
PROT SERPL-MCNC: 6.6 G/DL (ref 6.3–8.2)
RBC # BLD AUTO: 3.81 M/UL (ref 4.05–5.2)
SODIUM SERPL-SCNC: 138 MMOL/L (ref 136–145)
WBC # BLD AUTO: 3.2 K/UL (ref 4.3–11.1)

## 2022-04-11 PROCEDURE — 74011250636 HC RX REV CODE- 250/636: Performed by: INTERNAL MEDICINE

## 2022-04-11 PROCEDURE — 85025 COMPLETE CBC W/AUTO DIFF WBC: CPT

## 2022-04-11 PROCEDURE — 80053 COMPREHEN METABOLIC PANEL: CPT

## 2022-04-11 PROCEDURE — 96365 THER/PROPH/DIAG IV INF INIT: CPT

## 2022-04-11 PROCEDURE — 36591 DRAW BLOOD OFF VENOUS DEVICE: CPT

## 2022-04-11 PROCEDURE — 83735 ASSAY OF MAGNESIUM: CPT

## 2022-04-11 RX ORDER — MAGNESIUM SULFATE HEPTAHYDRATE 40 MG/ML
2 INJECTION, SOLUTION INTRAVENOUS ONCE
Status: COMPLETED | OUTPATIENT
Start: 2022-04-11 | End: 2022-04-11

## 2022-04-11 RX ORDER — SODIUM CHLORIDE 0.9 % (FLUSH) 0.9 %
10 SYRINGE (ML) INJECTION AS NEEDED
Status: DISCONTINUED | OUTPATIENT
Start: 2022-04-11 | End: 2022-04-13 | Stop reason: HOSPADM

## 2022-04-11 RX ADMIN — Medication 10 ML: at 11:43

## 2022-04-11 RX ADMIN — MAGNESIUM SULFATE HEPTAHYDRATE 2 G: 40 INJECTION, SOLUTION INTRAVENOUS at 13:49

## 2022-04-11 RX ADMIN — SODIUM CHLORIDE 500 ML: 900 INJECTION, SOLUTION INTRAVENOUS at 13:45

## 2022-04-11 NOTE — PROGRESS NOTES
Arrived to the infusion center. NS 500cc infused along with 2 gm mag. Issues of feeling tired. Aware of her next appointment on 4/18 at 1430. Concerened with small \"bumps above the port. No redness or tenderness noted. Daughter sent a picture to Dr. Ayaka Caballero by RN to monitor the area and call should any redness appear or pain. Nomotor temp and call for > 100.5. Discharged ambulatory in ssatisfactory condition.

## 2022-04-11 NOTE — PROGRESS NOTES
Patient arrived to port lab for port access and lab draw   [facility] accessed and labs drawn per protocol   *Port remains accessed. Patient discharged from port lab ambulatory. *

## 2022-04-11 NOTE — PROGRESS NOTES
Problem: Knowledge Deficit  Goal: *Verbalizes understanding of procedures and medications  Outcome: Progressing Towards Goal     Problem: Chemotherapy Treatment  Goal: *Chemotherapy regimen followed  Outcome: Progressing Towards Goal  Goal: *Hemodynamically stable  Outcome: Progressing Towards Goal  Goal: *Tolerating diet  Outcome: Progressing Towards Goal

## 2022-04-12 ENCOUNTER — PATIENT OUTREACH (OUTPATIENT)
Dept: CASE MANAGEMENT | Age: 77
End: 2022-04-12

## 2022-04-13 PROBLEM — E55.9 VITAMIN D DEFICIENCY: Status: ACTIVE | Noted: 2022-04-13

## 2022-04-14 ENCOUNTER — HOSPITAL ENCOUNTER (OUTPATIENT)
Dept: LAB | Age: 77
Discharge: HOME | End: 2022-04-14
Payer: MEDICARE

## 2022-04-14 DIAGNOSIS — I25.10 ATHEROSCLEROSIS OF NATIVE CORONARY ARTERY OF NATIVE HEART WITHOUT ANGINA PECTORIS: ICD-10-CM

## 2022-04-14 DIAGNOSIS — E78.2 MIXED HYPERLIPIDEMIA: ICD-10-CM

## 2022-04-14 LAB
CHOLEST SERPL-MCNC: 145 MG/DL
HDLC SERPL-MCNC: 58 MG/DL (ref 40–60)
HDLC SERPL: 2.5 {RATIO}
LDLC SERPL CALC-MCNC: 63.6 MG/DL
TRIGL SERPL-MCNC: 117 MG/DL (ref 35–150)
VLDLC SERPL CALC-MCNC: 23.4 MG/DL (ref 6–23)

## 2022-04-14 PROCEDURE — 80061 LIPID PANEL: CPT

## 2022-04-14 PROCEDURE — 36415 COLL VENOUS BLD VENIPUNCTURE: CPT

## 2022-04-18 ENCOUNTER — HOSPITAL ENCOUNTER (OUTPATIENT)
Dept: INFUSION THERAPY | Age: 77
Discharge: HOME | End: 2022-04-18
Payer: MEDICARE

## 2022-04-18 ENCOUNTER — PATIENT OUTREACH (OUTPATIENT)
Dept: CASE MANAGEMENT | Age: 77
End: 2022-04-18

## 2022-04-18 DIAGNOSIS — C18.2 PRIMARY ADENOCARCINOMA OF ASCENDING COLON (HCC): Primary | ICD-10-CM

## 2022-04-18 DIAGNOSIS — C18.2 PRIMARY ADENOCARCINOMA OF ASCENDING COLON (HCC): ICD-10-CM

## 2022-04-18 LAB
ALBUMIN SERPL-MCNC: 3.2 G/DL (ref 3.2–4.6)
ALBUMIN/GLOB SERPL: 0.8 {RATIO} (ref 1.2–3.5)
ALP SERPL-CCNC: 88 U/L (ref 50–136)
ALT SERPL-CCNC: 32 U/L (ref 12–65)
ANION GAP SERPL CALC-SCNC: 7 MMOL/L (ref 7–16)
AST SERPL-CCNC: 29 U/L (ref 15–37)
BASOPHILS # BLD: 0 K/UL (ref 0–0.2)
BASOPHILS NFR BLD: 1 % (ref 0–2)
BILIRUB SERPL-MCNC: 0.4 MG/DL (ref 0.2–1.1)
BUN SERPL-MCNC: 7 MG/DL (ref 8–23)
CALCIUM SERPL-MCNC: 8.8 MG/DL (ref 8.3–10.4)
CEA SERPL-MCNC: 4.4 NG/ML (ref 0–3)
CHLORIDE SERPL-SCNC: 105 MMOL/L (ref 98–107)
CO2 SERPL-SCNC: 25 MMOL/L (ref 21–32)
CREAT SERPL-MCNC: 0.9 MG/DL (ref 0.6–1)
DIFFERENTIAL METHOD BLD: ABNORMAL
EOSINOPHIL # BLD: 0 K/UL (ref 0–0.8)
EOSINOPHIL NFR BLD: 1 % (ref 0.5–7.8)
ERYTHROCYTE [DISTWIDTH] IN BLOOD BY AUTOMATED COUNT: 17.3 % (ref 11.9–14.6)
GLOBULIN SER CALC-MCNC: 3.9 G/DL (ref 2.3–3.5)
GLUCOSE SERPL-MCNC: 115 MG/DL (ref 65–100)
HCT VFR BLD AUTO: 34.4 % (ref 35.8–46.3)
HGB BLD-MCNC: 11 G/DL (ref 11.7–15.4)
IMM GRANULOCYTES # BLD AUTO: 0.2 K/UL (ref 0–0.5)
IMM GRANULOCYTES NFR BLD AUTO: 6 % (ref 0–5)
LYMPHOCYTES # BLD: 1.2 K/UL (ref 0.5–4.6)
LYMPHOCYTES NFR BLD: 33 % (ref 13–44)
MAGNESIUM SERPL-MCNC: 2.1 MG/DL (ref 1.8–2.4)
MCH RBC QN AUTO: 28.4 PG (ref 26.1–32.9)
MCHC RBC AUTO-ENTMCNC: 32 G/DL (ref 31.4–35)
MCV RBC AUTO: 88.9 FL (ref 79.6–97.8)
MONOCYTES # BLD: 0.7 K/UL (ref 0.1–1.3)
MONOCYTES NFR BLD: 18 % (ref 4–12)
NEUTS SEG # BLD: 1.6 K/UL (ref 1.7–8.2)
NEUTS SEG NFR BLD: 41 % (ref 43–78)
NRBC # BLD: 0 K/UL (ref 0–0.2)
PLATELET # BLD AUTO: 142 K/UL (ref 150–450)
PLATELET COMMENTS,PCOM: ADEQUATE
PMV BLD AUTO: 10.1 FL (ref 9.4–12.3)
POTASSIUM SERPL-SCNC: 3.7 MMOL/L (ref 3.5–5.1)
PROT SERPL-MCNC: 7.1 G/DL (ref 6.3–8.2)
RBC # BLD AUTO: 3.87 M/UL (ref 4.05–5.2)
RBC MORPH BLD: ABNORMAL
SODIUM SERPL-SCNC: 137 MMOL/L (ref 136–145)
WBC # BLD AUTO: 3.7 K/UL (ref 4.3–11.1)
WBC MORPH BLD: ABNORMAL

## 2022-04-18 PROCEDURE — 80053 COMPREHEN METABOLIC PANEL: CPT

## 2022-04-18 PROCEDURE — 74011000258 HC RX REV CODE- 258: Performed by: NURSE PRACTITIONER

## 2022-04-18 PROCEDURE — 85025 COMPLETE CBC W/AUTO DIFF WBC: CPT

## 2022-04-18 PROCEDURE — 96413 CHEMO IV INFUSION 1 HR: CPT

## 2022-04-18 PROCEDURE — 96523 IRRIG DRUG DELIVERY DEVICE: CPT

## 2022-04-18 PROCEDURE — 74011000250 HC RX REV CODE- 250: Performed by: NURSE PRACTITIONER

## 2022-04-18 PROCEDURE — 36415 COLL VENOUS BLD VENIPUNCTURE: CPT

## 2022-04-18 PROCEDURE — 96375 TX/PRO/DX INJ NEW DRUG ADDON: CPT

## 2022-04-18 PROCEDURE — 96368 THER/DIAG CONCURRENT INF: CPT

## 2022-04-18 PROCEDURE — 83735 ASSAY OF MAGNESIUM: CPT

## 2022-04-18 PROCEDURE — 96411 CHEMO IV PUSH ADDL DRUG: CPT

## 2022-04-18 PROCEDURE — 74011250636 HC RX REV CODE- 250/636: Performed by: NURSE PRACTITIONER

## 2022-04-18 PROCEDURE — 96415 CHEMO IV INFUSION ADDL HR: CPT

## 2022-04-18 PROCEDURE — 96367 TX/PROPH/DG ADDL SEQ IV INF: CPT

## 2022-04-18 PROCEDURE — G0498 CHEMO EXTEND IV INFUS W/PUMP: HCPCS

## 2022-04-18 PROCEDURE — 82378 CARCINOEMBRYONIC ANTIGEN: CPT

## 2022-04-18 RX ORDER — ONDANSETRON 2 MG/ML
8 INJECTION INTRAMUSCULAR; INTRAVENOUS ONCE
Status: COMPLETED | OUTPATIENT
Start: 2022-04-18 | End: 2022-04-18

## 2022-04-18 RX ORDER — SODIUM CHLORIDE 0.9 % (FLUSH) 0.9 %
10 SYRINGE (ML) INJECTION AS NEEDED
Status: DISCONTINUED | OUTPATIENT
Start: 2022-04-18 | End: 2022-04-20 | Stop reason: HOSPADM

## 2022-04-18 RX ORDER — FLUOROURACIL 50 MG/ML
400 INJECTION, SOLUTION INTRAVENOUS ONCE
Status: COMPLETED | OUTPATIENT
Start: 2022-04-18 | End: 2022-04-18

## 2022-04-18 RX ORDER — DEXTROSE MONOHYDRATE 50 MG/ML
25 INJECTION, SOLUTION INTRAVENOUS CONTINUOUS
Status: DISCONTINUED | OUTPATIENT
Start: 2022-04-18 | End: 2022-04-19 | Stop reason: HOSPADM

## 2022-04-18 RX ORDER — SODIUM CHLORIDE 0.9 % (FLUSH) 0.9 %
10 SYRINGE (ML) INJECTION AS NEEDED
Status: DISCONTINUED | OUTPATIENT
Start: 2022-04-18 | End: 2022-04-19 | Stop reason: HOSPADM

## 2022-04-18 RX ADMIN — DEXAMETHASONE SODIUM PHOSPHATE 12 MG: 4 INJECTION, SOLUTION INTRAMUSCULAR; INTRAVENOUS at 15:17

## 2022-04-18 RX ADMIN — ONDANSETRON 8 MG: 2 INJECTION INTRAMUSCULAR; INTRAVENOUS at 15:14

## 2022-04-18 RX ADMIN — DEXTROSE MONOHYDRATE 25 ML/HR: 5 INJECTION, SOLUTION INTRAVENOUS at 15:10

## 2022-04-18 RX ADMIN — FOSAPREPITANT 150 MG: 150 INJECTION, POWDER, LYOPHILIZED, FOR SOLUTION INTRAVENOUS at 15:35

## 2022-04-18 RX ADMIN — FLUOROURACIL 3840 MG: 50 INJECTION, SOLUTION INTRAVENOUS at 18:14

## 2022-04-18 RX ADMIN — LEUCOVORIN CALCIUM 640 MG: 350 INJECTION, POWDER, LYOPHILIZED, FOR SOLUTION INTRAMUSCULAR; INTRAVENOUS at 16:00

## 2022-04-18 RX ADMIN — SODIUM CHLORIDE, PRESERVATIVE FREE 10 ML: 5 INJECTION INTRAVENOUS at 15:10

## 2022-04-18 RX ADMIN — FLUOROURACIL 640 MG: 50 INJECTION, SOLUTION INTRAVENOUS at 18:11

## 2022-04-18 RX ADMIN — OXALIPLATIN 136 MG: 5 INJECTION, SOLUTION INTRAVENOUS at 16:00

## 2022-04-18 RX ADMIN — Medication 10 ML: at 13:24

## 2022-04-18 NOTE — PROGRESS NOTES
Arrived to the [facility]. FOLFOX completed. Patient tolerated well. Any issues or concerns during appointment: none. Patient aware of next infusion appointment on 4/20 (date) at 4:30 PM (time). Patient instructed to call provider with temperature of 100.4 or greater or nausea/vomiting/ diarrhea or pain not controlled by medications  Discharged ambulatory.

## 2022-04-18 NOTE — PROGRESS NOTES
Saw patient with Kathleen Carlson and nutrition for consideration of C4 FOLFOX (dose reduced oxaliplatin 80% upfront). Held last week d/t borderline TCP. Labs are acceptable today. Will proceed with treatment. Patient states her cold sensitivity is better, diarrhea is better. She has diarrhea for the first week following treatment. She uses Imodium to control it. She is still experiencing difficulty with tastes and appetite. Denies fever, chills. Pt c/o mucous and drainage. She is taking Chlor-trimeton ([facility] OTC antihistamine). RTC on 05/02. Encouraged to call with questions. Navigation will continue to follow.

## 2022-04-18 NOTE — PROGRESS NOTES
Patient arrived to port lab for port access and lab draw   Jermaine Jo accessed and labs drawn per protocol   *Port remains accessed   Patient discharged from port lab ambulatory*

## 2022-04-20 ENCOUNTER — HOSPITAL ENCOUNTER (OUTPATIENT)
Dept: INFUSION THERAPY | Age: 77
Discharge: HOME | End: 2022-04-20
Payer: MEDICARE

## 2022-04-20 VITALS
RESPIRATION RATE: 20 BRPM | HEART RATE: 89 BPM | SYSTOLIC BLOOD PRESSURE: 113 MMHG | OXYGEN SATURATION: 98 % | TEMPERATURE: 97.7 F | DIASTOLIC BLOOD PRESSURE: 58 MMHG

## 2022-04-20 DIAGNOSIS — E83.42 HYPOMAGNESEMIA: Primary | ICD-10-CM

## 2022-04-20 DIAGNOSIS — C18.2 PRIMARY ADENOCARCINOMA OF ASCENDING COLON (HCC): ICD-10-CM

## 2022-04-20 PROCEDURE — 96360 HYDRATION IV INFUSION INIT: CPT

## 2022-04-20 PROCEDURE — 74011250636 HC RX REV CODE- 250/636: Performed by: NURSE PRACTITIONER

## 2022-04-20 PROCEDURE — 74011000250 HC RX REV CODE- 250: Performed by: NURSE PRACTITIONER

## 2022-04-20 RX ORDER — SODIUM CHLORIDE 0.9 % (FLUSH) 0.9 %
10 SYRINGE (ML) INJECTION AS NEEDED
Status: DISCONTINUED | OUTPATIENT
Start: 2022-04-20 | End: 2022-04-21 | Stop reason: HOSPADM

## 2022-04-20 RX ORDER — SODIUM CHLORIDE 9 MG/ML
1000 INJECTION, SOLUTION INTRAVENOUS CONTINUOUS
Status: DISCONTINUED | OUTPATIENT
Start: 2022-04-20 | End: 2022-04-22 | Stop reason: HOSPADM

## 2022-04-20 RX ADMIN — SODIUM CHLORIDE, PRESERVATIVE FREE 10 ML: 5 INJECTION INTRAVENOUS at 16:54

## 2022-04-20 RX ADMIN — SODIUM CHLORIDE 1000 ML: 9 INJECTION, SOLUTION INTRAVENOUS at 16:56

## 2022-04-20 RX ADMIN — SODIUM CHLORIDE, PRESERVATIVE FREE 10 ML: 5 INJECTION INTRAVENOUS at 18:00

## 2022-05-02 ENCOUNTER — HOSPITAL ENCOUNTER (OUTPATIENT)
Dept: INFUSION THERAPY | Age: 77
Discharge: HOME | End: 2022-05-02
Payer: MEDICARE

## 2022-05-02 ENCOUNTER — PATIENT OUTREACH (OUTPATIENT)
Dept: CASE MANAGEMENT | Age: 77
End: 2022-05-02

## 2022-05-02 ENCOUNTER — HOSPITAL ENCOUNTER (OUTPATIENT)
Dept: INFUSION THERAPY | Age: 77
Discharge: HOME | End: 2022-05-02

## 2022-05-02 DIAGNOSIS — C18.9 MALIGNANT NEOPLASM OF COLON, UNSPECIFIED PART OF COLON (HCC): ICD-10-CM

## 2022-05-02 LAB
ALBUMIN SERPL-MCNC: 3 G/DL (ref 3.2–4.6)
ALBUMIN/GLOB SERPL: 0.8 {RATIO} (ref 1.2–3.5)
ALP SERPL-CCNC: 83 U/L (ref 50–136)
ALT SERPL-CCNC: 36 U/L (ref 12–65)
ANION GAP SERPL CALC-SCNC: 8 MMOL/L (ref 7–16)
AST SERPL-CCNC: 30 U/L (ref 15–37)
BASOPHILS # BLD: 0 K/UL (ref 0–0.2)
BASOPHILS NFR BLD: 0 % (ref 0–2)
BILIRUB SERPL-MCNC: 0.4 MG/DL (ref 0.2–1.1)
BUN SERPL-MCNC: 7 MG/DL (ref 8–23)
CALCIUM SERPL-MCNC: 8.8 MG/DL (ref 8.3–10.4)
CEA SERPL-MCNC: 3.3 NG/ML (ref 0–3)
CHLORIDE SERPL-SCNC: 105 MMOL/L (ref 98–107)
CO2 SERPL-SCNC: 27 MMOL/L (ref 21–32)
CREAT SERPL-MCNC: 0.8 MG/DL (ref 0.6–1)
DIFFERENTIAL METHOD BLD: ABNORMAL
EOSINOPHIL # BLD: 0.1 K/UL (ref 0–0.8)
EOSINOPHIL NFR BLD: 6 % (ref 0.5–7.8)
ERYTHROCYTE [DISTWIDTH] IN BLOOD BY AUTOMATED COUNT: 17.1 % (ref 11.9–14.6)
GLOBULIN SER CALC-MCNC: 3.7 G/DL (ref 2.3–3.5)
GLUCOSE SERPL-MCNC: 151 MG/DL (ref 65–100)
HCT VFR BLD AUTO: 29.4 % (ref 35.8–46.3)
HGB BLD-MCNC: 9.6 G/DL (ref 11.7–15.4)
IMM GRANULOCYTES # BLD AUTO: 0 K/UL (ref 0–0.5)
IMM GRANULOCYTES NFR BLD AUTO: 0 % (ref 0–5)
LYMPHOCYTES # BLD: 0.7 K/UL (ref 0.5–4.6)
LYMPHOCYTES NFR BLD: 50 % (ref 13–44)
MAGNESIUM SERPL-MCNC: 2 MG/DL (ref 1.8–2.4)
MCH RBC QN AUTO: 28.7 PG (ref 26.1–32.9)
MCHC RBC AUTO-ENTMCNC: 32.7 G/DL (ref 31.4–35)
MCV RBC AUTO: 87.8 FL (ref 79.6–97.8)
MONOCYTES # BLD: 0.2 K/UL (ref 0.1–1.3)
MONOCYTES NFR BLD: 11 % (ref 4–12)
NEUTS SEG # BLD: 0.4 K/UL (ref 1.7–8.2)
NEUTS SEG NFR BLD: 33 % (ref 43–78)
NRBC # BLD: 0 K/UL (ref 0–0.2)
PLATELET # BLD AUTO: 87 K/UL (ref 150–450)
PMV BLD AUTO: 11 FL (ref 9.4–12.3)
POTASSIUM SERPL-SCNC: 3.3 MMOL/L (ref 3.5–5.1)
PROT SERPL-MCNC: 6.7 G/DL (ref 6.3–8.2)
RBC # BLD AUTO: 3.35 M/UL (ref 4.05–5.2)
SODIUM SERPL-SCNC: 140 MMOL/L (ref 136–145)
WBC # BLD AUTO: 1.3 K/UL (ref 4.3–11.1)

## 2022-05-02 PROCEDURE — 83735 ASSAY OF MAGNESIUM: CPT

## 2022-05-02 PROCEDURE — 36415 COLL VENOUS BLD VENIPUNCTURE: CPT

## 2022-05-02 PROCEDURE — 82378 CARCINOEMBRYONIC ANTIGEN: CPT

## 2022-05-02 PROCEDURE — 96523 IRRIG DRUG DELIVERY DEVICE: CPT

## 2022-05-02 PROCEDURE — 85025 COMPLETE CBC W/AUTO DIFF WBC: CPT

## 2022-05-02 PROCEDURE — 80053 COMPREHEN METABOLIC PANEL: CPT

## 2022-05-02 RX ORDER — SODIUM CHLORIDE 0.9 % (FLUSH) 0.9 %
10 SYRINGE (ML) INJECTION AS NEEDED
Status: DISCONTINUED | OUTPATIENT
Start: 2022-05-02 | End: 2022-05-04 | Stop reason: HOSPADM

## 2022-05-02 RX ADMIN — Medication 10 ML: at 09:35

## 2022-05-02 NOTE — PROGRESS NOTES
I saw patient today with Kathleen Carlson prior to C5 Folfox. We will hold for one week due to patient [address] [facility] 400. Pt encouraged to call with any temps>100.4. She states she is not getting in the public while she is currently taking chemo.  Navigation will be following

## 2022-05-02 NOTE — PROGRESS NOTES
Patient arrived to port lab for port access and lab draw   [address] remains accessed for infusion, however the port was unable to draw lab work today. Peripheral stick for labs. Patient discharged from port lab ambulatory.

## 2022-05-05 DIAGNOSIS — E83.42 HYPOMAGNESEMIA: ICD-10-CM

## 2022-05-05 DIAGNOSIS — E78.2 MIXED HYPERLIPIDEMIA: Primary | ICD-10-CM

## 2022-05-05 DIAGNOSIS — E55.9 VITAMIN D DEFICIENCY: ICD-10-CM

## 2022-05-09 ENCOUNTER — HOSPITAL ENCOUNTER (OUTPATIENT)
Dept: INFUSION THERAPY | Age: 77
Discharge: HOME | End: 2022-05-09
Payer: MEDICARE

## 2022-05-09 ENCOUNTER — HOSPITAL ENCOUNTER (OUTPATIENT)
Dept: INFUSION THERAPY | Age: 77
Discharge: HOME | End: 2022-05-09

## 2022-05-09 DIAGNOSIS — C18.2 PRIMARY ADENOCARCINOMA OF ASCENDING COLON (HCC): ICD-10-CM

## 2022-05-09 LAB
ALBUMIN SERPL-MCNC: 3 G/DL (ref 3.2–4.6)
ALBUMIN/GLOB SERPL: 0.8 {RATIO} (ref 1.2–3.5)
ALP SERPL-CCNC: 107 U/L (ref 50–136)
ALT SERPL-CCNC: 59 U/L (ref 12–65)
ANION GAP SERPL CALC-SCNC: 5 MMOL/L (ref 7–16)
AST SERPL-CCNC: 41 U/L (ref 15–37)
BASOPHILS # BLD: 0 K/UL (ref 0–0.2)
BASOPHILS NFR BLD: 1 % (ref 0–2)
BILIRUB SERPL-MCNC: 0.3 MG/DL (ref 0.2–1.1)
BUN SERPL-MCNC: 10 MG/DL (ref 8–23)
CALCIUM SERPL-MCNC: 8.8 MG/DL (ref 8.3–10.4)
CEA SERPL-MCNC: 3.1 NG/ML (ref 0–3)
CHLORIDE SERPL-SCNC: 109 MMOL/L (ref 98–107)
CO2 SERPL-SCNC: 25 MMOL/L (ref 21–32)
CREAT SERPL-MCNC: 0.9 MG/DL (ref 0.6–1)
DIFFERENTIAL METHOD BLD: ABNORMAL
EOSINOPHIL # BLD: 0 K/UL (ref 0–0.8)
EOSINOPHIL NFR BLD: 1 % (ref 0.5–7.8)
ERYTHROCYTE [DISTWIDTH] IN BLOOD BY AUTOMATED COUNT: 18.8 % (ref 11.9–14.6)
GLOBULIN SER CALC-MCNC: 3.8 G/DL (ref 2.3–3.5)
GLUCOSE SERPL-MCNC: 169 MG/DL (ref 65–100)
HCT VFR BLD AUTO: 31.5 % (ref 35.8–46.3)
HGB BLD-MCNC: 10 G/DL (ref 11.7–15.4)
IMM GRANULOCYTES # BLD AUTO: 0.3 K/UL (ref 0–0.5)
IMM GRANULOCYTES NFR BLD AUTO: 9 % (ref 0–5)
LYMPHOCYTES # BLD: 1.2 K/UL (ref 0.5–4.6)
LYMPHOCYTES NFR BLD: 40 % (ref 13–44)
MCH RBC QN AUTO: 28.7 PG (ref 26.1–32.9)
MCHC RBC AUTO-ENTMCNC: 31.7 G/DL (ref 31.4–35)
MCV RBC AUTO: 90.5 FL (ref 79.6–97.8)
MONOCYTES # BLD: 0.6 K/UL (ref 0.1–1.3)
MONOCYTES NFR BLD: 20 % (ref 4–12)
NEUTS SEG # BLD: 0.9 K/UL (ref 1.7–8.2)
NEUTS SEG NFR BLD: 29 % (ref 43–78)
NRBC # BLD: 0.02 K/UL (ref 0–0.2)
PLATELET # BLD AUTO: 276 K/UL (ref 150–450)
PLATELET COMMENTS,PCOM: ADEQUATE
PMV BLD AUTO: 10 FL (ref 9.4–12.3)
POTASSIUM SERPL-SCNC: 3.7 MMOL/L (ref 3.5–5.1)
PROT SERPL-MCNC: 6.8 G/DL (ref 6.3–8.2)
RBC # BLD AUTO: 3.48 M/UL (ref 4.05–5.2)
RBC MORPH BLD: ABNORMAL
SODIUM SERPL-SCNC: 139 MMOL/L (ref 136–145)
WBC # BLD AUTO: 3 K/UL (ref 4.3–11.1)
WBC MORPH BLD: ABNORMAL

## 2022-05-09 PROCEDURE — 85025 COMPLETE CBC W/AUTO DIFF WBC: CPT

## 2022-05-09 PROCEDURE — 80053 COMPREHEN METABOLIC PANEL: CPT

## 2022-05-09 PROCEDURE — 36591 DRAW BLOOD OFF VENOUS DEVICE: CPT

## 2022-05-09 PROCEDURE — 82378 CARCINOEMBRYONIC ANTIGEN: CPT

## 2022-05-09 RX ORDER — SODIUM CHLORIDE 0.9 % (FLUSH) 0.9 %
10 SYRINGE (ML) INJECTION AS NEEDED
Status: DISCONTINUED | OUTPATIENT
Start: 2022-05-09 | End: 2022-05-11 | Stop reason: HOSPADM

## 2022-05-09 RX ADMIN — Medication 10 ML: at 11:15

## 2022-05-10 ENCOUNTER — PATIENT OUTREACH (OUTPATIENT)
Dept: CASE MANAGEMENT | Age: 77
End: 2022-05-10

## 2022-05-10 DIAGNOSIS — C18.2 PRIMARY ADENOCARCINOMA OF ASCENDING COLON (HCC): Primary | ICD-10-CM

## 2022-05-10 NOTE — PROGRESS NOTES
I saw patient on 5-9-22 with Gurpreet Nixon prior to C5 Folfox. We will hold chemo today due to low . Pt will return in one week with labs/chemo only. She will then return in 3 weeks to see Dr Marquis Terry.  Pt with fluids on pump day off and D9 at patient's request.

## 2022-05-16 ENCOUNTER — HOSPITAL ENCOUNTER (OUTPATIENT)
Dept: INFUSION THERAPY | Age: 77
Discharge: HOME | End: 2022-05-16
Payer: MEDICARE

## 2022-05-16 VITALS
DIASTOLIC BLOOD PRESSURE: 67 MMHG | WEIGHT: 120.4 LBS | TEMPERATURE: 97.8 F | SYSTOLIC BLOOD PRESSURE: 136 MMHG | OXYGEN SATURATION: 99 % | BODY MASS INDEX: 22.02 KG/M2 | HEART RATE: 92 BPM | RESPIRATION RATE: 18 BRPM

## 2022-05-16 DIAGNOSIS — E83.42 HYPOMAGNESEMIA: Primary | ICD-10-CM

## 2022-05-16 DIAGNOSIS — C18.2 PRIMARY ADENOCARCINOMA OF ASCENDING COLON (HCC): ICD-10-CM

## 2022-05-16 LAB
ALBUMIN SERPL-MCNC: 3.2 G/DL (ref 3.2–4.6)
ALBUMIN/GLOB SERPL: 1 {RATIO} (ref 1.2–3.5)
ALP SERPL-CCNC: 111 U/L (ref 50–136)
ALT SERPL-CCNC: 53 U/L (ref 12–65)
ANION GAP SERPL CALC-SCNC: 8 MMOL/L (ref 7–16)
AST SERPL-CCNC: 39 U/L (ref 15–37)
BASOPHILS # BLD: 0 K/UL (ref 0–0.2)
BASOPHILS NFR BLD: 1 % (ref 0–2)
BILIRUB SERPL-MCNC: 0.3 MG/DL (ref 0.2–1.1)
BUN SERPL-MCNC: 12 MG/DL (ref 8–23)
CALCIUM SERPL-MCNC: 8.9 MG/DL (ref 8.3–10.4)
CHLORIDE SERPL-SCNC: 110 MMOL/L (ref 98–107)
CO2 SERPL-SCNC: 22 MMOL/L (ref 21–32)
CREAT SERPL-MCNC: 0.8 MG/DL (ref 0.6–1)
DIFFERENTIAL METHOD BLD: ABNORMAL
EOSINOPHIL # BLD: 0 K/UL (ref 0–0.8)
EOSINOPHIL NFR BLD: 1 % (ref 0.5–7.8)
ERYTHROCYTE [DISTWIDTH] IN BLOOD BY AUTOMATED COUNT: 19.2 % (ref 11.9–14.6)
GLOBULIN SER CALC-MCNC: 3.3 G/DL (ref 2.3–3.5)
GLUCOSE SERPL-MCNC: 144 MG/DL (ref 65–100)
HCT VFR BLD AUTO: 32.5 % (ref 35.8–46.3)
HGB BLD-MCNC: 10.3 G/DL (ref 11.7–15.4)
IMM GRANULOCYTES # BLD AUTO: 0.1 K/UL (ref 0–0.5)
IMM GRANULOCYTES NFR BLD AUTO: 4 % (ref 0–5)
LYMPHOCYTES # BLD: 1.1 K/UL (ref 0.5–4.6)
LYMPHOCYTES NFR BLD: 31 % (ref 13–44)
MCH RBC QN AUTO: 29.7 PG (ref 26.1–32.9)
MCHC RBC AUTO-ENTMCNC: 31.7 G/DL (ref 31.4–35)
MCV RBC AUTO: 93.7 FL (ref 79.6–97.8)
MONOCYTES # BLD: 0.5 K/UL (ref 0.1–1.3)
MONOCYTES NFR BLD: 14 % (ref 4–12)
NEUTS SEG # BLD: 1.8 K/UL (ref 1.7–8.2)
NEUTS SEG NFR BLD: 50 % (ref 43–78)
NRBC # BLD: 0 K/UL (ref 0–0.2)
PLATELET # BLD AUTO: 183 K/UL (ref 150–450)
PMV BLD AUTO: 10.2 FL (ref 9.4–12.3)
POTASSIUM SERPL-SCNC: 3.7 MMOL/L (ref 3.5–5.1)
PROT SERPL-MCNC: 6.5 G/DL (ref 6.3–8.2)
RBC # BLD AUTO: 3.47 M/UL (ref 4.05–5.2)
SODIUM SERPL-SCNC: 140 MMOL/L (ref 136–145)
WBC # BLD AUTO: 3.6 K/UL (ref 4.3–11.1)

## 2022-05-16 PROCEDURE — 74011250636 HC RX REV CODE- 250/636: Performed by: NURSE PRACTITIONER

## 2022-05-16 PROCEDURE — 96368 THER/DIAG CONCURRENT INF: CPT

## 2022-05-16 PROCEDURE — 80053 COMPREHEN METABOLIC PANEL: CPT

## 2022-05-16 PROCEDURE — 96415 CHEMO IV INFUSION ADDL HR: CPT

## 2022-05-16 PROCEDURE — 85025 COMPLETE CBC W/AUTO DIFF WBC: CPT

## 2022-05-16 PROCEDURE — 74011000250 HC RX REV CODE- 250: Performed by: NURSE PRACTITIONER

## 2022-05-16 PROCEDURE — 96367 TX/PROPH/DG ADDL SEQ IV INF: CPT

## 2022-05-16 PROCEDURE — 96411 CHEMO IV PUSH ADDL DRUG: CPT

## 2022-05-16 PROCEDURE — 74011000258 HC RX REV CODE- 258: Performed by: NURSE PRACTITIONER

## 2022-05-16 PROCEDURE — 96413 CHEMO IV INFUSION 1 HR: CPT

## 2022-05-16 PROCEDURE — 96375 TX/PRO/DX INJ NEW DRUG ADDON: CPT

## 2022-05-16 PROCEDURE — G0498 CHEMO EXTEND IV INFUS W/PUMP: HCPCS

## 2022-05-16 RX ORDER — HEPARIN 100 UNIT/ML
300-500 SYRINGE INTRAVENOUS AS NEEDED
Status: CANCELLED
Start: 2022-05-18

## 2022-05-16 RX ORDER — HYDROCORTISONE SODIUM SUCCINATE 100 MG/2ML
100 INJECTION, POWDER, FOR SOLUTION INTRAMUSCULAR; INTRAVENOUS AS NEEDED
Status: CANCELLED | OUTPATIENT
Start: 2022-05-16

## 2022-05-16 RX ORDER — SODIUM CHLORIDE 9 MG/ML
10 INJECTION INTRAMUSCULAR; INTRAVENOUS; SUBCUTANEOUS AS NEEDED
Status: CANCELLED | OUTPATIENT
Start: 2022-05-16

## 2022-05-16 RX ORDER — DEXTROSE MONOHYDRATE 50 MG/ML
25 INJECTION, SOLUTION INTRAVENOUS CONTINUOUS
Status: ACTIVE | OUTPATIENT
Start: 2022-05-16 | End: 2022-05-16

## 2022-05-16 RX ORDER — ONDANSETRON 2 MG/ML
8 INJECTION INTRAMUSCULAR; INTRAVENOUS AS NEEDED
Status: CANCELLED | OUTPATIENT
Start: 2022-05-16

## 2022-05-16 RX ORDER — SODIUM CHLORIDE 0.9 % (FLUSH) 0.9 %
10 SYRINGE (ML) INJECTION AS NEEDED
Status: ACTIVE | OUTPATIENT
Start: 2022-05-16 | End: 2022-05-16

## 2022-05-16 RX ORDER — SODIUM CHLORIDE 9 MG/ML
1000 INJECTION, SOLUTION INTRAVENOUS CONTINUOUS
Status: CANCELLED
Start: 2022-05-18

## 2022-05-16 RX ORDER — ONDANSETRON 2 MG/ML
8 INJECTION INTRAMUSCULAR; INTRAVENOUS ONCE
Status: COMPLETED | OUTPATIENT
Start: 2022-05-16 | End: 2022-05-16

## 2022-05-16 RX ORDER — DIPHENHYDRAMINE HYDROCHLORIDE 50 MG/ML
25 INJECTION, SOLUTION INTRAMUSCULAR; INTRAVENOUS AS NEEDED
Status: CANCELLED
Start: 2022-05-16

## 2022-05-16 RX ORDER — EPINEPHRINE 1 MG/ML
0.3 INJECTION, SOLUTION, CONCENTRATE INTRAVENOUS AS NEEDED
Status: CANCELLED | OUTPATIENT
Start: 2022-05-16

## 2022-05-16 RX ORDER — SODIUM CHLORIDE 9 MG/ML
10 INJECTION INTRAMUSCULAR; INTRAVENOUS; SUBCUTANEOUS AS NEEDED
Status: CANCELLED | OUTPATIENT
Start: 2022-05-18

## 2022-05-16 RX ORDER — DIPHENHYDRAMINE HYDROCHLORIDE 50 MG/ML
50 INJECTION, SOLUTION INTRAMUSCULAR; INTRAVENOUS AS NEEDED
Status: CANCELLED
Start: 2022-05-16

## 2022-05-16 RX ORDER — SODIUM CHLORIDE 9 MG/ML
1000 INJECTION, SOLUTION INTRAVENOUS CONTINUOUS
Start: 2022-05-24

## 2022-05-16 RX ORDER — ALBUTEROL SULFATE 0.83 MG/ML
2.5 SOLUTION RESPIRATORY (INHALATION) AS NEEDED
Status: CANCELLED
Start: 2022-05-16

## 2022-05-16 RX ORDER — ACETAMINOPHEN 325 MG/1
650 TABLET ORAL AS NEEDED
Status: CANCELLED
Start: 2022-05-16

## 2022-05-16 RX ORDER — SODIUM CHLORIDE 0.9 % (FLUSH) 0.9 %
10 SYRINGE (ML) INJECTION AS NEEDED
Status: CANCELLED | OUTPATIENT
Start: 2022-05-18

## 2022-05-16 RX ORDER — HEPARIN 100 UNIT/ML
300-500 SYRINGE INTRAVENOUS AS NEEDED
Status: CANCELLED
Start: 2022-05-16

## 2022-05-16 RX ORDER — FLUOROURACIL 50 MG/ML
400 INJECTION, SOLUTION INTRAVENOUS ONCE
Status: COMPLETED | OUTPATIENT
Start: 2022-05-16 | End: 2022-05-16

## 2022-05-16 RX ADMIN — FOSAPREPITANT 150 MG: 150 INJECTION, POWDER, LYOPHILIZED, FOR SOLUTION INTRAVENOUS at 12:15

## 2022-05-16 RX ADMIN — LEUCOVORIN CALCIUM 640 MG: 500 INJECTION, POWDER, LYOPHILIZED, FOR SOLUTION INTRAMUSCULAR; INTRAVENOUS at 12:45

## 2022-05-16 RX ADMIN — SODIUM CHLORIDE, PRESERVATIVE FREE 10 ML: 5 INJECTION INTRAVENOUS at 08:15

## 2022-05-16 RX ADMIN — FLUOROURACIL 3840 MG: 50 INJECTION, SOLUTION INTRAVENOUS at 14:55

## 2022-05-16 RX ADMIN — ONDANSETRON 8 MG: 2 INJECTION INTRAMUSCULAR; INTRAVENOUS at 11:52

## 2022-05-16 RX ADMIN — FLUOROURACIL 640 MG: 50 INJECTION, SOLUTION INTRAVENOUS at 14:49

## 2022-05-16 RX ADMIN — DEXTROSE MONOHYDRATE 25 ML/HR: 5 INJECTION, SOLUTION INTRAVENOUS at 09:29

## 2022-05-16 RX ADMIN — DEXAMETHASONE SODIUM PHOSPHATE 12 MG: 4 INJECTION, SOLUTION INTRAMUSCULAR; INTRAVENOUS at 11:55

## 2022-05-16 RX ADMIN — OXALIPLATIN 109 MG: 5 INJECTION, SOLUTION INTRAVENOUS at 12:45

## 2022-05-16 NOTE — PROGRESS NOTES
5/16/2022  Pt ambulatory to Infusion without complaints. Delayed start due to computers off-line, pt aware. Labs drawn/reviewed and pt received treatment per order, tolerated well. Adrucil pump attached with clamps open. Aware of next Infusion appt on [email or web address]. Patient instructed to call provider with temperature of 100.4 or greater, nausea/vomiting/diarrhea/pain not controlled by medications, or any other issues/concerns. Discharged home with family without complaints.

## 2022-05-18 ENCOUNTER — HOSPITAL ENCOUNTER (OUTPATIENT)
Dept: INFUSION THERAPY | Age: 77
Discharge: HOME | End: 2022-05-18
Payer: MEDICARE

## 2022-05-18 VITALS
DIASTOLIC BLOOD PRESSURE: 67 MMHG | RESPIRATION RATE: 16 BRPM | SYSTOLIC BLOOD PRESSURE: 105 MMHG | HEART RATE: 75 BPM | TEMPERATURE: 97.4 F | OXYGEN SATURATION: 98 %

## 2022-05-18 DIAGNOSIS — C18.2 PRIMARY ADENOCARCINOMA OF ASCENDING COLON (HCC): Primary | ICD-10-CM

## 2022-05-18 PROCEDURE — 96360 HYDRATION IV INFUSION INIT: CPT

## 2022-05-18 PROCEDURE — 74011250636 HC RX REV CODE- 250/636: Performed by: NURSE PRACTITIONER

## 2022-05-18 PROCEDURE — 74011000250 HC RX REV CODE- 250: Performed by: NURSE PRACTITIONER

## 2022-05-18 RX ORDER — SODIUM CHLORIDE 9 MG/ML
1000 INJECTION, SOLUTION INTRAVENOUS CONTINUOUS
Status: DISCONTINUED | OUTPATIENT
Start: 2022-05-18 | End: 2022-05-20 | Stop reason: HOSPADM

## 2022-05-18 RX ORDER — SODIUM CHLORIDE 0.9 % (FLUSH) 0.9 %
10 SYRINGE (ML) INJECTION AS NEEDED
Status: DISCONTINUED | OUTPATIENT
Start: 2022-05-18 | End: 2022-05-19 | Stop reason: HOSPADM

## 2022-05-18 RX ADMIN — SODIUM CHLORIDE, PRESERVATIVE FREE 10 ML: 5 INJECTION INTRAVENOUS at 16:30

## 2022-05-18 RX ADMIN — SODIUM CHLORIDE 1000 ML: 9 INJECTION, SOLUTION INTRAVENOUS at 15:30

## 2022-05-18 RX ADMIN — SODIUM CHLORIDE, PRESERVATIVE FREE 10 ML: 5 INJECTION INTRAVENOUS at 15:30

## 2022-05-18 NOTE — PROGRESS NOTES
Arrived to the [facility]. Assessment continuous chemotherapy pump and hydration completed. Patient tolerated well. Any issues or concerns during appointment: No.  Patient instructed to call provider with temperature of 100.4 or greater or nausea/vomiting/ diarrhea or pain not controlled by medications  Discharged ambulatory.

## 2022-05-20 RX ORDER — 0.9 % SODIUM CHLORIDE 0.9 %
1000 INTRAVENOUS SOLUTION INTRAVENOUS ONCE
Status: CANCELLED
Start: 2022-05-24

## 2022-05-23 ENCOUNTER — HOSPITAL ENCOUNTER (OUTPATIENT)
Dept: INFUSION THERAPY | Age: 77
End: 2022-05-23

## 2022-05-31 ENCOUNTER — TELEPHONE (OUTPATIENT)
Dept: ONCOLOGY | Age: 77
End: 2022-05-31

## 2022-05-31 ENCOUNTER — HOSPITAL ENCOUNTER (OUTPATIENT)
Dept: INFUSION THERAPY | Age: 77
End: 2022-05-31

## 2022-05-31 ENCOUNTER — HOSPITAL ENCOUNTER (OUTPATIENT)
Dept: INFUSION THERAPY | Age: 77
Discharge: HOME | End: 2022-05-31

## 2022-05-31 ENCOUNTER — HOSPITAL ENCOUNTER (OUTPATIENT)
Dept: INFUSION THERAPY | Age: 77
Discharge: HOME | End: 2022-05-31
Payer: MEDICARE

## 2022-05-31 ENCOUNTER — OFFICE VISIT (OUTPATIENT)
Dept: ONCOLOGY | Age: 77
End: 2022-05-31
Payer: MEDICARE

## 2022-05-31 VITALS
SYSTOLIC BLOOD PRESSURE: 109 MMHG | OXYGEN SATURATION: 99 % | DIASTOLIC BLOOD PRESSURE: 62 MMHG | RESPIRATION RATE: 21 BRPM | WEIGHT: 121 LBS | BODY MASS INDEX: 22.26 KG/M2 | HEART RATE: 81 BPM | HEIGHT: 62 IN | TEMPERATURE: 97.7 F

## 2022-05-31 DIAGNOSIS — C18.2 PRIMARY ADENOCARCINOMA OF ASCENDING COLON (HCC): ICD-10-CM

## 2022-05-31 DIAGNOSIS — T45.1X5A CHEMOTHERAPY INDUCED NEUTROPENIA (HCC): ICD-10-CM

## 2022-05-31 DIAGNOSIS — D70.1 CHEMOTHERAPY INDUCED NEUTROPENIA (HCC): ICD-10-CM

## 2022-05-31 DIAGNOSIS — C18.2 PRIMARY ADENOCARCINOMA OF ASCENDING COLON (HCC): Primary | ICD-10-CM

## 2022-05-31 LAB
ALBUMIN SERPL-MCNC: 3.2 G/DL (ref 3.2–4.6)
ALBUMIN/GLOB SERPL: 0.9 {RATIO} (ref 1.2–3.5)
ALP SERPL-CCNC: 102 U/L (ref 50–136)
ALT SERPL-CCNC: 45 U/L (ref 12–65)
ANION GAP SERPL CALC-SCNC: 4 MMOL/L (ref 7–16)
AST SERPL-CCNC: 29 U/L (ref 15–37)
BASOPHILS # BLD: 0 K/UL (ref 0–0.2)
BASOPHILS NFR BLD: 1 % (ref 0–2)
BILIRUB SERPL-MCNC: 0.3 MG/DL (ref 0.2–1.1)
BUN SERPL-MCNC: 13 MG/DL (ref 8–23)
CALCIUM SERPL-MCNC: 8.9 MG/DL (ref 8.3–10.4)
CHLORIDE SERPL-SCNC: 109 MMOL/L (ref 98–107)
CO2 SERPL-SCNC: 27 MMOL/L (ref 21–32)
CREAT SERPL-MCNC: 0.8 MG/DL (ref 0.6–1)
DIFFERENTIAL METHOD BLD: ABNORMAL
EOSINOPHIL # BLD: 0.1 K/UL (ref 0–0.8)
EOSINOPHIL NFR BLD: 4 % (ref 0.5–7.8)
ERYTHROCYTE [DISTWIDTH] IN BLOOD BY AUTOMATED COUNT: 17.8 % (ref 11.9–14.6)
GLOBULIN SER CALC-MCNC: 3.5 G/DL (ref 2.3–3.5)
GLUCOSE SERPL-MCNC: 138 MG/DL (ref 65–100)
HCT VFR BLD AUTO: 30.3 % (ref 35.8–46.3)
HGB BLD-MCNC: 10 G/DL (ref 11.7–15.4)
IMM GRANULOCYTES # BLD AUTO: 0 K/UL (ref 0–0.5)
IMM GRANULOCYTES NFR BLD AUTO: 1 % (ref 0–5)
LYMPHOCYTES # BLD: 0.8 K/UL (ref 0.5–4.6)
LYMPHOCYTES NFR BLD: 49 % (ref 13–44)
MAGNESIUM SERPL-MCNC: 2.2 MG/DL (ref 1.8–2.4)
MCH RBC QN AUTO: 30.6 PG (ref 26.1–32.9)
MCHC RBC AUTO-ENTMCNC: 33 G/DL (ref 31.4–35)
MCV RBC AUTO: 92.7 FL (ref 79.6–97.8)
MONOCYTES # BLD: 0.3 K/UL (ref 0.1–1.3)
MONOCYTES NFR BLD: 19 % (ref 4–12)
NEUTS SEG # BLD: 0.4 K/UL (ref 1.7–8.2)
NEUTS SEG NFR BLD: 26 % (ref 43–78)
NRBC # BLD: 0 K/UL (ref 0–0.2)
PLATELET # BLD AUTO: 129 K/UL (ref 150–450)
PMV BLD AUTO: 10.4 FL (ref 9.4–12.3)
POTASSIUM SERPL-SCNC: 3.5 MMOL/L (ref 3.5–5.1)
PROT SERPL-MCNC: 6.7 G/DL (ref 6.3–8.2)
RBC # BLD AUTO: 3.27 M/UL (ref 4.05–5.2)
SODIUM SERPL-SCNC: 140 MMOL/L (ref 136–145)
WBC # BLD AUTO: 1.7 K/UL (ref 4.3–11.1)

## 2022-05-31 PROCEDURE — G8399 PT W/DXA RESULTS DOCUMENT: HCPCS | Performed by: NURSE PRACTITIONER

## 2022-05-31 PROCEDURE — 80053 COMPREHEN METABOLIC PANEL: CPT

## 2022-05-31 PROCEDURE — 85025 COMPLETE CBC W/AUTO DIFF WBC: CPT

## 2022-05-31 PROCEDURE — 1123F ACP DISCUSS/DSCN MKR DOCD: CPT | Performed by: NURSE PRACTITIONER

## 2022-05-31 PROCEDURE — G8420 CALC BMI NORM PARAMETERS: HCPCS | Performed by: NURSE PRACTITIONER

## 2022-05-31 PROCEDURE — G8427 DOCREV CUR MEDS BY ELIG CLIN: HCPCS | Performed by: NURSE PRACTITIONER

## 2022-05-31 PROCEDURE — 2580000003 HC RX 258: Performed by: INTERNAL MEDICINE

## 2022-05-31 PROCEDURE — 1090F PRES/ABSN URINE INCON ASSESS: CPT | Performed by: NURSE PRACTITIONER

## 2022-05-31 PROCEDURE — 36591 DRAW BLOOD OFF VENOUS DEVICE: CPT

## 2022-05-31 PROCEDURE — 1036F TOBACCO NON-USER: CPT | Performed by: NURSE PRACTITIONER

## 2022-05-31 PROCEDURE — 99214 OFFICE O/P EST MOD 30 MIN: CPT | Performed by: NURSE PRACTITIONER

## 2022-05-31 PROCEDURE — 83735 ASSAY OF MAGNESIUM: CPT

## 2022-05-31 RX ORDER — SODIUM CHLORIDE 0.9 % (FLUSH) 0.9 %
10 SYRINGE (ML) INJECTION PRN
Status: DISCONTINUED | OUTPATIENT
Start: 2022-05-31 | End: 2022-06-01 | Stop reason: HOSPADM

## 2022-05-31 RX ADMIN — Medication 10 ML: at 13:18

## 2022-05-31 ASSESSMENT — PATIENT HEALTH QUESTIONNAIRE - PHQ9
SUM OF ALL RESPONSES TO PHQ QUESTIONS 1-9: 0
SUM OF ALL RESPONSES TO PHQ9 QUESTIONS 1 & 2: 0
2. FEELING DOWN, DEPRESSED OR HOPELESS: 0
1. LITTLE INTEREST OR PLEASURE IN DOING THINGS: 0

## 2022-05-31 ASSESSMENT — ENCOUNTER SYMPTOMS
EYES NEGATIVE: 1
DIARRHEA: 1
COUGH: 0
SHORTNESS OF BREATH: 0
CONSTIPATION: 1

## 2022-05-31 NOTE — PATIENT INSTRUCTIONS
Patient Instructions from Today's Visit    Reason for Visit:  Prechemo visit    Diagnosis Information:  [email or web address]       Plan:  -You may use Preparation H if needed externally to possible hemorrhoids  -Hold chemo today  -Chemo and labs only in one week (you will not have provider visit in 1 week)    Follow Up:  As scheduled in one week    Recent Lab Results:  Hospital Outpatient Visit on 05/31/2022   Component Date Value Ref Range Status    WBC 05/31/2022 1.7* 4.3 - 11.1 K/uL Final    Comment: RESULTS VERIFIED, PHONED TO AND READ BACK BY  Jeferson Blum RN @ 1552 BY [facility] 05/31/22      RBC 05/31/2022 3.27* 4.05 - 5.2 M/uL Final    Hemoglobin 05/31/2022 10.0* 11.7 - 15.4 g/dL Final    Hematocrit 05/31/2022 30.3* 35.8 - 46.3 % Final    MCV 05/31/2022 92.7  79.6 - 97.8 FL Final    MCH 05/31/2022 30.6  26.1 - 32.9 PG Final    MCHC 05/31/2022 33.0  31.4 - 35.0 g/dL Final    RDW 05/31/2022 17.8* 11.9 - 14.6 % Final    Platelets 16/22/6148 129* 150 - 450 K/uL Final    MPV 05/31/2022 10.4  9.4 - 12.3 FL Final    nRBC 05/31/2022 0.00  0.0 - 0.2 K/uL Final    **Note: Absolute NRBC parameter is now reported with Hemogram**    Seg Neutrophils 05/31/2022 26* 43 - 78 % Final    Lymphocytes 05/31/2022 49* 13 - 44 % Final    Monocytes 05/31/2022 19* 4.0 - 12.0 % Final    Eosinophils % 05/31/2022 4  0.5 - 7.8 % Final    Basophils 05/31/2022 1  0.0 - 2.0 % Final    Immature Granulocytes 05/31/2022 1  0.0 - 5.0 % Final    Segs Absolute 05/31/2022 0.4* 1.7 - 8.2 K/UL Final    Absolute Lymph # 05/31/2022 0.8  0.5 - 4.6 K/UL Final    Absolute Mono # 05/31/2022 0.3  0.1 - 1.3 K/UL Final    Absolute Eos # 05/31/2022 0.1  0.0 - 0.8 K/UL Final    Basophils Absolute 05/31/2022 0.0  0.0 - 0.2 K/UL Final    Absolute Immature Granulocyte 05/31/2022 0.0  0.0 - 0.5 K/UL Final    Differential Type 05/31/2022 AUTOMATED    Final    Sodium 05/31/2022 140  136 - 145 mmol/L Final    Potassium 05/31/2022 3.5  3.5 - 5.1 mmol/L Final    Chloride 05/31/2022 109* 98 - 107 mmol/L Final    CO2 05/31/2022 27  21 - 32 mmol/L Final    Anion Gap 05/31/2022 4* 7 - 16 mmol/L Final    Glucose 05/31/2022 138* 65 - 100 mg/dL Final    BUN 05/31/2022 13  8 - 23 MG/DL Final    CREATININE 05/31/2022 0.80  0.6 - 1.0 MG/DL Final    GFR  05/31/2022 >60  >60 ml/min/1.73m2 Final    GFR Non- 05/31/2022 >60  >60 ml/min/1.73m2 Final    Comment:      Estimated GFR is calculated using the Modification of Diet in Renal Disease (MDRD) Study equation, reported for both  Americans (GFRAA) and non- Americans (GFRNA), and normalized to 1.73m2 body surface area. The physician must decide which value applies to the patient. The MDRD study equation should only be used in individuals age 25 or older. It has not been validated for the following: pregnant women, patients with serious comorbid conditions,or on certain medications, or persons with extremes of body size, muscle mass, or nutritional status.       Calcium 05/31/2022 8.9  8.3 - 10.4 MG/DL Final    Total Bilirubin 05/31/2022 0.3  0.2 - 1.1 MG/DL Final    ALT 05/31/2022 45  12 - 65 U/L Final    AST 05/31/2022 29  15 - 37 U/L Final    Alk Phosphatase 05/31/2022 102  50 - 136 U/L Final    Total Protein 05/31/2022 6.7  6.3 - 8.2 g/dL Final    Albumin 05/31/2022 3.2  3.2 - 4.6 g/dL Final    Globulin 05/31/2022 3.5  2.3 - 3.5 g/dL Final    Albumin/Globulin Ratio 05/31/2022 0.9* 1.2 - 3.5   Final    Magnesium 05/31/2022 2.2  1.8 - 2.4 mg/dL Final       Treatment Summary has been discussed and given to patient: n/a        -------------------------------------------------------------------------------------------------------------------  Please call our office at [phone] if you have any  of the following symptoms:   · Fever of 100.5 or greater  · Chills  · Shortness of breath  · Swelling or pain in one leg    After office hours an answering service is available and will contact a provider for emergencies or if you are experiencing any of the above symptoms.  Patient does express an interest in My Chart. My Chart log in information explained on the after visit summary printout at the [facility] [address] desk.     Jose Luis Longo RN  Nurse Navigator  [address] [facility] [address] [phone]

## 2022-05-31 NOTE — PROGRESS NOTES
_ Port accessed per protocol with a 0.75 jeff needle. Flushed with normal saline 10cc. Positive blood return. Labs drawn per order. Flushed with 10cc of normal saline and discharged ambulatory      hep locked no.    Still accessed yes   Dressing applied yes

## 2022-05-31 NOTE — PROGRESS NOTES
Progress Notes by Manuelito Melara NP at 05/09/22 [address] [facility] Hematology and Oncology: Office Visit Established Patient       Chief Complaint       Patient presents with          Follow-up        History of Present Illness:   Ms. Martell is a  65 y.o. female who presents today for  follow up regarding colon cancer. she was admitted on 1/22/22 with peritonitis, colon mass, obstruction and bowel perforation.  PMhx: anxiety,  corneal dystrophy s/p transplants (bilat) on steroid drops, CAD, GERD, IBS, HLD.  She p/w abd pain x2 days.  CT AP c/w 2.3cm mass in mid-ascending colon and LAD with obstruction and perforation.   S/p subsequent right extended colectomy with ileo-transverse staped anastomosis.  Path c/w mod-poorly diff adenocarcinoma - [id number] (2/14 LN +).  We were consulted for recs.       She is here for follow up and C6 FOLFOX. She reports mild fatigue. There is no nausea and she reports occasional diarrhea and constipation, potential hemorrhoid, no bleeding. Appetite is good and weight is up 1#. No cough, shortness of breath, or edema. Cold sensitivity and neuropathy is much improved with dose reduction. There was a few days of neck/shoulder soreness, resolved on its own. Denies any fevers or infectious symptoms. Chronological Events:   1/22/22 admitted with abd pain/perforation, dx'ed w colon ca    2/9/22 HFU - reviewed path again and next steps in management    2/28/22 FU C1D1 FOLFOX    3/7/22 FU - toxicity check following C1. Oxaliplatin was dose reduced for first cycle to 80%. 3/14/22 FU C2 FOLFOX-continue with Oxali dose reduction. 3/28/22 Cycle 3 FOLFOX - oxaliplatin is dose reduced. Tolerating well. 4/11/22 pre C4 due to gen debility and plts at 68 - will delay tx by 1 week; replete fluids/lytes   4/18/22 pre C4 - held last week for TCP/weakness. Plts improved and feeling better, no further diarrhea. 5/2/22 pre C5 - held d/t low ANC.   Cold sensitivity worse. RX Remeron for appetite/sleep. 5/9/22 Here pre-cycle 5 after one week hold for neutropenia. Holding again for ANC. Next week, reduce dose again by 20% for cold paresthesias, neutropenia and overall tolerance. 5/31/20 pre chemo C6, doing well, improved neuropathy/cod sensitivity with dose reduction, -defer by 1 week     Family History   Problem Relation Age of Onset    Heart Attack Father     Heart Disease Father         Arterosclerotic Cardiovascular Disease    Breast Cancer Maternal Aunt        Social History     Socioeconomic History    Marital status: Legally      Spouse name: Not on file    Number of children: Not on file    Years of education: Not on file    Highest education level: Not on file   Occupational History    Not on file   Tobacco Use    Smoking status: Never Smoker    Smokeless tobacco: Never Used   Substance and Sexual Activity    Alcohol use: No     Alcohol/week: 0.0 standard drinks    Drug use: Yes     Types: Prescription, OTC    Sexual activity: Not on file   Other Topics Concern    Not on file   Social History Narrative    Not on file     Social Determinants of Health     Financial Resource Strain:     Difficulty of Paying Living Expenses: Not on file   Food Insecurity:     Worried About Running Out of Food in the Last Year: Not on file    Tiesha of Food in the Last Year: Not on file   Transportation Needs:     Lack of Transportation (Medical): Not on file    Lack of Transportation (Non-Medical):  Not on file   Physical Activity:     Days of Exercise per Week: Not on file    Minutes of Exercise per Session: Not on file   Stress:     Feeling of Stress : Not on file   Social Connections:     Frequency of Communication with Friends and Family: Not on file    Frequency of Social Gatherings with Friends and Family: Not on file    Attends Confucianist Services: Not on file    Active Member of Clubs or Organizations: Not on file   Sp Kyle Attends Club or Organization Meetings: Not on file    Marital Status: Not on file   Intimate Partner Violence:     Fear of Current or Ex-Partner: Not on file    Emotionally Abused: Not on file    Physically Abused: Not on file    Sexually Abused: Not on file   Housing Stability:     Unable to Pay for Housing in the Last Year: Not on file    Number of [place] in the Last Year: Not on file    Unstable Housing in the Last Year: Not on file              Review of Systems   Constitutional: Positive for fatigue (mild). Negative for appetite change, chills and fever. HENT: Negative. Eyes: Negative. Respiratory: Negative for cough and shortness of breath. Cardiovascular: Negative. Gastrointestinal: Positive for constipation and diarrhea. Genitourinary: Negative. Musculoskeletal:        Had a few days of shoulder/neck soreness-resolved now   Neurological: Positive for numbness (improved with dose reduction). Cold sensitivity improved with dose reduction   Hematological: Negative. Psychiatric/Behavioral: Negative. All other systems reviewed and are negative.           Allergies   Allergen Reactions    Sulfa Antibiotics Hives and Rash         Past Medical History:   Diagnosis Date    Actinic keratosis     Adjustment disorder with mixed anxiety and depressed mood 2/11/2015    Adverse effect of anesthesia     cystoscope - was awake but could not move - dont remember the anesthesetic given    Corneal dystrophy 2/11/2015    Coronary atherosclerosis of native coronary artery 2/11/2015    GERD (gastroesophageal reflux disease)     Irritable bowel syndrome 2/11/2015    Menopause     Mixed hyperlipidemia 2/11/2015    Primary adenocarcinoma of ascending colon ([facility] [address] 1/31/2022    Psychiatric disorder     anxiety        Past Surgical History:   Procedure Laterality Date    BREAST BIOPSY Left     COLONOSCOPY      2014    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    CORNEAL TRANSPLANT Left     CYST REMOVAL      breast    IR PORT PLACEMENT EQUAL OR GREATER THAN 5 YEARS  2/21/2022    IR PORT PLACEMENT EQUAL OR GREATER THAN 5 YEARS  2/21/2022    IR PORT PLACEMENT EQUAL OR GREATER THAN 5 YEARS 2/21/2022 [facility]IOLOG[facility] SPECIALS      Current Outpatient Medications   Medication Sig Dispense Refill    acetaminophen (TYLENOL) 325 MG tablet Take 650 mg by mouth every 6 hours as needed      atorvastatin (LIPITOR) 40 MG tablet Take 40 mg by mouth daily      vitamin D3 (CHOLECALCIFEROL) 125 MCG (5000 UT) TABS tablet Take 1,000 Units by mouth daily Take 1000 units by mouth per day      Hyoscyamine Sulfate SL 0.125 MG SUBL Place 0.125 mg under the tongue every 6 hours as needed      lidocaine-prilocaine (EMLA) 2.5-2.5 % cream Apply topically as needed      ondansetron (ZOFRAN) 8 MG tablet Take 8 mg by mouth every 8 hours as needed      pantoprazole (PROTONIX) 40 MG tablet Take 40 mg by mouth daily      potassium chloride 20 MEQ/15ML (10%) oral solution Take 20 mEq by mouth daily      prednisoLONE acetate (PRED FORTE) 1 % ophthalmic suspension Apply 1 drop to eye      prochlorperazine (COMPAZINE) 10 MG tablet Take 10 mg by mouth every 6 hours as needed      sucralfate (CARAFATE) 1 GM tablet Take 1 g by mouth 4 times daily as needed       No current facility-administered medications for this visit.      Facility-Administered Medications Ordered in Other Visits   Medication Dose Route Frequency Provider Last Rate Last Admin    sodium chloride flush 0.9 % injection 10 mL  10 mL IntraVENous PRN Ulises Palencia MD   10 mL at 05/31/22 1318               OBJECTIVE:   Visit Vitals  Vitals:    05/31/22 1329   BP: 109/62   Site: Right Upper Arm   Position: Sitting   Cuff Size: Medium Adult   Pulse: 81   Resp: 21   Temp: 97.7 °F (36.5 °C)   TempSrc: Oral   SpO2: 99%   Weight: 121 lb (54.9 kg)   Height: 5' 2\" (1.575 m)         ECOG PERFORMANCE STATUS - 1 - Restricted in physically strenuous activity but ambulatory and able to carry out work of a light or  sedentary nature such as light house work, office work. Pain/Fatigue; Pain Score:   0 - No pain (fatigue 0 )        Distress -   PHQ-9  5/31/2022   Little interest or pleasure in doing things 0   Little interest or pleasure in doing things -   Feeling down, depressed, or hopeless 0   PHQ-2 Score 0   Total Score PHQ 2 -   PHQ-9 Total Score 0       Physical Exam  Vitals reviewed. Constitutional:       General: She is not in acute distress. Appearance: Normal appearance. She is not toxic-appearing. HENT:      Head: Normocephalic and atraumatic. Mouth/Throat:      Mouth: Mucous membranes are moist.      Pharynx: Oropharynx is clear. Eyes:      Conjunctiva/sclera: Conjunctivae normal.   Cardiovascular:      Rate and Rhythm: Normal rate and regular rhythm. Heart sounds: Normal heart sounds. Pulmonary:      Effort: Pulmonary effort is normal. No respiratory distress. Breath sounds: Normal breath sounds. No wheezing. Abdominal:      General: Bowel sounds are normal. There is no distension. Palpations: Abdomen is soft. Tenderness: There is no abdominal tenderness. Musculoskeletal:         General: Normal range of motion. Cervical back: Normal range of motion and neck supple. Skin:     General: Skin is warm and dry. Neurological:      General: No focal deficit present. Mental Status: She is alert and oriented to person, place, and time.    Psychiatric:         Mood and Affect: Mood normal.         Behavior: Behavior normal.                 Labs:  Hospital Outpatient Visit on 05/31/2022   Component Date Value Ref Range Status    WBC 05/31/2022 1.7* 4.3 - 11.1 K/uL Final    Comment: RESULTS VERIFIED, PHONED TO AND READ BACK BY  Tomi Luevano RN @ 9137 BY [facility] 05/31/22      RBC 05/31/2022 3.27* 4.05 - 5.2 M/uL Final    Hemoglobin 05/31/2022 10.0* 11.7 - 15.4 g/dL Final    value applies to the patient. The MDRD study equation should only be used in individuals age 25 or older. It has not been validated for the following: pregnant women, patients with serious comorbid conditions,or on certain medications, or persons with extremes of body size, muscle mass, or nutritional status.  Calcium 05/31/2022 8.9  8.3 - 10.4 MG/DL Final    Total Bilirubin 05/31/2022 0.3  0.2 - 1.1 MG/DL Final    ALT 05/31/2022 45  12 - 65 U/L Final    AST 05/31/2022 29  15 - 37 U/L Final    Alk Phosphatase 05/31/2022 102  50 - 136 U/L Final    Total Protein 05/31/2022 6.7  6.3 - 8.2 g/dL Final    Albumin 05/31/2022 3.2  3.2 - 4.6 g/dL Final    Globulin 05/31/2022 3.5  2.3 - 3.5 g/dL Final    Albumin/Globulin Ratio 05/31/2022 0.9* 1.2 - 3.5   Final    Magnesium 05/31/2022 2.2  1.8 - 2.4 mg/dL Final          Imaging:  Reviewed       PATHOLOGY:                         ASSESSMENT:    ICD-10-CM    1. Primary adenocarcinoma of ascending colon (HCC)  C18.2    2. Chemotherapy induced neutropenia (HCC)  D70.1     T45.1X5A           Ms. Case is here for FU of colon  cancer.         1. Mid-ascending colon adenocarcinoma - [id number] - Stage IIIB (high risk dz due to poorly diff/perforation/obstruction), MSI - stable,  KRAS mut    - Patient wished to lower the dose of oxaliplatin upfront to 80% of the dose. - here for follow-up and cycle 6 FOLFOX, , defer by 1 week, proceed with  dose reduced to 80% of original dose next week if ANC 1000 or more.  -discussed with Dr Saadia Young and we will add Artemio Truong going forward    - significant cold sensitivity - improved with 80% of original dosing    - CEA down from 12.3 to 3.1. Checking monthly/q other tx    - EVON - She completed IV iron on 3 7 and 314   - She did report some numbness or perception of swelling in the ball of her foot bilaterally, closer to lateral phalanges, and she reported that as baseline prior to chemotherapy.     - vit D defic - She is taking 5000 of vitamin D.     - GERD - prescribed Protonix and Carafate for reflux, well controlled. - nausea - added emend starting with C2 and worked well, continue with PO anti-emetics prn    - diarrhea - imodium prn.     - abd cramping - levsin prn     - low appetite - RX Remeron at bedtime    - insomnia - can use melatonin at night for insomnia. Historical:     - we reviewed the pathophysiology of colon cancer in general, we then reviewed her pathology and staging again and high risk disease.     - to start: FOLFOX: A cycle is every 14 days (6mo)  ? Oxaliplatin   (Eloxatin)  85 mg/m² IV once on day 1 - dose adjusted upfront to 80% of dose to see how tolerates it per  her wishes   ? Leucovorin  400 mg/m² IV once on day 1  ? Fluorouracil  400 mg/m² IV once on day 1  ? Fluorouracil  1,200 mg/m²/day CIV on days 1 and 2. Total dose = 2,400 mg/m²/cycle. OR CAPoX: A cycle is every 21 days   ? Capecitabine   (Xeloda)  850 mg/m² orally twice daily on days 1-14, then off 7 days  ? Oxaliplatin   (Eloxatin)  130 mg/m² IV day 1   - CT chest to complete staging with small RML nodule - ? LN - will monitor on re-imaging, no definite evid of disease. We discussed the CT results that show small nodule in the lung (per rad likely LN, <1cm abutting fissure). We reviewed that this could be a metastatic focus. She has stage III disease, if the lung nodule is cancerous, her tumor staging be stage IV. Intent of treatment will be palliative and not curative in nature. Unable to bx lesion due to size. Daughter and patient aware of this. Surveillance Guidance:  CEA: q3 months x 2 years, q6 months years 3, 4, and 5. CT Scans: Once per year (Consider scanning q6 months for 2 years if considered high risk). Colonoscopy: One  year after surgery, or within 3 months post-adjuvant therapy if no prior preoperative colonoscopy performed.  Subsequent colonoscopies as directed by post-adjuvant colonoscopy findings, as clinically-indicated, or at physician discretion. If no high-risk  findings, repeat no less than every 3-5 years. 2. Supportive care    - s/p bilat corneal transplant - sees Dr Christiano Harrington [phone] [facility]; d/w her eye team - no CI to start tx. RESUSCITATION DIRECTIVES/HOSPICE CARE: Full Support      RTC per schedule      MDM   Lab studies and imaging studies were personally reviewed. Pertinent old records were reviewed. Historical:    - daughter requested testing for DPYD. Reviewed that typically we would test in pts who have severe or unexpected toxicity from fluoropyrimidines (severe myelosuppression, mucositis, diarrhea, neurotoxicity,  cardiotoxicity) during the first few cycles of fluoropyrimidine therapy, not for screening purposes.  If she doesn't do well with tx - we'd dose adjust anyway. Family elected not to p/w testing. All questions were asked and answered to the best of my ability. The patient verbalized understanding and agrees with the plan above.            Reyes Montgomery) [address] [place] [address]  [facility]  [address], [address] [place] [address]  Office : [phone]  Fax : [phone]

## 2022-06-02 ENCOUNTER — CLINICAL DOCUMENTATION (OUTPATIENT)
Dept: CASE MANAGEMENT | Age: 77
End: 2022-06-02

## 2022-06-02 DIAGNOSIS — C18.2 PRIMARY ADENOCARCINOMA OF ASCENDING COLON (HCC): Primary | ICD-10-CM

## 2022-06-02 NOTE — PROGRESS NOTES
I saw patient on 5-31-22 with Chris Carlton. We will hold cycle C5 Folfox due to [address]. Pt will return in 1 week for infusion only and labs. Return in 3 weeks for OV. Pt is in agreement w plan.

## 2022-06-03 RX ORDER — PANTOPRAZOLE SODIUM 40 MG/1
TABLET, DELAYED RELEASE ORAL
Qty: 90 TABLET | Refills: 1 | Status: SHIPPED | OUTPATIENT
Start: 2022-06-03

## 2022-06-07 ENCOUNTER — HOSPITAL ENCOUNTER (OUTPATIENT)
Dept: INFUSION THERAPY | Age: 77
Discharge: HOME | End: 2022-06-07
Payer: MEDICARE

## 2022-06-07 VITALS
DIASTOLIC BLOOD PRESSURE: 68 MMHG | RESPIRATION RATE: 18 BRPM | SYSTOLIC BLOOD PRESSURE: 133 MMHG | OXYGEN SATURATION: 100 % | BODY MASS INDEX: 22.5 KG/M2 | WEIGHT: 123 LBS | HEART RATE: 86 BPM | TEMPERATURE: 97.8 F

## 2022-06-07 LAB
ALBUMIN SERPL-MCNC: 3.3 G/DL (ref 3.2–4.6)
ALBUMIN/GLOB SERPL: 1 {RATIO} (ref 1.2–3.5)
ALP SERPL-CCNC: 105 U/L (ref 50–136)
ALT SERPL-CCNC: 49 U/L (ref 12–65)
ANION GAP SERPL CALC-SCNC: 8 MMOL/L (ref 7–16)
AST SERPL-CCNC: 37 U/L (ref 15–37)
BASOPHILS # BLD: 0 K/UL (ref 0–0.2)
BASOPHILS NFR BLD: 1 % (ref 0–2)
BILIRUB SERPL-MCNC: 0.3 MG/DL (ref 0.2–1.1)
BUN SERPL-MCNC: 12 MG/DL (ref 8–23)
CALCIUM SERPL-MCNC: 8.6 MG/DL (ref 8.3–10.4)
CHLORIDE SERPL-SCNC: 108 MMOL/L (ref 98–107)
CO2 SERPL-SCNC: 24 MMOL/L (ref 21–32)
CREAT SERPL-MCNC: 0.9 MG/DL (ref 0.6–1)
DIFFERENTIAL METHOD BLD: ABNORMAL
EOSINOPHIL # BLD: 0.1 K/UL (ref 0–0.8)
EOSINOPHIL NFR BLD: 3 % (ref 0.5–7.8)
ERYTHROCYTE [DISTWIDTH] IN BLOOD BY AUTOMATED COUNT: 16.2 % (ref 11.9–14.6)
GLOBULIN SER CALC-MCNC: 3.4 G/DL (ref 2.3–3.5)
GLUCOSE SERPL-MCNC: 137 MG/DL (ref 65–100)
HCT VFR BLD AUTO: 32.7 % (ref 35.8–46.3)
HGB BLD-MCNC: 10.7 G/DL (ref 11.7–15.4)
IMM GRANULOCYTES # BLD AUTO: 0 K/UL (ref 0–0.5)
IMM GRANULOCYTES NFR BLD AUTO: 2 % (ref 0–5)
LYMPHOCYTES # BLD: 1.2 K/UL (ref 0.5–4.6)
LYMPHOCYTES NFR BLD: 49 % (ref 13–44)
MCH RBC QN AUTO: 30.8 PG (ref 26.1–32.9)
MCHC RBC AUTO-ENTMCNC: 32.7 G/DL (ref 31.4–35)
MCV RBC AUTO: 94.2 FL (ref 79.6–97.8)
MONOCYTES # BLD: 0.4 K/UL (ref 0.1–1.3)
MONOCYTES NFR BLD: 18 % (ref 4–12)
NEUTS SEG # BLD: 0.6 K/UL (ref 1.7–8.2)
NEUTS SEG NFR BLD: 27 % (ref 43–78)
NRBC # BLD: 0 K/UL (ref 0–0.2)
PLATELET # BLD AUTO: 186 K/UL (ref 150–450)
PMV BLD AUTO: 10.1 FL (ref 9.4–12.3)
POTASSIUM SERPL-SCNC: 3.5 MMOL/L (ref 3.5–5.1)
PROT SERPL-MCNC: 6.7 G/DL (ref 6.3–8.2)
RBC # BLD AUTO: 3.47 M/UL (ref 4.05–5.2)
SODIUM SERPL-SCNC: 140 MMOL/L (ref 136–145)
WBC # BLD AUTO: 2.4 K/UL (ref 4.3–11.1)

## 2022-06-07 PROCEDURE — 36591 DRAW BLOOD OFF VENOUS DEVICE: CPT

## 2022-06-07 PROCEDURE — 80053 COMPREHEN METABOLIC PANEL: CPT

## 2022-06-07 PROCEDURE — 85025 COMPLETE CBC W/AUTO DIFF WBC: CPT

## 2022-06-07 NOTE — PROGRESS NOTES
Arrived to the [facility]. Assessment completed, labs drawn/reviewed. Any issues or concerns during appointment: Pt , no treatment per Ju Naqvi NP. Pt rescheduled in one week. Patient aware of next infusion appointment on 06/14/22 @0800. Patient instructed to call provider with temperature of 100.4 or greater or nausea/vomiting/ diarrhea or pain not controlled by medications  Discharged ambulatory.

## 2022-06-10 ENCOUNTER — HOSPITAL ENCOUNTER (OUTPATIENT)
Dept: INFUSION THERAPY | Age: 77
End: 2022-06-10

## 2022-06-14 ENCOUNTER — HOSPITAL ENCOUNTER (OUTPATIENT)
Dept: INFUSION THERAPY | Age: 77
Discharge: HOME | End: 2022-06-14
Payer: MEDICARE

## 2022-06-14 VITALS
HEART RATE: 86 BPM | OXYGEN SATURATION: 95 % | RESPIRATION RATE: 16 BRPM | WEIGHT: 121.4 LBS | TEMPERATURE: 98.1 F | DIASTOLIC BLOOD PRESSURE: 57 MMHG | BODY MASS INDEX: 22.2 KG/M2 | SYSTOLIC BLOOD PRESSURE: 135 MMHG

## 2022-06-14 DIAGNOSIS — C18.2 PRIMARY ADENOCARCINOMA OF ASCENDING COLON (HCC): Primary | ICD-10-CM

## 2022-06-14 LAB
ALBUMIN SERPL-MCNC: 3.3 G/DL (ref 3.2–4.6)
ALBUMIN/GLOB SERPL: 1 {RATIO} (ref 1.2–3.5)
ALP SERPL-CCNC: 96 U/L (ref 50–136)
ALT SERPL-CCNC: 50 U/L (ref 12–65)
ANION GAP SERPL CALC-SCNC: 9 MMOL/L (ref 7–16)
AST SERPL-CCNC: 37 U/L (ref 15–37)
BASOPHILS # BLD: 0 K/UL (ref 0–0.2)
BASOPHILS NFR BLD: 1 % (ref 0–2)
BILIRUB SERPL-MCNC: 0.4 MG/DL (ref 0.2–1.1)
BUN SERPL-MCNC: 10 MG/DL (ref 8–23)
CALCIUM SERPL-MCNC: 8.9 MG/DL (ref 8.3–10.4)
CHLORIDE SERPL-SCNC: 109 MMOL/L (ref 98–107)
CO2 SERPL-SCNC: 23 MMOL/L (ref 21–32)
CREAT SERPL-MCNC: 0.9 MG/DL (ref 0.6–1)
DIFFERENTIAL METHOD BLD: ABNORMAL
EOSINOPHIL # BLD: 0.1 K/UL (ref 0–0.8)
EOSINOPHIL NFR BLD: 3 % (ref 0.5–7.8)
ERYTHROCYTE [DISTWIDTH] IN BLOOD BY AUTOMATED COUNT: 15.1 % (ref 11.9–14.6)
GLOBULIN SER CALC-MCNC: 3.3 G/DL (ref 2.3–3.5)
GLUCOSE SERPL-MCNC: 138 MG/DL (ref 65–100)
HCT VFR BLD AUTO: 33.8 % (ref 35.8–46.3)
HGB BLD-MCNC: 10.9 G/DL (ref 11.7–15.4)
IMM GRANULOCYTES # BLD AUTO: 0 K/UL (ref 0–0.5)
IMM GRANULOCYTES NFR BLD AUTO: 1 % (ref 0–5)
LYMPHOCYTES # BLD: 1 K/UL (ref 0.5–4.6)
LYMPHOCYTES NFR BLD: 36 % (ref 13–44)
MCH RBC QN AUTO: 30.5 PG (ref 26.1–32.9)
MCHC RBC AUTO-ENTMCNC: 32.2 G/DL (ref 31.4–35)
MCV RBC AUTO: 94.7 FL (ref 79.6–97.8)
MONOCYTES # BLD: 0.4 K/UL (ref 0.1–1.3)
MONOCYTES NFR BLD: 13 % (ref 4–12)
NEUTS SEG # BLD: 1.3 K/UL (ref 1.7–8.2)
NEUTS SEG NFR BLD: 47 % (ref 43–78)
NRBC # BLD: 0 K/UL (ref 0–0.2)
PLATELET # BLD AUTO: 127 K/UL (ref 150–450)
PMV BLD AUTO: 10.4 FL (ref 9.4–12.3)
POTASSIUM SERPL-SCNC: 3.8 MMOL/L (ref 3.5–5.1)
PROT SERPL-MCNC: 6.6 G/DL (ref 6.3–8.2)
RBC # BLD AUTO: 3.57 M/UL (ref 4.05–5.2)
SODIUM SERPL-SCNC: 141 MMOL/L (ref 136–145)
WBC # BLD AUTO: 2.8 K/UL (ref 4.3–11.1)

## 2022-06-14 PROCEDURE — 6360000002 HC RX W HCPCS: Performed by: NURSE PRACTITIONER

## 2022-06-14 PROCEDURE — G0498 CHEMO EXTEND IV INFUS W/PUMP: HCPCS

## 2022-06-14 PROCEDURE — 96368 THER/DIAG CONCURRENT INF: CPT

## 2022-06-14 PROCEDURE — 80053 COMPREHEN METABOLIC PANEL: CPT

## 2022-06-14 PROCEDURE — 96375 TX/PRO/DX INJ NEW DRUG ADDON: CPT

## 2022-06-14 PROCEDURE — 96415 CHEMO IV INFUSION ADDL HR: CPT

## 2022-06-14 PROCEDURE — 2580000003 HC RX 258: Performed by: NURSE PRACTITIONER

## 2022-06-14 PROCEDURE — 96413 CHEMO IV INFUSION 1 HR: CPT

## 2022-06-14 PROCEDURE — 96367 TX/PROPH/DG ADDL SEQ IV INF: CPT

## 2022-06-14 PROCEDURE — 96411 CHEMO IV PUSH ADDL DRUG: CPT

## 2022-06-14 PROCEDURE — 85025 COMPLETE CBC W/AUTO DIFF WBC: CPT

## 2022-06-14 RX ORDER — SODIUM CHLORIDE 9 MG/ML
5-40 INJECTION INTRAVENOUS PRN
Status: CANCELLED | OUTPATIENT
Start: 2022-06-14

## 2022-06-14 RX ORDER — MEPERIDINE HYDROCHLORIDE 25 MG/ML
12.5 INJECTION INTRAMUSCULAR; INTRAVENOUS; SUBCUTANEOUS PRN
Status: CANCELLED | OUTPATIENT
Start: 2022-06-14

## 2022-06-14 RX ORDER — SODIUM CHLORIDE 0.9 % (FLUSH) 0.9 %
5-40 SYRINGE (ML) INJECTION PRN
Status: DISCONTINUED | OUTPATIENT
Start: 2022-06-14 | End: 2022-06-15 | Stop reason: HOSPADM

## 2022-06-14 RX ORDER — FLUOROURACIL 50 MG/ML
320 INJECTION, SOLUTION INTRAVENOUS ONCE
Status: COMPLETED | OUTPATIENT
Start: 2022-06-14 | End: 2022-06-14

## 2022-06-14 RX ORDER — DIPHENHYDRAMINE HYDROCHLORIDE 50 MG/ML
50 INJECTION INTRAMUSCULAR; INTRAVENOUS
Status: CANCELLED | OUTPATIENT
Start: 2022-06-14

## 2022-06-14 RX ORDER — SODIUM CHLORIDE 0.9 % (FLUSH) 0.9 %
5-40 SYRINGE (ML) INJECTION PRN
Status: CANCELLED | OUTPATIENT
Start: 2022-06-16

## 2022-06-14 RX ORDER — SODIUM CHLORIDE 9 MG/ML
5-250 INJECTION, SOLUTION INTRAVENOUS PRN
Status: CANCELLED | OUTPATIENT
Start: 2022-06-14

## 2022-06-14 RX ORDER — ACETAMINOPHEN 325 MG/1
650 TABLET ORAL
Status: CANCELLED | OUTPATIENT
Start: 2022-06-14

## 2022-06-14 RX ORDER — ONDANSETRON 2 MG/ML
8 INJECTION INTRAMUSCULAR; INTRAVENOUS
Status: CANCELLED | OUTPATIENT
Start: 2022-06-14

## 2022-06-14 RX ORDER — ONDANSETRON 2 MG/ML
8 INJECTION INTRAMUSCULAR; INTRAVENOUS ONCE
Status: COMPLETED | OUTPATIENT
Start: 2022-06-14 | End: 2022-06-14

## 2022-06-14 RX ORDER — HEPARIN SODIUM (PORCINE) LOCK FLUSH IV SOLN 100 UNIT/ML 100 UNIT/ML
500 SOLUTION INTRAVENOUS PRN
Status: CANCELLED | OUTPATIENT
Start: 2022-06-16

## 2022-06-14 RX ORDER — EPINEPHRINE 1 MG/ML
0.3 INJECTION, SOLUTION, CONCENTRATE INTRAVENOUS PRN
Status: CANCELLED | OUTPATIENT
Start: 2022-06-14

## 2022-06-14 RX ORDER — SODIUM CHLORIDE 9 MG/ML
5-250 INJECTION, SOLUTION INTRAVENOUS PRN
Status: CANCELLED | OUTPATIENT
Start: 2022-06-16

## 2022-06-14 RX ORDER — DEXTROSE MONOHYDRATE 50 MG/ML
5-250 INJECTION, SOLUTION INTRAVENOUS PRN
Status: DISCONTINUED | OUTPATIENT
Start: 2022-06-14 | End: 2022-06-15 | Stop reason: HOSPADM

## 2022-06-14 RX ORDER — SODIUM CHLORIDE 9 MG/ML
INJECTION, SOLUTION INTRAVENOUS CONTINUOUS
Status: CANCELLED | OUTPATIENT
Start: 2022-06-14

## 2022-06-14 RX ORDER — SODIUM CHLORIDE 9 MG/ML
5-40 INJECTION INTRAVENOUS PRN
Status: CANCELLED | OUTPATIENT
Start: 2022-06-16

## 2022-06-14 RX ORDER — HEPARIN SODIUM (PORCINE) LOCK FLUSH IV SOLN 100 UNIT/ML 100 UNIT/ML
500 SOLUTION INTRAVENOUS PRN
Status: CANCELLED | OUTPATIENT
Start: 2022-06-14

## 2022-06-14 RX ORDER — ALBUTEROL SULFATE 90 UG/1
4 AEROSOL, METERED RESPIRATORY (INHALATION) PRN
Status: CANCELLED | OUTPATIENT
Start: 2022-06-14

## 2022-06-14 RX ORDER — 0.9 % SODIUM CHLORIDE 0.9 %
1000 INTRAVENOUS SOLUTION INTRAVENOUS ONCE
Status: CANCELLED
Start: 2022-06-16 | End: 2022-06-16

## 2022-06-14 RX ADMIN — FLUOROURACIL 500 MG: 50 INJECTION, SOLUTION INTRAVENOUS at 12:30

## 2022-06-14 RX ADMIN — OXALIPLATIN 110 MG: 5 INJECTION, SOLUTION INTRAVENOUS at 10:17

## 2022-06-14 RX ADMIN — SODIUM CHLORIDE, PRESERVATIVE FREE 10 ML: 5 INJECTION INTRAVENOUS at 08:00

## 2022-06-14 RX ADMIN — DEXAMETHASONE SODIUM PHOSPHATE 12 MG: 4 INJECTION, SOLUTION INTRAMUSCULAR; INTRAVENOUS at 09:31

## 2022-06-14 RX ADMIN — FOSAPREPITANT 150 MG: 150 INJECTION, POWDER, LYOPHILIZED, FOR SOLUTION INTRAVENOUS at 09:50

## 2022-06-14 RX ADMIN — LEUCOVORIN CALCIUM 650 MG: 350 INJECTION, POWDER, LYOPHILIZED, FOR SOLUTION INTRAMUSCULAR; INTRAVENOUS at 10:17

## 2022-06-14 RX ADMIN — FLUOROURACIL 3000 MG: 50 INJECTION, SOLUTION INTRAVENOUS at 12:33

## 2022-06-14 RX ADMIN — DEXTROSE MONOHYDRATE 25 ML/HR: 5 INJECTION, SOLUTION INTRAVENOUS at 09:05

## 2022-06-14 RX ADMIN — ONDANSETRON 8 MG: 2 INJECTION INTRAMUSCULAR; INTRAVENOUS at 09:28

## 2022-06-14 NOTE — PROGRESS NOTES
Arrived to the [facility]. Port accessed and labs drawn. Positive blood return noted. Premeds given and mFOLFOX completed. Patient tolerated well. Any issues or concerns during appointment: ongoing constipation and ensuring reduced dose of chemotherapy. Patient aware of next infusion appointment on 6/16/22 at 3:30 PM.  Patient instructed to call provider with temperature of 100.4 or greater or nausea/vomiting/ diarrhea or pain not controlled by medications  Discharged home ambulatory.

## 2022-06-16 ENCOUNTER — HOSPITAL ENCOUNTER (OUTPATIENT)
Dept: INFUSION THERAPY | Age: 77
Discharge: HOME | End: 2022-06-16
Payer: MEDICARE

## 2022-06-16 VITALS
RESPIRATION RATE: 16 BRPM | OXYGEN SATURATION: 99 % | HEART RATE: 75 BPM | SYSTOLIC BLOOD PRESSURE: 97 MMHG | TEMPERATURE: 98.1 F | DIASTOLIC BLOOD PRESSURE: 58 MMHG

## 2022-06-16 DIAGNOSIS — C18.2 PRIMARY ADENOCARCINOMA OF ASCENDING COLON (HCC): Primary | ICD-10-CM

## 2022-06-16 PROCEDURE — 96360 HYDRATION IV INFUSION INIT: CPT

## 2022-06-16 PROCEDURE — 2580000003 HC RX 258: Performed by: NURSE PRACTITIONER

## 2022-06-16 RX ORDER — 0.9 % SODIUM CHLORIDE 0.9 %
1000 INTRAVENOUS SOLUTION INTRAVENOUS ONCE
Status: COMPLETED | OUTPATIENT
Start: 2022-06-16 | End: 2022-06-16

## 2022-06-16 RX ORDER — SODIUM CHLORIDE 0.9 % (FLUSH) 0.9 %
5-40 SYRINGE (ML) INJECTION PRN
Status: DISCONTINUED | OUTPATIENT
Start: 2022-06-16 | End: 2022-06-17 | Stop reason: HOSPADM

## 2022-06-16 RX ADMIN — SODIUM CHLORIDE, PRESERVATIVE FREE 10 ML: 5 INJECTION INTRAVENOUS at 15:35

## 2022-06-16 RX ADMIN — SODIUM CHLORIDE 1000 ML: 900 INJECTION, SOLUTION INTRAVENOUS at 15:36

## 2022-06-16 NOTE — PROGRESS NOTES
Arrived to the [facility]. Pump D/C and 1 L IVF infusion completed. Patient tolerated well. Any issues or concerns during appointment: none. Patient aware of next infusion appointment on 06/17/2022 (date) at 1600 (time). Discharged ambulatory.

## 2022-06-17 ENCOUNTER — HOSPITAL ENCOUNTER (OUTPATIENT)
Dept: INFUSION THERAPY | Age: 77
Discharge: HOME | End: 2022-06-17
Payer: MEDICARE

## 2022-06-17 VITALS
TEMPERATURE: 97.9 F | SYSTOLIC BLOOD PRESSURE: 130 MMHG | RESPIRATION RATE: 16 BRPM | DIASTOLIC BLOOD PRESSURE: 55 MMHG | HEART RATE: 77 BPM | OXYGEN SATURATION: 100 %

## 2022-06-17 DIAGNOSIS — C18.2 PRIMARY ADENOCARCINOMA OF ASCENDING COLON (HCC): Primary | ICD-10-CM

## 2022-06-17 PROCEDURE — 96372 THER/PROPH/DIAG INJ SC/IM: CPT

## 2022-06-17 PROCEDURE — 6360000002 HC RX W HCPCS: Performed by: NURSE PRACTITIONER

## 2022-06-17 RX ADMIN — PEGFILGRASTIM-CBQV 6 MG: 6 INJECTION, SOLUTION SUBCUTANEOUS at 15:58

## 2022-06-17 NOTE — PROGRESS NOTES
Arrived to the [facility]. Udenyca injection completed. Patient tolerated well. Any issues or concerns during appointment: no.  Patient aware of next infusion appointment on 6/28/2022 (date) at 10:30 am (time). Patient aware of next lab and [facility] office visit on 6/27/2022 (date) at 12:45 pm (time). Patient instructed to call provider with temperature of 100.4 or greater or nausea/vomiting/ diarrhea or pain not controlled by medications  Discharged ambulatory with family member.

## 2022-06-24 ASSESSMENT — ENCOUNTER SYMPTOMS
COUGH: 0
SHORTNESS OF BREATH: 0
DIARRHEA: 1
CONSTIPATION: 1
EYES NEGATIVE: 1

## 2022-06-24 NOTE — PROGRESS NOTES
Progress Notes by Juanita Lenz NP at 05/09/22 [id number] [facility] Hematology and Oncology: Office Visit Established Patient       Chief Complaint       Patient presents with          Follow-up        History of Present Illness:   Ms. Martell is a  65 y.o. female who presents today for  follow up regarding colon cancer. she was admitted on 1/22/22 with peritonitis, colon mass, obstruction and bowel perforation.  PMhx: anxiety,  corneal dystrophy s/p transplants (bilat) on steroid drops, CAD, GERD, IBS, HLD.  She p/w abd pain x2 days.  CT AP c/w 2.3cm mass in mid-ascending colon and LAD with obstruction and perforation.   S/p subsequent right extended colectomy with ileo-transverse staped anastomosis.  Path c/w mod-poorly diff adenocarcinoma - [id number] (2/14 LN +).  We were consulted for recs.    Started FOLFOX - adjuvantly. Today, patient is here for follow-up and evaluation prior to cycle 7 FOLFOX. She continues to report fatigue but it is not worsening. She continues to be able to get up in the morning and do the things she wants to do, her fatigue is not affecting her quality of life significantly. No reported nausea. No other GI complaints at this time. Weight is at 119 pounds. She continues to eat okay. Cold sensitivity and neuropathy improved since dose reduction. She did get you done a car with cycle 6 and her white cell count rebounded nicely today at 33,000. We will likely be able to lower the dose to 3 mL and reassess with next cycle. Platelets above needed baseline to p/w tx as planned - she understands the results may be below cutoff next time. She wishes to proceed with treatment today. She wanted to review her diagnosis and staging today. She also wanted to discuss implications of K-tobi mutation which we did today. She will continue with supplemental IV fluids. She will also get a G-CSF support per above. Alk phos increase noted, will check GGT. Chronological Events:   1/22/22 admitted with abd pain/perforation, dx'ed w colon ca    2/9/22 HFU - reviewed path again and next steps in management    2/28/22 FU C1D1 FOLFOX    3/7/22 FU - toxicity check following C1. Oxaliplatin was dose reduced for first cycle to 80%. 3/14/22 FU C2 FOLFOX-continue with Oxali dose reduction. 3/28/22 Cycle 3 FOLFOX - oxaliplatin is dose reduced. Tolerating well. 4/11/22 pre C4 due to gen debility and plts at 68 - will delay tx by 1 week; replete fluids/lytes   4/18/22 pre C4 - held last week for TCP/weakness. Plts improved and feeling better, no further diarrhea. 5/2/22 pre C5 - held d/t low ANC. Cold sensitivity worse. RX Remeron for appetite/sleep. 5/9/22 Here pre-cycle 5 after one week hold for neutropenia. Holding again for ANC. Next week, reduce dose again by 20% for cold paresthesias, neutropenia and overall tolerance. 5/31/20 pre chemo C6, doing well, improved neuropathy/cod sensitivity with dose reduction, -defer by 1 week  6/27/22 FU pre C7, p/w tx; will plan to drop dose of GCSF to 1/2 after d/w pharmacy.        Family History   Problem Relation Age of Onset    Heart Attack Father     Heart Disease Father         Arterosclerotic Cardiovascular Disease    Breast Cancer Maternal Aunt        Social History     Socioeconomic History    Marital status: Legally      Spouse name: Not on file    Number of children: Not on file    Years of education: Not on file    Highest education level: Not on file   Occupational History    Not on file   Tobacco Use    Smoking status: Never Smoker    Smokeless tobacco: Never Used   Substance and Sexual Activity    Alcohol use: No     Alcohol/week: 0.0 standard drinks    Drug use: Yes     Types: Prescription, OTC    Sexual activity: Not on file   Other Topics Concern    Not on file   Social History Narrative    Not on file     Social Determinants of Health     Financial Resource Strain:     Difficulty of Paying Living Expenses: Not on file   Food Insecurity:     Worried About Running Out of Food in the Last Year: Not on file    Ran Out of Food in the Last Year: Not on file   Transportation Needs:     Lack of Transportation (Medical): Not on file    Lack of Transportation (Non-Medical): Not on file   Physical Activity:     Days of Exercise per Week: Not on file    Minutes of Exercise per Session: Not on file   Stress:     Feeling of Stress : Not on file   Social Connections:     Frequency of Communication with Friends and Family: Not on file    Frequency of Social Gatherings with Friends and Family: Not on file    Attends Religion Services: Not on file    Active Member of [address] [place] [address] [employer] or Organizations: Not on file    Attends Club or Organization Meetings: Not on file    Marital Status: Not on file   Intimate Partner Violence:     Fear of Current or Ex-Partner: Not on file    Emotionally Abused: Not on file    Physically Abused: Not on file    Sexually Abused: Not on file   Housing Stability:     Unable to Pay for Housing in the Last Year: Not on file    Number of [place] in the Last Year: Not on file    Unstable Housing in the Last Year: Not on file            Review of Systems   Constitutional: Positive for fatigue (mild). Negative for appetite change, chills and fever. HENT: Negative. Eyes: Negative. Respiratory: Negative for cough and shortness of breath. Cardiovascular: Negative. Gastrointestinal: Positive for constipation and diarrhea. Genitourinary: Negative. Musculoskeletal:        Had a few days of shoulder/neck soreness-resolved now   Neurological: Positive for numbness (improved with dose reduction). Cold sensitivity improved with dose reduction   Hematological: Negative. Psychiatric/Behavioral: Negative. All other systems reviewed and are negative.           Allergies   Allergen Reactions    Sulfa Antibiotics Hives and Rash         Past Medical History:   Diagnosis Date    Actinic keratosis     Adjustment disorder with mixed anxiety and depressed mood 2/11/2015    Adverse effect of anesthesia     cystoscope - was awake but could not move - dont remember the anesthesetic given    Corneal dystrophy 2/11/2015    Coronary atherosclerosis of native coronary artery 2/11/2015    GERD (gastroesophageal reflux disease)     Irritable bowel syndrome 2/11/2015    Menopause     Mixed hyperlipidemia 2/11/2015    Primary adenocarcinoma of ascending colon ([address] 1/31/2022    Psychiatric disorder     anxiety        Past Surgical History:   Procedure Laterality Date    BREAST BIOPSY Left     COLONOSCOPY      2014    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    CORNEAL TRANSPLANT Left     CYST REMOVAL      breast    IR PORT PLACEMENT EQUAL OR GREATER THAN 5 YEARS  2/21/2022    IR PORT PLACEMENT EQUAL OR GREATER THAN 5 YEARS  2/21/2022    IR PORT PLACEMENT EQUAL OR GREATER THAN 5 YEARS 2/21/2022 [facility]IOLOG[facility] SPECI[facility]      Current Outpatient Medications   Medication Sig Dispense Refill    aspirin 81 MG EC tablet Take 81 mg by mouth      sucralfate (CARAFATE) 1 GM tablet Take 1 tablet by mouth 4 times daily as needed (as needed for gastric indigestion and dyspepsia) 120 tablet 3    Magic Mouthwash (MIRACLE MOUTHWASH) Swish and spit 5 mLs 4 times daily as needed for Irritation 280 mL 3    pantoprazole (PROTONIX) 40 MG tablet TAKE 1 TABLET BY MOUTH EVERY DAY 90 tablet 1    acetaminophen (TYLENOL) 325 MG tablet Take 650 mg by mouth every 6 hours as needed      atorvastatin (LIPITOR) 40 MG tablet Take 40 mg by mouth daily      vitamin D3 (CHOLECALCIFEROL) 125 MCG (5000 UT) TABS tablet Take 1,000 Units by mouth daily Take 1000 units by mouth per day      Hyoscyamine Sulfate SL 0.125 MG SUBL Place 0.125 mg under the tongue every 6 hours as needed      lidocaine-prilocaine (EMLA) 2.5-2.5 % cream Apply topically as needed      ondansetron (ZOFRAN) 8 MG tablet Take 8 mg by mouth every 8 hours as needed      potassium chloride 20 MEQ/15ML (10%) oral solution Take 20 mEq by mouth daily      prednisoLONE acetate (PRED FORTE) 1 % ophthalmic suspension Apply 1 drop to eye      prochlorperazine (COMPAZINE) 10 MG tablet Take 10 mg by mouth every 6 hours as needed       No current facility-administered medications for this visit. OBJECTIVE:   Visit Vitals  Vitals:    06/27/22 1301 06/27/22 1316   BP: (!) 113/52 (!) 118/56   Pulse: 79 81   Resp: 16    Temp: 97.8 °F (36.6 °C)    TempSrc: Oral    SpO2: 98%    Weight: 119 lb 6.4 oz (54.2 kg)    Height: 5' 2\" (1.575 m)         ECOG PERFORMANCE STATUS - 0-Fully active, able to carry on all pre-disease performance without restriction. Pain - 0 - No pain/10. None/Minimal pain - not affecting QOL     Fatigue - No flowsheet data found. Distress - No flowsheet data found. Physical Exam  Vitals reviewed. Exam conducted with a chaperone present. Constitutional:       General: She is not in acute distress. Appearance: Normal appearance. She is normal weight. She is not toxic-appearing. HENT:      Head: Normocephalic and atraumatic. Nose: Nose normal. No congestion. Mouth/Throat:      Mouth: Mucous membranes are moist.      Pharynx: Oropharynx is clear. Eyes:      Conjunctiva/sclera: Conjunctivae normal.   Cardiovascular:      Rate and Rhythm: Normal rate and regular rhythm. Heart sounds: Normal heart sounds. Pulmonary:      Effort: Pulmonary effort is normal. No respiratory distress. Breath sounds: Normal breath sounds. No wheezing. Abdominal:      General: Bowel sounds are normal. There is no distension. Palpations: Abdomen is soft. Tenderness: There is no abdominal tenderness. There is no guarding. Musculoskeletal:         General: Normal range of motion. Cervical back: Normal range of motion and neck supple.       Right lower leg: No edema. Left lower leg: No edema. Skin:     General: Skin is warm and dry. Neurological:      General: No focal deficit present. Mental Status: She is alert and oriented to person, place, and time.    Psychiatric:         Mood and Affect: Mood normal.         Behavior: Behavior normal.             Labs:  Hospital Outpatient Visit on 06/27/2022   Component Date Value Ref Range Status    WBC 06/27/2022 33.8* 4.3 - 11.1 K/uL Final    PERIPHERAL REVIEW TO FOLLOW    RBC 06/27/2022 3.66* 4.05 - 5.2 M/uL Final    Hemoglobin 06/27/2022 11.4* 11.7 - 15.4 g/dL Final    Hematocrit 06/27/2022 35.4* 35.8 - 46.3 % Final    MCV 06/27/2022 96.7  79.6 - 97.8 FL Final    MCH 06/27/2022 31.1  26.1 - 32.9 PG Final    MCHC 06/27/2022 32.2  31.4 - 35.0 g/dL Final    RDW 06/27/2022 15.5* 11.9 - 14.6 % Final    Platelets 86/53/6513 83* 150 - 450 K/uL Final    MPV 06/27/2022 11.0  9.4 - 12.3 FL Final    nRBC 06/27/2022 0.06  0.0 - 0.2 K/uL Final    **Note: Absolute NRBC parameter is now reported with Hemogram**    Seg Neutrophils 06/27/2022 62  43 - 78 % Final    Lymphocytes 06/27/2022 7* 13 - 44 % Final    Monocytes 06/27/2022 6  4.0 - 12.0 % Final    Eosinophils % 06/27/2022 1  0.5 - 7.8 % Final    Basophils 06/27/2022 0  0.0 - 2.0 % Final    Immature Granulocytes 06/27/2022 24* 0.0 - 5.0 % Final    Segs Absolute 06/27/2022 21.0* 1.7 - 8.2 K/UL Final    Absolute Lymph # 06/27/2022 2.4  0.5 - 4.6 K/UL Final    Absolute Mono # 06/27/2022 2.0* 0.1 - 1.3 K/UL Final    Absolute Eos # 06/27/2022 0.3  0.0 - 0.8 K/UL Final    Basophils Absolute 06/27/2022 0.0  0.0 - 0.2 K/UL Final    Absolute Immature Granulocyte 06/27/2022 8.1* 0.0 - 0.5 K/UL Final    RBC Comment 06/27/2022     Final                    Value:SLIGHT  ANISOCYTOSIS + POIKILOCYTOSIS      RBC Comment 06/27/2022     Final                    Value:SLIGHT  POLYCHROMASIA      WBC Comment 06/27/2022 Result Confirmed By Smear    Final    Comment: SLIGHT  TOXIC GRANULATION  OCCASIONAL  DOHLE BODIES      Platelet Comment 49/53/1845 DECREASED    Final    Differential Type 06/27/2022 AUTOMATED    Final    Sodium 06/27/2022 143  136 - 145 mmol/L Final    Potassium 06/27/2022 3.8  3.5 - 5.1 mmol/L Final    Chloride 06/27/2022 111* 98 - 107 mmol/L Final    CO2 06/27/2022 26  21 - 32 mmol/L Final    Anion Gap 06/27/2022 6* 7 - 16 mmol/L Final    Glucose 06/27/2022 140* 65 - 100 mg/dL Final    BUN 06/27/2022 7* 8 - 23 MG/DL Final    CREATININE 06/27/2022 1.00  0.6 - 1.0 MG/DL Final    GFR  06/27/2022 >60  >60 ml/min/1.73m2 Final    GFR Non- 06/27/2022 57* >60 ml/min/1.73m2 Final    Comment:      Estimated GFR is calculated using the Modification of Diet in Renal Disease (MDRD) Study equation, reported for both  Americans (GFRAA) and non- Americans (GFRNA), and normalized to 1.73m2 body surface area. The physician must decide which value applies to the patient. The MDRD study equation should only be used in individuals age 25 or older. It has not been validated for the following: pregnant women, patients with serious comorbid conditions,or on certain medications, or persons with extremes of body size, muscle mass, or nutritional status.  Calcium 06/27/2022 8.8  8.3 - 10.4 MG/DL Final    Total Bilirubin 06/27/2022 0.3  0.2 - 1.1 MG/DL Final    ALT 06/27/2022 50  12 - 65 U/L Final    AST 06/27/2022 46* 15 - 37 U/L Final    Alk Phosphatase 06/27/2022 174* 50 - 136 U/L Final    Total Protein 06/27/2022 6.8  6.3 - 8.2 g/dL Final    Albumin 06/27/2022 3.5  3.2 - 4.6 g/dL Final    Globulin 06/27/2022 3.3  2.3 - 3.5 g/dL Final    Albumin/Globulin Ratio 06/27/2022 1.1* 1.2 - 3.5   Final    Magnesium 06/27/2022 2.1  1.8 - 2.4 mg/dL Final    CEA 06/27/2022 2.3  0.0 - 3.0 ng/mL Final    Comment: Nonsmoker:  <3.0 ng/mL  Smoker:     <5.0 ng/mL  [employer].  Patient's results of tumor marker testing may not be comparable to labs using different manufacturers/methods.  GGT 06/27/2022 49  5 - 55 U/L Final          Imaging:  Reviewed       PATHOLOGY:                         ASSESSMENT:    ICD-10-CM    1. Primary adenocarcinoma of ascending colon (HCC)  C18.2 Gamma GT     Magic Mouthwash (MIRACLE MOUTHWASH)     CANCELED: CBC With Auto Differential     CANCELED: Comprehensive metabolic panel   2. Mucositis  K12.30 Magic Mouthwash (MIRACLE MOUTHWASH)          Ms. Case is here for FU of colon  cancer.         1. Mid-ascending colon adenocarcinoma - [id number] - Stage IIIB (high risk dz due to poorly diff/perforation/obstruction), MSI - stable,  KRAS mut    - Patient wished to lower the dose of oxaliplatin upfront to 80% of the dose. - here for follow-up and cycle 7 FOLFOX, ANC adequate after addition of GCSF - will 1/2 dose and re-assess. Rest of cbc adequate. PLts down as seen with tx - may not need criteria next cycle and aware. Wishes to p/w tx as planned. - significant cold sensitivity - improved with 80% of original dosing    - CEA down from 12.3 to 3.1. Checking monthly/q other tx    - EVON - She completed IV iron    - She did report some numbness or perception of swelling in the ball of her foot bilaterally, closer to lateral phalanges, and she reported that as baseline prior to chemotherapy. - vit D defic - She is taking 5000 of vitamin D.     - GERD - prescribed Protonix and Carafate for reflux, well controlled. - nausea - added emend starting with C2 and worked well, continue with PO anti-emetics prn    - diarrhea - imodium prn.     - abd cramping - levsin prn     - low appetite - RX Remeron at bedtime    - insomnia - can use melatonin at night for insomnia. Surveillance Guidance:  CEA: q3 months x 2 years, q6 months years 3, 4, and 5. CT Scans: Once per year (Consider scanning q6 months for 2 years if considered high risk).   Colonoscopy: One  year after surgery, or within 3 months post-adjuvant therapy if no prior preoperative colonoscopy performed. Subsequent colonoscopies as directed by post-adjuvant colonoscopy findings, as clinically-indicated, or at physician discretion. If no high-risk  findings, repeat no less than every 3-5 years. 2. Supportive care    - s/p bilat corneal transplant - sees Dr Rosanna Thorne [phone] [facility]; d/w her eye team - no CI to start tx. RESUSCITATION DIRECTIVES/HOSPICE CARE: Full Support      RTC per schedule  [40min encounter - chart review, visit, discussion of qs, coordination of care, charting]. MDM  Number of Diagnoses or Management Options  Chemotherapy follow-up examination: established, improving  High risk medication use: established, improving  Mucositis: established, improving  Primary adenocarcinoma of ascending colon ([facility] [address]): established, improving     Amount and/or Complexity of Data Reviewed  Clinical lab tests: ordered and reviewed  Tests in the radiology section of CPT®: ordered and reviewed  Tests in the medicine section of CPT®: ordered  Obtain history from someone other than the patient: yes  Review and summarize past medical records: yes  Independent visualization of images, tracings, or specimens: yes    Risk of Complications, Morbidity, and/or Mortality  Presenting problems: moderate  Diagnostic procedures: moderate  Management options: high         Historical:    - daughter requested testing for DPYD. Reviewed that typically we would test in pts who have severe or unexpected toxicity from fluoropyrimidines (severe myelosuppression, mucositis, diarrhea, neurotoxicity,  cardiotoxicity) during the first few cycles of fluoropyrimidine therapy, not for screening purposes.  If she doesn't do well with tx - we'd dose adjust anyway. Family elected not to p/w testing.     Historical:     - we reviewed the pathophysiology of colon cancer in general, we then reviewed her pathology and staging again and high risk disease.     - to start: FOLFOX: A cycle is every 14 days (6mo)  ? Oxaliplatin   (Eloxatin)  85 mg/m² IV once on day 1 - dose adjusted upfront to 80% of dose to see how tolerates it per  her wishes   ? Leucovorin  400 mg/m² IV once on day 1  ? Fluorouracil  400 mg/m² IV once on day 1  ? Fluorouracil  1,200 mg/m²/day CIV on days 1 and 2. Total dose = 2,400 mg/m²/cycle. OR CAPoX: A cycle is every 21 days   ? Capecitabine   (Xeloda)  850 mg/m² orally twice daily on days 1-14, then off 7 days  ? Oxaliplatin   (Eloxatin)  130 mg/m² IV day 1   - CT chest to complete staging with small RML nodule - ? LN - will monitor on re-imaging, no definite evid of disease. We discussed the CT results that show small nodule in the lung (per rad likely LN, <1cm abutting fissure). We reviewed that this could be a metastatic focus. She has stage III disease, if the lung nodule is cancerous, her tumor staging be stage IV. Intent of treatment will be palliative and not curative in nature. Unable to bx lesion due to size. Daughter and patient aware of this. All questions were asked and answered to the best of my ability. The patient verbalized understanding and agrees with the plan above.            Gregg Saab MD   [facility] Hematology and Oncology  [address] [place] [address]  Office : [phone]  Fax : [phone]

## 2022-06-27 ENCOUNTER — OFFICE VISIT (OUTPATIENT)
Dept: ONCOLOGY | Age: 77
End: 2022-06-27
Payer: MEDICARE

## 2022-06-27 ENCOUNTER — HOSPITAL ENCOUNTER (OUTPATIENT)
Dept: LAB | Age: 77
Discharge: HOME | End: 2022-06-30
Payer: MEDICARE

## 2022-06-27 VITALS
RESPIRATION RATE: 16 BRPM | BODY MASS INDEX: 21.97 KG/M2 | OXYGEN SATURATION: 98 % | HEIGHT: 62 IN | WEIGHT: 119.4 LBS | HEART RATE: 81 BPM | TEMPERATURE: 97.8 F | SYSTOLIC BLOOD PRESSURE: 118 MMHG | DIASTOLIC BLOOD PRESSURE: 56 MMHG

## 2022-06-27 DIAGNOSIS — Z09 CHEMOTHERAPY FOLLOW-UP EXAMINATION: ICD-10-CM

## 2022-06-27 DIAGNOSIS — C18.2 PRIMARY ADENOCARCINOMA OF ASCENDING COLON (HCC): ICD-10-CM

## 2022-06-27 DIAGNOSIS — K12.30 MUCOSITIS: ICD-10-CM

## 2022-06-27 DIAGNOSIS — Z94.7 CORNEAL TRANSPLANT STATUS: ICD-10-CM

## 2022-06-27 DIAGNOSIS — Z79.899 HIGH RISK MEDICATION USE: ICD-10-CM

## 2022-06-27 DIAGNOSIS — C18.2 PRIMARY ADENOCARCINOMA OF ASCENDING COLON (HCC): Primary | ICD-10-CM

## 2022-06-27 LAB
ALBUMIN SERPL-MCNC: 3.5 G/DL (ref 3.2–4.6)
ALBUMIN/GLOB SERPL: 1.1 {RATIO} (ref 1.2–3.5)
ALP SERPL-CCNC: 174 U/L (ref 50–136)
ALT SERPL-CCNC: 50 U/L (ref 12–65)
ANION GAP SERPL CALC-SCNC: 6 MMOL/L (ref 7–16)
AST SERPL-CCNC: 46 U/L (ref 15–37)
BASOPHILS # BLD: 0 K/UL (ref 0–0.2)
BASOPHILS NFR BLD: 0 % (ref 0–2)
BILIRUB SERPL-MCNC: 0.3 MG/DL (ref 0.2–1.1)
BUN SERPL-MCNC: 7 MG/DL (ref 8–23)
CALCIUM SERPL-MCNC: 8.8 MG/DL (ref 8.3–10.4)
CEA SERPL-MCNC: 2.3 NG/ML (ref 0–3)
CHLORIDE SERPL-SCNC: 111 MMOL/L (ref 98–107)
CO2 SERPL-SCNC: 26 MMOL/L (ref 21–32)
CREAT SERPL-MCNC: 1 MG/DL (ref 0.6–1)
DIFFERENTIAL METHOD BLD: ABNORMAL
EOSINOPHIL # BLD: 0.3 K/UL (ref 0–0.8)
EOSINOPHIL NFR BLD: 1 % (ref 0.5–7.8)
ERYTHROCYTE [DISTWIDTH] IN BLOOD BY AUTOMATED COUNT: 15.5 % (ref 11.9–14.6)
GGT SERPL-CCNC: 49 U/L (ref 5–55)
GLOBULIN SER CALC-MCNC: 3.3 G/DL (ref 2.3–3.5)
GLUCOSE SERPL-MCNC: 140 MG/DL (ref 65–100)
HCT VFR BLD AUTO: 35.4 % (ref 35.8–46.3)
HGB BLD-MCNC: 11.4 G/DL (ref 11.7–15.4)
IMM GRANULOCYTES # BLD AUTO: 8.1 K/UL (ref 0–0.5)
IMM GRANULOCYTES NFR BLD AUTO: 24 % (ref 0–5)
LYMPHOCYTES # BLD: 2.4 K/UL (ref 0.5–4.6)
LYMPHOCYTES NFR BLD: 7 % (ref 13–44)
MAGNESIUM SERPL-MCNC: 2.1 MG/DL (ref 1.8–2.4)
MCH RBC QN AUTO: 31.1 PG (ref 26.1–32.9)
MCHC RBC AUTO-ENTMCNC: 32.2 G/DL (ref 31.4–35)
MCV RBC AUTO: 96.7 FL (ref 79.6–97.8)
MONOCYTES # BLD: 2 K/UL (ref 0.1–1.3)
MONOCYTES NFR BLD: 6 % (ref 4–12)
NEUTS SEG # BLD: 21 K/UL (ref 1.7–8.2)
NEUTS SEG NFR BLD: 62 % (ref 43–78)
NRBC # BLD: 0.06 K/UL (ref 0–0.2)
PLATELET # BLD AUTO: 83 K/UL (ref 150–450)
PLATELET COMMENT: ABNORMAL
PMV BLD AUTO: 11 FL (ref 9.4–12.3)
POTASSIUM SERPL-SCNC: 3.8 MMOL/L (ref 3.5–5.1)
PROT SERPL-MCNC: 6.8 G/DL (ref 6.3–8.2)
RBC # BLD AUTO: 3.66 M/UL (ref 4.05–5.2)
RBC MORPH BLD: ABNORMAL
RBC MORPH BLD: ABNORMAL
SODIUM SERPL-SCNC: 143 MMOL/L (ref 136–145)
WBC # BLD AUTO: 33.8 K/UL (ref 4.3–11.1)
WBC MORPH BLD: ABNORMAL

## 2022-06-27 PROCEDURE — 85025 COMPLETE CBC W/AUTO DIFF WBC: CPT

## 2022-06-27 PROCEDURE — 83735 ASSAY OF MAGNESIUM: CPT

## 2022-06-27 PROCEDURE — 36415 COLL VENOUS BLD VENIPUNCTURE: CPT

## 2022-06-27 PROCEDURE — 82977 ASSAY OF GGT: CPT

## 2022-06-27 PROCEDURE — 1090F PRES/ABSN URINE INCON ASSESS: CPT | Performed by: INTERNAL MEDICINE

## 2022-06-27 PROCEDURE — 99215 OFFICE O/P EST HI 40 MIN: CPT | Performed by: INTERNAL MEDICINE

## 2022-06-27 PROCEDURE — G8399 PT W/DXA RESULTS DOCUMENT: HCPCS | Performed by: INTERNAL MEDICINE

## 2022-06-27 PROCEDURE — 1123F ACP DISCUSS/DSCN MKR DOCD: CPT | Performed by: INTERNAL MEDICINE

## 2022-06-27 PROCEDURE — G8420 CALC BMI NORM PARAMETERS: HCPCS | Performed by: INTERNAL MEDICINE

## 2022-06-27 PROCEDURE — 82378 CARCINOEMBRYONIC ANTIGEN: CPT

## 2022-06-27 PROCEDURE — 80053 COMPREHEN METABOLIC PANEL: CPT

## 2022-06-27 PROCEDURE — G8428 CUR MEDS NOT DOCUMENT: HCPCS | Performed by: INTERNAL MEDICINE

## 2022-06-27 PROCEDURE — 1036F TOBACCO NON-USER: CPT | Performed by: INTERNAL MEDICINE

## 2022-06-27 RX ORDER — HEPARIN SODIUM (PORCINE) LOCK FLUSH IV SOLN 100 UNIT/ML 100 UNIT/ML
500 SOLUTION INTRAVENOUS PRN
Status: CANCELLED | OUTPATIENT
Start: 2022-06-28

## 2022-06-27 RX ORDER — ONDANSETRON 2 MG/ML
8 INJECTION INTRAMUSCULAR; INTRAVENOUS
Status: CANCELLED | OUTPATIENT
Start: 2022-06-28

## 2022-06-27 RX ORDER — EPINEPHRINE 1 MG/ML
0.3 INJECTION, SOLUTION, CONCENTRATE INTRAVENOUS PRN
Status: CANCELLED | OUTPATIENT
Start: 2022-06-28

## 2022-06-27 RX ORDER — SODIUM CHLORIDE 9 MG/ML
5-250 INJECTION, SOLUTION INTRAVENOUS PRN
Status: CANCELLED | OUTPATIENT
Start: 2022-06-28

## 2022-06-27 RX ORDER — SODIUM CHLORIDE 0.9 % (FLUSH) 0.9 %
5-40 SYRINGE (ML) INJECTION PRN
Status: CANCELLED | OUTPATIENT
Start: 2022-06-28

## 2022-06-27 RX ORDER — SODIUM CHLORIDE 9 MG/ML
5-40 INJECTION INTRAVENOUS PRN
Status: CANCELLED | OUTPATIENT
Start: 2022-06-30

## 2022-06-27 RX ORDER — DEXTROSE MONOHYDRATE 50 MG/ML
5-250 INJECTION, SOLUTION INTRAVENOUS PRN
Status: CANCELLED | OUTPATIENT
Start: 2022-06-28

## 2022-06-27 RX ORDER — SODIUM CHLORIDE 0.9 % (FLUSH) 0.9 %
5-40 SYRINGE (ML) INJECTION PRN
Status: CANCELLED | OUTPATIENT
Start: 2022-06-30

## 2022-06-27 RX ORDER — SODIUM CHLORIDE 9 MG/ML
5-40 INJECTION INTRAVENOUS PRN
Status: CANCELLED | OUTPATIENT
Start: 2022-06-28

## 2022-06-27 RX ORDER — SUCRALFATE 1 G/1
1 TABLET ORAL 4 TIMES DAILY PRN
Qty: 120 TABLET | Refills: 3 | Status: SHIPPED | OUTPATIENT
Start: 2022-06-27 | End: 2022-09-28

## 2022-06-27 RX ORDER — FAMOTIDINE 10 MG/ML
20 INJECTION, SOLUTION INTRAVENOUS
Status: CANCELLED | OUTPATIENT
Start: 2022-06-28

## 2022-06-27 RX ORDER — 0.9 % SODIUM CHLORIDE 0.9 %
1000 INTRAVENOUS SOLUTION INTRAVENOUS ONCE
Status: CANCELLED
Start: 2022-06-30 | End: 2022-06-30

## 2022-06-27 RX ORDER — HEPARIN SODIUM (PORCINE) LOCK FLUSH IV SOLN 100 UNIT/ML 100 UNIT/ML
500 SOLUTION INTRAVENOUS PRN
Status: CANCELLED | OUTPATIENT
Start: 2022-06-30

## 2022-06-27 RX ORDER — ALBUTEROL SULFATE 90 UG/1
4 AEROSOL, METERED RESPIRATORY (INHALATION) PRN
Status: CANCELLED | OUTPATIENT
Start: 2022-06-28

## 2022-06-27 RX ORDER — ASPIRIN 81 MG/1
81 TABLET ORAL DAILY
COMMUNITY

## 2022-06-27 RX ORDER — DIPHENHYDRAMINE HYDROCHLORIDE 50 MG/ML
50 INJECTION INTRAMUSCULAR; INTRAVENOUS
Status: CANCELLED | OUTPATIENT
Start: 2022-06-28

## 2022-06-27 RX ORDER — ACETAMINOPHEN 325 MG/1
650 TABLET ORAL
Status: CANCELLED | OUTPATIENT
Start: 2022-06-28

## 2022-06-27 RX ORDER — SODIUM CHLORIDE 9 MG/ML
5-250 INJECTION, SOLUTION INTRAVENOUS PRN
Status: CANCELLED | OUTPATIENT
Start: 2022-06-30

## 2022-06-27 RX ORDER — ONDANSETRON 2 MG/ML
8 INJECTION INTRAMUSCULAR; INTRAVENOUS ONCE
Status: CANCELLED | OUTPATIENT
Start: 2022-06-28 | End: 2022-06-28

## 2022-06-27 RX ORDER — SODIUM CHLORIDE 9 MG/ML
INJECTION, SOLUTION INTRAVENOUS CONTINUOUS
Status: CANCELLED | OUTPATIENT
Start: 2022-06-28

## 2022-06-27 RX ORDER — MEPERIDINE HYDROCHLORIDE 50 MG/ML
12.5 INJECTION INTRAMUSCULAR; INTRAVENOUS; SUBCUTANEOUS PRN
Status: CANCELLED | OUTPATIENT
Start: 2022-06-28

## 2022-06-27 RX ORDER — FLUOROURACIL 50 MG/ML
320 INJECTION, SOLUTION INTRAVENOUS ONCE
Status: CANCELLED | OUTPATIENT
Start: 2022-06-28 | End: 2022-06-28

## 2022-06-27 ASSESSMENT — PATIENT HEALTH QUESTIONNAIRE - PHQ9
SUM OF ALL RESPONSES TO PHQ QUESTIONS 1-9: 0
1. LITTLE INTEREST OR PLEASURE IN DOING THINGS: 0
SUM OF ALL RESPONSES TO PHQ9 QUESTIONS 1 & 2: 0
SUM OF ALL RESPONSES TO PHQ QUESTIONS 1-9: 0
SUM OF ALL RESPONSES TO PHQ QUESTIONS 1-9: 0
2. FEELING DOWN, DEPRESSED OR HOPELESS: 0
SUM OF ALL RESPONSES TO PHQ QUESTIONS 1-9: 0

## 2022-06-27 NOTE — PATIENT INSTRUCTIONS
Patient Instructions from Today's Visit    Reason for Visit:  Prechemo visit #7 Folfox    Diagnosis Information:  [email or web address]    Plan:  -Folfox cycle 7 tomorrow    Follow Up:   In 2 weeks    Recent Lab Results:  Hospital Outpatient Visit on 06/27/2022   Component Date Value Ref Range Status    WBC 06/27/2022 33.8* 4.3 - 11.1 K/uL Final    PERIPHERAL REVIEW TO FOLLOW    RBC 06/27/2022 3.66* 4.05 - 5.2 M/uL Final    Hemoglobin 06/27/2022 11.4* 11.7 - 15.4 g/dL Final    Hematocrit 06/27/2022 35.4* 35.8 - 46.3 % Final    MCV 06/27/2022 96.7  79.6 - 97.8 FL Final    MCH 06/27/2022 31.1  26.1 - 32.9 PG Final    MCHC 06/27/2022 32.2  31.4 - 35.0 g/dL Final    RDW 06/27/2022 15.5* 11.9 - 14.6 % Final    Platelets 44/46/6470 83* 150 - 450 K/uL Final    MPV 06/27/2022 11.0  9.4 - 12.3 FL Final    nRBC 06/27/2022 0.06  0.0 - 0.2 K/uL Final    **Note: Absolute NRBC parameter is now reported with Hemogram**    Seg Neutrophils 06/27/2022 62  43 - 78 % Final    Lymphocytes 06/27/2022 7* 13 - 44 % Final    Monocytes 06/27/2022 6  4.0 - 12.0 % Final    Eosinophils % 06/27/2022 1  0.5 - 7.8 % Final    Basophils 06/27/2022 0  0.0 - 2.0 % Final    Immature Granulocytes 06/27/2022 24* 0.0 - 5.0 % Final    Segs Absolute 06/27/2022 21.0* 1.7 - 8.2 K/UL Final    Absolute Lymph # 06/27/2022 2.4  0.5 - 4.6 K/UL Final    Absolute Mono # 06/27/2022 2.0* 0.1 - 1.3 K/UL Final    Absolute Eos # 06/27/2022 0.3  0.0 - 0.8 K/UL Final    Basophils Absolute 06/27/2022 0.0  0.0 - 0.2 K/UL Final    Absolute Immature Granulocyte 06/27/2022 8.1* 0.0 - 0.5 K/UL Final    RBC Comment 06/27/2022     Final                    Value:SLIGHT  ANISOCYTOSIS + POIKILOCYTOSIS      RBC Comment 06/27/2022     Final                    Value:SLIGHT  POLYCHROMASIA      WBC Comment 06/27/2022 Result Confirmed By Smear    Final    Comment: SLIGHT  TOXIC GRANULATION  OCCASIONAL  DOHLE BODIES      Platelet Comment 61/16/6695 DECREASED    Final    Differential Type 06/27/2022 AUTOMATED    Final    Sodium 06/27/2022 143  136 - 145 mmol/L Final    Potassium 06/27/2022 3.8  3.5 - 5.1 mmol/L Final    Chloride 06/27/2022 111* 98 - 107 mmol/L Final    CO2 06/27/2022 26  21 - 32 mmol/L Final    Anion Gap 06/27/2022 6* 7 - 16 mmol/L Final    Glucose 06/27/2022 140* 65 - 100 mg/dL Final    BUN 06/27/2022 7* 8 - 23 MG/DL Final    CREATININE 06/27/2022 1.00  0.6 - 1.0 MG/DL Final    GFR  06/27/2022 >60  >60 ml/min/1.73m2 Final    GFR Non- 06/27/2022 57* >60 ml/min/1.73m2 Final    Comment:      Estimated GFR is calculated using the Modification of Diet in Renal Disease (MDRD) Study equation, reported for both  Americans (GFRAA) and non- Americans (GFRNA), and normalized to 1.73m2 body surface area. The physician must decide which value applies to the patient. The MDRD study equation should only be used in individuals age 25 or older. It has not been validated for the following: pregnant women, patients with serious comorbid conditions,or on certain medications, or persons with extremes of body size, muscle mass, or nutritional status.  Calcium 06/27/2022 8.8  8.3 - 10.4 MG/DL Final    Total Bilirubin 06/27/2022 0.3  0.2 - 1.1 MG/DL Final    ALT 06/27/2022 50  12 - 65 U/L Final    AST 06/27/2022 46* 15 - 37 U/L Final    Alk Phosphatase 06/27/2022 174* 50 - 136 U/L Final    Total Protein 06/27/2022 6.8  6.3 - 8.2 g/dL Final    Albumin 06/27/2022 3.5  3.2 - 4.6 g/dL Final    Globulin 06/27/2022 3.3  2.3 - 3.5 g/dL Final    Albumin/Globulin Ratio 06/27/2022 1.1* 1.2 - 3.5   Final    Magnesium 06/27/2022 2.1  1.8 - 2.4 mg/dL Final    CEA 06/27/2022 2.3  0.0 - 3.0 ng/mL Final    Comment: Nonsmoker:  <3.0 ng/mL  Smoker:     <5.0 ng/mL  [employer].  Patient's results of tumor marker testing may

## 2022-06-28 ENCOUNTER — HOSPITAL ENCOUNTER (OUTPATIENT)
Dept: INFUSION THERAPY | Age: 77
Discharge: HOME | End: 2022-06-28
Payer: MEDICARE

## 2022-06-28 VITALS
RESPIRATION RATE: 16 BRPM | TEMPERATURE: 97.8 F | HEART RATE: 81 BPM | OXYGEN SATURATION: 97 % | DIASTOLIC BLOOD PRESSURE: 79 MMHG | SYSTOLIC BLOOD PRESSURE: 121 MMHG

## 2022-06-28 DIAGNOSIS — C18.2 PRIMARY ADENOCARCINOMA OF ASCENDING COLON (HCC): Primary | ICD-10-CM

## 2022-06-28 PROCEDURE — G0498 CHEMO EXTEND IV INFUS W/PUMP: HCPCS

## 2022-06-28 PROCEDURE — 96411 CHEMO IV PUSH ADDL DRUG: CPT

## 2022-06-28 PROCEDURE — 96368 THER/DIAG CONCURRENT INF: CPT

## 2022-06-28 PROCEDURE — 96375 TX/PRO/DX INJ NEW DRUG ADDON: CPT

## 2022-06-28 PROCEDURE — 96415 CHEMO IV INFUSION ADDL HR: CPT

## 2022-06-28 PROCEDURE — 96413 CHEMO IV INFUSION 1 HR: CPT

## 2022-06-28 PROCEDURE — 2580000003 HC RX 258: Performed by: INTERNAL MEDICINE

## 2022-06-28 PROCEDURE — 6360000002 HC RX W HCPCS: Performed by: INTERNAL MEDICINE

## 2022-06-28 PROCEDURE — 96367 TX/PROPH/DG ADDL SEQ IV INF: CPT

## 2022-06-28 RX ORDER — ONDANSETRON 2 MG/ML
8 INJECTION INTRAMUSCULAR; INTRAVENOUS ONCE
Status: COMPLETED | OUTPATIENT
Start: 2022-06-28 | End: 2022-06-28

## 2022-06-28 RX ORDER — FLUOROURACIL 50 MG/ML
320 INJECTION, SOLUTION INTRAVENOUS ONCE
Status: COMPLETED | OUTPATIENT
Start: 2022-06-28 | End: 2022-06-28

## 2022-06-28 RX ORDER — SODIUM CHLORIDE 0.9 % (FLUSH) 0.9 %
5-40 SYRINGE (ML) INJECTION PRN
Status: DISCONTINUED | OUTPATIENT
Start: 2022-06-28 | End: 2022-06-29 | Stop reason: HOSPADM

## 2022-06-28 RX ORDER — DEXTROSE MONOHYDRATE 50 MG/ML
5-250 INJECTION, SOLUTION INTRAVENOUS PRN
Status: DISCONTINUED | OUTPATIENT
Start: 2022-06-28 | End: 2022-06-29 | Stop reason: HOSPADM

## 2022-06-28 RX ADMIN — FOSAPREPITANT 150 MG: 150 INJECTION, POWDER, LYOPHILIZED, FOR SOLUTION INTRAVENOUS at 11:21

## 2022-06-28 RX ADMIN — DEXTROSE MONOHYDRATE 25 ML/HR: 5 INJECTION, SOLUTION INTRAVENOUS at 11:42

## 2022-06-28 RX ADMIN — DEXAMETHASONE SODIUM PHOSPHATE 12 MG: 4 INJECTION, SOLUTION INTRAMUSCULAR; INTRAVENOUS at 11:02

## 2022-06-28 RX ADMIN — OXALIPLATIN 110 MG: 5 INJECTION, SOLUTION INTRAVENOUS at 11:56

## 2022-06-28 RX ADMIN — SODIUM CHLORIDE, PRESERVATIVE FREE 10 ML: 5 INJECTION INTRAVENOUS at 12:00

## 2022-06-28 RX ADMIN — LEUCOVORIN CALCIUM 650 MG: 350 INJECTION, POWDER, LYOPHILIZED, FOR SOLUTION INTRAMUSCULAR; INTRAVENOUS at 11:56

## 2022-06-28 RX ADMIN — ONDANSETRON 8 MG: 2 INJECTION INTRAMUSCULAR; INTRAVENOUS at 11:00

## 2022-06-28 RX ADMIN — FLUOROURACIL 3000 MG: 50 INJECTION, SOLUTION INTRAVENOUS at 14:15

## 2022-06-28 RX ADMIN — FLUOROURACIL 500 MG: 50 INJECTION, SOLUTION INTRAVENOUS at 14:12

## 2022-06-28 NOTE — PROGRESS NOTES
Patient arrived to [facility] for Folfox. Assessment completed. No needs voiced at this time. Patient tolerated infusion well and is aware of next appointment on 6/30/2022 @1330. Patient discharged ambulatory.

## 2022-06-30 ENCOUNTER — HOSPITAL ENCOUNTER (OUTPATIENT)
Dept: INFUSION THERAPY | Age: 77
Discharge: HOME | End: 2022-06-30
Payer: MEDICARE

## 2022-06-30 VITALS
DIASTOLIC BLOOD PRESSURE: 56 MMHG | OXYGEN SATURATION: 97 % | RESPIRATION RATE: 16 BRPM | HEART RATE: 72 BPM | SYSTOLIC BLOOD PRESSURE: 103 MMHG | TEMPERATURE: 97.6 F

## 2022-06-30 DIAGNOSIS — C18.2 PRIMARY ADENOCARCINOMA OF ASCENDING COLON (HCC): Primary | ICD-10-CM

## 2022-06-30 PROCEDURE — 96360 HYDRATION IV INFUSION INIT: CPT

## 2022-06-30 PROCEDURE — 2580000003 HC RX 258: Performed by: INTERNAL MEDICINE

## 2022-06-30 RX ORDER — SODIUM CHLORIDE 0.9 % (FLUSH) 0.9 %
5-40 SYRINGE (ML) INJECTION PRN
Status: DISCONTINUED | OUTPATIENT
Start: 2022-06-30 | End: 2022-07-01 | Stop reason: HOSPADM

## 2022-06-30 RX ORDER — 0.9 % SODIUM CHLORIDE 0.9 %
1000 INTRAVENOUS SOLUTION INTRAVENOUS ONCE
Status: COMPLETED | OUTPATIENT
Start: 2022-06-30 | End: 2022-06-30

## 2022-06-30 RX ADMIN — SODIUM CHLORIDE 1000 ML: 9 INJECTION, SOLUTION INTRAVENOUS at 13:40

## 2022-06-30 RX ADMIN — SODIUM CHLORIDE, PRESERVATIVE FREE 10 ML: 5 INJECTION INTRAVENOUS at 13:40

## 2022-06-30 RX ADMIN — SODIUM CHLORIDE, PRESERVATIVE FREE 10 ML: 5 INJECTION INTRAVENOUS at 14:42

## 2022-06-30 NOTE — PROGRESS NOTES
Arrived to the [facility]. Adrucil pump and IVF completed. Patient tolerated without problems. Any issues or concerns during appointment: patient with c/o nausea, had taken Compazine, does not like Zofran due to constipation. Stated that she felt better after IVF. Patient aware of next infusion appointment on 6/30/22 (date) at 1 (time)  Patient instructed to call provider with temperature of 100.4 or greater or nausea/vomiting/ diarrhea or pain not controlled by medications  Discharged ambulatory with family.

## 2022-07-01 ENCOUNTER — HOSPITAL ENCOUNTER (OUTPATIENT)
Dept: INFUSION THERAPY | Age: 77
Discharge: HOME | End: 2022-07-01
Payer: MEDICARE

## 2022-07-01 VITALS
OXYGEN SATURATION: 96 % | SYSTOLIC BLOOD PRESSURE: 116 MMHG | HEART RATE: 82 BPM | TEMPERATURE: 98 F | RESPIRATION RATE: 16 BRPM | DIASTOLIC BLOOD PRESSURE: 62 MMHG

## 2022-07-01 DIAGNOSIS — C18.2 PRIMARY ADENOCARCINOMA OF ASCENDING COLON (HCC): Primary | ICD-10-CM

## 2022-07-01 PROCEDURE — 6360000002 HC RX W HCPCS: Performed by: INTERNAL MEDICINE

## 2022-07-01 PROCEDURE — 96372 THER/PROPH/DIAG INJ SC/IM: CPT

## 2022-07-01 RX ADMIN — PEGFILGRASTIM-CBQV 3 MG: 6 INJECTION, SOLUTION SUBCUTANEOUS at 13:50

## 2022-07-01 NOTE — PROGRESS NOTES
Arrived to the [facility]. Udenyca injection completed and tolerated well. Any issues or concerns during appointment: MD made aware patient received full dose instead of half. Patient aware of next infusion appointment on 7/12/22 at 10am with labs and OV prior. Patient instructed to call provider with temperature of 100.4 or greater or nausea/vomiting/ diarrhea or pain not controlled by medications  Discharged ambulatory with family member.

## 2022-07-04 PROBLEM — Z09 CHEMOTHERAPY FOLLOW-UP EXAMINATION: Status: ACTIVE | Noted: 2022-02-28

## 2022-07-04 PROBLEM — K12.30 MUCOSITIS: Status: ACTIVE | Noted: 2022-07-04

## 2022-07-12 ENCOUNTER — HOSPITAL ENCOUNTER (OUTPATIENT)
Dept: INFUSION THERAPY | Age: 77
Discharge: HOME | End: 2022-07-12
Payer: MEDICARE

## 2022-07-12 ENCOUNTER — OFFICE VISIT (OUTPATIENT)
Dept: ONCOLOGY | Age: 77
End: 2022-07-12
Payer: MEDICARE

## 2022-07-12 VITALS
BODY MASS INDEX: 21.64 KG/M2 | WEIGHT: 117.6 LBS | RESPIRATION RATE: 17 BRPM | SYSTOLIC BLOOD PRESSURE: 109 MMHG | OXYGEN SATURATION: 96 % | HEART RATE: 85 BPM | DIASTOLIC BLOOD PRESSURE: 60 MMHG | TEMPERATURE: 98 F | HEIGHT: 62 IN

## 2022-07-12 DIAGNOSIS — C18.2 PRIMARY ADENOCARCINOMA OF ASCENDING COLON (HCC): Primary | ICD-10-CM

## 2022-07-12 DIAGNOSIS — C18.2 PRIMARY ADENOCARCINOMA OF ASCENDING COLON (HCC): ICD-10-CM

## 2022-07-12 DIAGNOSIS — Z09 CHEMOTHERAPY FOLLOW-UP EXAMINATION: ICD-10-CM

## 2022-07-12 LAB
ALBUMIN SERPL-MCNC: 3.5 G/DL (ref 3.2–4.6)
ALBUMIN/GLOB SERPL: 1.2 {RATIO} (ref 1.2–3.5)
ALP SERPL-CCNC: 185 U/L (ref 50–136)
ALT SERPL-CCNC: 48 U/L (ref 12–65)
ANION GAP SERPL CALC-SCNC: 9 MMOL/L (ref 7–16)
AST SERPL-CCNC: 46 U/L (ref 15–37)
BASOPHILS # BLD: 0 K/UL (ref 0–0.2)
BASOPHILS NFR BLD: 0 % (ref 0–2)
BILIRUB SERPL-MCNC: 0.3 MG/DL (ref 0.2–1.1)
BUN SERPL-MCNC: 7 MG/DL (ref 8–23)
CALCIUM SERPL-MCNC: 9 MG/DL (ref 8.3–10.4)
CEA SERPL-MCNC: 2.7 NG/ML (ref 0–3)
CHLORIDE SERPL-SCNC: 110 MMOL/L (ref 98–107)
CO2 SERPL-SCNC: 24 MMOL/L (ref 21–32)
CREAT SERPL-MCNC: 1 MG/DL (ref 0.6–1)
DIFFERENTIAL METHOD BLD: ABNORMAL
EOSINOPHIL # BLD: 0 K/UL (ref 0–0.8)
EOSINOPHIL NFR BLD: 0 % (ref 0.5–7.8)
ERYTHROCYTE [DISTWIDTH] IN BLOOD BY AUTOMATED COUNT: 16.1 % (ref 11.9–14.6)
GLOBULIN SER CALC-MCNC: 3 G/DL (ref 2.3–3.5)
GLUCOSE SERPL-MCNC: 159 MG/DL (ref 65–100)
HCT VFR BLD AUTO: 33.2 % (ref 35.8–46.3)
HGB BLD-MCNC: 10.6 G/DL (ref 11.7–15.4)
IMM GRANULOCYTES # BLD AUTO: 6.6 K/UL (ref 0–0.5)
IMM GRANULOCYTES NFR BLD AUTO: 22 % (ref 0–5)
LYMPHOCYTES # BLD: 2.1 K/UL (ref 0.5–4.6)
LYMPHOCYTES NFR BLD: 7 % (ref 13–44)
MAGNESIUM SERPL-MCNC: 2 MG/DL (ref 1.8–2.4)
MCH RBC QN AUTO: 31 PG (ref 26.1–32.9)
MCHC RBC AUTO-ENTMCNC: 31.9 G/DL (ref 31.4–35)
MCV RBC AUTO: 97.1 FL (ref 79.6–97.8)
MONOCYTES # BLD: 2.1 K/UL (ref 0.1–1.3)
MONOCYTES NFR BLD: 7 % (ref 4–12)
NEUTS SEG # BLD: 19 K/UL (ref 1.7–8.2)
NEUTS SEG NFR BLD: 64 % (ref 43–78)
NRBC # BLD: 0.05 K/UL (ref 0–0.2)
PLATELET # BLD AUTO: 76 K/UL (ref 150–450)
PLATELET COMMENT: ABNORMAL
PMV BLD AUTO: 11.3 FL (ref 9.4–12.3)
POTASSIUM SERPL-SCNC: 3.8 MMOL/L (ref 3.5–5.1)
PROT SERPL-MCNC: 6.5 G/DL (ref 6.3–8.2)
RBC # BLD AUTO: 3.42 M/UL (ref 4.05–5.2)
RBC MORPH BLD: ABNORMAL
SODIUM SERPL-SCNC: 143 MMOL/L (ref 136–145)
WBC # BLD AUTO: 29.8 K/UL (ref 4.3–11.1)
WBC MORPH BLD: ABNORMAL

## 2022-07-12 PROCEDURE — 96375 TX/PRO/DX INJ NEW DRUG ADDON: CPT

## 2022-07-12 PROCEDURE — 1123F ACP DISCUSS/DSCN MKR DOCD: CPT | Performed by: NURSE PRACTITIONER

## 2022-07-12 PROCEDURE — G0498 CHEMO EXTEND IV INFUS W/PUMP: HCPCS

## 2022-07-12 PROCEDURE — G8399 PT W/DXA RESULTS DOCUMENT: HCPCS | Performed by: NURSE PRACTITIONER

## 2022-07-12 PROCEDURE — G8420 CALC BMI NORM PARAMETERS: HCPCS | Performed by: NURSE PRACTITIONER

## 2022-07-12 PROCEDURE — 1036F TOBACCO NON-USER: CPT | Performed by: NURSE PRACTITIONER

## 2022-07-12 PROCEDURE — 96368 THER/DIAG CONCURRENT INF: CPT

## 2022-07-12 PROCEDURE — 80053 COMPREHEN METABOLIC PANEL: CPT

## 2022-07-12 PROCEDURE — 96415 CHEMO IV INFUSION ADDL HR: CPT

## 2022-07-12 PROCEDURE — 99214 OFFICE O/P EST MOD 30 MIN: CPT | Performed by: NURSE PRACTITIONER

## 2022-07-12 PROCEDURE — 96367 TX/PROPH/DG ADDL SEQ IV INF: CPT

## 2022-07-12 PROCEDURE — 96413 CHEMO IV INFUSION 1 HR: CPT

## 2022-07-12 PROCEDURE — 1090F PRES/ABSN URINE INCON ASSESS: CPT | Performed by: NURSE PRACTITIONER

## 2022-07-12 PROCEDURE — 2580000003 HC RX 258: Performed by: INTERNAL MEDICINE

## 2022-07-12 PROCEDURE — 6360000002 HC RX W HCPCS: Performed by: NURSE PRACTITIONER

## 2022-07-12 PROCEDURE — G8427 DOCREV CUR MEDS BY ELIG CLIN: HCPCS | Performed by: NURSE PRACTITIONER

## 2022-07-12 PROCEDURE — 96411 CHEMO IV PUSH ADDL DRUG: CPT

## 2022-07-12 PROCEDURE — 85025 COMPLETE CBC W/AUTO DIFF WBC: CPT

## 2022-07-12 PROCEDURE — 82378 CARCINOEMBRYONIC ANTIGEN: CPT

## 2022-07-12 PROCEDURE — 2580000003 HC RX 258: Performed by: NURSE PRACTITIONER

## 2022-07-12 PROCEDURE — 83735 ASSAY OF MAGNESIUM: CPT

## 2022-07-12 PROCEDURE — 36591 DRAW BLOOD OFF VENOUS DEVICE: CPT

## 2022-07-12 RX ORDER — ONDANSETRON 2 MG/ML
8 INJECTION INTRAMUSCULAR; INTRAVENOUS ONCE
Status: COMPLETED | OUTPATIENT
Start: 2022-07-12 | End: 2022-07-12

## 2022-07-12 RX ORDER — SODIUM CHLORIDE 0.9 % (FLUSH) 0.9 %
5-40 SYRINGE (ML) INJECTION PRN
Status: CANCELLED | OUTPATIENT
Start: 2022-07-14

## 2022-07-12 RX ORDER — SODIUM CHLORIDE 9 MG/ML
INJECTION, SOLUTION INTRAVENOUS CONTINUOUS
Status: CANCELLED | OUTPATIENT
Start: 2022-07-12

## 2022-07-12 RX ORDER — SODIUM CHLORIDE 0.9 % (FLUSH) 0.9 %
5-40 SYRINGE (ML) INJECTION PRN
Status: DISCONTINUED | OUTPATIENT
Start: 2022-07-12 | End: 2022-07-13 | Stop reason: HOSPADM

## 2022-07-12 RX ORDER — SODIUM CHLORIDE 9 MG/ML
5-40 INJECTION INTRAVENOUS PRN
Status: CANCELLED | OUTPATIENT
Start: 2022-07-12

## 2022-07-12 RX ORDER — ACETAMINOPHEN 325 MG/1
650 TABLET ORAL
Status: CANCELLED | OUTPATIENT
Start: 2022-07-12

## 2022-07-12 RX ORDER — FAMOTIDINE 10 MG/ML
20 INJECTION, SOLUTION INTRAVENOUS
Status: CANCELLED | OUTPATIENT
Start: 2022-07-12

## 2022-07-12 RX ORDER — SODIUM CHLORIDE 9 MG/ML
5-40 INJECTION INTRAVENOUS PRN
Status: CANCELLED | OUTPATIENT
Start: 2022-07-14

## 2022-07-12 RX ORDER — FLUOROURACIL 50 MG/ML
320 INJECTION, SOLUTION INTRAVENOUS ONCE
Status: COMPLETED | OUTPATIENT
Start: 2022-07-12 | End: 2022-07-12

## 2022-07-12 RX ORDER — EPINEPHRINE 1 MG/ML
0.3 INJECTION, SOLUTION, CONCENTRATE INTRAVENOUS PRN
Status: CANCELLED | OUTPATIENT
Start: 2022-07-12

## 2022-07-12 RX ORDER — HEPARIN SODIUM (PORCINE) LOCK FLUSH IV SOLN 100 UNIT/ML 100 UNIT/ML
500 SOLUTION INTRAVENOUS PRN
Status: CANCELLED | OUTPATIENT
Start: 2022-07-12

## 2022-07-12 RX ORDER — DEXTROSE MONOHYDRATE 50 MG/ML
5-250 INJECTION, SOLUTION INTRAVENOUS PRN
Status: DISCONTINUED | OUTPATIENT
Start: 2022-07-12 | End: 2022-07-13 | Stop reason: HOSPADM

## 2022-07-12 RX ORDER — MEPERIDINE HYDROCHLORIDE 50 MG/ML
12.5 INJECTION INTRAMUSCULAR; INTRAVENOUS; SUBCUTANEOUS PRN
Status: CANCELLED | OUTPATIENT
Start: 2022-07-12

## 2022-07-12 RX ORDER — 0.9 % SODIUM CHLORIDE 0.9 %
1000 INTRAVENOUS SOLUTION INTRAVENOUS ONCE
Status: CANCELLED
Start: 2022-07-14 | End: 2022-07-14

## 2022-07-12 RX ORDER — ONDANSETRON 2 MG/ML
8 INJECTION INTRAMUSCULAR; INTRAVENOUS ONCE
Status: CANCELLED | OUTPATIENT
Start: 2022-07-12 | End: 2022-07-12

## 2022-07-12 RX ORDER — SODIUM CHLORIDE 0.9 % (FLUSH) 0.9 %
10 SYRINGE (ML) INJECTION PRN
Status: DISCONTINUED | OUTPATIENT
Start: 2022-07-12 | End: 2022-07-13 | Stop reason: HOSPADM

## 2022-07-12 RX ORDER — DIPHENHYDRAMINE HYDROCHLORIDE 50 MG/ML
50 INJECTION INTRAMUSCULAR; INTRAVENOUS
Status: CANCELLED | OUTPATIENT
Start: 2022-07-12

## 2022-07-12 RX ORDER — SODIUM CHLORIDE 0.9 % (FLUSH) 0.9 %
5-40 SYRINGE (ML) INJECTION PRN
Status: CANCELLED | OUTPATIENT
Start: 2022-07-12

## 2022-07-12 RX ORDER — SODIUM CHLORIDE 9 MG/ML
5-250 INJECTION, SOLUTION INTRAVENOUS PRN
Status: CANCELLED | OUTPATIENT
Start: 2022-07-14

## 2022-07-12 RX ORDER — HEPARIN SODIUM (PORCINE) LOCK FLUSH IV SOLN 100 UNIT/ML 100 UNIT/ML
500 SOLUTION INTRAVENOUS PRN
Status: CANCELLED | OUTPATIENT
Start: 2022-07-14

## 2022-07-12 RX ORDER — ONDANSETRON 2 MG/ML
8 INJECTION INTRAMUSCULAR; INTRAVENOUS
Status: CANCELLED | OUTPATIENT
Start: 2022-07-12

## 2022-07-12 RX ORDER — DEXTROSE MONOHYDRATE 50 MG/ML
5-250 INJECTION, SOLUTION INTRAVENOUS PRN
Status: CANCELLED | OUTPATIENT
Start: 2022-07-12

## 2022-07-12 RX ORDER — ALBUTEROL SULFATE 90 UG/1
4 AEROSOL, METERED RESPIRATORY (INHALATION) PRN
Status: CANCELLED | OUTPATIENT
Start: 2022-07-12

## 2022-07-12 RX ORDER — FLUOROURACIL 50 MG/ML
320 INJECTION, SOLUTION INTRAVENOUS ONCE
Status: CANCELLED | OUTPATIENT
Start: 2022-07-12 | End: 2022-07-12

## 2022-07-12 RX ORDER — SODIUM CHLORIDE 9 MG/ML
5-250 INJECTION, SOLUTION INTRAVENOUS PRN
Status: CANCELLED | OUTPATIENT
Start: 2022-07-12

## 2022-07-12 RX ADMIN — FOSAPREPITANT 150 MG: 150 INJECTION, POWDER, LYOPHILIZED, FOR SOLUTION INTRAVENOUS at 11:06

## 2022-07-12 RX ADMIN — FLUOROURACIL 3000 MG: 50 INJECTION, SOLUTION INTRAVENOUS at 13:45

## 2022-07-12 RX ADMIN — SODIUM CHLORIDE, PRESERVATIVE FREE 10 ML: 5 INJECTION INTRAVENOUS at 10:33

## 2022-07-12 RX ADMIN — FLUOROURACIL 500 MG: 50 INJECTION, SOLUTION INTRAVENOUS at 13:40

## 2022-07-12 RX ADMIN — SODIUM CHLORIDE, PRESERVATIVE FREE 10 ML: 5 INJECTION INTRAVENOUS at 09:05

## 2022-07-12 RX ADMIN — DEXAMETHASONE SODIUM PHOSPHATE 12 MG: 4 INJECTION, SOLUTION INTRAMUSCULAR; INTRAVENOUS at 10:50

## 2022-07-12 RX ADMIN — DEXTROSE MONOHYDRATE 150 ML/HR: 50 INJECTION, SOLUTION INTRAVENOUS at 10:35

## 2022-07-12 RX ADMIN — ONDANSETRON 8 MG: 2 INJECTION INTRAMUSCULAR; INTRAVENOUS at 10:43

## 2022-07-12 RX ADMIN — OXALIPLATIN 110 MG: 5 INJECTION, SOLUTION INTRAVENOUS at 11:30

## 2022-07-12 RX ADMIN — LEUCOVORIN CALCIUM 650 MG: 350 INJECTION, POWDER, LYOPHILIZED, FOR SOLUTION INTRAMUSCULAR; INTRAVENOUS at 11:30

## 2022-07-12 ASSESSMENT — PATIENT HEALTH QUESTIONNAIRE - PHQ9
SUM OF ALL RESPONSES TO PHQ QUESTIONS 1-9: 0
1. LITTLE INTEREST OR PLEASURE IN DOING THINGS: 0
2. FEELING DOWN, DEPRESSED OR HOPELESS: 0
SUM OF ALL RESPONSES TO PHQ9 QUESTIONS 1 & 2: 0
SUM OF ALL RESPONSES TO PHQ QUESTIONS 1-9: 0

## 2022-07-12 ASSESSMENT — ENCOUNTER SYMPTOMS
SHORTNESS OF BREATH: 0
EYES NEGATIVE: 1
COUGH: 0
DIARRHEA: 1
CONSTIPATION: 1

## 2022-07-12 NOTE — PROGRESS NOTES
[facility] Hematology and Oncology: Office Visit Established Patient    Chief Complaint   Patient presents with    Follow-up      History of Present Illness:  Ms. Martell is a  65 y.o. female who presents today for  follow up regarding colon cancer. she was admitted on 1/22/22 with peritonitis, colon mass, obstruction and bowel perforation.  PMhx: anxiety,  corneal dystrophy s/p transplants (bilat) on steroid drops, CAD, GERD, IBS, HLD.  She p/w abd pain x2 days.  CT AP c/w 2.3cm mass in mid-ascending colon and LAD with obstruction and perforation.   S/p subsequent right extended colectomy with ileo-transverse staped anastomosis.  Path c/w mod-poorly diff adenocarcinoma - [id number] (2/14 LN +).  We were consulted for recs.    Started FOLFOX - adjuvantly. She is here today for follow up and evaluation prior to C8 FOLFOX. She is here today with her daughter. She continues to feel well overall. She has fatigue, does seem to be slightly worsening as treatment continues but he is still managing okay. She denies any current GI or bowel complaints. She is   She still has cold sensitivity, mainly affecting her mouth, was not able to eat ice cream.  This did resolve in between treatment. Her neuropathy is not currently an issue, she has BL neuropathy prior to starting treatment. She denies any current pain. She denies any fevers, chills, or other infectious symptoms. G-CSF ordered to be at half dose going forward. Chronological Events:  1/22/22 admitted with abd pain/perforation, dx'ed w colon ca    2/9/22 HFU - reviewed path again and next steps in management    2/28/22 FU C1D1 FOLFOX    3/7/22 FU - toxicity check following C1. Oxaliplatin was dose reduced for first cycle to 80%. 3/14/22 FU C2 FOLFOX-continue with Oxali dose reduction. 3/28/22 Cycle 3 FOLFOX - oxaliplatin is dose reduced. Tolerating well.     4/11/22 pre C4 due to gen debility and plts at 68 - will delay tx by 1 week; replete fluids/lytes   4/18/22 pre C4 - held last week for TCP/weakness. Plts improved and feeling better, no further diarrhea. 5/2/22 pre C5 - held d/t low ANC. Cold sensitivity worse. RX Remeron for appetite/sleep. 5/9/22 Here pre-cycle 5 after one week hold for neutropenia. Holding again for ANC. Next week, reduce dose again by 20% for cold paresthesias, neutropenia and overall tolerance. 5/31/20 pre chemo C6, doing well, improved neuropathy/cod sensitivity with dose reduction, -defer by 1 week  6/27/22 FU pre C7, p/w tx; will plan to drop dose of GCSF to 1/2 after d/w pharmacy. 7/12/22 FU pre C8, plt adequate at 76k. Agree to p/w treatment. Family History   Problem Relation Age of Onset    Heart Attack Father     Heart Disease Father         Arterosclerotic Cardiovascular Disease    Breast Cancer Maternal Aunt        Social History     Socioeconomic History    Marital status: Legally      Spouse name: Not on file    Number of children: Not on file    Years of education: Not on file    Highest education level: Not on file   Occupational History    Not on file   Tobacco Use    Smoking status: Never Smoker    Smokeless tobacco: Never Used   Substance and Sexual Activity    Alcohol use: No     Alcohol/week: 0.0 standard drinks    Drug use: Yes     Types: Prescription, OTC    Sexual activity: Not on file   Other Topics Concern    Not on file   Social History Narrative    Not on file     Social Determinants of Health     Financial Resource Strain:     Difficulty of Paying Living Expenses: Not on file   Food Insecurity:     Worried About Running Out of Food in the Last Year: Not on file    Tiesha of Food in the Last Year: Not on file   Transportation Needs:     Lack of Transportation (Medical): Not on file    Lack of Transportation (Non-Medical):  Not on file   Physical Activity:     Days of Exercise per Week: Not on file    Minutes of Exercise per Session: Not on file Stress:     Feeling of Stress : Not on file   Social Connections:     Frequency of Communication with Friends and Family: Not on file    Frequency of Social Gatherings with Friends and Family: Not on file    Attends Jehovah's witness Services: Not on file    Active Member of Clubs or Organizations: Not on file    Attends Club or Organization Meetings: Not on file    Marital Status: Not on file   Intimate Partner Violence:     Fear of Current or Ex-Partner: Not on file    Emotionally Abused: Not on file    Physically Abused: Not on file    Sexually Abused: Not on file   Housing Stability:     Unable to Pay for Housing in the Last Year: Not on file    Number of [place] in the Last Year: Not on file    Unstable Housing in the Last Year: Not on file          Review of Systems   Constitutional: Positive for fatigue (mild). Negative for appetite change, chills and fever. HENT: Negative. Eyes: Negative. Respiratory: Negative for cough and shortness of breath. Cardiovascular: Negative. Gastrointestinal: Positive for constipation and diarrhea. Genitourinary: Negative. Musculoskeletal:        Had a few days of shoulder/neck soreness-resolved now   Neurological: Positive for numbness (improved with dose reduction). Cold sensitivity improved with dose reduction   Hematological: Negative. Psychiatric/Behavioral: Negative. All other systems reviewed and are negative.         Allergies   Allergen Reactions    Sulfa Antibiotics Hives and Rash         Past Medical History:   Diagnosis Date    Actinic keratosis     Adjustment disorder with mixed anxiety and depressed mood 2/11/2015    Adverse effect of anesthesia     cystoscope - was awake but could not move - dont remember the anesthesetic given    Corneal dystrophy 2/11/2015    Coronary atherosclerosis of native coronary artery 2/11/2015    GERD (gastroesophageal reflux disease)     Irritable bowel syndrome 2/11/2015    Menopause Medications Ordered in Other Visits   Medication Dose Route Frequency Provider Last Rate Last Admin    sodium chloride flush 0.9 % injection 10 mL  10 mL IntraVENous PRN Nadia Hayes MD   10 mL at 07/12/22 0905              OBJECTIVE:  Visit Vitals  Vitals:    07/12/22 0913 07/12/22 0918   BP: (!) 122/54 109/60   Site: Right Upper Arm Right Upper Arm   Position: Sitting Standing   Cuff Size: Medium Adult Medium Adult   Pulse: 80 85   Resp: 17    Temp: 98 °F (36.7 °C)    TempSrc: Oral    SpO2: 96%    Weight: 117 lb 9.6 oz (53.3 kg)    Height: 5' 2\" (1.575 m)         ECOG PERFORMANCE STATUS - 0-Fully active, able to carry on all pre-disease performance without restriction. Pain - Pain Score:   0 - No pain (fatigue-0)/10. None/Minimal pain - not affecting QOL     Fatigue - No flowsheet data found. Distress - No flowsheet data found. Physical Exam  Vitals reviewed. Exam conducted with a chaperone present. Constitutional:       General: She is not in acute distress. Appearance: Normal appearance. She is normal weight. She is not toxic-appearing. HENT:      Head: Normocephalic and atraumatic. Nose: Nose normal. No congestion. Mouth/Throat:      Mouth: Mucous membranes are moist.      Pharynx: Oropharynx is clear. Eyes:      Conjunctiva/sclera: Conjunctivae normal.   Cardiovascular:      Rate and Rhythm: Normal rate and regular rhythm. Heart sounds: Normal heart sounds. Pulmonary:      Effort: Pulmonary effort is normal. No respiratory distress. Breath sounds: Normal breath sounds. No wheezing. Abdominal:      General: Bowel sounds are normal. There is no distension. Palpations: Abdomen is soft. Tenderness: There is no abdominal tenderness. There is no guarding. Musculoskeletal:         General: Normal range of motion. Cervical back: Normal range of motion and neck supple. Right lower leg: No edema. Left lower leg: No edema.    Skin:     General: Skin is warm and dry. Neurological:      General: No focal deficit present. Mental Status: She is alert and oriented to person, place, and time. Psychiatric:         Mood and Affect: Mood normal.         Behavior: Behavior normal.          Labs:  Hospital Outpatient Visit on 07/12/2022   Component Date Value Ref Range Status    CEA 07/12/2022 2.7  0.0 - 3.0 ng/mL Final    Comment: Nonsmoker:  <3.0 ng/mL  Smoker:     <5.0 ng/mL  [employer]. Patient's results of tumor marker testing may not be comparable to labs using different manufacturers/methods.  Sodium 07/12/2022 143  136 - 145 mmol/L Final    Potassium 07/12/2022 3.8  3.5 - 5.1 mmol/L Final    Chloride 07/12/2022 110* 98 - 107 mmol/L Final    CO2 07/12/2022 24  21 - 32 mmol/L Final    Anion Gap 07/12/2022 9  7 - 16 mmol/L Final    Glucose 07/12/2022 159* 65 - 100 mg/dL Final    BUN 07/12/2022 7* 8 - 23 MG/DL Final    CREATININE 07/12/2022 1.00  0.6 - 1.0 MG/DL Final    GFR  07/12/2022 >60  >60 ml/min/1.73m2 Final    GFR Non- 07/12/2022 57* >60 ml/min/1.73m2 Final    Comment:      Estimated GFR is calculated using the Modification of Diet in Renal Disease (MDRD) Study equation, reported for both  Americans (GFRAA) and non- Americans (GFRNA), and normalized to 1.73m2 body surface area. The physician must decide which value applies to the patient. The MDRD study equation should only be used in individuals age 25 or older. It has not been validated for the following: pregnant women, patients with serious comorbid conditions,or on certain medications, or persons with extremes of body size, muscle mass, or nutritional status.       Calcium 07/12/2022 9.0  8.3 - 10.4 MG/DL Final    Total Bilirubin 07/12/2022 0.3  0.2 - 1.1 MG/DL Final    ALT 07/12/2022 48  12 - 65 U/L Final    AST 07/12/2022 46* 15 - 37 U/L Final    Alk Phosphatase 07/12/2022 185* 50 - 136 U/L Final    Total Protein 07/12/2022 6.5  6.3 - 8.2 g/dL Final    Albumin 07/12/2022 3.5  3.2 - 4.6 g/dL Final    Globulin 07/12/2022 3.0  2.3 - 3.5 g/dL Final    Albumin/Globulin Ratio 07/12/2022 1.2  1.2 - 3.5   Final    WBC 07/12/2022 29.8* 4.3 - 11.1 K/uL Final    Comment: PERIPHERAL REVIEW TO FOLLOW  RESULTS CHECKED X 2      RBC 07/12/2022 3.42* 4.05 - 5.2 M/uL Final    Hemoglobin 07/12/2022 10.6* 11.7 - 15.4 g/dL Final    Hematocrit 07/12/2022 33.2* 35.8 - 46.3 % Final    MCV 07/12/2022 97.1  79.6 - 97.8 FL Final    MCH 07/12/2022 31.0  26.1 - 32.9 PG Final    MCHC 07/12/2022 31.9  31.4 - 35.0 g/dL Final    RDW 07/12/2022 16.1* 11.9 - 14.6 % Final    Platelets 86/60/1194 76* 150 - 450 K/uL Final    MPV 07/12/2022 11.3  9.4 - 12.3 FL Final    nRBC 07/12/2022 0.05  0.0 - 0.2 K/uL Final    **Note: Absolute NRBC parameter is now reported with Hemogram**    Differential Type 07/12/2022 PENDING   Incomplete    Magnesium 07/12/2022 2.0  1.8 - 2.4 mg/dL Final          Imaging:  Reviewed       PATHOLOGY:                         ASSESSMENT:    ICD-10-CM    1. Primary adenocarcinoma of ascending colon (HCC)  C18.2 CBC With Auto Differential     Comprehensive metabolic panel   2. Chemotherapy follow-up examination  Z09           Ms. Case is here for FU of colon  cancer.         1. Mid-ascending colon adenocarcinoma - [id number] - Stage IIIB (high risk dz due to poorly diff/perforation/obstruction), MSI - stable,  KRAS mut    - Patient wished to lower the dose of oxaliplatin upfront to 80% of the dose. - here for follow-up and cycle 8 FOLFOX, G-CSF half dose moving forward. Plts just above parameter at 76k - they agree to p/w treatment and know she may not meet parameter next time. - significant cold sensitivity - improved with 80% of original dosing   - Elevated Alk phos - GGT checked and WNL at 49 6/2022   - CEA stable at 2.7 7/2022.   Checking monthly/q other tx    - EVON - She completed IV iron    - She did report some numbness or perception of swelling in the ball of her foot bilaterally, closer to lateral phalanges, and she reported that as baseline prior to chemotherapy. - vit D defic - She is taking 5000 of vitamin D.     - GERD - prescribed Protonix and Carafate for reflux, well controlled. - nausea - added emend starting with C2 and worked well, continue with PO anti-emetics prn    - diarrhea - imodium prn.     - abd cramping - levsin prn     - low appetite - RX Remeron at bedtime    - insomnia - can use melatonin at night for insomnia. Surveillance Guidance:  CEA: q3 months x 2 years, q6 months years 3, 4, and 5. CT Scans: Once per year (Consider scanning q6 months for 2 years if considered high risk). Colonoscopy: One  year after surgery, or within 3 months post-adjuvant therapy if no prior preoperative colonoscopy performed. Subsequent colonoscopies as directed by post-adjuvant colonoscopy findings, as clinically-indicated, or at physician discretion. If no high-risk  findings, repeat no less than every 3-5 years. 2. Supportive care    - s/p bilat corneal transplant - sees Dr Gilmar Obregon [phone] [facility]; d/w her eye team - no CI to start tx. RESUSCITATION DIRECTIVES/HOSPICE CARE: Full Support      RTC per schedule  [40min encounter - chart review, visit, discussion of qs, coordination of care, charting]. MDM     Historical:    - daughter requested testing for DPYD. Reviewed that typically we would test in pts who have severe or unexpected toxicity from fluoropyrimidines (severe myelosuppression, mucositis, diarrhea, neurotoxicity,  cardiotoxicity) during the first few cycles of fluoropyrimidine therapy, not for screening purposes.  If she doesn't do well with tx - we'd dose adjust anyway. Family elected not to p/w testing.     Historical:     - we reviewed the pathophysiology of colon cancer in general, we then reviewed her pathology and staging again and high risk disease.     - to start: FOLFOX: A cycle is every 14 days (6mo)  ? Oxaliplatin   (Eloxatin)  85 mg/m² IV once on day 1 - dose adjusted upfront to 80% of dose to see how tolerates it per  her wishes   ? Leucovorin  400 mg/m² IV once on day 1  ? Fluorouracil  400 mg/m² IV once on day 1  ? Fluorouracil  1,200 mg/m²/day CIV on days 1 and 2. Total dose = 2,400 mg/m²/cycle. OR CAPoX: A cycle is every 21 days   ? Capecitabine   (Xeloda)  850 mg/m² orally twice daily on days 1-14, then off 7 days  ? Oxaliplatin   (Eloxatin)  130 mg/m² IV day 1   - CT chest to complete staging with small RML nodule - ? LN - will monitor on re-imaging, no definite evid of disease. We discussed the CT results that show small nodule in the lung (per rad likely LN, <1cm abutting fissure). We reviewed that this could be a metastatic focus. She has stage III disease, if the lung nodule is cancerous, her tumor staging be stage IV. Intent of treatment will be palliative and not curative in nature. Unable to bx lesion due to size. Daughter and patient aware of this. All questions were asked and answered to the best of my ability. The patient verbalized understanding and agrees with the plan above.             LUCAS Sotelo NP  [facility] Hematology and Oncology  [address] [place] [address]  Office : [phone]  Fax : [phone]

## 2022-07-12 NOTE — PLAN OF CARE
Reviewed POC and side effects of pre meds and chemotherapy drugs. Pt verbalized understanding and asked appropriate questions.

## 2022-07-12 NOTE — PROGRESS NOTES
Patient arrived to [facility]. Port accessed, labs drawn, and port remains accessed. Patient discharged ambulatory to Infusion.

## 2022-07-12 NOTE — PATIENT INSTRUCTIONS
Patient Instructions from Today's Visit    Reason for Visit:  Prechemo visit    Diagnosis Information:  [email or web address]     Plan:  -Folfox today  -You may use saline nasal spray AYR     Follow Up:  As scheduled    Recent Lab Results:  Hospital Outpatient Visit on 07/12/2022   Component Date Value Ref Range Status    CEA 07/12/2022 2.7  0.0 - 3.0 ng/mL Final    Comment: Nonsmoker:  <3.0 ng/mL  Smoker:     <5.0 ng/mL  [employer]. Patient's results of tumor marker testing may not be comparable to labs using different manufacturers/methods.  Sodium 07/12/2022 143  136 - 145 mmol/L Final    Potassium 07/12/2022 3.8  3.5 - 5.1 mmol/L Final    Chloride 07/12/2022 110* 98 - 107 mmol/L Final    CO2 07/12/2022 24  21 - 32 mmol/L Final    Anion Gap 07/12/2022 9  7 - 16 mmol/L Final    Glucose 07/12/2022 159* 65 - 100 mg/dL Final    BUN 07/12/2022 7* 8 - 23 MG/DL Final    CREATININE 07/12/2022 1.00  0.6 - 1.0 MG/DL Final    GFR  07/12/2022 >60  >60 ml/min/1.73m2 Final    GFR Non- 07/12/2022 57* >60 ml/min/1.73m2 Final    Comment:      Estimated GFR is calculated using the Modification of Diet in Renal Disease (MDRD) Study equation, reported for both  Americans (GFRAA) and non- Americans (GFRNA), and normalized to 1.73m2 body surface area. The physician must decide which value applies to the patient. The MDRD study equation should only be used in individuals age 25 or older. It has not been validated for the following: pregnant women, patients with serious comorbid conditions,or on certain medications, or persons with extremes of body size, muscle mass, or nutritional status.       Calcium 07/12/2022 9.0  8.3 - 10.4 MG/DL Final    Total Bilirubin 07/12/2022 0.3  0.2 - 1.1 MG/DL Final    ALT 07/12/2022 48  12 - 65 U/L Final    AST 07/12/2022 46* 15 - 37 U/L Final  Alk Phosphatase 07/12/2022 185* 50 - 136 U/L Final    Total Protein 07/12/2022 6.5  6.3 - 8.2 g/dL Final    Albumin 07/12/2022 3.5  3.2 - 4.6 g/dL Final    Globulin 07/12/2022 3.0  2.3 - 3.5 g/dL Final    Albumin/Globulin Ratio 07/12/2022 1.2  1.2 - 3.5   Final    WBC 07/12/2022 29.8* 4.3 - 11.1 K/uL Final    Comment: PERIPHERAL REVIEW TO FOLLOW  RESULTS CHECKED X 2      RBC 07/12/2022 3.42* 4.05 - 5.2 M/uL Final    Hemoglobin 07/12/2022 10.6* 11.7 - 15.4 g/dL Final    Hematocrit 07/12/2022 33.2* 35.8 - 46.3 % Final    MCV 07/12/2022 97.1  79.6 - 97.8 FL Final    MCH 07/12/2022 31.0  26.1 - 32.9 PG Final    MCHC 07/12/2022 31.9  31.4 - 35.0 g/dL Final    RDW 07/12/2022 16.1* 11.9 - 14.6 % Final    Platelets 70/36/1544 76* 150 - 450 K/uL Final    MPV 07/12/2022 11.3  9.4 - 12.3 FL Final    nRBC 07/12/2022 0.05  0.0 - 0.2 K/uL Final    **Note: Absolute NRBC parameter is now reported with Hemogram**    Differential Type 07/12/2022 PENDING   Incomplete    Magnesium 07/12/2022 2.0  1.8 - 2.4 mg/dL Final       Treatment Summary has been discussed and given to patient: n/a        -------------------------------------------------------------------------------------------------------------------  Please call our office at [phone] if you have any  of the following symptoms:   · Fever of 100.5 or greater  · Chills  · Shortness of breath  · Swelling or pain in one leg    After office hours an answering service is available and will contact a provider for emergencies or if you are experiencing any of the above symptoms.  Patient does express an interest in My Chart. My Chart log in information explained on the after visit summary printout at the [facility] Tamara Dove [address] desk.     Ruth Colon, RN  Nurse Navigator  [address] [place] [id number] [phone]

## 2022-07-14 ENCOUNTER — HOSPITAL ENCOUNTER (OUTPATIENT)
Dept: INFUSION THERAPY | Age: 77
Discharge: HOME | End: 2022-07-14
Payer: MEDICARE

## 2022-07-14 VITALS
TEMPERATURE: 98.1 F | OXYGEN SATURATION: 98 % | HEART RATE: 75 BPM | DIASTOLIC BLOOD PRESSURE: 62 MMHG | SYSTOLIC BLOOD PRESSURE: 102 MMHG | RESPIRATION RATE: 16 BRPM

## 2022-07-14 DIAGNOSIS — C18.2 PRIMARY ADENOCARCINOMA OF ASCENDING COLON (HCC): Primary | ICD-10-CM

## 2022-07-14 DIAGNOSIS — E87.6 HYPOKALEMIA: Primary | ICD-10-CM

## 2022-07-14 PROCEDURE — 96360 HYDRATION IV INFUSION INIT: CPT

## 2022-07-14 PROCEDURE — 2580000003 HC RX 258: Performed by: NURSE PRACTITIONER

## 2022-07-14 RX ORDER — POTASSIUM CHLORIDE 20 MEQ/1
20 TABLET, EXTENDED RELEASE ORAL DAILY
Qty: 90 TABLET | Refills: 0 | Status: SHIPPED | OUTPATIENT
Start: 2022-07-14 | End: 2022-08-31 | Stop reason: SDUPTHER

## 2022-07-14 RX ORDER — 0.9 % SODIUM CHLORIDE 0.9 %
1000 INTRAVENOUS SOLUTION INTRAVENOUS ONCE
Status: COMPLETED | OUTPATIENT
Start: 2022-07-14 | End: 2022-07-14

## 2022-07-14 RX ORDER — SODIUM CHLORIDE 0.9 % (FLUSH) 0.9 %
5-40 SYRINGE (ML) INJECTION PRN
Status: DISCONTINUED | OUTPATIENT
Start: 2022-07-14 | End: 2022-07-15 | Stop reason: HOSPADM

## 2022-07-14 RX ADMIN — SODIUM CHLORIDE 1000 ML: 9 INJECTION, SOLUTION INTRAVENOUS at 14:59

## 2022-07-14 RX ADMIN — SODIUM CHLORIDE, PRESERVATIVE FREE 10 ML: 5 INJECTION INTRAVENOUS at 16:01

## 2022-07-14 NOTE — PROGRESS NOTES
Arrived to the [facility]. Pump D/C completed and 1 L IVF given. Patient tolerated well.    Any issues or concerns during appointment: no issues, patient took compazine from her own supply during infusion   Patient aware of next infusion appointment on tomorrow at 330pm  Discharged ambulatory with daughter

## 2022-07-15 ENCOUNTER — HOSPITAL ENCOUNTER (OUTPATIENT)
Dept: INFUSION THERAPY | Age: 77
Discharge: HOME | End: 2022-07-15
Payer: MEDICARE

## 2022-07-15 VITALS
TEMPERATURE: 97.9 F | RESPIRATION RATE: 16 BRPM | DIASTOLIC BLOOD PRESSURE: 54 MMHG | OXYGEN SATURATION: 99 % | HEART RATE: 74 BPM | SYSTOLIC BLOOD PRESSURE: 107 MMHG

## 2022-07-15 DIAGNOSIS — C18.2 PRIMARY ADENOCARCINOMA OF ASCENDING COLON (HCC): Primary | ICD-10-CM

## 2022-07-15 PROCEDURE — 96372 THER/PROPH/DIAG INJ SC/IM: CPT

## 2022-07-15 PROCEDURE — 6360000002 HC RX W HCPCS: Performed by: NURSE PRACTITIONER

## 2022-07-15 RX ADMIN — PEGFILGRASTIM-CBQV 3 MG: 6 INJECTION, SOLUTION SUBCUTANEOUS at 15:32

## 2022-07-15 NOTE — PROGRESS NOTES
Arrived to the [facility]. Brigidaenyca completed. Provided education on Udenyca    Patient instructed to report any side affects to ordering provider. Patient tolerated without complications. Any issues or concerns during appointment: NO.  Patient aware of next infusion appointment on 7/26/22 at 0900. Discharged ambulatory.

## 2022-07-21 ENCOUNTER — HOSPITAL ENCOUNTER (OUTPATIENT)
Dept: INFUSION THERAPY | Age: 77
Discharge: HOME | End: 2022-07-21
Payer: MEDICARE

## 2022-07-21 ENCOUNTER — TELEPHONE (OUTPATIENT)
Dept: ONCOLOGY | Age: 77
End: 2022-07-21

## 2022-07-21 VITALS
HEART RATE: 82 BPM | SYSTOLIC BLOOD PRESSURE: 117 MMHG | OXYGEN SATURATION: 98 % | DIASTOLIC BLOOD PRESSURE: 66 MMHG | TEMPERATURE: 97.6 F | RESPIRATION RATE: 16 BRPM

## 2022-07-21 DIAGNOSIS — R19.7 DIARRHEA, UNSPECIFIED TYPE: Primary | ICD-10-CM

## 2022-07-21 DIAGNOSIS — C18.2 PRIMARY ADENOCARCINOMA OF ASCENDING COLON (HCC): ICD-10-CM

## 2022-07-21 DIAGNOSIS — K12.30 MUCOSITIS: ICD-10-CM

## 2022-07-21 DIAGNOSIS — R19.7 DIARRHEA, UNSPECIFIED TYPE: ICD-10-CM

## 2022-07-21 PROCEDURE — 96360 HYDRATION IV INFUSION INIT: CPT

## 2022-07-21 PROCEDURE — 2580000003 HC RX 258: Performed by: INTERNAL MEDICINE

## 2022-07-21 RX ORDER — SODIUM CHLORIDE 0.9 % (FLUSH) 0.9 %
5-40 SYRINGE (ML) INJECTION PRN
Status: CANCELLED | OUTPATIENT
Start: 2022-07-21

## 2022-07-21 RX ORDER — HEPARIN SODIUM (PORCINE) LOCK FLUSH IV SOLN 100 UNIT/ML 100 UNIT/ML
500 SOLUTION INTRAVENOUS PRN
Status: CANCELLED | OUTPATIENT
Start: 2022-07-21

## 2022-07-21 RX ORDER — SODIUM CHLORIDE 9 MG/ML
5-250 INJECTION, SOLUTION INTRAVENOUS PRN
Status: CANCELLED | OUTPATIENT
Start: 2022-07-21

## 2022-07-21 RX ORDER — SODIUM CHLORIDE 0.9 % (FLUSH) 0.9 %
5-40 SYRINGE (ML) INJECTION PRN
Status: DISCONTINUED | OUTPATIENT
Start: 2022-07-21 | End: 2022-07-22 | Stop reason: HOSPADM

## 2022-07-21 RX ORDER — 0.9 % SODIUM CHLORIDE 0.9 %
1000 INTRAVENOUS SOLUTION INTRAVENOUS ONCE
Status: CANCELLED | OUTPATIENT
Start: 2022-07-21 | End: 2022-07-21

## 2022-07-21 RX ORDER — 0.9 % SODIUM CHLORIDE 0.9 %
1000 INTRAVENOUS SOLUTION INTRAVENOUS ONCE
Status: COMPLETED | OUTPATIENT
Start: 2022-07-21 | End: 2022-07-21

## 2022-07-21 RX ORDER — DIPHENOXYLATE HYDROCHLORIDE AND ATROPINE SULFATE 2.5; .025 MG/1; MG/1
1 TABLET ORAL 4 TIMES DAILY PRN
Qty: 40 TABLET | Refills: 0 | Status: SHIPPED | OUTPATIENT
Start: 2022-07-21 | End: 2022-07-31

## 2022-07-21 RX ADMIN — SODIUM CHLORIDE, PRESERVATIVE FREE 10 ML: 5 INJECTION INTRAVENOUS at 14:00

## 2022-07-21 RX ADMIN — SODIUM CHLORIDE 1000 ML: 9 INJECTION, SOLUTION INTRAVENOUS at 14:05

## 2022-07-21 NOTE — TELEPHONE ENCOUNTER
Pts daughter calling to see if pt can come in for fluids states she has had diarrhea and not able to keep anything down

## 2022-07-21 NOTE — PROGRESS NOTES
Arrived to the [facility]. 1L NS completed. Patient tolerated well. Any issues or concerns during appointment: none. Patient aware of next infusion appointment on 7/26/22 (date) at 56 (time). Patient aware of next lab and [facility] office visit on 7/26/22 (date) at 0900 (time).   Patient instructed to call provider with temperature of 100.4 or greater or nausea/vomiting/ diarrhea or pain not controlled by medications  Discharged ambulatory accompanied by daughter

## 2022-07-26 ENCOUNTER — HOSPITAL ENCOUNTER (OUTPATIENT)
Dept: INFUSION THERAPY | Age: 77
Discharge: HOME | End: 2022-07-26

## 2022-07-26 ENCOUNTER — OFFICE VISIT (OUTPATIENT)
Dept: ONCOLOGY | Age: 77
End: 2022-07-26
Payer: MEDICARE

## 2022-07-26 ENCOUNTER — HOSPITAL ENCOUNTER (OUTPATIENT)
Dept: INFUSION THERAPY | Age: 77
Discharge: HOME | End: 2022-07-26
Payer: MEDICARE

## 2022-07-26 VITALS
DIASTOLIC BLOOD PRESSURE: 51 MMHG | TEMPERATURE: 98.3 F | OXYGEN SATURATION: 98 % | RESPIRATION RATE: 16 BRPM | HEART RATE: 77 BPM | BODY MASS INDEX: 21.4 KG/M2 | WEIGHT: 117 LBS | SYSTOLIC BLOOD PRESSURE: 105 MMHG

## 2022-07-26 DIAGNOSIS — R53.83 FATIGUE DUE TO TREATMENT: ICD-10-CM

## 2022-07-26 DIAGNOSIS — D69.59 CHEMOTHERAPY-INDUCED THROMBOCYTOPENIA: ICD-10-CM

## 2022-07-26 DIAGNOSIS — C18.2 PRIMARY ADENOCARCINOMA OF ASCENDING COLON (HCC): Primary | ICD-10-CM

## 2022-07-26 DIAGNOSIS — T45.1X5A CHEMOTHERAPY INDUCED DIARRHEA: ICD-10-CM

## 2022-07-26 DIAGNOSIS — K52.1 CHEMOTHERAPY INDUCED DIARRHEA: ICD-10-CM

## 2022-07-26 DIAGNOSIS — T45.1X5A CHEMOTHERAPY-INDUCED THROMBOCYTOPENIA: ICD-10-CM

## 2022-07-26 DIAGNOSIS — R53.1 WEAKNESS: ICD-10-CM

## 2022-07-26 DIAGNOSIS — C18.2 PRIMARY ADENOCARCINOMA OF ASCENDING COLON (HCC): ICD-10-CM

## 2022-07-26 LAB
ALBUMIN SERPL-MCNC: 3.3 G/DL (ref 3.2–4.6)
ALBUMIN/GLOB SERPL: 1.1 {RATIO} (ref 1.2–3.5)
ALP SERPL-CCNC: 145 U/L (ref 50–136)
ALT SERPL-CCNC: 57 U/L (ref 12–65)
ANION GAP SERPL CALC-SCNC: 7 MMOL/L (ref 7–16)
AST SERPL-CCNC: 48 U/L (ref 15–37)
BASOPHILS # BLD: 0 K/UL (ref 0–0.2)
BASOPHILS NFR BLD: 0 % (ref 0–2)
BILIRUB SERPL-MCNC: 0.4 MG/DL (ref 0.2–1.1)
BUN SERPL-MCNC: 5 MG/DL (ref 8–23)
CALCIUM SERPL-MCNC: 8.9 MG/DL (ref 8.3–10.4)
CEA SERPL-MCNC: 3.2 NG/ML (ref 0–3)
CHLORIDE SERPL-SCNC: 112 MMOL/L (ref 98–107)
CO2 SERPL-SCNC: 24 MMOL/L (ref 21–32)
CREAT SERPL-MCNC: 0.8 MG/DL (ref 0.6–1)
DIFFERENTIAL METHOD BLD: ABNORMAL
EOSINOPHIL # BLD: 0.1 K/UL (ref 0–0.8)
EOSINOPHIL NFR BLD: 1 % (ref 0.5–7.8)
ERYTHROCYTE [DISTWIDTH] IN BLOOD BY AUTOMATED COUNT: 17.1 % (ref 11.9–14.6)
GLOBULIN SER CALC-MCNC: 2.9 G/DL (ref 2.3–3.5)
GLUCOSE SERPL-MCNC: 97 MG/DL (ref 65–100)
HCT VFR BLD AUTO: 30.6 % (ref 35.8–46.3)
HGB BLD-MCNC: 10.2 G/DL (ref 11.7–15.4)
IMM GRANULOCYTES # BLD AUTO: 0.6 K/UL (ref 0–0.5)
IMM GRANULOCYTES NFR BLD AUTO: 9 % (ref 0–5)
LYMPHOCYTES # BLD: 1.2 K/UL (ref 0.5–4.6)
LYMPHOCYTES NFR BLD: 18 % (ref 13–44)
MAGNESIUM SERPL-MCNC: 1.9 MG/DL (ref 1.8–2.4)
MCH RBC QN AUTO: 31.4 PG (ref 26.1–32.9)
MCHC RBC AUTO-ENTMCNC: 33.3 G/DL (ref 31.4–35)
MCV RBC AUTO: 94.2 FL (ref 79.6–97.8)
MONOCYTES # BLD: 1.2 K/UL (ref 0.1–1.3)
MONOCYTES NFR BLD: 17 % (ref 4–12)
NEUTS SEG # BLD: 3.7 K/UL (ref 1.7–8.2)
NEUTS SEG NFR BLD: 55 % (ref 43–78)
NRBC # BLD: 0 K/UL (ref 0–0.2)
PLATELET # BLD AUTO: 56 K/UL (ref 150–450)
PLATELET COMMENT: ABNORMAL
PMV BLD AUTO: 11.2 FL (ref 9.4–12.3)
POTASSIUM SERPL-SCNC: 3.9 MMOL/L (ref 3.5–5.1)
PROT SERPL-MCNC: 6.2 G/DL (ref 6.3–8.2)
RBC # BLD AUTO: 3.25 M/UL (ref 4.05–5.2)
RBC MORPH BLD: ABNORMAL
SODIUM SERPL-SCNC: 143 MMOL/L (ref 136–145)
WBC # BLD AUTO: 6.8 K/UL (ref 4.3–11.1)
WBC MORPH BLD: ABNORMAL

## 2022-07-26 PROCEDURE — 82378 CARCINOEMBRYONIC ANTIGEN: CPT

## 2022-07-26 PROCEDURE — 80053 COMPREHEN METABOLIC PANEL: CPT

## 2022-07-26 PROCEDURE — 36591 DRAW BLOOD OFF VENOUS DEVICE: CPT

## 2022-07-26 PROCEDURE — 99214 OFFICE O/P EST MOD 30 MIN: CPT | Performed by: NURSE PRACTITIONER

## 2022-07-26 PROCEDURE — 83735 ASSAY OF MAGNESIUM: CPT

## 2022-07-26 PROCEDURE — 1123F ACP DISCUSS/DSCN MKR DOCD: CPT | Performed by: NURSE PRACTITIONER

## 2022-07-26 PROCEDURE — 85025 COMPLETE CBC W/AUTO DIFF WBC: CPT

## 2022-07-26 PROCEDURE — 2580000003 HC RX 258: Performed by: INTERNAL MEDICINE

## 2022-07-26 RX ORDER — SODIUM CHLORIDE 0.9 % (FLUSH) 0.9 %
10 SYRINGE (ML) INJECTION PRN
Status: DISCONTINUED | OUTPATIENT
Start: 2022-07-26 | End: 2022-07-27 | Stop reason: HOSPADM

## 2022-07-26 RX ADMIN — SODIUM CHLORIDE, PRESERVATIVE FREE 10 ML: 5 INJECTION INTRAVENOUS at 09:10

## 2022-07-26 ASSESSMENT — ENCOUNTER SYMPTOMS
COUGH: 0
CONSTIPATION: 0
VOMITING: 0
EYES NEGATIVE: 1
ABDOMINAL PAIN: 1
SHORTNESS OF BREATH: 0
DIARRHEA: 1
NAUSEA: 1

## 2022-07-26 ASSESSMENT — PATIENT HEALTH QUESTIONNAIRE - PHQ9
SUM OF ALL RESPONSES TO PHQ QUESTIONS 1-9: 0
1. LITTLE INTEREST OR PLEASURE IN DOING THINGS: 0
2. FEELING DOWN, DEPRESSED OR HOPELESS: 0
SUM OF ALL RESPONSES TO PHQ QUESTIONS 1-9: 0
SUM OF ALL RESPONSES TO PHQ9 QUESTIONS 1 & 2: 0
SUM OF ALL RESPONSES TO PHQ QUESTIONS 1-9: 0
SUM OF ALL RESPONSES TO PHQ QUESTIONS 1-9: 0

## 2022-07-26 NOTE — PATIENT INSTRUCTIONS
Patient Instructions from Today's Visit    Reason for Visit:  Prechemo visit    Diagnosis Information:  [email or web address]    Plan:  -Hold chemo today due to platelet count 27T  -Return in 1 week with labs/infusion    Follow Up:  As scheduled    Recent Lab Results:  Hospital Outpatient Visit on 07/26/2022   Component Date Value Ref Range Status    WBC 07/26/2022 6.8  4.3 - 11.1 K/uL Final    PERIPHERAL REVIEW TO FOLLOW    RBC 07/26/2022 3.25 (A) 4.05 - 5.2 M/uL Final    Hemoglobin 07/26/2022 10.2 (A) 11.7 - 15.4 g/dL Final    Hematocrit 07/26/2022 30.6 (A) 35.8 - 46.3 % Final    MCV 07/26/2022 94.2  79.6 - 97.8 FL Final    MCH 07/26/2022 31.4  26.1 - 32.9 PG Final    MCHC 07/26/2022 33.3  31.4 - 35.0 g/dL Final    RDW 07/26/2022 17.1 (A) 11.9 - 14.6 % Final    Platelets 17/19/5997 56 (A) 150 - 450 K/uL Final    MPV 07/26/2022 11.2  9.4 - 12.3 FL Final    nRBC 07/26/2022 0.00  0.0 - 0.2 K/uL Final    **Note: Absolute NRBC parameter is now reported with Hemogram**    Differential Type 07/26/2022 PENDING   Incomplete    Sodium 07/26/2022 143  136 - 145 mmol/L Final    Potassium 07/26/2022 3.9  3.5 - 5.1 mmol/L Final    Chloride 07/26/2022 112 (A) 98 - 107 mmol/L Final    CO2 07/26/2022 24  21 - 32 mmol/L Final    Anion Gap 07/26/2022 7  7 - 16 mmol/L Final    Glucose 07/26/2022 97  65 - 100 mg/dL Final    BUN 07/26/2022 5 (A) 8 - 23 MG/DL Final    Creatinine 07/26/2022 0.80  0.6 - 1.0 MG/DL Final    GFR  07/26/2022 >60  >60 ml/min/1.73m2 Final    GFR Non- 07/26/2022 >60  >60 ml/min/1.73m2 Final    Comment:      Estimated GFR is calculated using the Modification of Diet in Renal Disease (MDRD) Study equation, reported for both  Americans (GFRAA) and non- Americans (GFRNA), and normalized to 1.73m2 body surface area. The physician must decide which value applies to the patient.   The MDRD study equation should only be used in individuals age 25 or older. It has not been validated for the following: pregnant women, patients with serious comorbid conditions,or on certain medications, or persons with extremes of body size, muscle mass, or nutritional status. Calcium 07/26/2022 8.9  8.3 - 10.4 MG/DL Final    Total Bilirubin 07/26/2022 0.4  0.2 - 1.1 MG/DL Final    ALT 07/26/2022 57  12 - 65 U/L Final    AST 07/26/2022 48 (A) 15 - 37 U/L Final    Alk Phosphatase 07/26/2022 145 (A) 50 - 136 U/L Final    Total Protein 07/26/2022 6.2 (A) 6.3 - 8.2 g/dL Final    Albumin 07/26/2022 3.3  3.2 - 4.6 g/dL Final    Globulin 07/26/2022 2.9  2.3 - 3.5 g/dL Final    Albumin/Globulin Ratio 07/26/2022 1.1 (A) 1.2 - 3.5   Final    CEA 07/26/2022 3.2 (A) 0.0 - 3.0 ng/mL Final    Comment: Nonsmoker:  <3.0 ng/mL  Smoker:     <5.0 ng/mL  [facility]. Patient's results of tumor marker testing may not be comparable to labs using different manufacturers/methods. Magnesium 07/26/2022 1.9  1.8 - 2.4 mg/dL Final        Treatment Summary has been discussed and given to patient: n/a        -------------------------------------------------------------------------------------------------------------------  Please call our office at [phone] if you have any  of the following symptoms:   Fever of 100.5 or greater  Chills  Shortness of breath  Swelling or pain in one leg    After office hours an answering service is available and will contact a provider for emergencies or if you are experiencing any of the above symptoms. Patient does express an interest in My Chart. My Chart log in information explained on the after visit summary printout at the [facility] desk.     Sulaiman Blankenship RN  Nurse Navigator  [address] [place] [phone]

## 2022-07-26 NOTE — PROGRESS NOTES
Port accessed and labs drawn per protocol; port remains accessed for infusion appt today. Pt discharged ambulatory.

## 2022-07-26 NOTE — PROGRESS NOTES
[facility] Hematology and Oncology: Office Visit Established Patient    Chief Complaint   Patient presents with    Follow-up      History of Present Illness:  Ms. Martell is a  65 y.o. female who presents today for  follow up regarding colon cancer. she was admitted on 1/22/22 with peritonitis, colon mass, obstruction and bowel perforation. PMhx: anxiety,  corneal dystrophy s/p transplants (bilat) on steroid drops, CAD, GERD, IBS, HLD. She p/w abd pain x2 days. CT AP c/w 2.3cm mass in mid-ascending colon and LAD with obstruction and perforation. S/p subsequent right extended colectomy with ileo-transverse staped anastomosis. Path c/w mod-poorly diff adenocarcinoma - qW9qH2h (2/14 LN +). We were consulted for recs. Started FOLFOX - adjuvantly. She returns today for follow up and consideration of C9 FOLFOX. She reports cycle 8 to be the worst yet in regards to fatigue and general weakness. Nausea was present for a few days and diarrhea is ongoing, controlled with imodium. There are intermittent abdominal cramps. No mucositis. There is no cough, shortness of breath, or edema. Cold sensitivity is stable along with her baseline neuropathy. She denies any pain, fevers, or infectious symptoms. Chronological Events:  1/22/22 admitted with abd pain/perforation, dx'ed w colon ca    2/9/22 HFU - reviewed path again and next steps in management    2/28/22 FU C1D1 FOLFOX    3/7/22 FU - toxicity check following C1. Oxaliplatin was dose reduced for first cycle to 80%. 3/14/22 FU C2 FOLFOX-continue with Oxali dose reduction. 3/28/22 Cycle 3 FOLFOX - oxaliplatin is dose reduced. Tolerating well. 4/11/22 pre C4 due to gen debility and plts at 68 - will delay tx by 1 week; replete fluids/lytes   4/18/22 pre C4 - held last week for TCP/weakness. Plts improved and feeling better, no further diarrhea. 5/2/22 pre C5 - held d/t low ANC. Cold sensitivity worse.  RX Remeron for appetite/sleep. 5/9/22 Here pre-cycle 5 after one week hold for neutropenia. Holding again for ANC. Next week, reduce dose again by 20% for cold paresthesias, neutropenia and overall tolerance. 5/31/20 pre chemo C6, doing well, improved neuropathy/cod sensitivity with dose reduction, -defer by 1 week  6/27/22 FU pre C7, p/w tx; will plan to drop dose of GCSF to 1/2 after d/w pharmacy. 7/12/22 FU pre C8, plt adequate at 76k. Agree to p/w treatment. 7/26/22 FU, Plt 56K-defer C9 by 1 week      Family History   Problem Relation Age of Onset    Heart Attack Father     Heart Disease Father         Arterosclerotic Cardiovascular Disease    Breast Cancer Maternal Aunt        Social History     Socioeconomic History    Marital status: Legally      Spouse name: Not on file    Number of children: Not on file    Years of education: Not on file    Highest education level: Not on file   Occupational History    Not on file   Tobacco Use    Smoking status: Never    Smokeless tobacco: Never   Substance and Sexual Activity    Alcohol use: No     Alcohol/week: 0.0 standard drinks    Drug use: Yes     Types: Prescription, OTC    Sexual activity: Not on file   Other Topics Concern    Not on file   Social History Narrative    Not on file     Social Determinants of Health     Financial Resource Strain: Not on file   Food Insecurity: Not on file   Transportation Needs: Not on file   Physical Activity: Not on file   Stress: Not on file   Social Connections: Not on file   Intimate Partner Violence: Not on file   Housing Stability: Not on file          Review of Systems   Constitutional:  Positive for fatigue (mild). Negative for appetite change, chills and fever. HENT: Negative. Eyes: Negative. Respiratory:  Negative for cough and shortness of breath. Cardiovascular: Negative. Gastrointestinal:  Positive for abdominal pain (intermittent cramping), diarrhea and nausea.  Negative for constipation and vomiting. Genitourinary: Negative. Musculoskeletal:         Had a few days of shoulder/neck soreness-resolved now   Skin:  Negative for rash. Neurological:  Positive for weakness (generalized) and numbness (improved with dose reduction). Cold sensitivity improved with dose reduction   Hematological: Negative. Psychiatric/Behavioral: Negative. All other systems reviewed and are negative. Allergies   Allergen Reactions    Sulfa Antibiotics Hives and Rash         Past Medical History:   Diagnosis Date    Actinic keratosis     Adjustment disorder with mixed anxiety and depressed mood 2/11/2015    Adverse effect of anesthesia     cystoscope - was awake but could not move - dont remember the anesthesetic given    Corneal dystrophy 2/11/2015    Coronary atherosclerosis of native coronary artery 2/11/2015    GERD (gastroesophageal reflux disease)     Irritable bowel syndrome 2/11/2015    Menopause     Mixed hyperlipidemia 2/11/2015    Primary adenocarcinoma of ascending colon ([facility] [address] 1/31/2022    Psychiatric disorder     anxiety        Past Surgical History:   Procedure Laterality Date    BREAST BIOPSY Left     COLONOSCOPY      2014    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  40 tablet 0    potassium chloride (KLOR-CON M) 20 MEQ extended release tablet Take 1 tablet by mouth daily 90 tablet 0    aspirin 81 MG EC tablet Take 81 mg by mouth      sucralfate (CARAFATE) 1 GM tablet Take 1 tablet by mouth 4 times daily Exam conducted with a chaperone present. Constitutional:       General: She is not in acute distress. Appearance: Normal appearance. She is normal weight. She is not toxic-appearing. HENT:      Head: Normocephalic and atraumatic. Nose: Nose normal. No congestion. Mouth/Throat:      Mouth: Mucous membranes are moist.      Pharynx: Oropharynx is clear. Eyes:      Conjunctiva/sclera: Conjunctivae normal.   Cardiovascular:      Rate and Rhythm: Normal rate and regular rhythm. Heart sounds: Normal heart sounds. Pulmonary:      Effort: Pulmonary effort is normal. No respiratory distress. Breath sounds: Normal breath sounds. No wheezing. Abdominal:      General: Bowel sounds are normal. There is no distension. Palpations: Abdomen is soft. Tenderness: There is no abdominal tenderness. There is no guarding. Musculoskeletal:         General: Normal range of motion. Cervical back: Normal range of motion and neck supple. Right lower leg: No edema. Left lower leg: No edema. Skin:     General: Skin is warm and dry. Neurological:      General: No focal deficit present. Mental Status: She is alert and oriented to person, place, and time.    Psychiatric:         Mood and Affect: Mood normal.         Behavior: Behavior normal.        Labs:  Hospital Outpatient Visit on 07/26/2022   Component Date Value Ref Range Status    WBC 07/26/2022 6.8  4.3 - 11.1 K/uL Final    PERIPHERAL REVIEW TO FOLLOW    RBC 07/26/2022 3.25 (A) 4.05 - 5.2 M/uL Final    Hemoglobin 07/26/2022 10.2 (A) 11.7 - 15.4 g/dL Final    Hematocrit 07/26/2022 30.6 (A) 35.8 - 46.3 % Final    MCV 07/26/2022 94.2  79.6 - 97.8 FL Final    MCH 07/26/2022 31.4  26.1 - 32.9 PG Final    MCHC 07/26/2022 33.3  31.4 - 35.0 g/dL Final    RDW 07/26/2022 17.1 (A) 11.9 - 14.6 % Final    Platelets 33/39/5091 56 (A) 150 - 450 K/uL Final    MPV 07/26/2022 11.2  9.4 - 12.3 FL Final    nRBC 07/26/2022 0.00  0.0 - 0.2 K/uL Final    **Note: Absolute NRBC parameter is now reported with Hemogram**    Seg Neutrophils 07/26/2022 55  43 - 78 % Final    Lymphocytes 07/26/2022 18  13 - 44 % Final    Monocytes 07/26/2022 17 (A) 4.0 - 12.0 % Final    Eosinophils % 07/26/2022 1  0.5 - 7.8 % Final    Basophils 07/26/2022 0  0.0 - 2.0 % Final    Immature Granulocytes 07/26/2022 9 (A) 0.0 - 5.0 % Final    Segs Absolute 07/26/2022 3.7  1.7 - 8.2 K/UL Final    Absolute Lymph # 07/26/2022 1.2  0.5 - 4.6 K/UL Final    Absolute Mono # 07/26/2022 1.2  0.1 - 1.3 K/UL Final    Absolute Eos # 07/26/2022 0.1  0.0 - 0.8 K/UL Final    Basophils Absolute 07/26/2022 0.0  0.0 - 0.2 K/UL Final    Absolute Immature Granulocyte 07/26/2022 0.6 (A) 0.0 - 0.5 K/UL Final    RBC Comment 07/26/2022     Final                    Value:SLIGHT  ANISOCYTOSIS + POIKILOCYTOSIS      RBC Comment 07/26/2022     Final                    Value:OCCASIONAL  OVALOCYTES      RBC Comment 07/26/2022     Final                    Value:OCCASIONAL  TEARDROP CELLS      RBC Comment 07/26/2022     Final                    Value:SLIGHT  POLYCHROMASIA      WBC Comment 07/26/2022 Result Confirmed By Smear    Final    Comment: SLIGHT  TOXIC GRANULATION      Platelet Comment 99/17/3880 DECREASED    Final    Differential Type 07/26/2022 AUTOMATED    Final    Sodium 07/26/2022 143  136 - 145 mmol/L Final    Potassium 07/26/2022 3.9  3.5 - 5.1 mmol/L Final    Chloride 07/26/2022 112 (A) 98 - 107 mmol/L Final    CO2 07/26/2022 24  21 - 32 mmol/L Final    Anion Gap 07/26/2022 7  7 - 16 mmol/L Final    Glucose 07/26/2022 97  65 - 100 mg/dL Final    BUN 07/26/2022 5 (A) 8 - 23 MG/DL Final    Creatinine 07/26/2022 0.80  0.6 - 1.0 MG/DL Final    GFR  07/26/2022 >60  >60 ml/min/1.73m2 Final    GFR Non- 07/26/2022 >60  >60 ml/min/1.73m2 Final    Comment:      Estimated GFR is calculated using the Modification of Diet in Renal Disease (MDRD) Study equation, reported for both week,G-CSF half dose moving forward. - significant cold sensitivity - improved with 80% of original dosing   - Elevated Alk phos - GGT checked and WNL at 49 6/2022   - CEA with slight increase to 3.2 today, we will continue to monitor monthly    - EVON - She completed IV iron    - She did report some numbness or perception of swelling in the ball of her foot bilaterally, closer to lateral phalanges, and she reported that as baseline prior to chemotherapy. - vit D defic - She is taking 5000 of vitamin D.     - GERD - prescribed Protonix and Carafate for reflux, well controlled. - nausea - added emend starting with C2 and worked well, continue with PO anti-emetics prn    - diarrhea - imodium prn.     - abd cramping - levsin prn     - low appetite - RX Remeron at bedtime    - insomnia - can use melatonin at night for insomnia. Surveillance Guidance:  CEA: q3 months x 2 years, q6 months years 3, 4, and 5. CT Scans: Once per year (Consider scanning q6 months for 2 years if considered high risk). Colonoscopy: One  year after surgery, or within 3 months post-adjuvant therapy if no prior preoperative colonoscopy performed. Subsequent colonoscopies as directed by post-adjuvant colonoscopy findings, as clinically-indicated, or at physician discretion. If no high-risk  findings, repeat no less than every 3-5 years. 2. Supportive care    - s/p bilat corneal transplant - [facility] Dr [phone]; d/w her eye team - no CI to start tx. RESUSCITATION DIRECTIVES/HOSPICE CARE: Full Support      RTC in 1 week for C9 FOLFOX and in 3 weeks for follow up/C10       MDM       Historical:    - daughter requested testing for DPYD.   Reviewed that typically we would test in pts who have severe or unexpected toxicity from fluoropyrimidines (severe myelosuppression, mucositis, diarrhea, neurotoxicity,  cardiotoxicity) during the first few cycles of fluoropyrimidine therapy, not for screening purposes. If she doesn't do well with tx - we'd dose adjust anyway. Family elected not to p/w testing. Historical:     - we reviewed the pathophysiology of colon cancer in general, we then reviewed her pathology and staging again and high risk disease.     - to start: FOLFOX: A cycle is every 14 days (6mo)    Oxaliplatin   (Eloxatin)  85 mg/m² IV once on day 1 - dose adjusted upfront to 80% of dose to see how tolerates it per  her wishes     Leucovorin  400 mg/m² IV once on day 1    Fluorouracil  400 mg/m² IV once on day 1    Fluorouracil  1,200 mg/m²/day CIV on days 1 and 2. Total dose = 2,400 mg/m²/cycle. OR CAPoX: A cycle is every 21 days     Capecitabine   (Xeloda)  850 mg/m² orally twice daily on days 1-14, then off 7 days    Oxaliplatin   (Eloxatin)  130 mg/m² IV day 1   - CT chest to complete staging with small RML nodule - ? LN - will monitor on re-imaging, no definite evid of disease. We discussed the CT results that show small nodule in the lung (per rad likely LN, <1cm abutting fissure). We reviewed that this could be a metastatic focus. She has stage III disease, if the lung nodule is cancerous, her tumor staging be stage IV. Intent of treatment will be palliative and not curative in nature. Unable to bx lesion due to size. Daughter and patient aware of this. All questions were asked and answered to the best of my ability. The patient verbalized understanding and agrees with the plan above.             LUCAS Mclain - [facility] Hematology and Oncology  [address] [place] [address]  Office : [phone]  Fax : [phone]

## 2022-07-27 ENCOUNTER — CLINICAL DOCUMENTATION (OUTPATIENT)
Dept: CASE MANAGEMENT | Age: 77
End: 2022-07-27

## 2022-07-27 DIAGNOSIS — C18.2 PRIMARY ADENOCARCINOMA OF ASCENDING COLON (HCC): Primary | ICD-10-CM

## 2022-07-27 NOTE — PROGRESS NOTES
Saw patient prior to C9 Folfox with Evangelina Bryant NP. We will HOLD all chemo this week due to platelet count 56A. Pt will return in 1 week for labs/infusion only. Pt requested to add CEA in infusion labs. We will add fluids one week after chemo since pt complained of more fatigue and diarrhea this past cycle.  Nurse navigation will be following

## 2022-08-02 ENCOUNTER — HOSPITAL ENCOUNTER (OUTPATIENT)
Dept: ULTRASOUND IMAGING | Age: 77
Discharge: HOME | End: 2022-08-04
Payer: MEDICARE

## 2022-08-02 ENCOUNTER — HOSPITAL ENCOUNTER (OUTPATIENT)
Dept: INFUSION THERAPY | Age: 77
Discharge: HOME | End: 2022-08-02
Payer: MEDICARE

## 2022-08-02 ENCOUNTER — CLINICAL DOCUMENTATION (OUTPATIENT)
Dept: CASE MANAGEMENT | Age: 77
End: 2022-08-02

## 2022-08-02 VITALS
BODY MASS INDEX: 21.51 KG/M2 | DIASTOLIC BLOOD PRESSURE: 54 MMHG | OXYGEN SATURATION: 99 % | TEMPERATURE: 97.8 F | RESPIRATION RATE: 17 BRPM | SYSTOLIC BLOOD PRESSURE: 107 MMHG | WEIGHT: 117.6 LBS | HEART RATE: 90 BPM

## 2022-08-02 DIAGNOSIS — M79.604 RIGHT LEG PAIN: Primary | ICD-10-CM

## 2022-08-02 DIAGNOSIS — C18.2 PRIMARY ADENOCARCINOMA OF ASCENDING COLON (HCC): Primary | ICD-10-CM

## 2022-08-02 DIAGNOSIS — M79.604 RIGHT LEG PAIN: ICD-10-CM

## 2022-08-02 LAB
ALBUMIN SERPL-MCNC: 3.1 G/DL (ref 3.2–4.6)
ALBUMIN/GLOB SERPL: 1 {RATIO} (ref 1.2–3.5)
ALP SERPL-CCNC: 139 U/L (ref 50–136)
ALT SERPL-CCNC: 58 U/L (ref 12–65)
ANION GAP SERPL CALC-SCNC: 6 MMOL/L (ref 7–16)
AST SERPL-CCNC: 53 U/L (ref 15–37)
BASOPHILS # BLD: 0 K/UL (ref 0–0.2)
BASOPHILS NFR BLD: 1 % (ref 0–2)
BILIRUB SERPL-MCNC: 0.4 MG/DL (ref 0.2–1.1)
BUN SERPL-MCNC: 9 MG/DL (ref 8–23)
CALCIUM SERPL-MCNC: 8.7 MG/DL (ref 8.3–10.4)
CEA SERPL-MCNC: 2.6 NG/ML (ref 0–3)
CHLORIDE SERPL-SCNC: 111 MMOL/L (ref 98–107)
CO2 SERPL-SCNC: 23 MMOL/L (ref 21–32)
CREAT SERPL-MCNC: 0.7 MG/DL (ref 0.6–1)
DIFFERENTIAL METHOD BLD: ABNORMAL
EOSINOPHIL # BLD: 0.1 K/UL (ref 0–0.8)
EOSINOPHIL NFR BLD: 2 % (ref 0.5–7.8)
ERYTHROCYTE [DISTWIDTH] IN BLOOD BY AUTOMATED COUNT: 16.2 % (ref 11.9–14.6)
GLOBULIN SER CALC-MCNC: 3.1 G/DL (ref 2.3–3.5)
GLUCOSE SERPL-MCNC: 119 MG/DL (ref 65–100)
HCT VFR BLD AUTO: 32.8 % (ref 35.8–46.3)
HGB BLD-MCNC: 10.5 G/DL (ref 11.7–15.4)
IMM GRANULOCYTES # BLD AUTO: 0.1 K/UL (ref 0–0.5)
IMM GRANULOCYTES NFR BLD AUTO: 4 % (ref 0–5)
LYMPHOCYTES # BLD: 1.1 K/UL (ref 0.5–4.6)
LYMPHOCYTES NFR BLD: 29 % (ref 13–44)
MCH RBC QN AUTO: 30.8 PG (ref 26.1–32.9)
MCHC RBC AUTO-ENTMCNC: 32 G/DL (ref 31.4–35)
MCV RBC AUTO: 96.2 FL (ref 79.6–97.8)
MONOCYTES # BLD: 0.7 K/UL (ref 0.1–1.3)
MONOCYTES NFR BLD: 20 % (ref 4–12)
NEUTS SEG # BLD: 1.7 K/UL (ref 1.7–8.2)
NEUTS SEG NFR BLD: 45 % (ref 43–78)
NRBC # BLD: 0 K/UL (ref 0–0.2)
PLATELET # BLD AUTO: 106 K/UL (ref 150–450)
PMV BLD AUTO: 11 FL (ref 9.4–12.3)
POTASSIUM SERPL-SCNC: 4.1 MMOL/L (ref 3.5–5.1)
PROT SERPL-MCNC: 6.2 G/DL (ref 6.3–8.2)
RBC # BLD AUTO: 3.41 M/UL (ref 4.05–5.2)
SODIUM SERPL-SCNC: 140 MMOL/L (ref 136–145)
WBC # BLD AUTO: 3.7 K/UL (ref 4.3–11.1)

## 2022-08-02 PROCEDURE — 93971 EXTREMITY STUDY: CPT

## 2022-08-02 PROCEDURE — 96415 CHEMO IV INFUSION ADDL HR: CPT

## 2022-08-02 PROCEDURE — 96368 THER/DIAG CONCURRENT INF: CPT

## 2022-08-02 PROCEDURE — 96411 CHEMO IV PUSH ADDL DRUG: CPT

## 2022-08-02 PROCEDURE — 85025 COMPLETE CBC W/AUTO DIFF WBC: CPT

## 2022-08-02 PROCEDURE — 6360000002 HC RX W HCPCS: Performed by: NURSE PRACTITIONER

## 2022-08-02 PROCEDURE — 80053 COMPREHEN METABOLIC PANEL: CPT

## 2022-08-02 PROCEDURE — 96367 TX/PROPH/DG ADDL SEQ IV INF: CPT

## 2022-08-02 PROCEDURE — 82378 CARCINOEMBRYONIC ANTIGEN: CPT

## 2022-08-02 PROCEDURE — 2580000003 HC RX 258: Performed by: NURSE PRACTITIONER

## 2022-08-02 PROCEDURE — G0498 CHEMO EXTEND IV INFUS W/PUMP: HCPCS

## 2022-08-02 PROCEDURE — 96375 TX/PRO/DX INJ NEW DRUG ADDON: CPT

## 2022-08-02 PROCEDURE — 96413 CHEMO IV INFUSION 1 HR: CPT

## 2022-08-02 RX ORDER — SODIUM CHLORIDE 0.9 % (FLUSH) 0.9 %
5-40 SYRINGE (ML) INJECTION PRN
Status: DISCONTINUED | OUTPATIENT
Start: 2022-08-02 | End: 2022-08-03 | Stop reason: HOSPADM

## 2022-08-02 RX ORDER — ACETAMINOPHEN 325 MG/1
650 TABLET ORAL
Status: CANCELLED | OUTPATIENT
Start: 2022-08-02

## 2022-08-02 RX ORDER — HEPARIN SODIUM (PORCINE) LOCK FLUSH IV SOLN 100 UNIT/ML 100 UNIT/ML
500 SOLUTION INTRAVENOUS PRN
Status: CANCELLED | OUTPATIENT
Start: 2022-08-02

## 2022-08-02 RX ORDER — DEXTROSE MONOHYDRATE 50 MG/ML
5-250 INJECTION, SOLUTION INTRAVENOUS PRN
Status: DISCONTINUED | OUTPATIENT
Start: 2022-08-02 | End: 2022-08-03 | Stop reason: HOSPADM

## 2022-08-02 RX ORDER — SODIUM CHLORIDE 9 MG/ML
INJECTION, SOLUTION INTRAVENOUS CONTINUOUS
Status: CANCELLED | OUTPATIENT
Start: 2022-08-02

## 2022-08-02 RX ORDER — DIPHENHYDRAMINE HYDROCHLORIDE 50 MG/ML
50 INJECTION INTRAMUSCULAR; INTRAVENOUS
Status: CANCELLED | OUTPATIENT
Start: 2022-08-02

## 2022-08-02 RX ORDER — ONDANSETRON 2 MG/ML
8 INJECTION INTRAMUSCULAR; INTRAVENOUS ONCE
Status: COMPLETED | OUTPATIENT
Start: 2022-08-02 | End: 2022-08-02

## 2022-08-02 RX ORDER — ALBUTEROL SULFATE 90 UG/1
4 AEROSOL, METERED RESPIRATORY (INHALATION) PRN
Status: CANCELLED | OUTPATIENT
Start: 2022-08-02

## 2022-08-02 RX ORDER — EPINEPHRINE 1 MG/ML
0.3 INJECTION, SOLUTION, CONCENTRATE INTRAVENOUS PRN
Status: CANCELLED | OUTPATIENT
Start: 2022-08-02

## 2022-08-02 RX ORDER — FLUOROURACIL 50 MG/ML
320 INJECTION, SOLUTION INTRAVENOUS ONCE
Status: COMPLETED | OUTPATIENT
Start: 2022-08-02 | End: 2022-08-02

## 2022-08-02 RX ORDER — ONDANSETRON 2 MG/ML
8 INJECTION INTRAMUSCULAR; INTRAVENOUS
Status: CANCELLED | OUTPATIENT
Start: 2022-08-02

## 2022-08-02 RX ORDER — MEPERIDINE HYDROCHLORIDE 25 MG/ML
12.5 INJECTION INTRAMUSCULAR; INTRAVENOUS; SUBCUTANEOUS PRN
Status: CANCELLED | OUTPATIENT
Start: 2022-08-02

## 2022-08-02 RX ORDER — SODIUM CHLORIDE 9 MG/ML
5-40 INJECTION INTRAVENOUS PRN
Status: CANCELLED | OUTPATIENT
Start: 2022-08-02

## 2022-08-02 RX ORDER — SODIUM CHLORIDE 9 MG/ML
5-250 INJECTION, SOLUTION INTRAVENOUS PRN
Status: CANCELLED | OUTPATIENT
Start: 2022-08-02

## 2022-08-02 RX ORDER — FLUCONAZOLE 100 MG/1
100 TABLET ORAL DAILY
Qty: 7 TABLET | Refills: 0 | Status: SHIPPED | OUTPATIENT
Start: 2022-08-02 | End: 2022-08-09

## 2022-08-02 RX ADMIN — DEXAMETHASONE SODIUM PHOSPHATE 12 MG: 4 INJECTION, SOLUTION INTRAMUSCULAR; INTRAVENOUS at 10:36

## 2022-08-02 RX ADMIN — FOSAPREPITANT 150 MG: 150 INJECTION, POWDER, LYOPHILIZED, FOR SOLUTION INTRAVENOUS at 10:58

## 2022-08-02 RX ADMIN — OXALIPLATIN 110 MG: 5 INJECTION, SOLUTION INTRAVENOUS at 11:33

## 2022-08-02 RX ADMIN — FLUOROURACIL 500 MG: 50 INJECTION, SOLUTION INTRAVENOUS at 13:46

## 2022-08-02 RX ADMIN — SODIUM CHLORIDE, PRESERVATIVE FREE 10 ML: 5 INJECTION INTRAVENOUS at 10:16

## 2022-08-02 RX ADMIN — LEUCOVORIN CALCIUM 650 MG: 350 INJECTION, POWDER, LYOPHILIZED, FOR SOLUTION INTRAMUSCULAR; INTRAVENOUS at 11:33

## 2022-08-02 RX ADMIN — ONDANSETRON 8 MG: 2 INJECTION INTRAMUSCULAR; INTRAVENOUS at 10:17

## 2022-08-02 RX ADMIN — FLUOROURACIL 3000 MG: 50 INJECTION, SOLUTION INTRAVENOUS at 13:50

## 2022-08-02 NOTE — PROGRESS NOTES
8/2/22 Kailey Alejandro, [facility] infusion, called to report pt issues - 1. Oral thrush, 2. Does udenyca dose need to be increased this starting this cycle (was dose reduced and pt did not make counts), and 3. Pt fell a few days ago and is reporting knee pain - no swelling, redness, or warmth to touch. Discussed with KARTIK Sherman to pharmacy, she will dose adjust udenyca, and knee to be evaluated by PCP. Infusion nurse aware of plan and will inform pt.

## 2022-08-02 NOTE — PROGRESS NOTES
Arrived to the [facility]. Folfox completed. Pump placed at 1350. Patient tolerated well. Any issues or concerns during appointment: Patient c/o having intermittent pains in right knee after a fall at home. NP notified and patient is to have ultrasound scheduled. Patient aware of next infusion appointment on 8/4/22 (date) at 4:30 pm (time). Patient instructed to call provider with temperature of 100.4 or greater or nausea/vomiting/ diarrhea or pain not controlled by medications  Discharged ambulatory.

## 2022-08-03 PROBLEM — Z09 CHEMOTHERAPY FOLLOW-UP EXAMINATION: Status: RESOLVED | Noted: 2022-02-28 | Resolved: 2022-08-03

## 2022-08-04 ENCOUNTER — HOSPITAL ENCOUNTER (OUTPATIENT)
Dept: INFUSION THERAPY | Age: 77
Discharge: HOME | End: 2022-08-04
Payer: MEDICARE

## 2022-08-04 VITALS
OXYGEN SATURATION: 98 % | DIASTOLIC BLOOD PRESSURE: 74 MMHG | HEART RATE: 75 BPM | TEMPERATURE: 97.8 F | SYSTOLIC BLOOD PRESSURE: 109 MMHG | RESPIRATION RATE: 16 BRPM

## 2022-08-04 DIAGNOSIS — C18.2 PRIMARY ADENOCARCINOMA OF ASCENDING COLON (HCC): Primary | ICD-10-CM

## 2022-08-04 PROCEDURE — 2580000003 HC RX 258: Performed by: INTERNAL MEDICINE

## 2022-08-04 PROCEDURE — 96360 HYDRATION IV INFUSION INIT: CPT

## 2022-08-04 RX ORDER — 0.9 % SODIUM CHLORIDE 0.9 %
1000 INTRAVENOUS SOLUTION INTRAVENOUS ONCE
Status: COMPLETED | OUTPATIENT
Start: 2022-08-04 | End: 2022-08-04

## 2022-08-04 RX ORDER — SODIUM CHLORIDE 9 MG/ML
5-250 INJECTION, SOLUTION INTRAVENOUS PRN
Status: CANCELLED | OUTPATIENT
Start: 2022-08-04

## 2022-08-04 RX ORDER — SODIUM CHLORIDE 9 MG/ML
5-40 INJECTION INTRAVENOUS PRN
Status: CANCELLED | OUTPATIENT
Start: 2022-08-04

## 2022-08-04 RX ORDER — HEPARIN SODIUM (PORCINE) LOCK FLUSH IV SOLN 100 UNIT/ML 100 UNIT/ML
500 SOLUTION INTRAVENOUS PRN
Status: CANCELLED | OUTPATIENT
Start: 2022-08-04

## 2022-08-04 RX ORDER — SODIUM CHLORIDE 0.9 % (FLUSH) 0.9 %
5-40 SYRINGE (ML) INJECTION PRN
Status: DISCONTINUED | OUTPATIENT
Start: 2022-08-04 | End: 2022-08-05 | Stop reason: HOSPADM

## 2022-08-04 RX ADMIN — SODIUM CHLORIDE 1000 ML: 900 INJECTION, SOLUTION INTRAVENOUS at 16:39

## 2022-08-04 NOTE — PROGRESS NOTES
Arrived to the [facility]. Pump D/C completed and 1 L IVF given. Patient tolerated well.   Any issues or concerns during appointment: answered questions about udenyca/diflucan  Patient aware of next infusion appointment on tomorrow at 430pm  Discharged ambulatory with daughter    Report to [facility] RN for remainder of IVF

## 2022-08-05 ENCOUNTER — HOSPITAL ENCOUNTER (OUTPATIENT)
Dept: INFUSION THERAPY | Age: 77
Discharge: HOME | End: 2022-08-05
Payer: MEDICARE

## 2022-08-05 VITALS
HEART RATE: 77 BPM | OXYGEN SATURATION: 98 % | DIASTOLIC BLOOD PRESSURE: 71 MMHG | RESPIRATION RATE: 16 BRPM | SYSTOLIC BLOOD PRESSURE: 121 MMHG | TEMPERATURE: 98.1 F

## 2022-08-05 DIAGNOSIS — C18.2 PRIMARY ADENOCARCINOMA OF ASCENDING COLON (HCC): Primary | ICD-10-CM

## 2022-08-05 PROCEDURE — 6360000002 HC RX W HCPCS: Performed by: NURSE PRACTITIONER

## 2022-08-05 PROCEDURE — 96372 THER/PROPH/DIAG INJ SC/IM: CPT

## 2022-08-05 RX ADMIN — PEGFILGRASTIM-CBQV 3 MG: 6 INJECTION, SOLUTION SUBCUTANEOUS at 16:40

## 2022-08-05 NOTE — PROGRESS NOTES
Arrived to the [facility]. Juaquin completed. Patient tolerated without difficulty. Any issues or concerns during appointment: None. Patient aware of next infusion appointment on 8/8 (date) at 1000(time). Patient instructed to call provider with temperature of 100.4 or greater or nausea/vomiting/ diarrhea or pain not controlled by medications  Discharged ambulatory.

## 2022-08-08 ENCOUNTER — HOSPITAL ENCOUNTER (OUTPATIENT)
Dept: INFUSION THERAPY | Age: 77
Discharge: HOME | End: 2022-08-08
Payer: MEDICARE

## 2022-08-08 VITALS
OXYGEN SATURATION: 96 % | TEMPERATURE: 98.1 F | RESPIRATION RATE: 18 BRPM | SYSTOLIC BLOOD PRESSURE: 113 MMHG | HEART RATE: 75 BPM | DIASTOLIC BLOOD PRESSURE: 55 MMHG

## 2022-08-08 DIAGNOSIS — C18.2 PRIMARY ADENOCARCINOMA OF ASCENDING COLON (HCC): ICD-10-CM

## 2022-08-08 DIAGNOSIS — R19.7 DIARRHEA, UNSPECIFIED TYPE: Primary | ICD-10-CM

## 2022-08-08 PROCEDURE — 96360 HYDRATION IV INFUSION INIT: CPT

## 2022-08-08 PROCEDURE — 2580000003 HC RX 258: Performed by: INTERNAL MEDICINE

## 2022-08-08 RX ORDER — HEPARIN SODIUM (PORCINE) LOCK FLUSH IV SOLN 100 UNIT/ML 100 UNIT/ML
500 SOLUTION INTRAVENOUS PRN
Status: CANCELLED | OUTPATIENT
Start: 2022-08-08

## 2022-08-08 RX ORDER — SODIUM CHLORIDE 9 MG/ML
INJECTION, SOLUTION INTRAVENOUS ONCE
Status: CANCELLED
Start: 2022-08-08 | End: 2022-08-08

## 2022-08-08 RX ORDER — SODIUM CHLORIDE 9 MG/ML
INJECTION, SOLUTION INTRAVENOUS ONCE
Status: COMPLETED | OUTPATIENT
Start: 2022-08-08 | End: 2022-08-08

## 2022-08-08 RX ORDER — SODIUM CHLORIDE 0.9 % (FLUSH) 0.9 %
5-40 SYRINGE (ML) INJECTION PRN
Status: DISCONTINUED | OUTPATIENT
Start: 2022-08-08 | End: 2022-08-09 | Stop reason: HOSPADM

## 2022-08-08 RX ORDER — SODIUM CHLORIDE 0.9 % (FLUSH) 0.9 %
5-40 SYRINGE (ML) INJECTION PRN
Status: CANCELLED | OUTPATIENT
Start: 2022-08-08

## 2022-08-08 RX ORDER — SODIUM CHLORIDE 9 MG/ML
5-250 INJECTION, SOLUTION INTRAVENOUS PRN
Status: CANCELLED | OUTPATIENT
Start: 2022-08-08

## 2022-08-08 RX ADMIN — SODIUM CHLORIDE: 9 INJECTION, SOLUTION INTRAVENOUS at 10:10

## 2022-08-08 RX ADMIN — SODIUM CHLORIDE, PRESERVATIVE FREE 10 ML: 5 INJECTION INTRAVENOUS at 10:10

## 2022-08-08 RX ADMIN — SODIUM CHLORIDE, PRESERVATIVE FREE 10 ML: 5 INJECTION INTRAVENOUS at 11:10

## 2022-08-16 ASSESSMENT — ENCOUNTER SYMPTOMS
EYES NEGATIVE: 1
VOMITING: 0
ABDOMINAL PAIN: 1
SHORTNESS OF BREATH: 0
NAUSEA: 1
COUGH: 0
DIARRHEA: 1
CONSTIPATION: 0

## 2022-08-16 NOTE — PROGRESS NOTES
[facility] Hematology and Oncology: Office Visit Established Patient    Chief Complaint   Patient presents with    Follow-up      History of Present Illness:  Ms. Martell is a  65 y.o. female who presents today for  follow up regarding colon cancer. she was admitted on 1/22/22 with peritonitis, colon mass, obstruction and bowel perforation. PMhx: anxiety,  corneal dystrophy s/p transplants (bilat) on steroid drops, CAD, GERD, IBS, HLD. She p/w abd pain x2 days. CT AP c/w 2.3cm mass in mid-ascending colon and LAD with obstruction and perforation. S/p subsequent right extended colectomy with ileo-transverse staped anastomosis. Path c/w mod-poorly diff adenocarcinoma - [id number] (2/14 LN +). We were consulted for recs. Started FOLFOX - adjuvantly. She returns today for follow up and consideration of C10 FOLFOX. She felt better with supportive IVF at tox check and wishes to c/w this again. We discussed her good tolerability of tx overall. She has some intermittent constipation - bowel regimen reviewed. Also needs Rx MMW. Daughter here with her, with other daughter present via tel. She is looking forward to being done with tx. Fatigue due to therapy is building up as she continues with the tx. No current s/s of infection. No mucositis. There is no cough, shortness of breath, or edema. Cold sensitivity is stable along with her baseline neuropathy. Chronological Events:  1/22/22 admitted with abd pain/perforation, dx'ed w colon ca    2/9/22 HFU - reviewed path again and next steps in management    2/28/22 FU C1D1 FOLFOX    3/7/22 FU - toxicity check following C1. Oxaliplatin was dose reduced for first cycle to 80%. 3/14/22 FU C2 FOLFOX-continue with Oxali dose reduction. 3/28/22 Cycle 3 FOLFOX - oxaliplatin is dose reduced. Tolerating well.     4/11/22 pre C4 due to gen debility and plts at 68 - will delay tx by 1 week; replete fluids/lytes   4/18/22 pre C4 - held last week for TCP/weakness. Plts improved and feeling better, no further diarrhea. 5/2/22 pre C5 - held d/t low ANC. Cold sensitivity worse. RX Remeron for appetite/sleep. 5/9/22 Here pre-cycle 5 after one week hold for neutropenia. Holding again for ANC. Next week, reduce dose again by 20% for cold paresthesias, neutropenia and overall tolerance. 5/31/20 pre chemo C6, doing well, improved neuropathy/cod sensitivity with dose reduction, -defer by 1 week  6/27/22 FU pre C7, p/w tx; will plan to drop dose of GCSF to 1/2 after d/w pharmacy. 7/12/22 FU pre C8, plt adequate at 76k. Agree to p/w treatment. 7/26/22 FU, Plt 56K-defer C9 by 1 week    8/18/22 FU C10, fluids in the interim     Family History   Problem Relation Age of Onset    Heart Attack Father     Heart Disease Father         Arterosclerotic Cardiovascular Disease    Breast Cancer Maternal Aunt        Social History     Socioeconomic History    Marital status: Legally      Spouse name: Not on file    Number of children: Not on file    Years of education: Not on file    Highest education level: Not on file   Occupational History    Not on file   Tobacco Use    Smoking status: Never    Smokeless tobacco: Never   Substance and Sexual Activity    Alcohol use: No     Alcohol/week: 0.0 standard drinks    Drug use: Yes     Types: Prescription, OTC    Sexual activity: Not on file   Other Topics Concern    Not on file   Social History Narrative    Not on file     Social Determinants of Health     Financial Resource Strain: Not on file   Food Insecurity: Not on file   Transportation Needs: Not on file   Physical Activity: Not on file   Stress: Not on file   Social Connections: Not on file   Intimate Partner Violence: Not on file   Housing Stability: Not on file          Review of Systems   Constitutional:  Positive for fatigue (mild). Negative for appetite change, chills and fever. HENT: Negative. Eyes: Negative.     Respiratory:  Negative for cough and shortness of breath. Cardiovascular: Negative. Gastrointestinal:  Positive for abdominal pain (intermittent cramping), diarrhea and nausea. Negative for constipation and vomiting. Genitourinary: Negative. Musculoskeletal:         Had a few days of shoulder/neck soreness-resolved now   Skin:  Negative for rash. Neurological:  Positive for weakness (generalized) and numbness (improved with dose reduction). Cold sensitivity improved with dose reduction   Hematological: Negative. Psychiatric/Behavioral: Negative. All other systems reviewed and are negative.       Allergies   Allergen Reactions    Sulfa Antibiotics Hives and Rash         Past Medical History:   Diagnosis Date    Actinic keratosis     Adjustment disorder with mixed anxiety and depressed mood 2/11/2015    Adverse effect of anesthesia     cystoscope - was awake but could not move - dont remember the anesthesetic given    Corneal dystrophy 2/11/2015    Coronary atherosclerosis of native coronary artery 2/11/2015    GERD (gastroesophageal reflux disease)     Irritable bowel syndrome 2/11/2015    Menopause     Mixed hyperlipidemia 2/11/2015    Primary adenocarcinoma of ascending colon ([address] 1/31/2022    Psychiatric disorder     anxiety        Past Surgical History:   Procedure Laterality Date    BREAST BIOPSY Left     COLONOSCOPY      2014    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    CORNEAL TRANSPLANT Left     CYST REMOVAL      breast    IR PORT PLACEMENT EQUAL OR GREATER THAN 5 YEARS  2/21/2022    IR PORT PLACEMENT EQUAL OR GREATER THAN 5 YEARS  2/21/2022    IR PORT PLACEMENT EQUAL OR GREATER THAN 5 YEARS 2/21/2022 [facility] RADIOLOGY SPECIALS      Current Outpatient Medications   Medication Sig Dispense Refill    potassium chloride (KLOR-CON M) 20 MEQ extended release tablet Take 1 tablet by mouth daily 90 tablet 0    aspirin 81 MG EC tablet Take 81 mg by mouth      sucralfate (CARAFATE) 1 GM tablet Take 1 tablet by mouth 4 times daily as needed (as needed for gastric indigestion and dyspepsia) 120 tablet 3    Magic Mouthwash (MIRACLE MOUTHWASH) Swish and spit 5 mLs 4 times daily as needed for Irritation 280 mL 3    pantoprazole (PROTONIX) 40 MG tablet TAKE 1 TABLET BY MOUTH EVERY DAY 90 tablet 1    acetaminophen (TYLENOL) 325 MG tablet Take 650 mg by mouth every 6 hours as needed      atorvastatin (LIPITOR) 40 MG tablet Take 40 mg by mouth daily      vitamin D3 (CHOLECALCIFEROL) 125 MCG (5000 UT) TABS tablet Take 1,000 Units by mouth daily Take 1000 units by mouth per day      Hyoscyamine Sulfate SL 0.125 MG SUBL Place 0.125 mg under the tongue every 6 hours as needed      lidocaine-prilocaine (EMLA) 2.5-2.5 % cream Apply topically as needed      ondansetron (ZOFRAN) 8 MG tablet Take 8 mg by mouth every 8 hours as needed      prednisoLONE acetate (PRED FORTE) 1 % ophthalmic suspension Apply 1 drop to eye      prochlorperazine (COMPAZINE) 10 MG tablet Take 10 mg by mouth every 6 hours as needed      potassium chloride 20 MEQ/15ML (10%) oral solution Take 20 mEq by mouth daily (Patient not taking: No sig reported)       No current facility-administered medications for this visit. OBJECTIVE:  Visit Vitals  Vitals:    08/18/22 1554 08/18/22 1605   BP: 117/60 117/64   Site: Left Upper Arm Left Upper Arm   Position: Sitting Standing   Cuff Size: Medium Adult Medium Adult   Pulse: 55 71   Resp: 16    Temp: 97.8 °F (36.6 °C)    TempSrc: Oral    SpO2: 98%    Weight: 118 lb 6.4 oz (53.7 kg)    Height: 5' 2\" (1.575 m)         ECOG PERFORMANCE STATUS - 0-Fully active, able to carry on all pre-disease performance without restriction. Pain - Pain Score:   0 - No pain (fatigue-3)/10. None/Minimal pain - not affecting QOL     Fatigue - No flowsheet data found. Distress - No flowsheet data found. Physical Exam  Vitals reviewed. Exam conducted with a chaperone present.    Constitutional:       General: She is not in acute distress. Appearance: Normal appearance. She is normal weight. She is not toxic-appearing. HENT:      Head: Normocephalic and atraumatic. Nose: Nose normal. No congestion. Mouth/Throat:      Mouth: Mucous membranes are moist.      Pharynx: Oropharynx is clear. Eyes:      Conjunctiva/sclera: Conjunctivae normal.   Cardiovascular:      Rate and Rhythm: Normal rate and regular rhythm. Heart sounds: Normal heart sounds. Pulmonary:      Effort: Pulmonary effort is normal. No respiratory distress. Breath sounds: Normal breath sounds. No wheezing. Abdominal:      General: Bowel sounds are normal. There is no distension. Palpations: Abdomen is soft. Tenderness: There is no abdominal tenderness. There is no guarding. Musculoskeletal:         General: Normal range of motion. Cervical back: Normal range of motion and neck supple. Right lower leg: No edema. Left lower leg: No edema. Skin:     General: Skin is warm and dry. Neurological:      General: No focal deficit present. Mental Status: She is alert and oriented to person, place, and time.    Psychiatric:         Mood and Affect: Mood normal.         Behavior: Behavior normal.        Labs:  Hospital Outpatient Visit on 08/18/2022   Component Date Value Ref Range Status    WBC 08/18/2022 6.8  4.3 - 11.1 K/uL Final    RESULTS CHECKED X 2    RBC 08/18/2022 3.41 (A) 4.05 - 5.2 M/uL Final    Hemoglobin 08/18/2022 10.8 (A) 11.7 - 15.4 g/dL Final    Hematocrit 08/18/2022 33.3 (A) 35.8 - 46.3 % Final    MCV 08/18/2022 97.7  79.6 - 97.8 FL Final    MCH 08/18/2022 31.7  26.1 - 32.9 PG Final    MCHC 08/18/2022 32.4  31.4 - 35.0 g/dL Final    RDW 08/18/2022 16.5 (A) 11.9 - 14.6 % Final    Platelets 42/03/2075 82 (A) 150 - 450 K/uL Final    MPV 08/18/2022 11.1  9.4 - 12.3 FL Final    nRBC 08/18/2022 0.00  0.0 - 0.2 K/uL Final    **Note: Absolute NRBC parameter is now reported with Hemogram**    Seg Neutrophils 08/18/2022 59  43 - 78 % Final    Lymphocytes 08/18/2022 21  13 - 44 % Final    Monocytes 08/18/2022 14 (A) 4.0 - 12.0 % Final    Eosinophils % 08/18/2022 2  0.5 - 7.8 % Final    Basophils 08/18/2022 0  0.0 - 2.0 % Final    Immature Granulocytes 08/18/2022 4  0.0 - 5.0 % Final    Segs Absolute 08/18/2022 3.9  1.7 - 8.2 K/UL Final    Absolute Lymph # 08/18/2022 1.4  0.5 - 4.6 K/UL Final    Absolute Mono # 08/18/2022 0.9  0.1 - 1.3 K/UL Final    Absolute Eos # 08/18/2022 0.2  0.0 - 0.8 K/UL Final    Basophils Absolute 08/18/2022 0.0  0.0 - 0.2 K/UL Final    Absolute Immature Granulocyte 08/18/2022 0.3  0.0 - 0.5 K/UL Final    Differential Type 08/18/2022 AUTOMATED    Final    CEA 08/18/2022 2.4  0.0 - 3.0 ng/mL Final    Comment: Nonsmoker:  <3.0 ng/mL  Smoker:     <5.0 ng/mL  [facility]. Patient's results of tumor marker testing may not be comparable to labs using different manufacturers/methods. Magnesium 08/18/2022 2.1  1.8 - 2.4 mg/dL Final    Sodium 08/18/2022 140  136 - 145 mmol/L Final    Potassium 08/18/2022 3.7  3.5 - 5.1 mmol/L Final    Chloride 08/18/2022 109 (A) 98 - 107 mmol/L Final    CO2 08/18/2022 25  21 - 32 mmol/L Final    Anion Gap 08/18/2022 6 (A) 7 - 16 mmol/L Final    Glucose 08/18/2022 130 (A) 65 - 100 mg/dL Final    BUN 08/18/2022 7 (A) 8 - 23 MG/DL Final    Creatinine 08/18/2022 0.80  0.6 - 1.0 MG/DL Final    GFR  08/18/2022 >60  >60 ml/min/1.73m2 Final    GFR Non- 08/18/2022 >60  >60 ml/min/1.73m2 Final    Comment:      Estimated GFR is calculated using the Modification of Diet in Renal Disease (MDRD) Study equation, reported for both  Americans (GFRAA) and non- Americans (GFRNA), and normalized to 1.73m2 body surface area. The physician must decide which value applies to the patient. The MDRD study equation should only be used in individuals age 25 or older.  It has not been validated for the following: pregnant women, patients with serious comorbid conditions,or on certain medications, or persons with extremes of body size, muscle mass, or nutritional status. Calcium 08/18/2022 8.9  8.3 - 10.4 MG/DL Final    Total Bilirubin 08/18/2022 0.3  0.2 - 1.1 MG/DL Final    ALT 08/18/2022 69 (A) 12 - 65 U/L Final    AST 08/18/2022 53 (A) 15 - 37 U/L Final    Alk Phosphatase 08/18/2022 167 (A) 50 - 136 U/L Final    Total Protein 08/18/2022 6.5  6.3 - 8.2 g/dL Final    Albumin 08/18/2022 3.4  3.2 - 4.6 g/dL Final    Globulin 08/18/2022 3.1  2.3 - 3.5 g/dL Final    Albumin/Globulin Ratio 08/18/2022 1.1 (A) 1.2 - 3.5   Final          Imaging:  Reviewed       PATHOLOGY:                         ASSESSMENT:    ICD-10-CM    1. Primary adenocarcinoma of ascending colon (HCC)  C18.2 CBC with Auto Differential     Comprehensive Metabolic Panel     Magnesium     CEA     CBC With Auto Differential     Comprehensive metabolic panel     DISCONTINUED: dextrose 5 % solution     DISCONTINUED: fosaprepitant (EMEND) 150 mg in sodium chloride 0.9 % 150 mL IVPB     DISCONTINUED: dexamethasone (DECADRON) 12 mg in sodium chloride 0.9 % 50 mL IVPB     DISCONTINUED: ondansetron (ZOFRAN) injection 8 mg     DISCONTINUED: oxaliplatin (ELOXATIN) 110 mg in dextrose 5 % 250 mL chemo IVPB     DISCONTINUED: leucovorin calcium (WELLCOVORIN) 650 mg in dextrose 5 % 250 mL IVPB     DISCONTINUED: fluorouracil (ADRUCIL) chemo syringe 500 mg     DISCONTINUED: fluorouracil (ADRUCIL) 3,075 mg in sodium chloride 0.9 % 230 mL chemo elastomeric infusion     DISCONTINUED: sodium chloride flush 0.9 % injection 5-40 mL     DISCONTINUED: 0.9 % sodium chloride bolus     DISCONTINUED: sodium chloride flush 0.9 % injection 5-40 mL     DISCONTINUED: pegfilgrastim-cbqv (UDENYCA) injection 3 mg             Ms. Case is here for FU of colon  cancer.          1. Mid-ascending colon adenocarcinoma - [id number] - Stage IIIB (high risk dz due to poorly diff/perforation/obstruction), MSI - established, improving     Amount and/or Complexity of Data Reviewed  Clinical lab tests: ordered and reviewed  Tests in the radiology section of CPT®: reviewed  Tests in the medicine section of CPT®: ordered  Obtain history from someone other than the patient: yes  Review and summarize past medical records: yes  Independent visualization of images, tracings, or specimens: yes    Risk of Complications, Morbidity, and/or Mortality  Presenting problems: moderate  Diagnostic procedures: low  Management options: high         Historical:    - daughter requested testing for DPYD. Reviewed that typically we would test in pts who have severe or unexpected toxicity from fluoropyrimidines (severe myelosuppression, mucositis, diarrhea, neurotoxicity,  cardiotoxicity) during the first few cycles of fluoropyrimidine therapy, not for screening purposes. If she doesn't do well with tx - we'd dose adjust anyway. Family elected not to p/w testing. Historical:     - we reviewed the pathophysiology of colon cancer in general, we then reviewed her pathology and staging again and high risk disease.     - to start: FOLFOX: A cycle is every 14 days (6mo)    Oxaliplatin   (Eloxatin)  85 mg/m² IV once on day 1 - dose adjusted upfront to 80% of dose to see how tolerates it per  her wishes     Leucovorin  400 mg/m² IV once on day 1    Fluorouracil  400 mg/m² IV once on day 1    Fluorouracil  1,200 mg/m²/day CIV on days 1 and 2. Total dose = 2,400 mg/m²/cycle. OR CAPoX: A cycle is every 21 days     Capecitabine   (Xeloda)  850 mg/m² orally twice daily on days 1-14, then off 7 days    Oxaliplatin   (Eloxatin)  130 mg/m² IV day 1   - CT chest to complete staging with small RML nodule - ? LN - will monitor on re-imaging, no definite evid of disease. We discussed the CT results that show small nodule in the lung (per rad likely LN, <1cm abutting fissure). We reviewed that this could be a metastatic focus.   She has stage III disease, if the lung nodule is cancerous, her tumor staging be stage IV. Intent of treatment will be palliative and not curative in nature. Unable to bx lesion due to size. Daughter and patient aware of this. - Elevated Alk phos - GGT checked and WNL at 49 6/2022     All questions were asked and answered to the best of my ability. The patient verbalized understanding and agrees with the plan above.             Live Castellanos MD, MD  [facility] Hematology and Oncology  [address] [place] [address]  Office : [phone]  Fax : [phone]

## 2022-08-18 ENCOUNTER — OFFICE VISIT (OUTPATIENT)
Dept: ONCOLOGY | Age: 77
End: 2022-08-18
Payer: MEDICARE

## 2022-08-18 ENCOUNTER — HOSPITAL ENCOUNTER (OUTPATIENT)
Dept: LAB | Age: 77
Discharge: HOME | End: 2022-08-21
Payer: MEDICARE

## 2022-08-18 ENCOUNTER — HOSPITAL ENCOUNTER (OUTPATIENT)
Dept: INFUSION THERAPY | Age: 77
End: 2022-08-18

## 2022-08-18 VITALS
BODY MASS INDEX: 21.79 KG/M2 | SYSTOLIC BLOOD PRESSURE: 117 MMHG | WEIGHT: 118.4 LBS | HEART RATE: 71 BPM | DIASTOLIC BLOOD PRESSURE: 64 MMHG | TEMPERATURE: 97.8 F | HEIGHT: 62 IN | OXYGEN SATURATION: 98 % | RESPIRATION RATE: 16 BRPM

## 2022-08-18 DIAGNOSIS — C18.2 PRIMARY ADENOCARCINOMA OF ASCENDING COLON (HCC): Primary | ICD-10-CM

## 2022-08-18 DIAGNOSIS — Z09 CHEMOTHERAPY FOLLOW-UP EXAMINATION: ICD-10-CM

## 2022-08-18 DIAGNOSIS — Z79.899 HIGH RISK MEDICATION USE: ICD-10-CM

## 2022-08-18 LAB
ALBUMIN SERPL-MCNC: 3.4 G/DL (ref 3.2–4.6)
ALBUMIN/GLOB SERPL: 1.1 {RATIO} (ref 1.2–3.5)
ALP SERPL-CCNC: 167 U/L (ref 50–136)
ALT SERPL-CCNC: 69 U/L (ref 12–65)
ANION GAP SERPL CALC-SCNC: 6 MMOL/L (ref 7–16)
AST SERPL-CCNC: 53 U/L (ref 15–37)
BASOPHILS # BLD: 0 K/UL (ref 0–0.2)
BASOPHILS NFR BLD: 0 % (ref 0–2)
BILIRUB SERPL-MCNC: 0.3 MG/DL (ref 0.2–1.1)
BUN SERPL-MCNC: 7 MG/DL (ref 8–23)
CALCIUM SERPL-MCNC: 8.9 MG/DL (ref 8.3–10.4)
CEA SERPL-MCNC: 2.4 NG/ML (ref 0–3)
CHLORIDE SERPL-SCNC: 109 MMOL/L (ref 98–107)
CO2 SERPL-SCNC: 25 MMOL/L (ref 21–32)
CREAT SERPL-MCNC: 0.8 MG/DL (ref 0.6–1)
DIFFERENTIAL METHOD BLD: ABNORMAL
EOSINOPHIL # BLD: 0.2 K/UL (ref 0–0.8)
EOSINOPHIL NFR BLD: 2 % (ref 0.5–7.8)
ERYTHROCYTE [DISTWIDTH] IN BLOOD BY AUTOMATED COUNT: 16.5 % (ref 11.9–14.6)
GLOBULIN SER CALC-MCNC: 3.1 G/DL (ref 2.3–3.5)
GLUCOSE SERPL-MCNC: 130 MG/DL (ref 65–100)
HCT VFR BLD AUTO: 33.3 % (ref 35.8–46.3)
HGB BLD-MCNC: 10.8 G/DL (ref 11.7–15.4)
IMM GRANULOCYTES # BLD AUTO: 0.3 K/UL (ref 0–0.5)
IMM GRANULOCYTES NFR BLD AUTO: 4 % (ref 0–5)
LYMPHOCYTES # BLD: 1.4 K/UL (ref 0.5–4.6)
LYMPHOCYTES NFR BLD: 21 % (ref 13–44)
MAGNESIUM SERPL-MCNC: 2.1 MG/DL (ref 1.8–2.4)
MCH RBC QN AUTO: 31.7 PG (ref 26.1–32.9)
MCHC RBC AUTO-ENTMCNC: 32.4 G/DL (ref 31.4–35)
MCV RBC AUTO: 97.7 FL (ref 79.6–97.8)
MONOCYTES # BLD: 0.9 K/UL (ref 0.1–1.3)
MONOCYTES NFR BLD: 14 % (ref 4–12)
NEUTS SEG # BLD: 3.9 K/UL (ref 1.7–8.2)
NEUTS SEG NFR BLD: 59 % (ref 43–78)
NRBC # BLD: 0 K/UL (ref 0–0.2)
PLATELET # BLD AUTO: 82 K/UL (ref 150–450)
PMV BLD AUTO: 11.1 FL (ref 9.4–12.3)
POTASSIUM SERPL-SCNC: 3.7 MMOL/L (ref 3.5–5.1)
PROT SERPL-MCNC: 6.5 G/DL (ref 6.3–8.2)
RBC # BLD AUTO: 3.41 M/UL (ref 4.05–5.2)
SODIUM SERPL-SCNC: 140 MMOL/L (ref 136–145)
WBC # BLD AUTO: 6.8 K/UL (ref 4.3–11.1)

## 2022-08-18 PROCEDURE — 80053 COMPREHEN METABOLIC PANEL: CPT

## 2022-08-18 PROCEDURE — 36415 COLL VENOUS BLD VENIPUNCTURE: CPT

## 2022-08-18 PROCEDURE — 85025 COMPLETE CBC W/AUTO DIFF WBC: CPT

## 2022-08-18 PROCEDURE — G8427 DOCREV CUR MEDS BY ELIG CLIN: HCPCS | Performed by: INTERNAL MEDICINE

## 2022-08-18 PROCEDURE — G8399 PT W/DXA RESULTS DOCUMENT: HCPCS | Performed by: INTERNAL MEDICINE

## 2022-08-18 PROCEDURE — 1036F TOBACCO NON-USER: CPT | Performed by: INTERNAL MEDICINE

## 2022-08-18 PROCEDURE — 1123F ACP DISCUSS/DSCN MKR DOCD: CPT | Performed by: INTERNAL MEDICINE

## 2022-08-18 PROCEDURE — 1090F PRES/ABSN URINE INCON ASSESS: CPT | Performed by: INTERNAL MEDICINE

## 2022-08-18 PROCEDURE — 82378 CARCINOEMBRYONIC ANTIGEN: CPT

## 2022-08-18 PROCEDURE — G8420 CALC BMI NORM PARAMETERS: HCPCS | Performed by: INTERNAL MEDICINE

## 2022-08-18 PROCEDURE — 99215 OFFICE O/P EST HI 40 MIN: CPT | Performed by: INTERNAL MEDICINE

## 2022-08-18 PROCEDURE — 83735 ASSAY OF MAGNESIUM: CPT

## 2022-08-18 RX ORDER — SODIUM CHLORIDE 0.9 % (FLUSH) 0.9 %
5-40 SYRINGE (ML) INJECTION PRN
Status: CANCELLED | OUTPATIENT
Start: 2022-08-21

## 2022-08-18 RX ORDER — ACETAMINOPHEN 325 MG/1
650 TABLET ORAL
Status: CANCELLED | OUTPATIENT
Start: 2022-08-18

## 2022-08-18 RX ORDER — SODIUM CHLORIDE 9 MG/ML
5-250 INJECTION, SOLUTION INTRAVENOUS PRN
Status: CANCELLED | OUTPATIENT
Start: 2022-08-21

## 2022-08-18 RX ORDER — DIPHENHYDRAMINE HYDROCHLORIDE 50 MG/ML
50 INJECTION INTRAMUSCULAR; INTRAVENOUS
Status: CANCELLED | OUTPATIENT
Start: 2022-08-18

## 2022-08-18 RX ORDER — ALBUTEROL SULFATE 90 UG/1
4 AEROSOL, METERED RESPIRATORY (INHALATION) PRN
Status: CANCELLED | OUTPATIENT
Start: 2022-08-18

## 2022-08-18 RX ORDER — HEPARIN SODIUM (PORCINE) LOCK FLUSH IV SOLN 100 UNIT/ML 100 UNIT/ML
500 SOLUTION INTRAVENOUS PRN
Status: CANCELLED | OUTPATIENT
Start: 2022-08-21

## 2022-08-18 RX ORDER — SODIUM CHLORIDE 9 MG/ML
5-40 INJECTION INTRAVENOUS PRN
Status: CANCELLED | OUTPATIENT
Start: 2022-08-18

## 2022-08-18 RX ORDER — SODIUM CHLORIDE 9 MG/ML
INJECTION, SOLUTION INTRAVENOUS CONTINUOUS
Status: CANCELLED | OUTPATIENT
Start: 2022-08-18

## 2022-08-18 RX ORDER — SODIUM CHLORIDE 0.9 % (FLUSH) 0.9 %
5-40 SYRINGE (ML) INJECTION PRN
Status: CANCELLED | OUTPATIENT
Start: 2022-08-18

## 2022-08-18 RX ORDER — EPINEPHRINE 1 MG/ML
0.3 INJECTION, SOLUTION, CONCENTRATE INTRAVENOUS PRN
Status: CANCELLED | OUTPATIENT
Start: 2022-08-18

## 2022-08-18 RX ORDER — SODIUM CHLORIDE 9 MG/ML
5-40 INJECTION INTRAVENOUS PRN
Status: CANCELLED | OUTPATIENT
Start: 2022-08-21

## 2022-08-18 RX ORDER — DEXTROSE MONOHYDRATE 50 MG/ML
5-250 INJECTION, SOLUTION INTRAVENOUS PRN
Status: CANCELLED | OUTPATIENT
Start: 2022-08-18

## 2022-08-18 RX ORDER — ONDANSETRON 2 MG/ML
8 INJECTION INTRAMUSCULAR; INTRAVENOUS
Status: CANCELLED | OUTPATIENT
Start: 2022-08-18

## 2022-08-18 RX ORDER — FLUOROURACIL 50 MG/ML
320 INJECTION, SOLUTION INTRAVENOUS ONCE
Status: CANCELLED | OUTPATIENT
Start: 2022-08-18 | End: 2022-08-18

## 2022-08-18 RX ORDER — SODIUM CHLORIDE 9 MG/ML
5-250 INJECTION, SOLUTION INTRAVENOUS PRN
Status: CANCELLED | OUTPATIENT
Start: 2022-08-18

## 2022-08-18 RX ORDER — ONDANSETRON 2 MG/ML
8 INJECTION INTRAMUSCULAR; INTRAVENOUS ONCE
Status: CANCELLED | OUTPATIENT
Start: 2022-08-18 | End: 2022-08-18

## 2022-08-18 RX ORDER — FAMOTIDINE 10 MG/ML
20 INJECTION, SOLUTION INTRAVENOUS
Status: CANCELLED | OUTPATIENT
Start: 2022-08-18

## 2022-08-18 RX ORDER — 0.9 % SODIUM CHLORIDE 0.9 %
1000 INTRAVENOUS SOLUTION INTRAVENOUS ONCE
Status: CANCELLED
Start: 2022-08-21 | End: 2022-08-18

## 2022-08-18 RX ORDER — MEPERIDINE HYDROCHLORIDE 50 MG/ML
12.5 INJECTION INTRAMUSCULAR; INTRAVENOUS; SUBCUTANEOUS PRN
Status: CANCELLED | OUTPATIENT
Start: 2022-08-18

## 2022-08-18 RX ORDER — HEPARIN SODIUM (PORCINE) LOCK FLUSH IV SOLN 100 UNIT/ML 100 UNIT/ML
500 SOLUTION INTRAVENOUS PRN
Status: CANCELLED | OUTPATIENT
Start: 2022-08-18

## 2022-08-18 ASSESSMENT — PATIENT HEALTH QUESTIONNAIRE - PHQ9
SUM OF ALL RESPONSES TO PHQ QUESTIONS 1-9: 0
2. FEELING DOWN, DEPRESSED OR HOPELESS: 0
1. LITTLE INTEREST OR PLEASURE IN DOING THINGS: 0
SUM OF ALL RESPONSES TO PHQ QUESTIONS 1-9: 0
SUM OF ALL RESPONSES TO PHQ9 QUESTIONS 1 & 2: 0

## 2022-08-18 NOTE — PATIENT INSTRUCTIONS
Patient Instructions from Today's Visit    Reason for Visit:  Prechemo visit    Diagnosis Information:  [email or web address] [email or web address]-[email or web address]-answers-patient-education-materials/vvus-gdveeoa-wktd-sheets      Plan:  -You may take the Docusate on constipation days (this is a stool softener)  -Cycle 10 Folfox tomorrow (platelets 26O)  -We will send new order Magic Mouthwash    Follow Up:  -as scheduled    Recent Lab Results:  Hospital Outpatient Visit on 08/18/2022   Component Date Value Ref Range Status    WBC 08/18/2022 6.8  4.3 - 11.1 K/uL Final    RESULTS CHECKED X 2    RBC 08/18/2022 3.41 (A) 4.05 - 5.2 M/uL Final    Hemoglobin 08/18/2022 10.8 (A) 11.7 - 15.4 g/dL Final    Hematocrit 08/18/2022 33.3 (A) 35.8 - 46.3 % Final    MCV 08/18/2022 97.7  79.6 - 97.8 FL Final    MCH 08/18/2022 31.7  26.1 - 32.9 PG Final    MCHC 08/18/2022 32.4  31.4 - 35.0 g/dL Final    RDW 08/18/2022 16.5 (A) 11.9 - 14.6 % Final    Platelets 31/24/9391 82 (A) 150 - 450 K/uL Final    MPV 08/18/2022 11.1  9.4 - 12.3 FL Final    nRBC 08/18/2022 0.00  0.0 - 0.2 K/uL Final    **Note: Absolute NRBC parameter is now reported with Hemogram**    Seg Neutrophils 08/18/2022 59  43 - 78 % Final    Lymphocytes 08/18/2022 21  13 - 44 % Final    Monocytes 08/18/2022 14 (A) 4.0 - 12.0 % Final    Eosinophils % 08/18/2022 2  0.5 - 7.8 % Final    Basophils 08/18/2022 0  0.0 - 2.0 % Final    Immature Granulocytes 08/18/2022 4  0.0 - 5.0 % Final    Segs Absolute 08/18/2022 3.9  1.7 - 8.2 K/UL Final    Absolute Lymph # 08/18/2022 1.4  0.5 - 4.6 K/UL Final    Absolute Mono # 08/18/2022 0.9  0.1 - 1.3 K/UL Final    Absolute Eos # 08/18/2022 0.2  0.0 - 0.8 K/UL Final    Basophils Absolute 08/18/2022 0.0  0.0 - 0.2 K/UL Final    Absolute Immature Granulocyte 08/18/2022 0.3  0.0 - 0.5 K/UL Final    Differential Type 08/18/2022 AUTOMATED    Final    CEA 08/18/2022 2.4  0.0 - 3.0 ng/mL Final    Comment: Nonsmoker:  <3.0 ng/mL  Smoker:     <5.0 ng/mL  [employer]. Patient's results of tumor marker testing may not be comparable to labs using different manufacturers/methods. Magnesium 08/18/2022 2.1  1.8 - 2.4 mg/dL Final    Sodium 08/18/2022 140  136 - 145 mmol/L Final    Potassium 08/18/2022 3.7  3.5 - 5.1 mmol/L Final    Chloride 08/18/2022 109 (A) 98 - 107 mmol/L Final    CO2 08/18/2022 25  21 - 32 mmol/L Final    Anion Gap 08/18/2022 6 (A) 7 - 16 mmol/L Final    Glucose 08/18/2022 130 (A) 65 - 100 mg/dL Final    BUN 08/18/2022 7 (A) 8 - 23 MG/DL Final    Creatinine 08/18/2022 0.80  0.6 - 1.0 MG/DL Final    GFR  08/18/2022 >60  >60 ml/min/1.73m2 Final    GFR Non- 08/18/2022 >60  >60 ml/min/1.73m2 Final    Comment:      Estimated GFR is calculated using the Modification of Diet in Renal Disease (MDRD) Study equation, reported for both  Americans (GFRAA) and non- Americans (GFRNA), and normalized to 1.73m2 body surface area. The physician must decide which value applies to the patient. The MDRD study equation should only be used in individuals age 25 or older. It has not been validated for the following: pregnant women, patients with serious comorbid conditions,or on certain medications, or persons with extremes of body size, muscle mass, or nutritional status.       Calcium 08/18/2022 8.9  8.3 - 10.4 MG/DL Final    Total Bilirubin 08/18/2022 0.3  0.2 - 1.1 MG/DL Final    ALT 08/18/2022 69 (A) 12 - 65 U/L Final    AST 08/18/2022 53 (A) 15 - 37 U/L Final    Alk Phosphatase 08/18/2022 167 (A) 50 - 136 U/L Final    Total Protein 08/18/2022 6.5  6.3 - 8.2 g/dL Final    Albumin 08/18/2022 3.4  3.2 - 4.6 g/dL Final    Globulin 08/18/2022 3.1  2.3 - 3.5 g/dL Final    Albumin/Globulin Ratio 08/18/2022 1.1 (A) 1.2 - 3.5   Final        Treatment Summary has been discussed and given to patient: n/a        -------------------------------------------------------------------------------------------------------------------  Please call our office at [phone] if you have any  of the following symptoms:   Fever of 100.5 or greater  Chills  Shortness of breath  Swelling or pain in one leg    After office hours an answering service is available and will contact a provider for emergencies or if you are experiencing any of the above symptoms. Patient does express an interest in My Chart. My Chart log in information explained on the after visit summary printout at the [employer] desk.     Teto Hidalgo, RN  Nurse Navigator  [address] [facility] [address] [phone]

## 2022-08-19 ENCOUNTER — HOSPITAL ENCOUNTER (OUTPATIENT)
Dept: INFUSION THERAPY | Age: 77
Discharge: HOME | End: 2022-08-19
Payer: MEDICARE

## 2022-08-19 VITALS
TEMPERATURE: 97.8 F | OXYGEN SATURATION: 98 % | DIASTOLIC BLOOD PRESSURE: 68 MMHG | HEART RATE: 84 BPM | SYSTOLIC BLOOD PRESSURE: 115 MMHG

## 2022-08-19 DIAGNOSIS — C18.2 PRIMARY ADENOCARCINOMA OF ASCENDING COLON (HCC): Primary | ICD-10-CM

## 2022-08-19 PROCEDURE — 96375 TX/PRO/DX INJ NEW DRUG ADDON: CPT

## 2022-08-19 PROCEDURE — G0498 CHEMO EXTEND IV INFUS W/PUMP: HCPCS

## 2022-08-19 PROCEDURE — 96411 CHEMO IV PUSH ADDL DRUG: CPT

## 2022-08-19 PROCEDURE — 96413 CHEMO IV INFUSION 1 HR: CPT

## 2022-08-19 PROCEDURE — 96368 THER/DIAG CONCURRENT INF: CPT

## 2022-08-19 PROCEDURE — 6360000002 HC RX W HCPCS: Performed by: INTERNAL MEDICINE

## 2022-08-19 PROCEDURE — 96415 CHEMO IV INFUSION ADDL HR: CPT

## 2022-08-19 PROCEDURE — 96367 TX/PROPH/DG ADDL SEQ IV INF: CPT

## 2022-08-19 PROCEDURE — 2580000003 HC RX 258: Performed by: INTERNAL MEDICINE

## 2022-08-19 RX ORDER — FLUOROURACIL 50 MG/ML
320 INJECTION, SOLUTION INTRAVENOUS ONCE
Status: COMPLETED | OUTPATIENT
Start: 2022-08-19 | End: 2022-08-19

## 2022-08-19 RX ORDER — SODIUM CHLORIDE 0.9 % (FLUSH) 0.9 %
5-40 SYRINGE (ML) INJECTION PRN
Status: DISCONTINUED | OUTPATIENT
Start: 2022-08-19 | End: 2022-08-20 | Stop reason: HOSPADM

## 2022-08-19 RX ORDER — DEXTROSE MONOHYDRATE 50 MG/ML
5-250 INJECTION, SOLUTION INTRAVENOUS PRN
Status: DISCONTINUED | OUTPATIENT
Start: 2022-08-19 | End: 2022-08-20 | Stop reason: HOSPADM

## 2022-08-19 RX ORDER — ONDANSETRON 2 MG/ML
8 INJECTION INTRAMUSCULAR; INTRAVENOUS ONCE
Status: COMPLETED | OUTPATIENT
Start: 2022-08-19 | End: 2022-08-19

## 2022-08-19 RX ADMIN — DEXTROSE MONOHYDRATE 25 ML/HR: 50 INJECTION, SOLUTION INTRAVENOUS at 10:43

## 2022-08-19 RX ADMIN — SODIUM CHLORIDE, PRESERVATIVE FREE 10 ML: 5 INJECTION INTRAVENOUS at 10:43

## 2022-08-19 RX ADMIN — LEUCOVORIN CALCIUM 650 MG: 100 INJECTION, POWDER, LYOPHILIZED, FOR SUSPENSION INTRAMUSCULAR; INTRAVENOUS at 12:02

## 2022-08-19 RX ADMIN — FLUOROURACIL 3000 MG: 50 INJECTION, SOLUTION INTRAVENOUS at 14:20

## 2022-08-19 RX ADMIN — FOSAPREPITANT 150 MG: 150 INJECTION, POWDER, LYOPHILIZED, FOR SOLUTION INTRAVENOUS at 11:03

## 2022-08-19 RX ADMIN — DEXAMETHASONE SODIUM PHOSPHATE 12 MG: 4 INJECTION, SOLUTION INTRAMUSCULAR; INTRAVENOUS at 10:47

## 2022-08-19 RX ADMIN — FLUOROURACIL 500 MG: 50 INJECTION, SOLUTION INTRAVENOUS at 14:15

## 2022-08-19 RX ADMIN — ONDANSETRON 8 MG: 2 INJECTION INTRAMUSCULAR; INTRAVENOUS at 10:46

## 2022-08-19 RX ADMIN — OXALIPLATIN 110 MG: 5 INJECTION, SOLUTION INTRAVENOUS at 12:02

## 2022-08-19 NOTE — PROGRESS NOTES
Arrived to the [facility]. Chemo completed. Patient tolerated well. Continuous chemo pump connected and instructions reviewed. Any issues or concerns during appointment: None. Patient aware of next infusion appointment on 8/21 (date) at 1300 (time). Patient aware of next lab and [facility] office visit on 9/6 (date) at [phone] (time). Patient instructed to call provider with temperature of 100.4 or greater or nausea/vomiting/ diarrhea or pain not controlled by medications  Discharged in stable condition.

## 2022-08-21 ENCOUNTER — HOSPITAL ENCOUNTER (OUTPATIENT)
Dept: INFUSION THERAPY | Age: 77
Discharge: HOME | End: 2022-08-21
Payer: MEDICARE

## 2022-08-21 VITALS
DIASTOLIC BLOOD PRESSURE: 59 MMHG | OXYGEN SATURATION: 99 % | SYSTOLIC BLOOD PRESSURE: 120 MMHG | BODY MASS INDEX: 21.58 KG/M2 | HEART RATE: 61 BPM | WEIGHT: 118 LBS

## 2022-08-21 DIAGNOSIS — C18.2 PRIMARY ADENOCARCINOMA OF ASCENDING COLON (HCC): Primary | ICD-10-CM

## 2022-08-21 PROCEDURE — 2580000003 HC RX 258: Performed by: INTERNAL MEDICINE

## 2022-08-21 PROCEDURE — 96360 HYDRATION IV INFUSION INIT: CPT

## 2022-08-21 RX ORDER — 0.9 % SODIUM CHLORIDE 0.9 %
1000 INTRAVENOUS SOLUTION INTRAVENOUS ONCE
Status: COMPLETED | OUTPATIENT
Start: 2022-08-21 | End: 2022-08-21

## 2022-08-21 RX ORDER — SODIUM CHLORIDE 0.9 % (FLUSH) 0.9 %
5-40 SYRINGE (ML) INJECTION PRN
Status: DISCONTINUED | OUTPATIENT
Start: 2022-08-21 | End: 2022-08-22 | Stop reason: HOSPADM

## 2022-08-21 RX ADMIN — SODIUM CHLORIDE 1000 ML: 900 INJECTION, SOLUTION INTRAVENOUS at 12:30

## 2022-08-21 RX ADMIN — SODIUM CHLORIDE, PRESERVATIVE FREE 10 ML: 5 INJECTION INTRAVENOUS at 12:30

## 2022-08-21 NOTE — PROGRESS NOTES
Patient arrived to [facility] for hydration and pump removal. Assessment completed. No needs voiced at this time. Patient tolerated infusion well and is aware of next appointment on 8/22/2022 @1330. Patient discharged ambulatory with daughter.

## 2022-08-22 ENCOUNTER — HOSPITAL ENCOUNTER (OUTPATIENT)
Dept: INFUSION THERAPY | Age: 77
Discharge: HOME | End: 2022-08-22
Payer: MEDICARE

## 2022-08-22 VITALS
RESPIRATION RATE: 18 BRPM | HEART RATE: 74 BPM | OXYGEN SATURATION: 97 % | DIASTOLIC BLOOD PRESSURE: 61 MMHG | SYSTOLIC BLOOD PRESSURE: 111 MMHG | TEMPERATURE: 98.6 F

## 2022-08-22 DIAGNOSIS — C18.2 PRIMARY ADENOCARCINOMA OF ASCENDING COLON (HCC): Primary | ICD-10-CM

## 2022-08-22 PROCEDURE — 6360000002 HC RX W HCPCS: Performed by: INTERNAL MEDICINE

## 2022-08-22 PROCEDURE — 96372 THER/PROPH/DIAG INJ SC/IM: CPT

## 2022-08-22 RX ADMIN — PEGFILGRASTIM-CBQV 3 MG: 6 INJECTION, SOLUTION SUBCUTANEOUS at 13:49

## 2022-08-22 NOTE — PROGRESS NOTES
Arrived to the [facility]. Udenyca injection in half dose completed. Patient tolerated well. Any issues or concerns during appointment: none. Patient aware of next infusion appointment on 8/25 (date) at 4:30 PM (time). Patient instructed to call provider with temperature of 100.4 or greater or nausea/vomiting/ diarrhea or pain not controlled by medications  Discharged ambulatory.

## 2022-08-23 DIAGNOSIS — C18.2 PRIMARY ADENOCARCINOMA OF ASCENDING COLON (HCC): Primary | ICD-10-CM

## 2022-08-25 ENCOUNTER — HOSPITAL ENCOUNTER (OUTPATIENT)
Dept: INFUSION THERAPY | Age: 77
Discharge: HOME | End: 2022-08-25
Payer: MEDICARE

## 2022-08-25 VITALS
DIASTOLIC BLOOD PRESSURE: 67 MMHG | RESPIRATION RATE: 18 BRPM | OXYGEN SATURATION: 95 % | TEMPERATURE: 98.7 F | HEART RATE: 76 BPM | SYSTOLIC BLOOD PRESSURE: 120 MMHG

## 2022-08-25 DIAGNOSIS — R19.7 DIARRHEA, UNSPECIFIED TYPE: Primary | ICD-10-CM

## 2022-08-25 DIAGNOSIS — C18.2 PRIMARY ADENOCARCINOMA OF ASCENDING COLON (HCC): ICD-10-CM

## 2022-08-25 PROCEDURE — 2580000003 HC RX 258: Performed by: INTERNAL MEDICINE

## 2022-08-25 PROCEDURE — 96360 HYDRATION IV INFUSION INIT: CPT

## 2022-08-25 RX ORDER — SODIUM CHLORIDE 9 MG/ML
5-250 INJECTION, SOLUTION INTRAVENOUS PRN
Status: CANCELLED | OUTPATIENT
Start: 2022-08-25

## 2022-08-25 RX ORDER — HEPARIN SODIUM (PORCINE) LOCK FLUSH IV SOLN 100 UNIT/ML 100 UNIT/ML
500 SOLUTION INTRAVENOUS PRN
Status: CANCELLED | OUTPATIENT
Start: 2022-08-25

## 2022-08-25 RX ORDER — SODIUM CHLORIDE 0.9 % (FLUSH) 0.9 %
5-40 SYRINGE (ML) INJECTION PRN
Status: DISCONTINUED | OUTPATIENT
Start: 2022-08-25 | End: 2022-08-26 | Stop reason: HOSPADM

## 2022-08-25 RX ORDER — SODIUM CHLORIDE 9 MG/ML
INJECTION, SOLUTION INTRAVENOUS ONCE
Status: CANCELLED
Start: 2022-08-25 | End: 2022-08-25

## 2022-08-25 RX ORDER — SODIUM CHLORIDE 0.9 % (FLUSH) 0.9 %
5-40 SYRINGE (ML) INJECTION PRN
Status: CANCELLED | OUTPATIENT
Start: 2022-08-25

## 2022-08-25 RX ORDER — SODIUM CHLORIDE 9 MG/ML
INJECTION, SOLUTION INTRAVENOUS ONCE
Status: COMPLETED | OUTPATIENT
Start: 2022-08-25 | End: 2022-08-25

## 2022-08-25 RX ADMIN — SODIUM CHLORIDE: 9 INJECTION, SOLUTION INTRAVENOUS at 16:25

## 2022-08-25 RX ADMIN — SODIUM CHLORIDE, PRESERVATIVE FREE 10 ML: 5 INJECTION INTRAVENOUS at 17:36

## 2022-08-25 RX ADMIN — SODIUM CHLORIDE, PRESERVATIVE FREE 10 ML: 5 INJECTION INTRAVENOUS at 16:24

## 2022-08-25 NOTE — PROGRESS NOTES
Pt. Discharged ambulatory. Tolerated infusion well. No distress noted. To call physician with any problems or concerns. Understanding voiced. To return to Infusions on  9/6/22.

## 2022-08-31 DIAGNOSIS — E87.6 HYPOKALEMIA: ICD-10-CM

## 2022-08-31 DIAGNOSIS — F41.8 ANXIETY ABOUT HEALTH: Primary | ICD-10-CM

## 2022-08-31 RX ORDER — ALPRAZOLAM 0.25 MG/1
0.25 TABLET ORAL 3 TIMES DAILY PRN
Qty: 30 TABLET | Refills: 0 | Status: CANCELLED | OUTPATIENT
Start: 2022-08-31 | End: 2022-09-30

## 2022-08-31 RX ORDER — POTASSIUM CHLORIDE 20 MEQ/1
20 TABLET, EXTENDED RELEASE ORAL DAILY
Qty: 90 TABLET | Refills: 0 | Status: SHIPPED | OUTPATIENT
Start: 2022-08-31 | End: 2022-09-28

## 2022-09-01 RX ORDER — ALPRAZOLAM 0.25 MG/1
0.25 TABLET ORAL 3 TIMES DAILY PRN
Qty: 30 TABLET | Refills: 1 | Status: SHIPPED | OUTPATIENT
Start: 2022-09-01 | End: 2022-09-06

## 2022-09-06 ENCOUNTER — HOSPITAL ENCOUNTER (OUTPATIENT)
Dept: INFUSION THERAPY | Age: 77
Discharge: HOME | End: 2022-09-06
Payer: MEDICARE

## 2022-09-06 ENCOUNTER — OFFICE VISIT (OUTPATIENT)
Dept: ONCOLOGY | Age: 77
End: 2022-09-06
Payer: MEDICARE

## 2022-09-06 ENCOUNTER — CLINICAL DOCUMENTATION (OUTPATIENT)
Dept: CASE MANAGEMENT | Age: 77
End: 2022-09-06

## 2022-09-06 VITALS
BODY MASS INDEX: 21.62 KG/M2 | OXYGEN SATURATION: 99 % | DIASTOLIC BLOOD PRESSURE: 62 MMHG | SYSTOLIC BLOOD PRESSURE: 110 MMHG | TEMPERATURE: 97.9 F | RESPIRATION RATE: 14 BRPM | HEIGHT: 62 IN | WEIGHT: 117.5 LBS | HEART RATE: 77 BPM

## 2022-09-06 DIAGNOSIS — R53.83 FATIGUE DUE TO TREATMENT: ICD-10-CM

## 2022-09-06 DIAGNOSIS — C18.2 PRIMARY ADENOCARCINOMA OF ASCENDING COLON (HCC): ICD-10-CM

## 2022-09-06 DIAGNOSIS — R74.8 ELEVATED LIVER ENZYMES: ICD-10-CM

## 2022-09-06 DIAGNOSIS — T45.1X5A CHEMOTHERAPY-INDUCED THROMBOCYTOPENIA: ICD-10-CM

## 2022-09-06 DIAGNOSIS — D69.59 CHEMOTHERAPY-INDUCED THROMBOCYTOPENIA: ICD-10-CM

## 2022-09-06 DIAGNOSIS — C18.2 MALIGNANT NEOPLASM OF ASCENDING COLON (HCC): Primary | ICD-10-CM

## 2022-09-06 DIAGNOSIS — C18.2 PRIMARY ADENOCARCINOMA OF ASCENDING COLON (HCC): Primary | ICD-10-CM

## 2022-09-06 DIAGNOSIS — Z09 CHEMOTHERAPY FOLLOW-UP EXAMINATION: ICD-10-CM

## 2022-09-06 LAB
ALBUMIN SERPL-MCNC: 3.2 G/DL (ref 3.2–4.6)
ALBUMIN/GLOB SERPL: 1 {RATIO} (ref 1.2–3.5)
ALP SERPL-CCNC: 173 U/L (ref 50–136)
ALT SERPL-CCNC: 85 U/L (ref 12–65)
ANION GAP SERPL CALC-SCNC: 5 MMOL/L (ref 4–13)
AST SERPL-CCNC: 59 U/L (ref 15–37)
BASOPHILS # BLD: 0 K/UL (ref 0–0.2)
BASOPHILS NFR BLD: 0 % (ref 0–2)
BILIRUB SERPL-MCNC: 0.4 MG/DL (ref 0.2–1.1)
BUN SERPL-MCNC: 7 MG/DL (ref 8–23)
CALCIUM SERPL-MCNC: 8.5 MG/DL (ref 8.3–10.4)
CEA SERPL-MCNC: 2.2 NG/ML (ref 0–3)
CHLORIDE SERPL-SCNC: 111 MMOL/L (ref 101–110)
CO2 SERPL-SCNC: 24 MMOL/L (ref 21–32)
CREAT SERPL-MCNC: 0.7 MG/DL (ref 0.6–1)
DIFFERENTIAL METHOD BLD: ABNORMAL
EOSINOPHIL # BLD: 0.1 K/UL (ref 0–0.8)
EOSINOPHIL NFR BLD: 1 % (ref 0.5–7.8)
ERYTHROCYTE [DISTWIDTH] IN BLOOD BY AUTOMATED COUNT: 15.5 % (ref 11.9–14.6)
GLOBULIN SER CALC-MCNC: 3.1 G/DL (ref 2.3–3.5)
GLUCOSE SERPL-MCNC: 128 MG/DL (ref 65–100)
HCT VFR BLD AUTO: 33.6 % (ref 35.8–46.3)
HGB BLD-MCNC: 10.8 G/DL (ref 11.7–15.4)
IMM GRANULOCYTES # BLD AUTO: 0.2 K/UL (ref 0–0.5)
IMM GRANULOCYTES NFR BLD AUTO: 4 % (ref 0–5)
LYMPHOCYTES # BLD: 1.1 K/UL (ref 0.5–4.6)
LYMPHOCYTES NFR BLD: 24 % (ref 13–44)
MAGNESIUM SERPL-MCNC: 2.2 MG/DL (ref 1.8–2.4)
MCH RBC QN AUTO: 31.6 PG (ref 26.1–32.9)
MCHC RBC AUTO-ENTMCNC: 32.1 G/DL (ref 31.4–35)
MCV RBC AUTO: 98.2 FL (ref 79.6–97.8)
MONOCYTES # BLD: 0.9 K/UL (ref 0.1–1.3)
MONOCYTES NFR BLD: 19 % (ref 4–12)
NEUTS SEG # BLD: 2.5 K/UL (ref 1.7–8.2)
NEUTS SEG NFR BLD: 52 % (ref 43–78)
NRBC # BLD: 0 K/UL (ref 0–0.2)
PLATELET # BLD AUTO: 82 K/UL (ref 150–450)
PMV BLD AUTO: 11 FL (ref 9.4–12.3)
POTASSIUM SERPL-SCNC: 4 MMOL/L (ref 3.5–5.1)
PROT SERPL-MCNC: 6.3 G/DL (ref 6.3–8.2)
RBC # BLD AUTO: 3.42 M/UL (ref 4.05–5.2)
SODIUM SERPL-SCNC: 140 MMOL/L (ref 136–145)
WBC # BLD AUTO: 4.7 K/UL (ref 4.3–11.1)

## 2022-09-06 PROCEDURE — 2580000003 HC RX 258: Performed by: INTERNAL MEDICINE

## 2022-09-06 PROCEDURE — G0498 CHEMO EXTEND IV INFUS W/PUMP: HCPCS

## 2022-09-06 PROCEDURE — 96415 CHEMO IV INFUSION ADDL HR: CPT

## 2022-09-06 PROCEDURE — 1090F PRES/ABSN URINE INCON ASSESS: CPT | Performed by: NURSE PRACTITIONER

## 2022-09-06 PROCEDURE — 85025 COMPLETE CBC W/AUTO DIFF WBC: CPT

## 2022-09-06 PROCEDURE — 82378 CARCINOEMBRYONIC ANTIGEN: CPT

## 2022-09-06 PROCEDURE — 96375 TX/PRO/DX INJ NEW DRUG ADDON: CPT

## 2022-09-06 PROCEDURE — 6360000002 HC RX W HCPCS: Performed by: NURSE PRACTITIONER

## 2022-09-06 PROCEDURE — 36591 DRAW BLOOD OFF VENOUS DEVICE: CPT

## 2022-09-06 PROCEDURE — 99214 OFFICE O/P EST MOD 30 MIN: CPT | Performed by: NURSE PRACTITIONER

## 2022-09-06 PROCEDURE — 96368 THER/DIAG CONCURRENT INF: CPT

## 2022-09-06 PROCEDURE — 1036F TOBACCO NON-USER: CPT | Performed by: NURSE PRACTITIONER

## 2022-09-06 PROCEDURE — 83735 ASSAY OF MAGNESIUM: CPT

## 2022-09-06 PROCEDURE — 96367 TX/PROPH/DG ADDL SEQ IV INF: CPT

## 2022-09-06 PROCEDURE — G8420 CALC BMI NORM PARAMETERS: HCPCS | Performed by: NURSE PRACTITIONER

## 2022-09-06 PROCEDURE — G8399 PT W/DXA RESULTS DOCUMENT: HCPCS | Performed by: NURSE PRACTITIONER

## 2022-09-06 PROCEDURE — 96413 CHEMO IV INFUSION 1 HR: CPT

## 2022-09-06 PROCEDURE — 96411 CHEMO IV PUSH ADDL DRUG: CPT

## 2022-09-06 PROCEDURE — G8428 CUR MEDS NOT DOCUMENT: HCPCS | Performed by: NURSE PRACTITIONER

## 2022-09-06 PROCEDURE — 80053 COMPREHEN METABOLIC PANEL: CPT

## 2022-09-06 PROCEDURE — 1123F ACP DISCUSS/DSCN MKR DOCD: CPT | Performed by: NURSE PRACTITIONER

## 2022-09-06 PROCEDURE — 2580000003 HC RX 258: Performed by: NURSE PRACTITIONER

## 2022-09-06 RX ORDER — FLUOROURACIL 50 MG/ML
320 INJECTION, SOLUTION INTRAVENOUS ONCE
Status: COMPLETED | OUTPATIENT
Start: 2022-09-06 | End: 2022-09-06

## 2022-09-06 RX ORDER — SODIUM CHLORIDE 9 MG/ML
5-250 INJECTION, SOLUTION INTRAVENOUS PRN
Status: CANCELLED | OUTPATIENT
Start: 2022-09-08

## 2022-09-06 RX ORDER — DEXTROSE MONOHYDRATE 50 MG/ML
5-250 INJECTION, SOLUTION INTRAVENOUS PRN
Status: DISCONTINUED | OUTPATIENT
Start: 2022-09-06 | End: 2022-09-07 | Stop reason: HOSPADM

## 2022-09-06 RX ORDER — ACETAMINOPHEN 325 MG/1
650 TABLET ORAL
Status: CANCELLED | OUTPATIENT
Start: 2022-09-06

## 2022-09-06 RX ORDER — LOPERAMIDE HYDROCHLORIDE 2 MG/1
2 CAPSULE ORAL 4 TIMES DAILY PRN
COMMUNITY

## 2022-09-06 RX ORDER — SODIUM CHLORIDE 9 MG/ML
INJECTION, SOLUTION INTRAVENOUS CONTINUOUS
Status: CANCELLED | OUTPATIENT
Start: 2022-09-06

## 2022-09-06 RX ORDER — HEPARIN SODIUM (PORCINE) LOCK FLUSH IV SOLN 100 UNIT/ML 100 UNIT/ML
500 SOLUTION INTRAVENOUS PRN
Status: CANCELLED | OUTPATIENT
Start: 2022-09-06

## 2022-09-06 RX ORDER — SODIUM CHLORIDE 9 MG/ML
5-250 INJECTION, SOLUTION INTRAVENOUS PRN
Status: DISCONTINUED | OUTPATIENT
Start: 2022-09-06 | End: 2022-09-07 | Stop reason: HOSPADM

## 2022-09-06 RX ORDER — MEPERIDINE HYDROCHLORIDE 50 MG/ML
12.5 INJECTION INTRAMUSCULAR; INTRAVENOUS; SUBCUTANEOUS PRN
Status: CANCELLED | OUTPATIENT
Start: 2022-09-06

## 2022-09-06 RX ORDER — EPINEPHRINE 1 MG/ML
0.3 INJECTION, SOLUTION, CONCENTRATE INTRAVENOUS PRN
Status: CANCELLED | OUTPATIENT
Start: 2022-09-06

## 2022-09-06 RX ORDER — ONDANSETRON 2 MG/ML
8 INJECTION INTRAMUSCULAR; INTRAVENOUS ONCE
Status: CANCELLED | OUTPATIENT
Start: 2022-09-06 | End: 2022-09-06

## 2022-09-06 RX ORDER — FAMOTIDINE 10 MG/ML
20 INJECTION, SOLUTION INTRAVENOUS
Status: CANCELLED | OUTPATIENT
Start: 2022-09-06

## 2022-09-06 RX ORDER — SODIUM CHLORIDE 9 MG/ML
5-40 INJECTION INTRAVENOUS PRN
Status: CANCELLED | OUTPATIENT
Start: 2022-09-08

## 2022-09-06 RX ORDER — HEPARIN SODIUM (PORCINE) LOCK FLUSH IV SOLN 100 UNIT/ML 100 UNIT/ML
500 SOLUTION INTRAVENOUS PRN
Status: DISCONTINUED | OUTPATIENT
Start: 2022-09-06 | End: 2022-09-07 | Stop reason: HOSPADM

## 2022-09-06 RX ORDER — SODIUM CHLORIDE 0.9 % (FLUSH) 0.9 %
10 SYRINGE (ML) INJECTION PRN
Status: DISCONTINUED | OUTPATIENT
Start: 2022-09-06 | End: 2022-09-07 | Stop reason: HOSPADM

## 2022-09-06 RX ORDER — SODIUM CHLORIDE 9 MG/ML
5-40 INJECTION INTRAVENOUS PRN
Status: DISCONTINUED | OUTPATIENT
Start: 2022-09-06 | End: 2022-09-07 | Stop reason: HOSPADM

## 2022-09-06 RX ORDER — DIPHENHYDRAMINE HCL 25 MG
25 TABLET ORAL EVERY 6 HOURS PRN
COMMUNITY

## 2022-09-06 RX ORDER — FLUOROURACIL 50 MG/ML
320 INJECTION, SOLUTION INTRAVENOUS ONCE
Status: CANCELLED | OUTPATIENT
Start: 2022-09-06 | End: 2022-09-06

## 2022-09-06 RX ORDER — ALBUTEROL SULFATE 90 UG/1
4 AEROSOL, METERED RESPIRATORY (INHALATION) PRN
Status: CANCELLED | OUTPATIENT
Start: 2022-09-06

## 2022-09-06 RX ORDER — ONDANSETRON 2 MG/ML
8 INJECTION INTRAMUSCULAR; INTRAVENOUS
Status: CANCELLED | OUTPATIENT
Start: 2022-09-06

## 2022-09-06 RX ORDER — SODIUM CHLORIDE 0.9 % (FLUSH) 0.9 %
5-40 SYRINGE (ML) INJECTION PRN
Status: DISCONTINUED | OUTPATIENT
Start: 2022-09-06 | End: 2022-09-07 | Stop reason: HOSPADM

## 2022-09-06 RX ORDER — SODIUM CHLORIDE 9 MG/ML
5-250 INJECTION, SOLUTION INTRAVENOUS PRN
Status: CANCELLED | OUTPATIENT
Start: 2022-09-06

## 2022-09-06 RX ORDER — DIPHENHYDRAMINE HYDROCHLORIDE 50 MG/ML
50 INJECTION INTRAMUSCULAR; INTRAVENOUS
Status: CANCELLED | OUTPATIENT
Start: 2022-09-06

## 2022-09-06 RX ORDER — DEXTROSE MONOHYDRATE 50 MG/ML
5-250 INJECTION, SOLUTION INTRAVENOUS PRN
Status: CANCELLED | OUTPATIENT
Start: 2022-09-06

## 2022-09-06 RX ORDER — SODIUM CHLORIDE 9 MG/ML
5-40 INJECTION INTRAVENOUS PRN
Status: CANCELLED | OUTPATIENT
Start: 2022-09-06

## 2022-09-06 RX ORDER — ONDANSETRON 2 MG/ML
8 INJECTION INTRAMUSCULAR; INTRAVENOUS ONCE
Status: COMPLETED | OUTPATIENT
Start: 2022-09-06 | End: 2022-09-06

## 2022-09-06 RX ORDER — HEPARIN SODIUM (PORCINE) LOCK FLUSH IV SOLN 100 UNIT/ML 100 UNIT/ML
500 SOLUTION INTRAVENOUS PRN
Status: CANCELLED | OUTPATIENT
Start: 2022-09-08

## 2022-09-06 RX ORDER — SODIUM CHLORIDE 0.9 % (FLUSH) 0.9 %
5-40 SYRINGE (ML) INJECTION PRN
Status: CANCELLED | OUTPATIENT
Start: 2022-09-08

## 2022-09-06 RX ORDER — SODIUM CHLORIDE 0.9 % (FLUSH) 0.9 %
5-40 SYRINGE (ML) INJECTION PRN
Status: CANCELLED | OUTPATIENT
Start: 2022-09-06

## 2022-09-06 RX ORDER — DIPHENOXYLATE HYDROCHLORIDE AND ATROPINE SULFATE 2.5; .025 MG/1; MG/1
1 TABLET ORAL 4 TIMES DAILY PRN
COMMUNITY
End: 2022-09-28

## 2022-09-06 RX ORDER — 0.9 % SODIUM CHLORIDE 0.9 %
1000 INTRAVENOUS SOLUTION INTRAVENOUS ONCE
Status: CANCELLED
Start: 2022-09-08 | End: 2022-09-06

## 2022-09-06 RX ADMIN — FOSAPREPITANT 150 MG: 150 INJECTION, POWDER, LYOPHILIZED, FOR SOLUTION INTRAVENOUS at 14:20

## 2022-09-06 RX ADMIN — DEXTROSE MONOHYDRATE 80 ML/HR: 5 INJECTION, SOLUTION INTRAVENOUS at 14:41

## 2022-09-06 RX ADMIN — Medication 10 ML: at 10:53

## 2022-09-06 RX ADMIN — ONDANSETRON 8 MG: 2 INJECTION INTRAMUSCULAR; INTRAVENOUS at 14:01

## 2022-09-06 RX ADMIN — OXALIPLATIN 110 MG: 5 INJECTION, SOLUTION INTRAVENOUS at 15:02

## 2022-09-06 RX ADMIN — LEUCOVORIN CALCIUM 650 MG: 350 INJECTION, POWDER, LYOPHILIZED, FOR SOLUTION INTRAMUSCULAR; INTRAVENOUS at 15:02

## 2022-09-06 RX ADMIN — FLUOROURACIL 500 MG: 50 INJECTION, SOLUTION INTRAVENOUS at 17:10

## 2022-09-06 RX ADMIN — FLUOROURACIL 3000 MG: 50 INJECTION, SOLUTION INTRAVENOUS at 17:15

## 2022-09-06 RX ADMIN — SODIUM CHLORIDE, PRESERVATIVE FREE 10 ML: 5 INJECTION INTRAVENOUS at 14:00

## 2022-09-06 RX ADMIN — DEXAMETHASONE SODIUM PHOSPHATE 12 MG: 4 INJECTION, SOLUTION INTRAMUSCULAR; INTRAVENOUS at 14:04

## 2022-09-06 ASSESSMENT — PATIENT HEALTH QUESTIONNAIRE - PHQ9
SUM OF ALL RESPONSES TO PHQ QUESTIONS 1-9: 0
SUM OF ALL RESPONSES TO PHQ QUESTIONS 1-9: 0
2. FEELING DOWN, DEPRESSED OR HOPELESS: 0
SUM OF ALL RESPONSES TO PHQ QUESTIONS 1-9: 0
SUM OF ALL RESPONSES TO PHQ QUESTIONS 1-9: 0

## 2022-09-06 ASSESSMENT — ENCOUNTER SYMPTOMS
COUGH: 0
CONSTIPATION: 0
EYES NEGATIVE: 1
VOMITING: 0
SHORTNESS OF BREATH: 0

## 2022-09-06 NOTE — PROGRESS NOTES
[facility] Hematology and Oncology: Office Visit Established Patient    Chief Complaint   Patient presents with    Follow-up      History of Present Illness:  Ms. Martell is a  65 y.o. female who presents today for  follow up regarding colon cancer. she was admitted on 1/22/22 with peritonitis, colon mass, obstruction and bowel perforation. PMhx: anxiety,  corneal dystrophy s/p transplants (bilat) on steroid drops, CAD, GERD, IBS, HLD. She p/w abd pain x2 days. CT AP c/w 2.3cm mass in mid-ascending colon and LAD with obstruction and perforation. S/p subsequent right extended colectomy with ileo-transverse staped anastomosis. Path c/w mod-poorly diff adenocarcinoma - [id number] (2/14 LN +). We were consulted for recs. Started FOLFOX - adjuvantly. She returns today for follow up and consideration of cycle 11 FOLFOX. She felt like she did the best she has done with this past cycle. She continues IVF's on day 6 and she feels this is very helpful for her overall well being. She denies any nausea or bowel issues. Fatigue is better overall. Appetite is better as well and weight is stable. She has been using a compounded mouthwash that is helpful for mucositis - this is actually avoided with this medication. No current or recent bleeding or s/s of infection. There is no cough, shortness of breath, or edema. Cold sensitivity is stable along with her baseline neuropathy. Chronological Events:  1/22/22 admitted with abd pain/perforation, dx'ed w colon ca    2/9/22 HFU - reviewed path again and next steps in management    2/28/22 FU C1D1 FOLFOX    3/7/22 FU - toxicity check following C1. Oxaliplatin was dose reduced for first cycle to 80%. 3/14/22 FU C2 FOLFOX-continue with Oxali dose reduction. 3/28/22 Cycle 3 FOLFOX - oxaliplatin is dose reduced. Tolerating well.     4/11/22 pre C4 due to gen debility and plts at 68 - will delay tx by 1 week; replete fluids/lytes   4/18/22 pre C4 - held last week for TCP/weakness. Plts improved and feeling better, no further diarrhea. 5/2/22 pre C5 - held d/t low ANC. Cold sensitivity worse. RX Remeron for appetite/sleep. 5/9/22 Here pre-cycle 5 after one week hold for neutropenia. Holding again for ANC. Next week, reduce dose again by 20% for cold paresthesias, neutropenia and overall tolerance. 5/31/20 pre chemo C6, doing well, improved neuropathy/cod sensitivity with dose reduction, -defer by 1 week  6/27/22 FU pre C7, p/w tx; will plan to drop dose of GCSF to 1/2 after d/w pharmacy. 7/12/22 FU pre C8, plt adequate at 76k. Agree to p/w treatment. 7/26/22 FU, Plt 56K-defer C9 by 1 week    8/18/22 FU C10, fluids in the interim   9/6/22 Here for cycle 11. Doing well overall - best she has felt. Mild elevation in ALT/AST/Alk phos - continue to monitor.        Family History   Problem Relation Age of Onset    Heart Attack Father     Heart Disease Father         Arterosclerotic Cardiovascular Disease    Breast Cancer Maternal Aunt        Social History     Socioeconomic History    Marital status: Legally      Spouse name: Not on file    Number of children: Not on file    Years of education: Not on file    Highest education level: Not on file   Occupational History    Not on file   Tobacco Use    Smoking status: Never    Smokeless tobacco: Never   Substance and Sexual Activity    Alcohol use: No     Alcohol/week: 0.0 standard drinks    Drug use: Yes     Types: Prescription, OTC    Sexual activity: Not on file   Other Topics Concern    Not on file   Social History Narrative    Not on file     Social Determinants of Health     Financial Resource Strain: Not on file   Food Insecurity: Not on file   Transportation Needs: Not on file   Physical Activity: Not on file   Stress: Not on file   Social Connections: Not on file   Intimate Partner Violence: Not on file   Housing Stability: Not on file          Review of Systems   Constitutional:  Positive for fatigue (mild). Negative for appetite change, chills and fever. HENT: Negative. Eyes: Negative. Respiratory:  Negative for cough and shortness of breath. Cardiovascular: Negative. Gastrointestinal:  Negative for constipation and vomiting. Genitourinary: Negative. Skin:  Negative for rash. Neurological:  Positive for numbness (improved with dose reduction). Cold sensitivity improved with dose reduction   Hematological: Negative. Psychiatric/Behavioral: Negative. All other systems reviewed and are negative. Allergies   Allergen Reactions    Sulfa Antibiotics Hives and Rash    Sulfamethoxazole-Trimethoprim Rash         Past Medical History:   Diagnosis Date    Actinic keratosis     Adjustment disorder with mixed anxiety and depressed mood 2/11/2015    Adverse effect of anesthesia     cystoscope - was awake but could not move - dont remember the anesthesetic given    Corneal dystrophy 2/11/2015    Coronary atherosclerosis of native coronary artery 2/11/2015    GERD (gastroesophageal reflux disease)     Irritable bowel syndrome 2/11/2015    Menopause     Mixed hyperlipidemia 2/11/2015    Primary adenocarcinoma of ascending colon ([address] 1/31/2022    Psychiatric disorder     anxiety        Past Surgical History:   Procedure Laterality Date    BREAST BIOPSY Left     COLONOSCOPY      2014    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  PRN loperamide (IMODIUM) 2 MG capsule Take 2 mg by mouth 4 times daily as needed for Diarrhea PRN      diphenhydrAMINE (BENADRYL) 25 MG tablet Take 25 mg by mouth every 6 hours as needed for Itching PRN      vitamin D 25 MCG (1000 UT) CAPS Take by mouth Once a day      potassium chloride (KLOR-CON M) 20 MEQ extended release tablet Take 1 tablet by mouth daily 90 tablet 0    aspirin 81 MG EC tablet Take 81 mg by mouth      sucralfate (CARAFATE) 1 GM tablet Take 1 tablet by mouth 4 times daily as needed (as needed for gastric indigestion and dyspepsia) 120 tablet 3    Magic Mouthwash (MIRACLE MOUTHWASH) Swish and spit 5 mLs 4 times daily as needed for Irritation 280 mL 3    pantoprazole (PROTONIX) 40 MG tablet TAKE 1 TABLET BY MOUTH EVERY DAY 90 tablet 1    acetaminophen (TYLENOL) 325 MG tablet Take 650 mg by mouth every 6 hours as needed      atorvastatin (LIPITOR) 40 MG tablet Take 40 mg by mouth daily      Hyoscyamine Sulfate SL 0.125 MG SUBL Place 0.125 mg under the tongue every 6 hours as needed      lidocaine-prilocaine (EMLA) 2.5-2.5 % cream Apply topically as needed      ondansetron (ZOFRAN) 8 MG tablet Take 8 mg by mouth every 8 hours as needed      prednisoLONE acetate (PRED FORTE) 1 % ophthalmic suspension Apply 1 drop to eye      prochlorperazine (COMPAZINE) 10 MG tablet Take 10 mg by mouth every 6 hours as needed      vitamin D3 (CHOLECALCIFEROL) 125 MCG (5000 UT) TABS tablet Take 1,000 Units by mouth daily Take 1000 units by mouth per day (Patient not taking: Reported on 9/6/2022)      potassium chloride 20 MEQ/15ML (10%) oral solution Take 20 mEq by mouth daily (Patient not taking: No sig reported)       No current facility-administered medications for this visit.      Facility-Administered Medications Ordered in Other Visits   Medication Dose Route Frequency Provider Last Rate Last Admin    sodium chloride flush 0.9 % injection 10 mL  10 mL IntraVENous PRN Kamla Rizvi MD   10 mL at 09/06/22 8284 fosaprepitant (EMEND) 150 mg in sodium chloride 0.9 % 150 mL IVPB  150 mg IntraVENous Once Tinnie Poisson, NP-C        dexamethasone (DECADRON) 12 mg in sodium chloride 0.9 % 50 mL IVPB  12 mg IntraVENous Once Tinnie Poisson, NP-C        ondansetron [facility]) injection 8 mg  8 mg IntraVENous Once Tinnie Poisson, NP-C        dextrose 5 % solution  5-250 mL/hr IntraVENous PRN Tinnie Poisson, NP-C        oxaliplatin (ELOXATIN) 110 mg in dextrose 5 % 250 mL chemo IVPB  68 mg/m2 (Order-Specific) IntraVENous Once Tinnie Poisson, NP-C        leucovorin calcium (WELLCOVORIN) 650 mg in dextrose 5 % 250 mL IVPB  400 mg/m2 (Order-Specific) IntraVENous Once Tinnie Poisson, NP-C        fluorouracil (ADRUCIL) chemo syringe 500 mg  320 mg/m2 (Order-Specific) IntraVENous Once Tinnie Poisson, NP-C        fluorouracil (ADRUCIL) 3,075 mg in sodium chloride 0.9 % 230 mL chemo elastomeric infusion  1,920 mg/m2 (Order-Specific) IntraVENous Once Tinnie Poisson, NP-C        sodium chloride flush 0.9 % injection 5-40 mL  5-40 mL IntraVENous PRN Tinnie Poisson, NP-C        sodium chloride (PF) 0.9 % injection 5-40 mL  5-40 mL IntraVENous PRN Tinnie Poisson, NP-C        0.9 % sodium chloride infusion  5-250 mL/hr IntraVENous PRN Tinnie Poisson, NP-C        heparin flush 100 UNIT/ML injection 500 Units  500 Units IntraCATHeter PRN Tinnie Poisson, NP-C        alteplase (CATHFLO) 2 mg in sterile water 2 mL injection  2 mg IntraCATHeter PRN Tinnie Poisson, NP-C                  OBJECTIVE:  Visit Vitals  Vitals:    09/06/22 1228 09/06/22 1237   BP: 108/66 110/62   Site: Left Upper Arm Left Upper Arm   Position: Sitting Standing   Pulse: 74 77   Resp: 14    Temp: 97.9 °F (36.6 °C)    SpO2: 99%    Weight: 117 lb 8 oz (53.3 kg)    Height: 5' 2\" (1.575 m)         ECOG PERFORMANCE STATUS - 0-Fully active, able to carry on all pre-disease performance without restriction. Pain - Pain Score:   0 - No pain (Fatigue-0)/10. None/Minimal pain - not affecting QOL     Fatigue - No flowsheet data found. Distress - No flowsheet data found. Physical Exam  Vitals reviewed. Exam conducted with a chaperone present. Constitutional:       General: She is not in acute distress. Appearance: Normal appearance. She is normal weight. She is not toxic-appearing. HENT:      Head: Normocephalic and atraumatic. Nose: Nose normal. No congestion. Mouth/Throat:      Mouth: Mucous membranes are moist.      Pharynx: Oropharynx is clear. Eyes:      Conjunctiva/sclera: Conjunctivae normal.   Cardiovascular:      Rate and Rhythm: Normal rate and regular rhythm. Heart sounds: Normal heart sounds. Pulmonary:      Effort: Pulmonary effort is normal. No respiratory distress. Breath sounds: Normal breath sounds. No wheezing. Abdominal:      General: Bowel sounds are normal. There is no distension. Palpations: Abdomen is soft. Tenderness: There is no abdominal tenderness. There is no guarding. Musculoskeletal:         General: Normal range of motion. Cervical back: Normal range of motion and neck supple. Right lower leg: No edema. Left lower leg: No edema. Skin:     General: Skin is warm and dry. Neurological:      General: No focal deficit present. Mental Status: She is alert and oriented to person, place, and time.    Psychiatric:         Mood and Affect: Mood normal.         Behavior: Behavior normal.        Labs:  Hospital Outpatient Visit on 09/06/2022   Component Date Value Ref Range Status    WBC 09/06/2022 4.7  4.3 - 11.1 K/uL Final    RESULTS CHECKED X 2    RBC 09/06/2022 3.42 (A) 4.05 - 5.2 M/uL Final    Hemoglobin 09/06/2022 10.8 (A) 11.7 - 15.4 g/dL Final    Hematocrit 09/06/2022 33.6 (A) 35.8 - 46.3 % Final    MCV 09/06/2022 98.2 (A) 79.6 - 97.8 FL Final    MCH 09/06/2022 31.6  26.1 - 32.9 PG Final    MCHC 09/06/2022 32.1  31.4 - 35.0 g/dL Final    RDW 09/06/2022 15.5 (A) 11.9 - 14.6 % Final    Platelets 93/42/8479 82 (A) 150 - 450 K/uL Final    MPV 09/06/2022 11.0  9.4 - 12.3 FL Final    nRBC 09/06/2022 0.00  0.0 - 0.2 K/uL Final    **Note: Absolute NRBC parameter is now reported with Hemogram**    Differential Type 09/06/2022 AUTOMATED    Final    Seg Neutrophils 09/06/2022 52  43 - 78 % Final    Lymphocytes 09/06/2022 24  13 - 44 % Final    Monocytes 09/06/2022 19 (A) 4.0 - 12.0 % Final    Eosinophils % 09/06/2022 1  0.5 - 7.8 % Final    Basophils 09/06/2022 0  0.0 - 2.0 % Final    Immature Granulocytes 09/06/2022 4  0.0 - 5.0 % Final    Segs Absolute 09/06/2022 2.5  1.7 - 8.2 K/UL Final    Absolute Lymph # 09/06/2022 1.1  0.5 - 4.6 K/UL Final    Absolute Mono # 09/06/2022 0.9  0.1 - 1.3 K/UL Final    Absolute Eos # 09/06/2022 0.1  0.0 - 0.8 K/UL Final    Basophils Absolute 09/06/2022 0.0  0.0 - 0.2 K/UL Final    Absolute Immature Granulocyte 09/06/2022 0.2  0.0 - 0.5 K/UL Final    Sodium 09/06/2022 140  136 - 145 mmol/L Final    Potassium 09/06/2022 4.0  3.5 - 5.1 mmol/L Final    Chloride 09/06/2022 111 (A) 101 - 110 mmol/L Final    CO2 09/06/2022 24  21 - 32 mmol/L Final    Anion Gap 09/06/2022 5  4 - 13 mmol/L Final    Glucose 09/06/2022 128 (A) 65 - 100 mg/dL Final    BUN 09/06/2022 7 (A) 8 - 23 MG/DL Final    Creatinine 09/06/2022 0.70  0.6 - 1.0 MG/DL Final    GFR  09/06/2022 >60  >60 ml/min/1.73m2 Final    GFR Non- 09/06/2022 >60  >60 ml/min/1.73m2 Final    Comment:      Estimated GFR is calculated using the Modification of Diet in Renal Disease (MDRD) Study equation, reported for both  Americans (GFRAA) and non- Americans (GFRNA), and normalized to 1.73m2 body surface area. The physician must decide which value applies to the patient. The MDRD study equation should only be used in individuals age 25 or older.  It has not been validated for the following: pregnant women, patients with serious comorbid conditions,or on certain medications, or persons with extremes of body size, muscle mass, or nutritional status. Calcium 09/06/2022 8.5  8.3 - 10.4 MG/DL Final    Total Bilirubin 09/06/2022 0.4  0.2 - 1.1 MG/DL Final    ALT 09/06/2022 85 (A) 12 - 65 U/L Final    AST 09/06/2022 59 (A) 15 - 37 U/L Final    Alk Phosphatase 09/06/2022 173 (A) 50 - 136 U/L Final    Total Protein 09/06/2022 6.3  6.3 - 8.2 g/dL Final    Albumin 09/06/2022 3.2  3.2 - 4.6 g/dL Final    Globulin 09/06/2022 3.1  2.3 - 3.5 g/dL Final    Albumin/Globulin Ratio 09/06/2022 1.0 (A) 1.2 - 3.5   Final    Magnesium 09/06/2022 2.2  1.8 - 2.4 mg/dL Final    CEA 09/06/2022 2.2  0.0 - 3.0 ng/mL Final    Comment: Nonsmoker:  <3.0 ng/mL  Smoker:     <5.0 ng/mL  [employer]. Patient's results of tumor marker testing may not be comparable to labs using different manufacturers/methods. Imaging:  Reviewed       PATHOLOGY:                         ASSESSMENT:    ICD-10-CM    1. Malignant neoplasm of ascending colon (HCC)  C18.2 CBC with Auto Differential     Comprehensive Metabolic Panel     Magnesium      2.  Primary adenocarcinoma of ascending colon (HCC)  C18.2 CBC With Auto Differential     Comprehensive metabolic panel     DISCONTINUED: dextrose 5 % solution     DISCONTINUED: fosaprepitant (EMEND) 150 mg in sodium chloride 0.9 % 150 mL IVPB     DISCONTINUED: dexamethasone (DECADRON) 12 mg in sodium chloride 0.9 % 50 mL IVPB     DISCONTINUED: ondansetron (ZOFRAN) injection 8 mg     DISCONTINUED: oxaliplatin (ELOXATIN) 110 mg in dextrose 5 % 250 mL chemo IVPB     DISCONTINUED: leucovorin calcium (WELLCOVORIN) 650 mg in dextrose 5 % 250 mL IVPB     DISCONTINUED: fluorouracil (ADRUCIL) chemo syringe 500 mg     DISCONTINUED: fluorouracil (ADRUCIL) 3,075 mg in sodium chloride 0.9 % 230 mL chemo elastomeric infusion     DISCONTINUED: sodium chloride flush 0.9 % injection 5-40 mL     DISCONTINUED: sodium chloride (PF) 0.9 % injection 5-40 mL     DISCONTINUED: 0.9 % sodium chloride infusion     DISCONTINUED: heparin flush 100 UNIT/ML injection 500 Units     DISCONTINUED: alteplase (CATHFLO) 2 mg in sterile water 2 mL injection      3. Chemotherapy follow-up examination  Z09       4. Fatigue due to treatment  R53.83       5. Chemotherapy-induced thrombocytopenia  D69.59     T45. 1X5A       6. Elevated liver enzymes  R74.8              Ms. Case is here for FU of colon  cancer. 1. Mid-ascending colon adenocarcinoma - [id number] - Stage IIIB (high risk dz due to poorly diff/perforation/obstruction), MSI - stable,  KRAS mut    - Patient wished to lower the dose of oxaliplatin upfront to 80% of the dose. C9 delayed 1 wk due to thrombocytopenia    - cold sensitivity - improved with 80% of original dosing       - here for follow-up and cycle 11 FOLFOX, G-CSF half dose w tx    - CEA - continue to monitor monthly    - EVON - She completed IV iron    - She did report some numbness or perception of swelling in the ball of her foot bilaterally, closer to lateral phalanges, and she reported that as baseline prior to chemotherapy. Not worsening.    - vit D defic - She is taking 5000 of vitamin D.     - GERD - Protonix and Carafate for reflux, well controlled. - nausea - emend starting with C2 and worked well, continue with PO anti-emetics prn - denies recent issues with this  - mucositis - resolved with compounded rinse, Rx provided. - diarrhea - imodium prn.     - low appetite - RX Remeron at bedtime    - insomnia - can use melatonin at night for insomnia. - elevated liver enzymes - mild, continue to monitor.   - Continue good oral nutrition and hydration.   - Encouraged frequent activity throughout the day and rest as needed to combat fatigue.   - Call with any fevers, uncontrolled side effects from treatment or any other worrisome/concerning symptoms.          Surveillance Guidance:  CEA: q3 months x 2 years, q6 months years 3, 4, and 5. CT Scans: Once per year (Consider scanning q6 months for 2 years if considered high risk). Colonoscopy: One  year after surgery, or within 3 months post-adjuvant therapy if no prior preoperative colonoscopy performed. Subsequent colonoscopies as directed by post-adjuvant colonoscopy findings, as clinically-indicated, or at physician discretion. If no high-risk  findings, repeat no less than every 3-5 years. 2. Supportive care    - s/p bilat corneal transplant - sees Dr Zach London [phone] [facility]; d/w her eye team - no CI to start tx. RESUSCITATION DIRECTIVES/HOSPICE CARE: Full Support      RTC for C11 FOLFOX and or sooner as needed   [40min - chart review, review of results and discussion of options, next steps, coordination of care and charting ]       MDM       Historical:    - daughter requested testing for DPYD. Reviewed that typically we would test in pts who have severe or unexpected toxicity from fluoropyrimidines (severe myelosuppression, mucositis, diarrhea, neurotoxicity,  cardiotoxicity) during the first few cycles of fluoropyrimidine therapy, not for screening purposes. If she doesn't do well with tx - we'd dose adjust anyway. Family elected not to p/w testing. Historical:     - we reviewed the pathophysiology of colon cancer in general, we then reviewed her pathology and staging again and high risk disease.     - to start: FOLFOX: A cycle is every 14 days (6mo)    Oxaliplatin   (Eloxatin)  85 mg/m² IV once on day 1 - dose adjusted upfront to 80% of dose to see how tolerates it per  her wishes     Leucovorin  400 mg/m² IV once on day 1    Fluorouracil  400 mg/m² IV once on day 1    Fluorouracil  1,200 mg/m²/day CIV on days 1 and 2. Total dose = 2,400 mg/m²/cycle.    OR CAPoX: A cycle is every 21 days     Capecitabine   (Xeloda)  850 mg/m² orally twice daily on days 1-14, then off 7 days

## 2022-09-06 NOTE — PATIENT INSTRUCTIONS
Patient Instructions from Today's Visit    Reason for Visit:  Pre chemo cycle 11 FOLFOX     Plan:  Proceed to infusion     Follow Up:  2 weeks    Recent Lab Results:  Hospital Outpatient Visit on 09/06/2022   Component Date Value Ref Range Status    WBC 09/06/2022 4.7  4.3 - 11.1 K/uL Final    RESULTS CHECKED X 2    RBC 09/06/2022 3.42 (A) 4.05 - 5.2 M/uL Final    Hemoglobin 09/06/2022 10.8 (A) 11.7 - 15.4 g/dL Final    Hematocrit 09/06/2022 33.6 (A) 35.8 - 46.3 % Final    MCV 09/06/2022 98.2 (A) 79.6 - 97.8 FL Final    MCH 09/06/2022 31.6  26.1 - 32.9 PG Final    MCHC 09/06/2022 32.1  31.4 - 35.0 g/dL Final    RDW 09/06/2022 15.5 (A) 11.9 - 14.6 % Final    Platelets 75/63/8062 82 (A) 150 - 450 K/uL Final    MPV 09/06/2022 11.0  9.4 - 12.3 FL Final    nRBC 09/06/2022 0.00  0.0 - 0.2 K/uL Final    **Note: Absolute NRBC parameter is now reported with Hemogram**    Differential Type 09/06/2022 AUTOMATED    Final    Seg Neutrophils 09/06/2022 52  43 - 78 % Final    Lymphocytes 09/06/2022 24  13 - 44 % Final    Monocytes 09/06/2022 19 (A) 4.0 - 12.0 % Final    Eosinophils % 09/06/2022 1  0.5 - 7.8 % Final    Basophils 09/06/2022 0  0.0 - 2.0 % Final    Immature Granulocytes 09/06/2022 4  0.0 - 5.0 % Final    Segs Absolute 09/06/2022 2.5  1.7 - 8.2 K/UL Final    Absolute Lymph # 09/06/2022 1.1  0.5 - 4.6 K/UL Final    Absolute Mono # 09/06/2022 0.9  0.1 - 1.3 K/UL Final    Absolute Eos # 09/06/2022 0.1  0.0 - 0.8 K/UL Final    Basophils Absolute 09/06/2022 0.0  0.0 - 0.2 K/UL Final    Absolute Immature Granulocyte 09/06/2022 0.2  0.0 - 0.5 K/UL Final    Sodium 09/06/2022 140  136 - 145 mmol/L Final    Potassium 09/06/2022 4.0  3.5 - 5.1 mmol/L Final    Chloride 09/06/2022 111 (A) 101 - 110 mmol/L Final    CO2 09/06/2022 24  21 - 32 mmol/L Final    Anion Gap 09/06/2022 5  4 - 13 mmol/L Final    Glucose 09/06/2022 128 (A) 65 - 100 mg/dL Final    BUN 09/06/2022 7 (A) 8 - 23 MG/DL Final    Creatinine 09/06/2022 0.70  0.6 - 1.0 MG/DL Final    GFR  09/06/2022 >60  >60 ml/min/1.73m2 Final    GFR Non- 09/06/2022 >60  >60 ml/min/1.73m2 Final    Comment:      Estimated GFR is calculated using the Modification of Diet in Renal Disease (MDRD) Study equation, reported for both  Americans (GFRAA) and non- Americans (GFRNA), and normalized to 1.73m2 body surface area. The physician must decide which value applies to the patient. The MDRD study equation should only be used in individuals age 25 or older. It has not been validated for the following: pregnant women, patients with serious comorbid conditions,or on certain medications, or persons with extremes of body size, muscle mass, or nutritional status. Calcium 09/06/2022 8.5  8.3 - 10.4 MG/DL Final    Total Bilirubin 09/06/2022 0.4  0.2 - 1.1 MG/DL Final    ALT 09/06/2022 85 (A) 12 - 65 U/L Final    AST 09/06/2022 59 (A) 15 - 37 U/L Final    Alk Phosphatase 09/06/2022 173 (A) 50 - 136 U/L Final    Total Protein 09/06/2022 6.3  6.3 - 8.2 g/dL Final    Albumin 09/06/2022 3.2  3.2 - 4.6 g/dL Final    Globulin 09/06/2022 3.1  2.3 - 3.5 g/dL Final    Albumin/Globulin Ratio 09/06/2022 1.0 (A) 1.2 - 3.5   Final    Magnesium 09/06/2022 2.2  1.8 - 2.4 mg/dL Final    CEA 09/06/2022 2.2  0.0 - 3.0 ng/mL Final    Comment: Nonsmoker:  <3.0 ng/mL  Smoker:     <5.0 ng/mL  [employer]. Patient's results of tumor marker testing may not be comparable to labs using different manufacturers/methods.            Treatment Summary has been discussed and given to patient: no        -------------------------------------------------------------------------------------------------------------------  Please call our office at [phone] if you have any  of the following symptoms:   Fever of 100.5 or greater  Chills  Shortness of breath  Swelling or pain in one leg    After office hours an answering service is available and will contact a provider for emergencies or if you are experiencing any of the above symptoms. Patient did express an interest in My Chart. My Chart log in information explained on the after visit summary printout at the [facility] [address] desk.     LACI Watkins, RN  Nurse Navigator  [address] [place] [id number] [phone]

## 2022-09-06 NOTE — PROGRESS NOTES
Pt. Discharged ambulatory. Tolerated infusion well. No distress noted. To call physician with any problems or concerns. Understanding voiced. To return to Infusions on  9/8/22.

## 2022-09-06 NOTE — PROGRESS NOTES
9/6/22 saw pt today with Aryan Ortiz NP for pre chemo cycle 11 FOLFOX. She is tolerating chemo well. PO intake is good. Proceed to infusion. Follow up in 2 weeks. Encouraged to call with any concerns. Navigation will continue to follow.

## 2022-09-06 NOTE — PROGRESS NOTES
Patient arrived to port lab for port access and lab draw   Alejandro Cerna 45 accessed and labs drawn per protocol   *Port remains accessed   Patient discharged from port lab ambulatory*

## 2022-09-08 ENCOUNTER — HOSPITAL ENCOUNTER (OUTPATIENT)
Dept: INFUSION THERAPY | Age: 77
Discharge: HOME | End: 2022-09-08
Payer: MEDICARE

## 2022-09-08 VITALS
HEART RATE: 75 BPM | TEMPERATURE: 98 F | DIASTOLIC BLOOD PRESSURE: 62 MMHG | RESPIRATION RATE: 16 BRPM | OXYGEN SATURATION: 98 % | SYSTOLIC BLOOD PRESSURE: 133 MMHG

## 2022-09-08 DIAGNOSIS — C18.2 PRIMARY ADENOCARCINOMA OF ASCENDING COLON (HCC): Primary | ICD-10-CM

## 2022-09-08 PROCEDURE — 96365 THER/PROPH/DIAG IV INF INIT: CPT

## 2022-09-08 PROCEDURE — 96360 HYDRATION IV INFUSION INIT: CPT

## 2022-09-08 PROCEDURE — 2580000003 HC RX 258: Performed by: NURSE PRACTITIONER

## 2022-09-08 RX ORDER — SODIUM CHLORIDE 0.9 % (FLUSH) 0.9 %
5-40 SYRINGE (ML) INJECTION PRN
Status: DISCONTINUED | OUTPATIENT
Start: 2022-09-08 | End: 2022-09-09 | Stop reason: HOSPADM

## 2022-09-08 RX ORDER — 0.9 % SODIUM CHLORIDE 0.9 %
1000 INTRAVENOUS SOLUTION INTRAVENOUS ONCE
Status: COMPLETED | OUTPATIENT
Start: 2022-09-08 | End: 2022-09-08

## 2022-09-08 RX ADMIN — SODIUM CHLORIDE, PRESERVATIVE FREE 10 ML: 5 INJECTION INTRAVENOUS at 17:06

## 2022-09-08 RX ADMIN — SODIUM CHLORIDE 1000 ML: 9 INJECTION, SOLUTION INTRAVENOUS at 17:06

## 2022-09-09 ENCOUNTER — HOSPITAL ENCOUNTER (OUTPATIENT)
Dept: INFUSION THERAPY | Age: 77
Discharge: HOME | End: 2022-09-09
Payer: MEDICARE

## 2022-09-09 VITALS
TEMPERATURE: 98 F | HEART RATE: 74 BPM | SYSTOLIC BLOOD PRESSURE: 114 MMHG | RESPIRATION RATE: 16 BRPM | DIASTOLIC BLOOD PRESSURE: 57 MMHG | OXYGEN SATURATION: 99 %

## 2022-09-09 DIAGNOSIS — C18.2 PRIMARY ADENOCARCINOMA OF ASCENDING COLON (HCC): Primary | ICD-10-CM

## 2022-09-09 PROCEDURE — 96372 THER/PROPH/DIAG INJ SC/IM: CPT

## 2022-09-09 PROCEDURE — 6360000002 HC RX W HCPCS: Performed by: NURSE PRACTITIONER

## 2022-09-09 RX ADMIN — PEGFILGRASTIM-CBQV 3 MG: 6 INJECTION, SOLUTION SUBCUTANEOUS at 17:05

## 2022-09-09 NOTE — PROGRESS NOTES
Arrived to the [facility]. Juaquin completed. Provided education on same    Patient instructed to report any side affects to ordering provider. Patient tolerated well. Any issues or concerns during appointment: none. Patient aware of next infusion appointment on 9/12/22 (date) at 0800 (time). Discharged ambulatory.

## 2022-09-12 ENCOUNTER — HOSPITAL ENCOUNTER (OUTPATIENT)
Dept: INFUSION THERAPY | Age: 77
Discharge: HOME | End: 2022-09-12
Payer: MEDICARE

## 2022-09-12 VITALS
WEIGHT: 114 LBS | OXYGEN SATURATION: 98 % | TEMPERATURE: 97.7 F | SYSTOLIC BLOOD PRESSURE: 129 MMHG | HEART RATE: 83 BPM | BODY MASS INDEX: 20.85 KG/M2 | RESPIRATION RATE: 16 BRPM | DIASTOLIC BLOOD PRESSURE: 52 MMHG

## 2022-09-12 DIAGNOSIS — R19.7 DIARRHEA, UNSPECIFIED TYPE: Primary | ICD-10-CM

## 2022-09-12 DIAGNOSIS — C18.2 PRIMARY ADENOCARCINOMA OF ASCENDING COLON (HCC): ICD-10-CM

## 2022-09-12 PROCEDURE — 2580000003 HC RX 258: Performed by: INTERNAL MEDICINE

## 2022-09-12 PROCEDURE — 96360 HYDRATION IV INFUSION INIT: CPT

## 2022-09-12 RX ORDER — SODIUM CHLORIDE 0.9 % (FLUSH) 0.9 %
5-40 SYRINGE (ML) INJECTION PRN
Status: CANCELLED | OUTPATIENT
Start: 2022-09-12

## 2022-09-12 RX ORDER — SODIUM CHLORIDE 9 MG/ML
INJECTION, SOLUTION INTRAVENOUS ONCE
Status: COMPLETED | OUTPATIENT
Start: 2022-09-12 | End: 2022-09-12

## 2022-09-12 RX ORDER — SODIUM CHLORIDE 0.9 % (FLUSH) 0.9 %
5-40 SYRINGE (ML) INJECTION PRN
Status: DISCONTINUED | OUTPATIENT
Start: 2022-09-12 | End: 2022-09-13 | Stop reason: HOSPADM

## 2022-09-12 RX ORDER — HEPARIN SODIUM (PORCINE) LOCK FLUSH IV SOLN 100 UNIT/ML 100 UNIT/ML
500 SOLUTION INTRAVENOUS PRN
Status: CANCELLED | OUTPATIENT
Start: 2022-09-12

## 2022-09-12 RX ORDER — SODIUM CHLORIDE 9 MG/ML
INJECTION, SOLUTION INTRAVENOUS ONCE
Status: CANCELLED
Start: 2022-09-12 | End: 2022-09-12

## 2022-09-12 RX ORDER — SODIUM CHLORIDE 9 MG/ML
5-250 INJECTION, SOLUTION INTRAVENOUS PRN
Status: CANCELLED | OUTPATIENT
Start: 2022-09-12

## 2022-09-12 RX ADMIN — SODIUM CHLORIDE, PRESERVATIVE FREE 10 ML: 5 INJECTION INTRAVENOUS at 09:55

## 2022-09-12 RX ADMIN — SODIUM CHLORIDE: 9 INJECTION, SOLUTION INTRAVENOUS at 08:25

## 2022-09-12 NOTE — PROGRESS NOTES
Arrived to the infusion center  1 liter NS completed. No new issues or concerns voiced during the visit. Aware of her next appointment on 9/21 at 0830    Discharged ambulatory in satisfactory condition.

## 2022-09-16 ASSESSMENT — ENCOUNTER SYMPTOMS
CONSTIPATION: 0
VOMITING: 0
COUGH: 0
EYES NEGATIVE: 1
SHORTNESS OF BREATH: 0

## 2022-09-19 ENCOUNTER — HOSPITAL ENCOUNTER (OUTPATIENT)
Dept: LAB | Age: 77
Discharge: HOME | End: 2022-09-22
Payer: MEDICARE

## 2022-09-19 ENCOUNTER — CLINICAL DOCUMENTATION (OUTPATIENT)
Dept: CASE MANAGEMENT | Age: 77
End: 2022-09-19

## 2022-09-19 ENCOUNTER — OFFICE VISIT (OUTPATIENT)
Dept: ONCOLOGY | Age: 77
End: 2022-09-19
Payer: MEDICARE

## 2022-09-19 VITALS
SYSTOLIC BLOOD PRESSURE: 104 MMHG | HEIGHT: 62 IN | DIASTOLIC BLOOD PRESSURE: 53 MMHG | BODY MASS INDEX: 21.35 KG/M2 | OXYGEN SATURATION: 98 % | RESPIRATION RATE: 14 BRPM | WEIGHT: 116 LBS | HEART RATE: 83 BPM | TEMPERATURE: 97.9 F

## 2022-09-19 DIAGNOSIS — C18.2 MALIGNANT NEOPLASM OF ASCENDING COLON (HCC): ICD-10-CM

## 2022-09-19 DIAGNOSIS — Z09 CHEMOTHERAPY FOLLOW-UP EXAMINATION: ICD-10-CM

## 2022-09-19 DIAGNOSIS — C18.2 PRIMARY ADENOCARCINOMA OF ASCENDING COLON (HCC): Primary | ICD-10-CM

## 2022-09-19 DIAGNOSIS — Z79.899 HIGH RISK MEDICATION USE: ICD-10-CM

## 2022-09-19 DIAGNOSIS — E87.6 HYPOKALEMIA: ICD-10-CM

## 2022-09-19 LAB
ALBUMIN SERPL-MCNC: 3.4 G/DL (ref 3.2–4.6)
ALBUMIN/GLOB SERPL: 1.1 {RATIO} (ref 1.2–3.5)
ALP SERPL-CCNC: 196 U/L (ref 50–136)
ALT SERPL-CCNC: 69 U/L (ref 12–65)
ANION GAP SERPL CALC-SCNC: 4 MMOL/L (ref 4–13)
AST SERPL-CCNC: 51 U/L (ref 15–37)
BASOPHILS # BLD: 0 K/UL (ref 0–0.2)
BASOPHILS NFR BLD: 0 % (ref 0–2)
BILIRUB SERPL-MCNC: 0.4 MG/DL (ref 0.2–1.1)
BUN SERPL-MCNC: 6 MG/DL (ref 8–23)
CALCIUM SERPL-MCNC: 8.4 MG/DL (ref 8.3–10.4)
CHLORIDE SERPL-SCNC: 112 MMOL/L (ref 101–110)
CO2 SERPL-SCNC: 26 MMOL/L (ref 21–32)
CREAT SERPL-MCNC: 0.8 MG/DL (ref 0.6–1)
DIFFERENTIAL METHOD BLD: ABNORMAL
EOSINOPHIL # BLD: 0 K/UL (ref 0–0.8)
EOSINOPHIL NFR BLD: 1 % (ref 0.5–7.8)
ERYTHROCYTE [DISTWIDTH] IN BLOOD BY AUTOMATED COUNT: 15.4 % (ref 11.9–14.6)
GLOBULIN SER CALC-MCNC: 3 G/DL (ref 2.3–3.5)
GLUCOSE SERPL-MCNC: 122 MG/DL (ref 65–100)
HCT VFR BLD AUTO: 33.1 % (ref 35.8–46.3)
HGB BLD-MCNC: 10.9 G/DL (ref 11.7–15.4)
IMM GRANULOCYTES # BLD AUTO: 0.3 K/UL (ref 0–0.5)
IMM GRANULOCYTES NFR BLD AUTO: 5 % (ref 0–5)
LYMPHOCYTES # BLD: 1.1 K/UL (ref 0.5–4.6)
LYMPHOCYTES NFR BLD: 16 % (ref 13–44)
MAGNESIUM SERPL-MCNC: 1.8 MG/DL (ref 1.8–2.4)
MCH RBC QN AUTO: 32.4 PG (ref 26.1–32.9)
MCHC RBC AUTO-ENTMCNC: 32.9 G/DL (ref 31.4–35)
MCV RBC AUTO: 98.5 FL (ref 79.6–97.8)
MONOCYTES # BLD: 0.9 K/UL (ref 0.1–1.3)
MONOCYTES NFR BLD: 14 % (ref 4–12)
NEUTS SEG # BLD: 4.5 K/UL (ref 1.7–8.2)
NEUTS SEG NFR BLD: 64 % (ref 43–78)
NRBC # BLD: 0 K/UL (ref 0–0.2)
PLATELET # BLD AUTO: 71 K/UL (ref 150–450)
PMV BLD AUTO: 10.4 FL (ref 9.4–12.3)
POTASSIUM SERPL-SCNC: 3.1 MMOL/L (ref 3.5–5.1)
PROT SERPL-MCNC: 6.4 G/DL (ref 6.3–8.2)
RBC # BLD AUTO: 3.36 M/UL (ref 4.05–5.2)
SODIUM SERPL-SCNC: 142 MMOL/L (ref 136–145)
WBC # BLD AUTO: 7 K/UL (ref 4.3–11.1)

## 2022-09-19 PROCEDURE — 80053 COMPREHEN METABOLIC PANEL: CPT

## 2022-09-19 PROCEDURE — G8420 CALC BMI NORM PARAMETERS: HCPCS | Performed by: INTERNAL MEDICINE

## 2022-09-19 PROCEDURE — 36415 COLL VENOUS BLD VENIPUNCTURE: CPT

## 2022-09-19 PROCEDURE — 99215 OFFICE O/P EST HI 40 MIN: CPT | Performed by: INTERNAL MEDICINE

## 2022-09-19 PROCEDURE — G8399 PT W/DXA RESULTS DOCUMENT: HCPCS | Performed by: INTERNAL MEDICINE

## 2022-09-19 PROCEDURE — G8427 DOCREV CUR MEDS BY ELIG CLIN: HCPCS | Performed by: INTERNAL MEDICINE

## 2022-09-19 PROCEDURE — 83735 ASSAY OF MAGNESIUM: CPT

## 2022-09-19 PROCEDURE — 1123F ACP DISCUSS/DSCN MKR DOCD: CPT | Performed by: INTERNAL MEDICINE

## 2022-09-19 PROCEDURE — 1036F TOBACCO NON-USER: CPT | Performed by: INTERNAL MEDICINE

## 2022-09-19 PROCEDURE — 1090F PRES/ABSN URINE INCON ASSESS: CPT | Performed by: INTERNAL MEDICINE

## 2022-09-19 PROCEDURE — 85025 COMPLETE CBC W/AUTO DIFF WBC: CPT

## 2022-09-19 ASSESSMENT — PATIENT HEALTH QUESTIONNAIRE - PHQ9
SUM OF ALL RESPONSES TO PHQ QUESTIONS 1-9: 0
2. FEELING DOWN, DEPRESSED OR HOPELESS: 0

## 2022-09-19 NOTE — PATIENT INSTRUCTIONS
Differential Type 09/19/2022 AUTOMATED    Final    Sodium 09/19/2022 142  136 - 145 mmol/L Final    Potassium 09/19/2022 3.1 (A) 3.5 - 5.1 mmol/L Final    Chloride 09/19/2022 112 (A) 101 - 110 mmol/L Final    CO2 09/19/2022 26  21 - 32 mmol/L Final    Anion Gap 09/19/2022 4  4 - 13 mmol/L Final    Glucose 09/19/2022 122 (A) 65 - 100 mg/dL Final    BUN 09/19/2022 6 (A) 8 - 23 MG/DL Final    Creatinine 09/19/2022 0.80  0.6 - 1.0 MG/DL Final    GFR  09/19/2022 >60  >60 ml/min/1.73m2 Final    GFR Non- 09/19/2022 >60  >60 ml/min/1.73m2 Final    Comment:      Estimated GFR is calculated using the Modification of Diet in Renal Disease (MDRD) Study equation, reported for both  Americans (GFRAA) and non- Americans (GFRNA), and normalized to 1.73m2 body surface area. The physician must decide which value applies to the patient. The MDRD study equation should only be used in individuals age 25 or older. It has not been validated for the following: pregnant women, patients with serious comorbid conditions,or on certain medications, or persons with extremes of body size, muscle mass, or nutritional status.       Calcium 09/19/2022 8.4  8.3 - 10.4 MG/DL Final    Total Bilirubin 09/19/2022 0.4  0.2 - 1.1 MG/DL Final    ALT 09/19/2022 69 (A) 12 - 65 U/L Final    AST 09/19/2022 51 (A) 15 - 37 U/L Final    Alk Phosphatase 09/19/2022 196 (A) 50 - 136 U/L Final    Total Protein 09/19/2022 6.4  6.3 - 8.2 g/dL Final    Albumin 09/19/2022 3.4  3.2 - 4.6 g/dL Final    Globulin 09/19/2022 3.0  2.3 - 3.5 g/dL Final    Albumin/Globulin Ratio 09/19/2022 1.1 (A) 1.2 - 3.5   Final    Magnesium 09/19/2022 1.8  1.8 - 2.4 mg/dL Final       Treatment Summary has been discussed and given to patient: n/a    -------------------------------------------------------------------------------------------------------------------  Please call our office at [phone] if you have any  of the following symptoms: Fever of 100.5 or greater  Chills  Shortness of breath  Swelling or pain in one leg    After office hours an answering service is available and will contact a provider for emergencies or if you are experiencing any of the above symptoms. Patient does express an interest in My Chart. My Chart log in information explained on the after visit summary printout at the [facility] Tamara Dove [address] desk.     Rema Enriquez RN  Nurse Navigator  [address] [place] [id number] [phone]

## 2022-09-21 ENCOUNTER — CLINICAL DOCUMENTATION (OUTPATIENT)
Dept: CASE MANAGEMENT | Age: 77
End: 2022-09-21

## 2022-09-21 ENCOUNTER — HOSPITAL ENCOUNTER (OUTPATIENT)
Dept: INFUSION THERAPY | Age: 77
Discharge: HOME | End: 2022-09-21
Payer: MEDICARE

## 2022-09-21 VITALS
DIASTOLIC BLOOD PRESSURE: 58 MMHG | TEMPERATURE: 97.7 F | BODY MASS INDEX: 21.18 KG/M2 | OXYGEN SATURATION: 96 % | RESPIRATION RATE: 16 BRPM | WEIGHT: 115.8 LBS | HEART RATE: 76 BPM | SYSTOLIC BLOOD PRESSURE: 110 MMHG

## 2022-09-21 LAB
BASOPHILS # BLD: 0 K/UL (ref 0–0.2)
BASOPHILS NFR BLD: 0 % (ref 0–2)
DIFFERENTIAL METHOD BLD: ABNORMAL
EOSINOPHIL # BLD: 0.1 K/UL (ref 0–0.8)
EOSINOPHIL NFR BLD: 1 % (ref 0.5–7.8)
ERYTHROCYTE [DISTWIDTH] IN BLOOD BY AUTOMATED COUNT: 15.2 % (ref 11.9–14.6)
HCT VFR BLD AUTO: 32.1 % (ref 35.8–46.3)
HGB BLD-MCNC: 10.3 G/DL (ref 11.7–15.4)
IMM GRANULOCYTES # BLD AUTO: 0.4 K/UL (ref 0–0.5)
IMM GRANULOCYTES NFR BLD AUTO: 7 % (ref 0–5)
LYMPHOCYTES # BLD: 1 K/UL (ref 0.5–4.6)
LYMPHOCYTES NFR BLD: 19 % (ref 13–44)
MCH RBC QN AUTO: 31.8 PG (ref 26.1–32.9)
MCHC RBC AUTO-ENTMCNC: 32.1 G/DL (ref 31.4–35)
MCV RBC AUTO: 99.1 FL (ref 79.6–97.8)
MONOCYTES # BLD: 0.6 K/UL (ref 0.1–1.3)
MONOCYTES NFR BLD: 12 % (ref 4–12)
NEUTS SEG # BLD: 3 K/UL (ref 1.7–8.2)
NEUTS SEG NFR BLD: 61 % (ref 43–78)
NRBC # BLD: 0 K/UL (ref 0–0.2)
PLATELET # BLD AUTO: 68 K/UL (ref 150–450)
PLATELET COMMENT: ABNORMAL
PMV BLD AUTO: 11.8 FL (ref 9.4–12.3)
RBC # BLD AUTO: 3.24 M/UL (ref 4.05–5.2)
RBC MORPH BLD: ABNORMAL
WBC # BLD AUTO: 5.1 K/UL (ref 4.3–11.1)
WBC MORPH BLD: ABNORMAL

## 2022-09-21 PROCEDURE — 2580000003 HC RX 258: Performed by: INTERNAL MEDICINE

## 2022-09-21 PROCEDURE — 85025 COMPLETE CBC W/AUTO DIFF WBC: CPT

## 2022-09-21 PROCEDURE — 36591 DRAW BLOOD OFF VENOUS DEVICE: CPT

## 2022-09-21 RX ORDER — SODIUM CHLORIDE 0.9 % (FLUSH) 0.9 %
5-40 SYRINGE (ML) INJECTION 2 TIMES DAILY
Status: DISCONTINUED | OUTPATIENT
Start: 2022-09-21 | End: 2022-09-22 | Stop reason: HOSPADM

## 2022-09-21 RX ADMIN — SODIUM CHLORIDE, PRESERVATIVE FREE 10 ML: 5 INJECTION INTRAVENOUS at 10:10

## 2022-09-21 NOTE — PROGRESS NOTES
Arrived to the [facility]. Assessment completed, labs drawn and  reviewed. Platelet count 68 called to [facility] HECTOR AGUERO and patient will not receive treatment today. Any issues or concerns during appointment: None  Patient aware office to call with next appointment.   Discharged ambulatory with daughter

## 2022-09-23 DIAGNOSIS — C18.2 PRIMARY ADENOCARCINOMA OF ASCENDING COLON (HCC): Primary | ICD-10-CM

## 2022-09-23 RX ORDER — SODIUM CHLORIDE 9 MG/ML
INJECTION, SOLUTION INTRAVENOUS ONCE
Status: CANCELLED
Start: 2022-09-23 | End: 2022-09-23

## 2022-09-23 RX ORDER — SODIUM CHLORIDE 9 MG/ML
INJECTION, SOLUTION INTRAVENOUS ONCE
Status: CANCELLED
Start: 2022-09-27 | End: 2022-09-27

## 2022-09-24 ENCOUNTER — HOSPITAL ENCOUNTER (OUTPATIENT)
Dept: INFUSION THERAPY | Age: 77
End: 2022-09-24

## 2022-09-27 ENCOUNTER — HOSPITAL ENCOUNTER (OUTPATIENT)
Dept: INFUSION THERAPY | Age: 77
Discharge: HOME | End: 2022-09-27
Payer: MEDICARE

## 2022-09-27 VITALS
SYSTOLIC BLOOD PRESSURE: 130 MMHG | TEMPERATURE: 98.2 F | RESPIRATION RATE: 16 BRPM | OXYGEN SATURATION: 98 % | HEART RATE: 82 BPM | DIASTOLIC BLOOD PRESSURE: 55 MMHG

## 2022-09-27 DIAGNOSIS — R19.7 DIARRHEA, UNSPECIFIED TYPE: Primary | ICD-10-CM

## 2022-09-27 DIAGNOSIS — C18.2 PRIMARY ADENOCARCINOMA OF ASCENDING COLON (HCC): ICD-10-CM

## 2022-09-27 PROCEDURE — 2580000003 HC RX 258: Performed by: NURSE PRACTITIONER

## 2022-09-27 PROCEDURE — 96360 HYDRATION IV INFUSION INIT: CPT

## 2022-09-27 PROCEDURE — 2580000003 HC RX 258: Performed by: INTERNAL MEDICINE

## 2022-09-27 RX ORDER — SODIUM CHLORIDE 9 MG/ML
INJECTION, SOLUTION INTRAVENOUS ONCE
Status: COMPLETED | OUTPATIENT
Start: 2022-09-27 | End: 2022-09-27

## 2022-09-27 RX ORDER — SODIUM CHLORIDE 0.9 % (FLUSH) 0.9 %
5-40 SYRINGE (ML) INJECTION PRN
Status: DISCONTINUED | OUTPATIENT
Start: 2022-09-27 | End: 2022-09-28 | Stop reason: HOSPADM

## 2022-09-27 RX ORDER — HEPARIN SODIUM (PORCINE) LOCK FLUSH IV SOLN 100 UNIT/ML 100 UNIT/ML
500 SOLUTION INTRAVENOUS PRN
OUTPATIENT
Start: 2022-09-27

## 2022-09-27 RX ORDER — SODIUM CHLORIDE 9 MG/ML
5-250 INJECTION, SOLUTION INTRAVENOUS PRN
OUTPATIENT
Start: 2022-09-27

## 2022-09-27 RX ORDER — SODIUM CHLORIDE 9 MG/ML
INJECTION, SOLUTION INTRAVENOUS ONCE
Status: CANCELLED
Start: 2022-09-27 | End: 2022-09-27

## 2022-09-27 RX ORDER — SODIUM CHLORIDE 0.9 % (FLUSH) 0.9 %
5-40 SYRINGE (ML) INJECTION PRN
OUTPATIENT
Start: 2022-09-27

## 2022-09-27 RX ADMIN — SODIUM CHLORIDE, PRESERVATIVE FREE 10 ML: 5 INJECTION INTRAVENOUS at 09:25

## 2022-09-27 RX ADMIN — SODIUM CHLORIDE, PRESERVATIVE FREE 10 ML: 5 INJECTION INTRAVENOUS at 10:40

## 2022-09-27 RX ADMIN — SODIUM CHLORIDE: 9 INJECTION, SOLUTION INTRAVENOUS at 09:25

## 2022-09-27 ASSESSMENT — ENCOUNTER SYMPTOMS
CONSTIPATION: 0
SHORTNESS OF BREATH: 0
VOMITING: 0
COUGH: 0
EYES NEGATIVE: 1

## 2022-09-27 NOTE — PROGRESS NOTES
Arrived to the [facility]. IV fluids completed. Patient tolerated without difficulty. Any issues or concerns during appointment: None. Patient aware of next infusion appointment on 09/28 (date) at 1400 (time). Patient instructed to call provider with temperature of 100.4 or greater or nausea/vomiting/ diarrhea or pain not controlled by medications  Discharged ambulatory.

## 2022-09-28 ENCOUNTER — HOSPITAL ENCOUNTER (OUTPATIENT)
Dept: INFUSION THERAPY | Age: 77
Discharge: HOME | End: 2022-09-28

## 2022-09-28 ENCOUNTER — CLINICAL DOCUMENTATION (OUTPATIENT)
Dept: CASE MANAGEMENT | Age: 77
End: 2022-09-28

## 2022-09-28 ENCOUNTER — HOSPITAL ENCOUNTER (OUTPATIENT)
Dept: INFUSION THERAPY | Age: 77
Discharge: HOME | End: 2022-09-28
Payer: MEDICARE

## 2022-09-28 ENCOUNTER — OFFICE VISIT (OUTPATIENT)
Dept: ONCOLOGY | Age: 77
End: 2022-09-28
Payer: MEDICARE

## 2022-09-28 VITALS
TEMPERATURE: 98.1 F | WEIGHT: 118 LBS | SYSTOLIC BLOOD PRESSURE: 113 MMHG | HEIGHT: 62 IN | BODY MASS INDEX: 21.71 KG/M2 | OXYGEN SATURATION: 98 % | HEART RATE: 89 BPM | DIASTOLIC BLOOD PRESSURE: 60 MMHG | RESPIRATION RATE: 20 BRPM

## 2022-09-28 DIAGNOSIS — C18.9 MALIGNANT NEOPLASM OF COLON, UNSPECIFIED PART OF COLON (HCC): ICD-10-CM

## 2022-09-28 DIAGNOSIS — K90.9 INTESTINAL MALABSORPTION, UNSPECIFIED TYPE: ICD-10-CM

## 2022-09-28 DIAGNOSIS — C18.2 PRIMARY ADENOCARCINOMA OF ASCENDING COLON (HCC): Primary | ICD-10-CM

## 2022-09-28 DIAGNOSIS — Z63.8 POOR ATTACHMENT TO PRIMARY CAREGIVER: ICD-10-CM

## 2022-09-28 DIAGNOSIS — E55.9 VITAMIN D DEFICIENCY: ICD-10-CM

## 2022-09-28 DIAGNOSIS — Z79.899 HIGH RISK MEDICATION USE: ICD-10-CM

## 2022-09-28 DIAGNOSIS — C18.9 MALIGNANT NEOPLASM OF COLON, UNSPECIFIED PART OF COLON (HCC): Primary | ICD-10-CM

## 2022-09-28 DIAGNOSIS — C18.2 PRIMARY ADENOCARCINOMA OF ASCENDING COLON (HCC): ICD-10-CM

## 2022-09-28 LAB
ALBUMIN SERPL-MCNC: 3.2 G/DL (ref 3.2–4.6)
ALBUMIN/GLOB SERPL: 1.1 {RATIO} (ref 1.2–3.5)
ALP SERPL-CCNC: 176 U/L (ref 50–136)
ALT SERPL-CCNC: 74 U/L (ref 12–65)
ANION GAP SERPL CALC-SCNC: 4 MMOL/L (ref 4–13)
AST SERPL-CCNC: 61 U/L (ref 15–37)
BASOPHILS # BLD: 0 K/UL (ref 0–0.2)
BASOPHILS NFR BLD: 1 % (ref 0–2)
BILIRUB SERPL-MCNC: 0.4 MG/DL (ref 0.2–1.1)
BUN SERPL-MCNC: 7 MG/DL (ref 8–23)
CALCIUM SERPL-MCNC: 8.7 MG/DL (ref 8.3–10.4)
CEA SERPL-MCNC: 2.3 NG/ML (ref 0–3)
CHLORIDE SERPL-SCNC: 112 MMOL/L (ref 101–110)
CO2 SERPL-SCNC: 25 MMOL/L (ref 21–32)
CREAT SERPL-MCNC: 0.7 MG/DL (ref 0.6–1)
DIFFERENTIAL METHOD BLD: ABNORMAL
EOSINOPHIL # BLD: 0 K/UL (ref 0–0.8)
EOSINOPHIL NFR BLD: 1 % (ref 0.5–7.8)
ERYTHROCYTE [DISTWIDTH] IN BLOOD BY AUTOMATED COUNT: 14.6 % (ref 11.9–14.6)
GLOBULIN SER CALC-MCNC: 3 G/DL (ref 2.3–3.5)
GLUCOSE SERPL-MCNC: 170 MG/DL (ref 65–100)
HCT VFR BLD AUTO: 33 % (ref 35.8–46.3)
HGB BLD-MCNC: 10.9 G/DL (ref 11.7–15.4)
IMM GRANULOCYTES # BLD AUTO: 0 K/UL (ref 0–0.5)
IMM GRANULOCYTES NFR BLD AUTO: 1 % (ref 0–5)
LYMPHOCYTES # BLD: 1 K/UL (ref 0.5–4.6)
LYMPHOCYTES NFR BLD: 30 % (ref 13–44)
MAGNESIUM SERPL-MCNC: 2.1 MG/DL (ref 1.8–2.4)
MCH RBC QN AUTO: 32.2 PG (ref 26.1–32.9)
MCHC RBC AUTO-ENTMCNC: 33 G/DL (ref 31.4–35)
MCV RBC AUTO: 97.6 FL (ref 79.6–97.8)
MONOCYTES # BLD: 0.7 K/UL (ref 0.1–1.3)
MONOCYTES NFR BLD: 20 % (ref 4–12)
NEUTS SEG # BLD: 1.6 K/UL (ref 1.7–8.2)
NEUTS SEG NFR BLD: 47 % (ref 43–78)
NRBC # BLD: 0 K/UL (ref 0–0.2)
PLATELET # BLD AUTO: 86 K/UL (ref 150–450)
PMV BLD AUTO: 11.2 FL (ref 9.4–12.3)
POTASSIUM SERPL-SCNC: 3.9 MMOL/L (ref 3.5–5.1)
PROT SERPL-MCNC: 6.2 G/DL (ref 6.3–8.2)
RBC # BLD AUTO: 3.38 M/UL (ref 4.05–5.2)
SODIUM SERPL-SCNC: 141 MMOL/L (ref 136–145)
WBC # BLD AUTO: 3.3 K/UL (ref 4.3–11.1)

## 2022-09-28 PROCEDURE — 1090F PRES/ABSN URINE INCON ASSESS: CPT | Performed by: INTERNAL MEDICINE

## 2022-09-28 PROCEDURE — 2580000003 HC RX 258: Performed by: INTERNAL MEDICINE

## 2022-09-28 PROCEDURE — 85025 COMPLETE CBC W/AUTO DIFF WBC: CPT

## 2022-09-28 PROCEDURE — 80053 COMPREHEN METABOLIC PANEL: CPT

## 2022-09-28 PROCEDURE — 82378 CARCINOEMBRYONIC ANTIGEN: CPT

## 2022-09-28 PROCEDURE — 83735 ASSAY OF MAGNESIUM: CPT

## 2022-09-28 PROCEDURE — G8399 PT W/DXA RESULTS DOCUMENT: HCPCS | Performed by: INTERNAL MEDICINE

## 2022-09-28 PROCEDURE — 1036F TOBACCO NON-USER: CPT | Performed by: INTERNAL MEDICINE

## 2022-09-28 PROCEDURE — G8420 CALC BMI NORM PARAMETERS: HCPCS | Performed by: INTERNAL MEDICINE

## 2022-09-28 PROCEDURE — G8427 DOCREV CUR MEDS BY ELIG CLIN: HCPCS | Performed by: INTERNAL MEDICINE

## 2022-09-28 PROCEDURE — 1123F ACP DISCUSS/DSCN MKR DOCD: CPT | Performed by: INTERNAL MEDICINE

## 2022-09-28 PROCEDURE — 99215 OFFICE O/P EST HI 40 MIN: CPT | Performed by: INTERNAL MEDICINE

## 2022-09-28 PROCEDURE — 36591 DRAW BLOOD OFF VENOUS DEVICE: CPT

## 2022-09-28 RX ORDER — SODIUM CHLORIDE 0.9 % (FLUSH) 0.9 %
10 SYRINGE (ML) INJECTION PRN
Status: DISCONTINUED | OUTPATIENT
Start: 2022-09-28 | End: 2022-09-29 | Stop reason: HOSPADM

## 2022-09-28 RX ADMIN — Medication 10 ML: at 11:24

## 2022-09-28 SDOH — SOCIAL STABILITY - SOCIAL INSECURITY: OTHER SPECIFIED PROBLEMS RELATED TO PRIMARY SUPPORT GROUP: Z63.8

## 2022-09-28 ASSESSMENT — PATIENT HEALTH QUESTIONNAIRE - PHQ9
SUM OF ALL RESPONSES TO PHQ QUESTIONS 1-9: 0
2. FEELING DOWN, DEPRESSED OR HOPELESS: 0
1. LITTLE INTEREST OR PLEASURE IN DOING THINGS: 0
SUM OF ALL RESPONSES TO PHQ9 QUESTIONS 1 & 2: 0

## 2022-09-28 NOTE — PROGRESS NOTES
I saw patient today with Dr Khushboo Villanueva prior to cycle 12. After some conversation with patient, Dr Khushboo Villanueva and pt have decided to forgo cycle 12. Pt will return in approx 1 week for labs to see if ANC is 1500 or more at pt request. Otherwise we will see pt in 2 months w scans. Navigation will sign off unless needed.

## 2022-09-28 NOTE — PROGRESS NOTES
[facility] Hematology and Oncology: Office Visit Established Patient    Chief Complaint   Patient presents with    Follow-up      History of Present Illness:  Ms. Martell is a  65 y.o. female who presents today for  follow up regarding colon cancer. she was admitted on 1/22/22 with peritonitis, colon mass, obstruction and bowel perforation. PMhx: anxiety,  corneal dystrophy s/p transplants (bilat) on steroid drops, CAD, GERD, IBS, HLD. She p/w abd pain x2 days. CT AP c/w 2.3cm mass in mid-ascending colon and LAD with obstruction and perforation. S/p subsequent right extended colectomy with ileo-transverse staped anastomosis. Path c/w mod-poorly diff adenocarcinoma - [id number] (2/14 LN +). We were consulted for recs. S/p FOLFOX - adjuvantly. Today, she is here for FU with her daughter and other daughter via telephone. Ultimately after discussion of pros and cons of proceeding with last treatment, she elected not to proceed with treatment due to increased risk of infection,  with persistent cytopenias, fatigue and neuropathy. She is comfortable with this plan. She understands that taking the last treatment will not necessarily prevent disease in the future. She also understands her risk of recurrent disease with tx adjustments made along the way to make it tolerable/dose adjusted. She will follow-up in a couple of months with CT prior. We discussed surveillance per NCCN guidelines. She did have an appointment scheduled on October 21 but can come back next week to determine if her counts are recovering nicely. She wishes to go back into the community to her [employer] but is considering holding off for now due to immune compromise. She is not having any signs and symptoms of infection at this time. She wishes to switch primary care physicians and I will refer her to [facility] internal medicine.             Chronological Events:  1/22/22 admitted with abd pain/perforation, dx'ed w colon ca    2/9/22 HFU - reviewed path again and next steps in management    2/28/22 FU C1D1 FOLFOX    3/7/22 FU - toxicity check following C1. Oxaliplatin was dose reduced for first cycle to 80%. 3/14/22 FU C2 FOLFOX-continue with Oxali dose reduction. 3/28/22 Cycle 3 FOLFOX - oxaliplatin is dose reduced. Tolerating well. 4/11/22 pre C4 due to gen debility and plts at 68 - will delay tx by 1 week; replete fluids/lytes   4/18/22 pre C4 - held last week for TCP/weakness. Plts improved and feeling better, no further diarrhea. 5/2/22 pre C5 - held d/t low ANC. Cold sensitivity worse. RX Remeron for appetite/sleep. 5/9/22 Here pre-cycle 5 after one week hold for neutropenia. Holding again for ANC. Next week, reduce dose again by 20% for cold paresthesias, neutropenia and overall tolerance. 5/31/20 pre chemo C6, doing well, improved neuropathy/cod sensitivity with dose reduction, -defer by 1 week  6/27/22 FU pre C7, p/w tx; will plan to drop dose of GCSF to 1/2 after d/w pharmacy. 7/12/22 FU pre C8, plt adequate at 76k. Agree to p/w treatment. 7/26/22 FU, Plt 56K-defer C9 by 1 week    8/18/22 FU C10, fluids in the interim   9/6/22 Here for cycle 11. Doing well overall - best she has felt. Mild elevation in ALT/AST/Alk phos - continue to monitor. 9/2022 FU pre C12 - due to SE, elected not to p/w C12 accepting risks. PLan for CT prior to next apt. Surveillance per guidelines.         Family History   Problem Relation Age of Onset    Heart Attack Father     Heart Disease Father         Arterosclerotic Cardiovascular Disease    Breast Cancer Maternal Aunt        Social History     Socioeconomic History    Marital status: Legally      Spouse name: Not on file    Number of children: Not on file    Years of education: Not on file    Highest education level: Not on file   Occupational History    Not on file   Tobacco Use    Smoking status: Never    Smokeless tobacco: Never   Substance and Sexual Activity Alcohol use: No     Alcohol/week: 0.0 standard drinks    Drug use: Yes     Types: Prescription, OTC    Sexual activity: Not on file   Other Topics Concern    Not on file   Social History Narrative    Not on file     Social Determinants of Health     Financial Resource Strain: Not on file   Food Insecurity: Not on file   Transportation Needs: Not on file   Physical Activity: Not on file   Stress: Not on file   Social Connections: Not on file   Intimate Partner Violence: Not on file   Housing Stability: Not on file          Review of Systems   Constitutional:  Positive for fatigue (mild). Negative for appetite change, chills and fever. HENT: Negative. Eyes: Negative. Respiratory:  Negative for cough and shortness of breath. Cardiovascular: Negative. Gastrointestinal:  Negative for constipation and vomiting. Genitourinary: Negative. Skin:  Negative for rash. Neurological:  Positive for numbness (improved with dose reduction). Cold sensitivity improved with dose reduction   Hematological: Negative. Psychiatric/Behavioral: Negative. All other systems reviewed and are negative.       Allergies   Allergen Reactions    Sulfa Antibiotics Hives and Rash    Sulfamethoxazole-Trimethoprim Rash         Past Medical History:   Diagnosis Date    Actinic keratosis     Adjustment disorder with mixed anxiety and depressed mood 2/11/2015    Adverse effect of anesthesia     cystoscope - was awake but could not move - dont remember the anesthesetic given    Corneal dystrophy 2/11/2015    Coronary atherosclerosis of native coronary artery 2/11/2015    GERD (gastroesophageal reflux disease)     Irritable bowel syndrome 2/11/2015    Menopause     Mixed hyperlipidemia 2/11/2015    Primary adenocarcinoma of ascending colon ([facility] [address] 1/31/2022    Psychiatric disorder     anxiety        Past Surgical History:   Procedure Laterality Date    BREAST BIOPSY Left     COLONOSCOPY      2014    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    CORNEAL TRANSPLANT Left     CYST REMOVAL      breast    IR PORT PLACEMENT EQUAL OR GREATER THAN 5 YEARS  2/21/2022    IR PORT PLACEMENT EQUAL OR GREATER THAN 5 YEARS  2/21/2022    IR PORT PLACEMENT EQUAL OR GREATER THAN 5 YEARS 2/21/2022 [facility] RADIOLOGY SPECIALS      Current Outpatient Medications   Medication Sig Dispense Refill    B Complex Vitamins (B COMPLEX 1 PO) Take 1 tablet by mouth daily      loperamide (IMODIUM) 2 MG capsule Take 2 mg by mouth 4 times daily as needed for Diarrhea PRN      diphenhydrAMINE (BENADRYL) 25 MG tablet Take 25 mg by mouth every 6 hours as needed for Itching PRN      vitamin D 25 MCG (1000 UT) CAPS Take by mouth Once a day      aspirin 81 MG EC tablet Take 81 mg by mouth      pantoprazole (PROTONIX) 40 MG tablet TAKE 1 TABLET BY MOUTH EVERY DAY 90 tablet 1    atorvastatin (LIPITOR) 40 MG tablet Take 40 mg by mouth daily      lidocaine-prilocaine (EMLA) 2.5-2.5 % cream Apply topically as needed      prednisoLONE acetate (PRED FORTE) 1 % ophthalmic suspension Apply 1 drop to eye      acetaminophen (TYLENOL) 325 MG tablet Take 650 mg by mouth every 6 hours as needed      Hyoscyamine Sulfate SL 0.125 MG SUBL Place 0.125 mg under the tongue every 6 hours as needed       No current facility-administered medications for this visit. OBJECTIVE:  Visit Vitals  Vitals:    09/28/22 1240 09/28/22 1248   BP: (!) 116/45 113/60   Pulse: 77 89   Resp: 20    Temp: 98.1 °F (36.7 °C)    TempSrc: Oral    SpO2: 98%    Weight: 118 lb (53.5 kg)    Height: 5' 2\" (1.575 m)    BP noted - increasing fluids      ECOG PERFORMANCE STATUS - 0-Fully active, able to carry on all pre-disease performance without restriction. Pain - Pain Score:   0 - No pain (fatigue-0)/10. None/Minimal pain - not affecting QOL     Fatigue - No flowsheet data found. Distress - No flowsheet data found. Physical Exam  Vitals reviewed.  Exam conducted with a chaperone present. Constitutional:       General: She is not in acute distress. Appearance: Normal appearance. She is normal weight. She is not toxic-appearing. HENT:      Head: Normocephalic and atraumatic. Nose: Nose normal. No congestion. Mouth/Throat:      Mouth: Mucous membranes are moist.      Pharynx: Oropharynx is clear. Eyes:      Conjunctiva/sclera: Conjunctivae normal.   Cardiovascular:      Rate and Rhythm: Normal rate and regular rhythm. Heart sounds: Normal heart sounds. Pulmonary:      Effort: Pulmonary effort is normal. No respiratory distress. Breath sounds: Normal breath sounds. No wheezing. Abdominal:      General: Bowel sounds are normal. There is no distension. Palpations: Abdomen is soft. Tenderness: There is no abdominal tenderness. There is no guarding. Musculoskeletal:         General: Normal range of motion. Cervical back: Normal range of motion and neck supple. Right lower leg: No edema. Left lower leg: No edema. Skin:     General: Skin is warm and dry. Neurological:      General: No focal deficit present. Mental Status: She is alert and oriented to person, place, and time.    Psychiatric:         Mood and Affect: Mood normal.         Behavior: Behavior normal.        Labs:  Hospital Outpatient Visit on 09/28/2022   Component Date Value Ref Range Status    Sodium 09/28/2022 141  136 - 145 mmol/L Final    Potassium 09/28/2022 3.9  3.5 - 5.1 mmol/L Final    Chloride 09/28/2022 112 (A)  101 - 110 mmol/L Final    CO2 09/28/2022 25  21 - 32 mmol/L Final    Anion Gap 09/28/2022 4  4 - 13 mmol/L Final    Glucose 09/28/2022 170 (A)  65 - 100 mg/dL Final    BUN 09/28/2022 7 (A)  8 - 23 MG/DL Final    Creatinine 09/28/2022 0.70  0.6 - 1.0 MG/DL Final    GFR  09/28/2022 >60  >60 ml/min/1.73m2 Final    GFR Non- 09/28/2022 >60  >60 ml/min/1.73m2 Final    Comment:      Estimated GFR is calculated using the Modification of Diet in Renal Disease (MDRD) Study equation, reported for both  Americans (GFRAA) and non- Americans (GFRNA), and normalized to 1.73m2 body surface area. The physician must decide which value applies to the patient. The MDRD study equation should only be used in individuals age 25 or older. It has not been validated for the following: pregnant women, patients with serious comorbid conditions,or on certain medications, or persons with extremes of body size, muscle mass, or nutritional status.       Calcium 09/28/2022 8.7  8.3 - 10.4 MG/DL Final    Total Bilirubin 09/28/2022 0.4  0.2 - 1.1 MG/DL Final    ALT 09/28/2022 74 (A)  12 - 65 U/L Final    AST 09/28/2022 61 (A)  15 - 37 U/L Final    Alk Phosphatase 09/28/2022 176 (A)  50 - 136 U/L Final    Total Protein 09/28/2022 6.2 (A)  6.3 - 8.2 g/dL Final    Albumin 09/28/2022 3.2  3.2 - 4.6 g/dL Final    Globulin 09/28/2022 3.0  2.3 - 3.5 g/dL Final    Albumin/Globulin Ratio 09/28/2022 1.1 (A)  1.2 - 3.5   Final    Magnesium 09/28/2022 2.1  1.8 - 2.4 mg/dL Final    WBC 09/28/2022 3.3 (A)  4.3 - 11.1 K/uL Final    RBC 09/28/2022 3.38 (A)  4.05 - 5.2 M/uL Final    Hemoglobin 09/28/2022 10.9 (A)  11.7 - 15.4 g/dL Final    Hematocrit 09/28/2022 33.0 (A)  35.8 - 46.3 % Final    MCV 09/28/2022 97.6  79.6 - 97.8 FL Final    MCH 09/28/2022 32.2  26.1 - 32.9 PG Final    MCHC 09/28/2022 33.0  31.4 - 35.0 g/dL Final    RDW 09/28/2022 14.6  11.9 - 14.6 % Final    Platelets 85/00/7471 86 (A)  150 - 450 K/uL Final    MPV 09/28/2022 11.2  9.4 - 12.3 FL Final    nRBC 09/28/2022 0.00  0.0 - 0.2 K/uL Final    **Note: Absolute NRBC parameter is now reported with Hemogram**    Seg Neutrophils 09/28/2022 47  43 - 78 % Final    Lymphocytes 09/28/2022 30  13 - 44 % Final    Monocytes 09/28/2022 20 (A)  4.0 - 12.0 % Final    Eosinophils % 09/28/2022 1  0.5 - 7.8 % Final    Basophils 09/28/2022 1  0.0 - 2.0 % Final    Immature Granulocytes 09/28/2022 1  0.0 - 5.0 % Final    Segs Absolute 09/28/2022 1.6 (A)  1.7 - 8.2 K/UL Final    Absolute Lymph # 09/28/2022 1.0  0.5 - 4.6 K/UL Final    Absolute Mono # 09/28/2022 0.7  0.1 - 1.3 K/UL Final    Absolute Eos # 09/28/2022 0.0  0.0 - 0.8 K/UL Final    Basophils Absolute 09/28/2022 0.0  0.0 - 0.2 K/UL Final    Absolute Immature Granulocyte 09/28/2022 0.0  0.0 - 0.5 K/UL Final    Differential Type 09/28/2022 AUTOMATED    Final    CEA 09/28/2022 2.3  0.0 - 3.0 ng/mL Final    Comment: Nonsmoker:  <3.0 ng/mL  Smoker:     <5.0 ng/mL  [employer]. Patient's results of tumor marker testing may not be comparable to labs using different manufacturers/methods. Imaging:  Reviewed       PATHOLOGY:                         ASSESSMENT:    ICD-10-CM    1. Malignant neoplasm of colon, unspecified part of colon ([facility] [address]  C18.9 CT CHEST ABDOMEN PELVIS W CONTRAST             Ms. Case is here for FU of colon  cancer. 1. Mid-ascending colon adenocarcinoma - [id number] - Stage IIIB (high risk dz due to poorly diff/perforation/obstruction - we have discussed this and pt VU), MSI - stable,  KRAS mut    - Patient wished to lower the dose of oxaliplatin upfront to 80% of the dose. C9 delayed 1 wk due to thrombocytopenia; C12 held due to immunocompromise/fatigue and neuropathy   - cold sensitivity - improved with 80% of original dosing      - here for FU with her daughter and other daughter via telephone. Ultimately after discussion of pros and cons of proceeding with last treatment, she elected not to proceed with treatment due to increased risk of infection with persistent cytopenias. She is comfortable with this plan. She understands that taking the last treatment will not necessarily prevent disease in the future. She also understands her risk of recurrent disease with tx adjustments made along the way to make it tolerable/dose adjusted. She will follow-up in a couple of months with CT prior.   We discussed surveillance per NCCN guidelines. She did have an appointment scheduled on October 21 but can come back next week to determine if her counts are recovering nicely. She wishes to go back into the community to her [employer] but is considering holding off for now due to immune compromise. She is not having any signs and symptoms of infection at this time. - She wishes to switch primary care physicians and I will refer her to [facility] internal medicine.        - CEA - continue to monitor    - EVON - She completed IV iron    - vit D defic - She is taking 5000 of vitamin D.     - GERD - Protonix and Carafate for reflux, well controlled. - diarrhea - imodium prn.     - low appetite - RX Remeron at bedtime    - insomnia - can use melatonin at night   - elevated liver enzymes - mild, continue to monitor.   - Continue good oral nutrition and hydration.   - Encouraged frequent activity throughout the day and rest as needed to combat fatigue.   - Call with any fevers, uncontrolled side effects from treatment or any other worrisome/concerning symptoms.   - hypokalemia - resolved; can d/c potassium         Surveillance Guidance:  CEA: q3 months x 2 years, q6 months years 3, 4, and 5. CT Scans: Once per year (Consider scanning q6 months for 2 years if considered high risk). Colonoscopy: One  year after surgery, or within 3 months post-adjuvant therapy if no prior preoperative colonoscopy performed. Subsequent colonoscopies as directed by post-adjuvant colonoscopy findings, as clinically-indicated, or at physician discretion. If no high-risk  findings, repeat no less than every 3-5 years. 2. Supportive care    - s/p bilat corneal transplant - sees Dr Sewell Spore [phone] [facility]; d/w her eye team - no CI to start tx.          RESUSCITATION DIRECTIVES/HOSPICE CARE: Full Support      RTC ~2mo or sooner as needed        MDM  Number of Diagnoses or Management Options  High risk medication use: established, worsening  Primary adenocarcinoma of ascending colon ([facility] [address]): established, improving  Vitamin D deficiency: established, improving     Amount and/or Complexity of Data Reviewed  Clinical lab tests: ordered and reviewed  Tests in the radiology section of CPT®: ordered and reviewed  Tests in the medicine section of CPT®: ordered  Obtain history from someone other than the patient: yes  Review and summarize past medical records: yes  Independent visualization of images, tracings, or specimens: yes    Risk of Complications, Morbidity, and/or Mortality  Presenting problems: moderate  Diagnostic procedures: moderate  Management options: high      [40min - chart review, review of results and discussion of options, next steps, coordination of care and charting ]       Historical:    - daughter requested testing for DPYD. Reviewed that typically we would test in pts who have severe or unexpected toxicity from fluoropyrimidines (severe myelosuppression, mucositis, diarrhea, neurotoxicity,  cardiotoxicity) during the first few cycles of fluoropyrimidine therapy, not for screening purposes. If she doesn't do well with tx - we'd dose adjust anyway. Family elected not to p/w testing. Historical:     - we reviewed the pathophysiology of colon cancer in general, we then reviewed her pathology and staging again and high risk disease.     - to start: FOLFOX: A cycle is every 14 days (6mo)    Oxaliplatin   (Eloxatin)  85 mg/m² IV once on day 1 - dose adjusted upfront to 80% of dose to see how tolerates it per  her wishes     Leucovorin  400 mg/m² IV once on day 1    Fluorouracil  400 mg/m² IV once on day 1    Fluorouracil  1,200 mg/m²/day CIV on days 1 and 2. Total dose = 2,400 mg/m²/cycle. OR CAPoX: A cycle is every 21 days     Capecitabine   (Xeloda)  850 mg/m² orally twice daily on days 1-14, then off 7 days    Oxaliplatin   (Eloxatin)  130 mg/m² IV day 1   - CT chest to complete staging with small RML nodule - ? LN - will monitor on re-imaging, no definite evid of disease. We discussed the CT results that show small nodule in the lung (per rad likely LN, <1cm abutting fissure). We reviewed that this could be a metastatic focus. She has stage III disease, if the lung nodule is cancerous, her tumor staging be stage IV. Intent of treatment will be palliative and not curative in nature. Unable to bx lesion due to size. Daughter and patient aware of this. - Elevated Alk phos - GGT checked and WNL at 49 6/2022   - She did report some numbness or perception of swelling in the ball of her foot bilaterally, closer to lateral phalanges, and she reported that as baseline prior to chemotherapy. Now with some worsening. Able to write, close buttons/zipper. - nausea - emend starting with C2 and worked well, continue with PO anti-emetics prn - denies recent issues with this  - mucositis - resolved with compounded rinse, Rx provided. All questions were asked and answered to the best of my ability. The patient verbalized understanding and agrees with the plan above.             Aurelia Hopper MD, MD  [facility] Hematology and Oncology  [address] [place] [address]  Office : [phone]  Fax : [phone]

## 2022-09-28 NOTE — PATIENT INSTRUCTIONS
Patient Instructions from Today's Visit    Reason for Visit:  Prechemo visit    Diagnosis Information:  [email or web address]-[email or web address]-answers-[email or web address]-[email or web address]-[email or web address]-[email or web address]-sheets      Plan:  -We will not complete cycle 12 per your decision after discussion of pro/cons   -Continue Vitamin D  -Stop Potassium  -Continue Protonix    Follow Up:  As scheduled    Recent Lab Results:  Hospital Outpatient Visit on 09/28/2022   Component Date Value Ref Range Status    Sodium 09/28/2022 141  136 - 145 mmol/L Final    Potassium 09/28/2022 3.9  3.5 - 5.1 mmol/L Final    Chloride 09/28/2022 112 (A)  101 - 110 mmol/L Final    CO2 09/28/2022 25  21 - 32 mmol/L Final    Anion Gap 09/28/2022 4  4 - 13 mmol/L Final    Glucose 09/28/2022 170 (A)  65 - 100 mg/dL Final    BUN 09/28/2022 7 (A)  8 - 23 MG/DL Final    Creatinine 09/28/2022 0.70  0.6 - 1.0 MG/DL Final    GFR  09/28/2022 >60  >60 ml/min/1.73m2 Final    GFR Non- 09/28/2022 >60  >60 ml/min/1.73m2 Final    Comment:      Estimated GFR is calculated using the Modification of Diet in Renal Disease (MDRD) Study equation, reported for both  Americans (GFRAA) and non- Americans (GFRNA), and normalized to 1.73m2 body surface area. The physician must decide which value applies to the patient. The MDRD study equation should only be used in individuals age 25 or older. It has not been validated for the following: pregnant women, patients with serious comorbid conditions,or on certain medications, or persons with extremes of body size, muscle mass, or nutritional status.       Calcium 09/28/2022 8.7  8.3 - 10.4 MG/DL Final    Total Bilirubin 09/28/2022 0.4  0.2 - 1.1 MG/DL Final    ALT 09/28/2022 74 (A)  12 - 65 U/L Final    AST 09/28/2022 61 (A)  15 - 37 U/L Final    Alk Phosphatase 09/28/2022 176 (A)  50 - 136 U/L Final    Total Protein 09/28/2022 6.2 (A)  6.3 - 8.2 g/dL Final    Albumin 09/28/2022 3.2  3.2 - 4.6 g/dL Final    Globulin 09/28/2022 3.0  2.3 - 3.5 g/dL Final    Albumin/Globulin Ratio 09/28/2022 1.1 (A)  1.2 - 3.5   Final    Magnesium 09/28/2022 2.1  1.8 - 2.4 mg/dL Final    WBC 09/28/2022 3.3 (A)  4.3 - 11.1 K/uL Final    RBC 09/28/2022 3.38 (A)  4.05 - 5.2 M/uL Final    Hemoglobin 09/28/2022 10.9 (A)  11.7 - 15.4 g/dL Final    Hematocrit 09/28/2022 33.0 (A)  35.8 - 46.3 % Final    MCV 09/28/2022 97.6  79.6 - 97.8 FL Final    MCH 09/28/2022 32.2  26.1 - 32.9 PG Final    MCHC 09/28/2022 33.0  31.4 - 35.0 g/dL Final    RDW 09/28/2022 14.6  11.9 - 14.6 % Final    Platelets 00/04/4023 86 (A)  150 - 450 K/uL Final    MPV 09/28/2022 11.2  9.4 - 12.3 FL Final    nRBC 09/28/2022 0.00  0.0 - 0.2 K/uL Final    **Note: Absolute NRBC parameter is now reported with Hemogram**    Seg Neutrophils 09/28/2022 47  43 - 78 % Final    Lymphocytes 09/28/2022 30  13 - 44 % Final    Monocytes 09/28/2022 20 (A)  4.0 - 12.0 % Final    Eosinophils % 09/28/2022 1  0.5 - 7.8 % Final    Basophils 09/28/2022 1  0.0 - 2.0 % Final    Immature Granulocytes 09/28/2022 1  0.0 - 5.0 % Final    Segs Absolute 09/28/2022 1.6 (A)  1.7 - 8.2 K/UL Final    Absolute Lymph # 09/28/2022 1.0  0.5 - 4.6 K/UL Final    Absolute Mono # 09/28/2022 0.7  0.1 - 1.3 K/UL Final    Absolute Eos # 09/28/2022 0.0  0.0 - 0.8 K/UL Final    Basophils Absolute 09/28/2022 0.0  0.0 - 0.2 K/UL Final    Absolute Immature Granulocyte 09/28/2022 0.0  0.0 - 0.5 K/UL Final    Differential Type 09/28/2022 AUTOMATED    Final    CEA 09/28/2022 2.3  0.0 - 3.0 ng/mL Final    Comment: Nonsmoker:  <3.0 ng/mL  Smoker:     <5.0 ng/mL  [facility]. Patient's results of tumor marker testing may not be comparable to labs using different manufacturers/methods.           Treatment Summary has been discussed and given to patient: n/a        -------------------------------------------------------------------------------------------------------------------  Please call our office at [phone] if you have any  of the following symptoms:   Fever of 100.5 or greater  Chills  Shortness of breath  Swelling or pain in one leg    After office hours an answering service is available and will contact a provider for emergencies or if you are experiencing any of the above symptoms. Patient does express an interest in My Chart. My Chart log in information explained on the after visit summary printout at the [facility] [address] desk.     Jessica Paz RN  Nurse Navigator  [address] [place] [address] [phone]

## 2022-09-29 PROBLEM — E87.6 HYPOKALEMIA: Status: ACTIVE | Noted: 2022-09-29

## 2022-09-29 PROBLEM — Z09 CHEMOTHERAPY FOLLOW-UP EXAMINATION: Status: RESOLVED | Noted: 2022-02-28 | Resolved: 2022-09-29

## 2022-10-01 ENCOUNTER — HOSPITAL ENCOUNTER (OUTPATIENT)
Dept: INFUSION THERAPY | Age: 77
End: 2022-10-01

## 2022-10-04 ENCOUNTER — PREP FOR PROCEDURE (OUTPATIENT)
Dept: SURGERY | Age: 77
End: 2022-10-04

## 2022-10-04 ENCOUNTER — OFFICE VISIT (OUTPATIENT)
Dept: SURGERY | Age: 77
End: 2022-10-04
Payer: MEDICARE

## 2022-10-04 VITALS — HEART RATE: 74 BPM | OXYGEN SATURATION: 98 % | HEIGHT: 62 IN | BODY MASS INDEX: 21.71 KG/M2 | WEIGHT: 118 LBS

## 2022-10-04 DIAGNOSIS — C18.2 PRIMARY ADENOCARCINOMA OF ASCENDING COLON (HCC): Primary | ICD-10-CM

## 2022-10-04 PROBLEM — Z12.11 SPECIAL SCREENING FOR MALIGNANT NEOPLASMS, COLON: Status: ACTIVE | Noted: 2022-10-04

## 2022-10-04 PROCEDURE — 1123F ACP DISCUSS/DSCN MKR DOCD: CPT | Performed by: SURGERY

## 2022-10-04 PROCEDURE — 1090F PRES/ABSN URINE INCON ASSESS: CPT | Performed by: SURGERY

## 2022-10-04 PROCEDURE — G8420 CALC BMI NORM PARAMETERS: HCPCS | Performed by: SURGERY

## 2022-10-04 PROCEDURE — G8399 PT W/DXA RESULTS DOCUMENT: HCPCS | Performed by: SURGERY

## 2022-10-04 PROCEDURE — 99214 OFFICE O/P EST MOD 30 MIN: CPT | Performed by: SURGERY

## 2022-10-04 PROCEDURE — G8427 DOCREV CUR MEDS BY ELIG CLIN: HCPCS | Performed by: SURGERY

## 2022-10-04 PROCEDURE — 1036F TOBACCO NON-USER: CPT | Performed by: SURGERY

## 2022-10-04 PROCEDURE — G8484 FLU IMMUNIZE NO ADMIN: HCPCS | Performed by: SURGERY

## 2022-10-04 NOTE — PROGRESS NOTES
[address] [place] [address], [facility]  [phone]    Office Note   Rashida Ty Case   MRN: [id number]     : 1945        HPI: Rashida Ty Case is a 65 y.o. female who comes back to the office for cancer follow-up. She has been seen by Dr. Haroldo Cason in oncology and has completed her adjuvant chemotherapy for her stage III colon cancer. She completed 11 cycles with the 12 cycle being eliminated due to worsening of symptoms. We discussed that routine surveillance includes colonoscopy at 1 year postop. Her last colonoscopy was in  and no abnormalities were present. She just finished her chemo and is still recovering from the GI symptoms from it. We did discuss the possibility of needing to manage constipation with a stool management program of stool softeners plus or minus MiraLAX. She has her port in place. Her prior colonoscopies were performed by Dr. Mendez Cummins who is now retired. I told her that this is something that I could do for her. She initially presented to the ER on 2022 with diagnosis of Peritonitis ([address] Diagnoses of Colonic mass, Partial intestinal obstruction, unspecified cause ([address] and Bowel perforation ([address] were also pertinent to this visit. Jason Jewell Her PMH includes CAD s/p stents and GERD. She presented with c/o worsening abdominal pain x 2 days. CT AP with 2.3 cm mass in mid-ascending colon with few adjacent enlarged LNs with obstruction and perforation as well as enlarged LN in anterior aspect of mid lower abdomen. Hospital Course:  CT AP c/w 2.3 cm mass in mid-ascending colon and LAD with obstruction and perforation. She underwent R extended colectomy with ileo-transverse colon staped anastomosis by Dr. Camilla Ho. Path +mod to poorly differentiated adenocarcinoma invades through muscularis propria into pericolonic soft tissue. Margins neg.  2/14 LNs +ve. pT3 N1b. Hgb 8.4.       S/p subsequent right extended colectomy with ileo-transverse stapled anastomosis. Path c/w mod-poorly diff adenocarcinoma - [id number] (2/14 LN +). Oncology consulted and seen by Dr. Rochelle Carr. PATH DIAGNOSIS       \"RIGHT COLON\":               MODERATELY TO POORLY DIFFERENTIATED COLONIC ADENOCARCINOMA. CARCINOMA IS 4.9 CM IN GREATEST DIMENSION. CARCINOMA INVADES THROUGH MUSCULARIS PROPRIA INTO PERICOLONIC                  SOFT TISSUE. MARGINS ARE NEGATIVE FOR CARCINOMA. ACUTE APPENDICITIS. TWO LYMPH NODES POSITIVE FOR METASTATIC CARCINOMA WITH EXTRA-                  CAPSULAR EXTENSION (2/14). SEE ATTACHED CANCER CHECKLIST. Sign Out Date: 1/25/2022  Deno Kanner, MD              2/7/2022 POD #16 She presents with her mother in follow-up. She has an appointment to see Dr. Rochelle Carr on Wednesday. They will discuss plans for chemotherapy. He had some questions about diet. She should remain on a GI soft diet for about 3 months. We discussed several food choices. She is feeling a bit constipated and I recommended starting daily MiraLAX and continue with the Colace as well. They asked about Protonix and I think she should take this for 1 month and then can transition to her as needed Gaviscon as long as her symptoms of GERD are controlled. We discussed port placement and I recommend this being done in IR. On exam, her abdomen is soft. Little bit of erythema on the lower part of incision is the same as it was at the time of discharge. There is no drainage. Her staples are fairly wide apart and I would suspect there would be drainage by this time. Staples were removed and Steri-Strips applied. If redness worsens or drainage occurs then she will call. Otherwise I will see her back in 4 weeks. 2/11/2022 POD #20 She returns to the office after being seen by oncology who felt that she had developed a wound infection.   She did have drainage of her wound. The drainage appears serous. Her fascia is intact. I packed the wound and will have her remove the packing tomorrow. I do not need to reinforce the wound edges as they appear stable. She may shower normally but not spray water into the wound and just redressed the wound following packing removal with dry gauze covering it but no need for repacking. I believe this will resolve quickly. I will see her back in 1 week. 2/18/2022 POD #27 She returns to the office for 1 week follow-up from repacking her wound. She unpacked it the following day and has been showering up. There has been continued resolution of erythema and no further drainage. The wound is slightly open but does not undermine the incision. There is no tenderness. There is no purulence. The wound was covered with a dry dressing. She is set up for IR placement of implantable port on Monday. She will begin chemotherapy.       Past Medical History:   Diagnosis Date    Actinic keratosis     Adjustment disorder with mixed anxiety and depressed mood 2/11/2015    Adverse effect of anesthesia     cystoscope - was awake but could not move - dont remember the anesthesetic given    Corneal dystrophy 2/11/2015    Coronary atherosclerosis of native coronary artery 2/11/2015    GERD (gastroesophageal reflux disease)     Irritable bowel syndrome 2/11/2015    Menopause     Mixed hyperlipidemia 2/11/2015    Primary adenocarcinoma of ascending colon ([facility] [address] 1/31/2022    Psychiatric disorder     anxiety     Past Surgical History:   Procedure Laterality Date    BREAST BIOPSY Left     COLONOSCOPY      2014    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    CORNEAL TRANSPLANT Left     CYST REMOVAL      breast    IR PORT PLACEMENT EQUAL OR GREATER THAN 5 YEARS  2/21/2022    IR PORT PLACEMENT EQUAL OR GREATER THAN 5 YEARS  2/21/2022    IR PORT PLACEMENT EQUAL OR GREATER THAN 5 YEARS 2/21/2022 [facility] SPECIALS Current Outpatient Medications   Medication Sig    B Complex Vitamins (B COMPLEX 1 PO) Take 1 tablet by mouth daily    loperamide (IMODIUM) 2 MG capsule Take 2 mg by mouth 4 times daily as needed for Diarrhea PRN    diphenhydrAMINE (BENADRYL) 25 MG tablet Take 25 mg by mouth every 6 hours as needed for Itching PRN    vitamin D 25 MCG (1000 UT) CAPS Take by mouth Once a day    aspirin 81 MG EC tablet Take 81 mg by mouth    pantoprazole (PROTONIX) 40 MG tablet TAKE 1 TABLET BY MOUTH EVERY DAY    acetaminophen (TYLENOL) 325 MG tablet Take 650 mg by mouth every 6 hours as needed    atorvastatin (LIPITOR) 40 MG tablet Take 40 mg by mouth daily    Hyoscyamine Sulfate SL 0.125 MG SUBL Place 0.125 mg under the tongue every 6 hours as needed    lidocaine-prilocaine (EMLA) 2.5-2.5 % cream Apply topically as needed    prednisoLONE acetate (PRED FORTE) 1 % ophthalmic suspension Apply 1 drop to eye     No current facility-administered medications for this visit. ALLERGIES:  Sulfa antibiotics and Sulfamethoxazole-trimethoprim    Social History     Socioeconomic History    Marital status: Legally    Tobacco Use    Smoking status: Never    Smokeless tobacco: Never   Substance and Sexual Activity    Alcohol use: No     Alcohol/week: 0.0 standard drinks    Drug use: Yes     Types: Prescription, OTC     Social History     Tobacco Use   Smoking Status Never   Smokeless Tobacco Never     Family History   Problem Relation Age of Onset    Heart Attack Father     Heart Disease Father         Arterosclerotic Cardiovascular Disease    Breast Cancer Maternal Aunt        ROS: The patient has no difficulty with chest pain or shortness of breath. No fever or chills. The patient denies any personal or family history of abnormal clotting or bleeding. Comprehensive review of systems was otherwise unremarkable except as noted above. Physical Exam:   Constitutional: Alert oriented cooperative patient in no acute distress. Pulse 74   Ht 5' 2\" (1.575 m)   Wt 118 lb (53.5 kg)   SpO2 98%   BMI 21.58 kg/m²   Eyes:Sclera are clear without icterus. ENMT: no obvious neck masses, no ear or lip lesions  CV: RRR. Normal perfusion  Resp: No JVD. Breathing is  non-labored. GI: soft and non-distended, well healed midline incision scar     Musculoskeletal: unremarkable with normal function.    Neuro:  No obvious focal deficits  Psychiatric: normal affect and mood, no memory impairment    Lab Results   Component Value Date/Time    WBC 3.3 09/28/2022 11:14 AM    HGB 10.9 09/28/2022 11:14 AM    HCT 33.0 09/28/2022 11:14 AM    PLT 86 09/28/2022 11:14 AM    MCV 97.6 09/28/2022 11:14 AM       Lab Results   Component Value Date     09/28/2022    K 3.9 09/28/2022     (H) 09/28/2022    CO2 25 09/28/2022    BUN 7 (L) 09/28/2022    CREATININE 0.70 09/28/2022    GLUCOSE 170 (H) 09/28/2022    CALCIUM 8.7 09/28/2022    PROT 6.2 (L) 09/28/2022    LABALBU 3.2 09/28/2022    BILITOT 0.4 09/28/2022    ALKPHOS 176 (H) 09/28/2022    AST 61 (H) 09/28/2022    ALT 74 (H) 09/28/2022    LABGLOM >60 09/28/2022    GFRAA >60 09/28/2022    AGRATIO 1.0 (L) 05/16/2022    GLOB 3.0 09/28/2022         No results found for: AMYLASE  Lab Results   Component Value Date    LIPASE 82 01/22/2022        Lab Results   Component Value Date/Time    CEA 12.3 (H) 01/27/2022 01:15 PM     Lab Results   Component Value Date    CEA 2.3 09/28/2022     CT Result (most recent):  CT CHEST W CONTRAST 01/28/2022    Narrative  EXAMINATION: CT CHEST WITH IV CONTRAST 1/28/2022 2:40 PM    ACCESSION NUMBER: [id number]    INDICATION: cancer staging    COMPARISON: CT abdomen and pelvis 1/22/2022    TECHNIQUE: Multiple contiguous axial CT images of the chest were obtained from  the lung apices to the lung bases after the intravenous administration of 80 cc  Isovue-370 contrast material .    Radiation dose reduction techniques were used for this study:  Our CT scanners  use one or all of the following: Automated exposure control, adjustment of the  mA and/or kVp according to patient's size, iterative reconstruction. FINDINGS:    THORACIC AORTA: Mild scattered atherosclerosis. PROXIMAL GREAT VESSELS: Unremarkable    HEART: Left anterior descending and right coronary artery atherosclerosis. PERICARDIUM: Unremarkable    MEDIASTINAL LYMPH NODES: Unremarkable  HILAR LYMPH NODES: Unremarkable  AXILLARY LYMPH NODES: Unremarkable    PULMONARY PARENCHYMA: Small bilateral pleural effusions with linear bibasilar  atelectasis. 0.6 cm right middle lobe nodule abutting the major fissure (axial  image 35). The large central airways are predominantly clear. PLEURA: No pneumothorax. VISUALIZED UPPER ABDOMEN: There is no free intraperitoneal gas in the included  portions of the upper abdomen. Small volume free fluid adjacent to the tip of  the spleen. There is high density material in the gallbladder lumen, either  sludge or vicarious excretion of contrast.    OSSEOUS STRUCTURES: Lumbar spine spondylosis without suspicious lytic or blastic  bony lesions. Impression  1. No definite evidence of metastatic disease in the chest.    2.  A 0.6 cm right middle lobe nodule abuts the major fissure and is most likely  a lymph node. Continued attention on follow-up exams is recommended to exclude  the unlikely possibility of an isolated nodular metastasis. 3.  Small bilateral pleural effusions and bibasilar atelectasis. 4.  Small volume free fluid adjacent to the tip of the spleen. Assessment/Plan:     Jackson Angel Case is a 65 y.o. female who who presents to the office 16 days status post emergent extended right hemicolectomy with anastomosis for cecal perforation associated with near obstructing ascending colon cancer. She had a significantly large tumor with poorly differentiated histology. 2 out of 14 lymph nodes are positive for metastatic disease. CEA is elevated.   She has been seen by oncology who have recommended adjuvant chemotherapy. There will meet with Dr. Andrew Myers on Wednesday. Staples were removed from her incision and Steri-Stripped. She does have some inferior erythema. This looks similar to the day of discharge. I think this is most likely bruising, but they should keep an eye on it and let us know if it changes or drains. I think she should be appropriate for IR placement of a port when needed as long as this wound does not worsen. I will see her back in 4 weeks. She returns to the office on 2/11/2022. She did not make it the full 4 weeks as she developed drainage from her wound. This appears to be serous and not a full-blown wound infection. The induration is largely resolved and the open area of the wound was packed with gauze. Care instructions were given. I will see her back in 1 week. She returns on 2/18/2022, 1 week later for reassessment of her wound. This is healing nicely following drainage of wound seroma. This did not appear to be a wound infection. Her fascia is intact. The wound is closing nicely. There is no contraindication to proceeding with port placement and chemotherapy. She will have port placement on Monday and begin chemotherapy shortly thereafter. If she has any further issues, she may contact me otherwise I will see her back in 3 months. She returns almost 8 months later on 10/4/2022. She has completed her adjuvant chemotherapy having completed 11 of 12 intended cycles. She is already feeling improvement being off of chemo. She sees Dr. Andrew Myers who will be coordinating her surveillance CT scans and blood work-up. We discussed performing colonoscopy at 1 year postop and we will set her up for that today. We did also discuss need for stool modification while she is fully recovered from being off chemotherapy if she starts developing chronic constipation.   She can use stool softeners plus or minus MiraLAX and we talked about dosing MiraLAX to achieve a good stool consistency. She is appropriate for 1 day prep. She is appropriate for the pediatric scope. She has a port in place and we will try to get this access so she does not need another IV started on the day of the procedure. She will apply Emla to her port site. We discussed proceeding with colonoscopy. I discussed the patient's condition and treatment options with the patient. I discussed risks of colonoscopy in language the patient could understand including bleeding, infection, aspiration, perforation, medication reaction, need for further endoscopy or surgery, abscess, fistula, SBO, DVT, PE, heart attack, stroke, renal failure, respiratory failure, ventilatory dependence, and death. The patient voiced understanding of all this and all questions were answered. Alternatives to colonoscopy were discussed also and risks of the alternatives. The patient requested that we proceed with colonoscopy. Informed consent was obtained.      Patient Active Problem List    Diagnosis Date Noted    Hypokalemia 09/29/2022     Priority: Medium    Diarrhea 07/21/2022     Priority: Medium    Mucositis 07/04/2022     Priority: Medium    Vitamin D deficiency 04/13/2022    Hypomagnesemia 04/11/2022    Iron deficiency anemia 02/28/2022    High risk medication use 02/28/2022    Redness of skin 02/15/2022    Corneal transplant status 02/15/2022    Postoperative seroma of subcutaneous tissue after non-dermatologic procedure 02/11/2022    Primary adenocarcinoma of ascending colon [address] [address] 01/31/2022    Perforation of colon ([facility] [address] 01/22/2022    Precordial pain 01/12/2017    Irritable bowel syndrome 02/11/2015    Corneal dystrophy 02/11/2015    Adjustment disorder with mixed anxiety and depressed mood 02/11/2015    Mixed hyperlipidemia 02/11/2015    Coronary atherosclerosis of native coronary artery 02/11/2015          Nitin Ly MD,  FACS      Physical Exam:   Constitutional: Alert oriented cooperative patient in no acute 786.51  1/12/2017        Mixed hyperlipidemia ICD-10-CM: E78.2  ICD-9-CM: 272.2  2/11/2015        Adjustment disorder with mixed anxiety and depressed mood ICD-10-CM: F43.23  ICD-9-CM: 309.28  2/11/2015        Corneal dystrophy ICD-10-CM: H18.509  ICD-9-CM: 371.50  2/11/2015        Irritable bowel syndrome ICD-10-CM: K58.9  ICD-9-CM: 564.1  2/11/2015        Coronary atherosclerosis of native coronary artery ICD-10-CM: I25.10  ICD-9-CM: 414.01  2/11/2015                  Pretty Casey MD,  FACS

## 2022-10-07 ENCOUNTER — HOSPITAL ENCOUNTER (OUTPATIENT)
Dept: LAB | Age: 77
Discharge: HOME | End: 2022-10-10
Payer: MEDICARE

## 2022-10-07 ENCOUNTER — CLINICAL DOCUMENTATION (OUTPATIENT)
Dept: ONCOLOGY | Age: 77
End: 2022-10-07

## 2022-10-07 DIAGNOSIS — C18.2 PRIMARY ADENOCARCINOMA OF ASCENDING COLON (HCC): ICD-10-CM

## 2022-10-07 DIAGNOSIS — C18.9 MALIGNANT NEOPLASM OF COLON, UNSPECIFIED PART OF COLON (HCC): Primary | ICD-10-CM

## 2022-10-07 LAB
ALBUMIN SERPL-MCNC: 3.2 G/DL (ref 3.2–4.6)
ALBUMIN/GLOB SERPL: 1.1 {RATIO} (ref 1.2–3.5)
ALP SERPL-CCNC: 166 U/L (ref 50–136)
ALT SERPL-CCNC: 67 U/L (ref 12–65)
ANION GAP SERPL CALC-SCNC: 6 MMOL/L (ref 4–13)
AST SERPL-CCNC: 59 U/L (ref 15–37)
BASOPHILS # BLD: 0 K/UL (ref 0–0.2)
BASOPHILS NFR BLD: 1 % (ref 0–2)
BILIRUB SERPL-MCNC: 0.5 MG/DL (ref 0.2–1.1)
BUN SERPL-MCNC: 6 MG/DL (ref 8–23)
CALCIUM SERPL-MCNC: 8.5 MG/DL (ref 8.3–10.4)
CHLORIDE SERPL-SCNC: 111 MMOL/L (ref 101–110)
CO2 SERPL-SCNC: 25 MMOL/L (ref 21–32)
CREAT SERPL-MCNC: 0.6 MG/DL (ref 0.6–1)
DIFFERENTIAL METHOD BLD: ABNORMAL
EOSINOPHIL # BLD: 0 K/UL (ref 0–0.8)
EOSINOPHIL NFR BLD: 2 % (ref 0.5–7.8)
ERYTHROCYTE [DISTWIDTH] IN BLOOD BY AUTOMATED COUNT: 13.8 % (ref 11.9–14.6)
GLOBULIN SER CALC-MCNC: 3 G/DL (ref 2.3–3.5)
GLUCOSE SERPL-MCNC: 118 MG/DL (ref 65–100)
HCT VFR BLD AUTO: 32.8 % (ref 35.8–46.3)
HGB BLD-MCNC: 10.7 G/DL (ref 11.7–15.4)
IMM GRANULOCYTES # BLD AUTO: 0 K/UL (ref 0–0.5)
IMM GRANULOCYTES NFR BLD AUTO: 1 % (ref 0–5)
LYMPHOCYTES # BLD: 0.8 K/UL (ref 0.5–4.6)
LYMPHOCYTES NFR BLD: 42 % (ref 13–44)
MCH RBC QN AUTO: 32.1 PG (ref 26.1–32.9)
MCHC RBC AUTO-ENTMCNC: 32.6 G/DL (ref 31.4–35)
MCV RBC AUTO: 98.5 FL (ref 79.6–97.8)
MONOCYTES # BLD: 0.4 K/UL (ref 0.1–1.3)
MONOCYTES NFR BLD: 19 % (ref 4–12)
NEUTS SEG # BLD: 0.7 K/UL (ref 1.7–8.2)
NEUTS SEG NFR BLD: 35 % (ref 43–78)
NRBC # BLD: 0 K/UL (ref 0–0.2)
PLATELET # BLD AUTO: 68 K/UL (ref 150–450)
PMV BLD AUTO: 10.9 FL (ref 9.4–12.3)
POTASSIUM SERPL-SCNC: 3.7 MMOL/L (ref 3.5–5.1)
PROT SERPL-MCNC: 6.2 G/DL (ref 6.3–8.2)
RBC # BLD AUTO: 3.33 M/UL (ref 4.05–5.2)
SODIUM SERPL-SCNC: 142 MMOL/L (ref 136–145)
WBC # BLD AUTO: 1.9 K/UL (ref 4.3–11.1)

## 2022-10-07 PROCEDURE — 85025 COMPLETE CBC W/AUTO DIFF WBC: CPT

## 2022-10-07 PROCEDURE — 80053 COMPREHEN METABOLIC PANEL: CPT

## 2022-10-07 PROCEDURE — 36415 COLL VENOUS BLD VENIPUNCTURE: CPT

## 2022-10-18 ENCOUNTER — OFFICE VISIT (OUTPATIENT)
Dept: INTERNAL MEDICINE CLINIC | Facility: CLINIC | Age: 77
End: 2022-10-18
Payer: MEDICARE

## 2022-10-18 VITALS
HEIGHT: 62 IN | OXYGEN SATURATION: 99 % | BODY MASS INDEX: 21.71 KG/M2 | WEIGHT: 118 LBS | DIASTOLIC BLOOD PRESSURE: 72 MMHG | SYSTOLIC BLOOD PRESSURE: 126 MMHG | HEART RATE: 73 BPM

## 2022-10-18 DIAGNOSIS — E55.9 VITAMIN D DEFICIENCY: ICD-10-CM

## 2022-10-18 DIAGNOSIS — I25.10 ATHEROSCLEROSIS OF NATIVE CORONARY ARTERY OF NATIVE HEART WITHOUT ANGINA PECTORIS: ICD-10-CM

## 2022-10-18 DIAGNOSIS — M85.859 OSTEOPENIA OF NECK OF FEMUR, UNSPECIFIED LATERALITY: ICD-10-CM

## 2022-10-18 DIAGNOSIS — E78.2 MIXED HYPERLIPIDEMIA: ICD-10-CM

## 2022-10-18 DIAGNOSIS — C18.2 PRIMARY ADENOCARCINOMA OF ASCENDING COLON (HCC): Primary | ICD-10-CM

## 2022-10-18 PROCEDURE — 1123F ACP DISCUSS/DSCN MKR DOCD: CPT | Performed by: STUDENT IN AN ORGANIZED HEALTH CARE EDUCATION/TRAINING PROGRAM

## 2022-10-18 PROCEDURE — G8484 FLU IMMUNIZE NO ADMIN: HCPCS | Performed by: STUDENT IN AN ORGANIZED HEALTH CARE EDUCATION/TRAINING PROGRAM

## 2022-10-18 PROCEDURE — 1090F PRES/ABSN URINE INCON ASSESS: CPT | Performed by: STUDENT IN AN ORGANIZED HEALTH CARE EDUCATION/TRAINING PROGRAM

## 2022-10-18 PROCEDURE — G8420 CALC BMI NORM PARAMETERS: HCPCS | Performed by: STUDENT IN AN ORGANIZED HEALTH CARE EDUCATION/TRAINING PROGRAM

## 2022-10-18 PROCEDURE — 99214 OFFICE O/P EST MOD 30 MIN: CPT | Performed by: STUDENT IN AN ORGANIZED HEALTH CARE EDUCATION/TRAINING PROGRAM

## 2022-10-18 PROCEDURE — 1036F TOBACCO NON-USER: CPT | Performed by: STUDENT IN AN ORGANIZED HEALTH CARE EDUCATION/TRAINING PROGRAM

## 2022-10-18 PROCEDURE — G8399 PT W/DXA RESULTS DOCUMENT: HCPCS | Performed by: STUDENT IN AN ORGANIZED HEALTH CARE EDUCATION/TRAINING PROGRAM

## 2022-10-18 PROCEDURE — G8427 DOCREV CUR MEDS BY ELIG CLIN: HCPCS | Performed by: STUDENT IN AN ORGANIZED HEALTH CARE EDUCATION/TRAINING PROGRAM

## 2022-10-18 RX ORDER — ATORVASTATIN CALCIUM 40 MG/1
40 TABLET, FILM COATED ORAL DAILY
Qty: 90 TABLET | Refills: 1 | Status: SHIPPED | OUTPATIENT
Start: 2022-10-18

## 2022-10-18 RX ORDER — SUCRALFATE 1 G/1
1 TABLET ORAL DAILY
COMMUNITY

## 2022-10-18 ASSESSMENT — ENCOUNTER SYMPTOMS
ABDOMINAL PAIN: 0
NAUSEA: 0
VOMITING: 0
SHORTNESS OF BREATH: 0

## 2022-10-18 ASSESSMENT — PATIENT HEALTH QUESTIONNAIRE - PHQ9
SUM OF ALL RESPONSES TO PHQ QUESTIONS 1-9: 0
SUM OF ALL RESPONSES TO PHQ QUESTIONS 1-9: 0
SUM OF ALL RESPONSES TO PHQ9 QUESTIONS 1 & 2: 0
2. FEELING DOWN, DEPRESSED OR HOPELESS: 0
SUM OF ALL RESPONSES TO PHQ QUESTIONS 1-9: 0
SUM OF ALL RESPONSES TO PHQ QUESTIONS 1-9: 0
1. LITTLE INTEREST OR PLEASURE IN DOING THINGS: 0

## 2022-10-18 NOTE — PROGRESS NOTES
TAKE 1 TABLET BY MOUTH EVERY DAY 90 tablet 1    Hyoscyamine Sulfate SL 0.125 MG SUBL Place 0.125 mg under the tongue every 6 hours as needed      lidocaine-prilocaine (EMLA) 2.5-2.5 % cream Apply topically as needed      prednisoLONE acetate (PRED FORTE) 1 % ophthalmic suspension Place 1 drop into both eyes every evening       No current facility-administered medications for this visit. Allergies   Allergen Reactions    Sulfa Antibiotics Hives and Rash    Sulfamethoxazole-Trimethoprim Rash     Patient Active Problem List   Diagnosis    Iron deficiency anemia    Precordial pain    Irritable bowel syndrome    High risk medication use    Perforation of colon (HCC)    Redness of skin    Corneal dystrophy    Adjustment disorder with mixed anxiety and depressed mood    Mixed hyperlipidemia    Atherosclerosis of native coronary artery of native heart with angina pectoris ([address]    Primary adenocarcinoma of ascending colon (HCC)    Postoperative seroma of subcutaneous tissue after non-dermatologic procedure    Corneal transplant status    Hypomagnesemia    Vitamin D deficiency    Mucositis    Diarrhea    Hypokalemia    Special screening for malignant neoplasms, colon    Osteopenia of neck of femur     Social History     Tobacco Use    Smoking status: Never    Smokeless tobacco: Never   Substance Use Topics    Alcohol use: Never          Review of Systems   Constitutional:  Negative for chills and fever. HENT:  Negative for congestion. Eyes:  Negative for visual disturbance. Respiratory:  Negative for shortness of breath. Cardiovascular:  Negative for chest pain. Gastrointestinal:  Negative for abdominal pain, nausea and vomiting. Musculoskeletal:  Negative for arthralgias. Neurological:  Negative for syncope and headaches.        OBJECTIVE:    Vitals:    10/18/22 1418   BP: 126/72   Pulse: 73   SpO2: 99%   Weight: 118 lb (53.5 kg)   Height: 5' 2\" (1.575 m)        Physical Exam  Constitutional: General: She is not in acute distress. Appearance: Normal appearance. HENT:      Head: Normocephalic and atraumatic. Eyes:      Extraocular Movements: Extraocular movements intact. Cardiovascular:      Rate and Rhythm: Normal rate and regular rhythm. Heart sounds: Normal heart sounds. Pulmonary:      Breath sounds: Normal breath sounds. Abdominal:      General: There is no distension. Palpations: Abdomen is soft. Tenderness: There is no abdominal tenderness. There is no guarding. Musculoskeletal:         General: No deformity. Skin:     General: Skin is warm and dry. Neurological:      Mental Status: She is alert. Mental status is at baseline. Psychiatric:         Mood and Affect: Mood normal.          Medical problems and test results were reviewed with the patient today. ASSESSMENT and PLAN     1. Primary adenocarcinoma of ascending colon ([address]  2. Atherosclerosis of native coronary artery of native heart without angina pectoris  -     atorvastatin (LIPITOR) 40 MG tablet; Take 1 tablet by mouth daily, Disp-90 tablet, R-1Normal  -     Lipid Panel; Future  -     TSH with Reflex; Future  3. Vitamin D deficiency  4. Mixed hyperlipidemia  5. Osteopenia of neck of femur, unspecified laterality     She is following with oncology, getting colonoscopy done with general surgery in the near future. We will add on lipid panel, TSH to her labs, check vitamin D.  Plan on repeating DEXA 1 year from last.  She is on vitamin D. Return in about 3 months (around 1/18/2023) for medicare AWE, with fasting labs at any time prior.      Mindi March MD

## 2022-10-21 ENCOUNTER — HOSPITAL ENCOUNTER (OUTPATIENT)
Dept: LAB | Age: 77
Discharge: HOME | End: 2022-10-24
Payer: MEDICARE

## 2022-10-21 ENCOUNTER — CLINICAL DOCUMENTATION (OUTPATIENT)
Dept: ONCOLOGY | Age: 77
End: 2022-10-21

## 2022-10-21 DIAGNOSIS — C18.9 MALIGNANT NEOPLASM OF COLON, UNSPECIFIED PART OF COLON (HCC): ICD-10-CM

## 2022-10-21 DIAGNOSIS — C18.2 PRIMARY ADENOCARCINOMA OF ASCENDING COLON (HCC): Primary | ICD-10-CM

## 2022-10-21 LAB
ALBUMIN SERPL-MCNC: 3.6 G/DL (ref 3.2–4.6)
ALBUMIN/GLOB SERPL: 1.2 {RATIO} (ref 0.4–1.6)
ALP SERPL-CCNC: 170 U/L (ref 50–136)
ALT SERPL-CCNC: 65 U/L (ref 12–65)
ANION GAP SERPL CALC-SCNC: 5 MMOL/L (ref 2–11)
AST SERPL-CCNC: 49 U/L (ref 15–37)
BASOPHILS # BLD: 0 K/UL (ref 0–0.2)
BASOPHILS NFR BLD: 1 % (ref 0–2)
BILIRUB SERPL-MCNC: 0.5 MG/DL (ref 0.2–1.1)
BUN SERPL-MCNC: 6 MG/DL (ref 8–23)
CALCIUM SERPL-MCNC: 9.1 MG/DL (ref 8.3–10.4)
CHLORIDE SERPL-SCNC: 108 MMOL/L (ref 101–110)
CO2 SERPL-SCNC: 28 MMOL/L (ref 21–32)
CREAT SERPL-MCNC: 0.6 MG/DL (ref 0.6–1)
DIFFERENTIAL METHOD BLD: ABNORMAL
EOSINOPHIL # BLD: 0 K/UL (ref 0–0.8)
EOSINOPHIL NFR BLD: 2 % (ref 0.5–7.8)
ERYTHROCYTE [DISTWIDTH] IN BLOOD BY AUTOMATED COUNT: 12.7 % (ref 11.9–14.6)
GLOBULIN SER CALC-MCNC: 3.1 G/DL (ref 2.8–4.5)
GLUCOSE SERPL-MCNC: 94 MG/DL (ref 65–100)
HCT VFR BLD AUTO: 35 % (ref 35.8–46.3)
HGB BLD-MCNC: 11.7 G/DL (ref 11.7–15.4)
IMM GRANULOCYTES # BLD AUTO: 0 K/UL (ref 0–0.5)
IMM GRANULOCYTES NFR BLD AUTO: 0 % (ref 0–5)
LYMPHOCYTES # BLD: 0.7 K/UL (ref 0.5–4.6)
LYMPHOCYTES NFR BLD: 45 % (ref 13–44)
MCH RBC QN AUTO: 32.1 PG (ref 26.1–32.9)
MCHC RBC AUTO-ENTMCNC: 33.4 G/DL (ref 31.4–35)
MCV RBC AUTO: 96.2 FL (ref 82–102)
MONOCYTES # BLD: 0.2 K/UL (ref 0.1–1.3)
MONOCYTES NFR BLD: 14 % (ref 4–12)
NEUTS SEG # BLD: 0.6 K/UL (ref 1.7–8.2)
NEUTS SEG NFR BLD: 38 % (ref 43–78)
NRBC # BLD: 0 K/UL (ref 0–0.2)
PLATELET # BLD AUTO: 93 K/UL (ref 150–450)
PMV BLD AUTO: 10.8 FL (ref 9.4–12.3)
POTASSIUM SERPL-SCNC: 3.8 MMOL/L (ref 3.5–5.1)
PROT SERPL-MCNC: 6.7 G/DL (ref 6.3–8.2)
RBC # BLD AUTO: 3.64 M/UL (ref 4.05–5.2)
SODIUM SERPL-SCNC: 141 MMOL/L (ref 133–143)
WBC # BLD AUTO: 1.6 K/UL (ref 4.3–11.1)

## 2022-10-21 PROCEDURE — 80053 COMPREHEN METABOLIC PANEL: CPT

## 2022-10-21 PROCEDURE — 36415 COLL VENOUS BLD VENIPUNCTURE: CPT

## 2022-10-21 PROCEDURE — 85025 COMPLETE CBC W/AUTO DIFF WBC: CPT

## 2022-10-21 NOTE — PROGRESS NOTES
Wbc 1.6  Read back and verified. Msg to RITA Andrew  She reviewed the chart and she is to repeat labs in 2 weeks. Call to the patient she can be here on 11/4/22.  She Verbalizes understanding of instructions

## 2022-10-23 LAB
CHOLEST SERPL-MCNC: 139 MG/DL
HDLC SERPL-MCNC: 76 MG/DL (ref 40–60)
HDLC SERPL: 1.8 {RATIO}
LDLC SERPL CALC-MCNC: 42.2 MG/DL
TRIGL SERPL-MCNC: 104 MG/DL (ref 35–150)
TSH W FREE THYROID IF ABNORMAL: 1.74 UIU/ML (ref 0.36–3.74)
VLDLC SERPL CALC-MCNC: 20.8 MG/DL (ref 6–23)

## 2022-11-03 PROBLEM — Z12.11 SPECIAL SCREENING FOR MALIGNANT NEOPLASMS, COLON: Status: RESOLVED | Noted: 2022-10-04 | Resolved: 2022-11-03

## 2022-11-04 ENCOUNTER — HOSPITAL ENCOUNTER (OUTPATIENT)
Dept: LAB | Age: 77
Discharge: HOME | End: 2022-11-07
Payer: MEDICARE

## 2022-11-04 DIAGNOSIS — C18.2 PRIMARY ADENOCARCINOMA OF ASCENDING COLON (HCC): ICD-10-CM

## 2022-11-04 LAB
ALBUMIN SERPL-MCNC: 3.6 G/DL (ref 3.2–4.6)
ALBUMIN/GLOB SERPL: 1.2 {RATIO} (ref 0.4–1.6)
ALP SERPL-CCNC: 172 U/L (ref 50–136)
ALT SERPL-CCNC: 50 U/L (ref 12–65)
ANION GAP SERPL CALC-SCNC: 5 MMOL/L (ref 2–11)
AST SERPL-CCNC: 38 U/L (ref 15–37)
BASOPHILS # BLD: 0 K/UL (ref 0–0.2)
BASOPHILS NFR BLD: 1 % (ref 0–2)
BILIRUB SERPL-MCNC: 0.4 MG/DL (ref 0.2–1.1)
BUN SERPL-MCNC: 9 MG/DL (ref 8–23)
CALCIUM SERPL-MCNC: 8.7 MG/DL (ref 8.3–10.4)
CHLORIDE SERPL-SCNC: 108 MMOL/L (ref 101–110)
CO2 SERPL-SCNC: 27 MMOL/L (ref 21–32)
CREAT SERPL-MCNC: 0.8 MG/DL (ref 0.6–1)
DIFFERENTIAL METHOD BLD: ABNORMAL
EOSINOPHIL # BLD: 0 K/UL (ref 0–0.8)
EOSINOPHIL NFR BLD: 1 % (ref 0.5–7.8)
ERYTHROCYTE [DISTWIDTH] IN BLOOD BY AUTOMATED COUNT: 12.1 % (ref 11.9–14.6)
GLOBULIN SER CALC-MCNC: 3.1 G/DL (ref 2.8–4.5)
GLUCOSE SERPL-MCNC: 142 MG/DL (ref 65–100)
HCT VFR BLD AUTO: 34.8 % (ref 35.8–46.3)
HGB BLD-MCNC: 11.5 G/DL (ref 11.7–15.4)
IMM GRANULOCYTES # BLD AUTO: 0 K/UL (ref 0–0.5)
IMM GRANULOCYTES NFR BLD AUTO: 1 % (ref 0–5)
LYMPHOCYTES # BLD: 0.8 K/UL (ref 0.5–4.6)
LYMPHOCYTES NFR BLD: 37 % (ref 13–44)
MCH RBC QN AUTO: 31.4 PG (ref 26.1–32.9)
MCHC RBC AUTO-ENTMCNC: 33 G/DL (ref 31.4–35)
MCV RBC AUTO: 95.1 FL (ref 82–102)
MONOCYTES # BLD: 0.3 K/UL (ref 0.1–1.3)
MONOCYTES NFR BLD: 12 % (ref 4–12)
NEUTS SEG # BLD: 1 K/UL (ref 1.7–8.2)
NEUTS SEG NFR BLD: 48 % (ref 43–78)
NRBC # BLD: 0 K/UL (ref 0–0.2)
PLATELET # BLD AUTO: 85 K/UL (ref 150–450)
PMV BLD AUTO: 10.1 FL (ref 9.4–12.3)
POTASSIUM SERPL-SCNC: 3.7 MMOL/L (ref 3.5–5.1)
PROT SERPL-MCNC: 6.7 G/DL (ref 6.3–8.2)
RBC # BLD AUTO: 3.66 M/UL (ref 4.05–5.2)
SODIUM SERPL-SCNC: 140 MMOL/L (ref 133–143)
WBC # BLD AUTO: 2.1 K/UL (ref 4.3–11.1)

## 2022-11-04 PROCEDURE — 36415 COLL VENOUS BLD VENIPUNCTURE: CPT

## 2022-11-04 PROCEDURE — 80053 COMPREHEN METABOLIC PANEL: CPT

## 2022-11-04 PROCEDURE — 85025 COMPLETE CBC W/AUTO DIFF WBC: CPT

## 2022-11-25 ASSESSMENT — ENCOUNTER SYMPTOMS
SHORTNESS OF BREATH: 0
COUGH: 0
CONSTIPATION: 0
VOMITING: 0
EYES NEGATIVE: 1

## 2022-11-25 NOTE — PROGRESS NOTES
[facility] Hematology and Oncology: Office Visit Established Patient    Chief Complaint   Patient presents with    Follow-up      History of Present Illness:  Ms. Martell is a  65 y.o. female who presents today for  follow up regarding colon cancer. she was admitted on 1/22/22 with peritonitis, colon mass, obstruction and bowel perforation. PMhx: anxiety,  corneal dystrophy s/p transplants (bilat) on steroid drops, CAD, GERD, IBS, HLD. She p/w abd pain x2 days. CT AP c/w 2.3cm mass in mid-ascending colon and LAD with obstruction and perforation. S/p subsequent right extended colectomy with ileo-transverse staped anastomosis. Path c/w mod-poorly diff adenocarcinoma - [id number] (2/14 LN +). We were consulted for recs. S/p FOLFOX - adjuvantly, omitted last cycle. Ultimately after discussion of pros and cons of proceeding with last treatment, she elected not to proceed with treatment due to increased risk of infection, with persistent cytopenias, fatigue and neuropathy. She was comfortable with this plan. She understood that taking the last treatment will not necessarily prevent disease in the future. She also understood that her risk of recurrent disease with tx adjustments made along the way to make it tolerable/dose adjusted. We discussed surveillance per NCCN guidelines. She wishes to go back into the community to her [employer] but is considering holding off for now due to immune compromise. She wished to switch primary care physicians and was referred. Today, pt is here for FU. Her daughters are present on the phone for the visit. Multiple issues/concerns addressed. Pt reported feeling ok when it comes to her GI tract except for some burning. She stopped PPi few days ago and noted worsening of her GERD symptoms. She has not seen GI in many years and will be referred to Dr Alcira Frias to establish care for for GERD/re need for further workup.   She also reported pain and some swelling in her knuckles, improved now. Will add on MELINA. Itermittent self resolving itching for which she take Benadrl. No rashes. Vit D is pending. Cbc still with wbc 2.4, ANC up to 1200, Hb 11.3 and plt 106. She is wearing a mask and still avoiding being with ppl. Spent Thanksgiving by herself. We discussed increased risk of infection after chemo but also that normalized counts will not necessarily protect her from any future infections. Wishes to repeat labs at next visit, which will be after her restaging CT in Dec.  Cea continues to drop and at 1.1 today. Alk phos also improved. Chronological Events:  1/22/22 admitted with abd pain/perforation, dx'ed w colon ca    2/9/22 HFU - reviewed path again and next steps in management    2/28/22 FU C1D1 FOLFOX    3/7/22 FU - toxicity check following C1. Oxaliplatin was dose reduced for first cycle to 80%. 3/14/22 FU C2 FOLFOX-continue with Oxali dose reduction. 3/28/22 Cycle 3 FOLFOX - oxaliplatin is dose reduced. Tolerating well. 4/11/22 pre C4 due to gen debility and plts at 68 - will delay tx by 1 week; replete fluids/lytes   4/18/22 pre C4 - held last week for TCP/weakness. Plts improved and feeling better, no further diarrhea. 5/2/22 pre C5 - held d/t low ANC. Cold sensitivity worse. RX Remeron for appetite/sleep. 5/9/22 Here pre-cycle 5 after one week hold for neutropenia. Holding again for ANC. Next week, reduce dose again by 20% for cold paresthesias, neutropenia and overall tolerance. 5/31/20 pre chemo C6, doing well, improved neuropathy/cod sensitivity with dose reduction, -defer by 1 week  6/27/22 FU pre C7, p/w tx; will plan to drop dose of GCSF to 1/2 after d/w pharmacy. 7/12/22 FU pre C8, plt adequate at 76k. Agree to p/w treatment. 7/26/22 FU, Plt 56K-defer C9 by 1 week    8/18/22 FU C10, fluids in the interim   9/6/22 Here for cycle 11. Doing well overall - best she has felt. Mild elevation in ALT/AST/Alk phos - continue to monitor. 9/2022 FU pre C12 - due to SE, elected not to p/w C12 accepting risks. PLan for CT prior to next apt. Surveillance per guidelines. 11/2022 FU doing ok; next plans reviewed; surveillance discussion and risks reviewed. Family History   Problem Relation Age of Onset    Cancer Mother         Bladder Cancer    Heart Attack Father     Heart Disease Father         Arterosclerotic Cardiovascular Disease    Prostate Cancer Brother     Prostate Cancer Brother     Breast Cancer Maternal Aunt     Colon Cancer Maternal Aunt        Social History     Socioeconomic History    Marital status: Legally      Spouse name: Not on file    Number of children: Not on file    Years of education: Not on file    Highest education level: Not on file   Occupational History    Not on file   Tobacco Use    Smoking status: Never    Smokeless tobacco: Never   Vaping Use    Vaping Use: Never used   Substance and Sexual Activity    Alcohol use: Never    Drug use: Never    Sexual activity: Not on file   Other Topics Concern    Not on file   Social History Narrative    Not on file     Social Determinants of Health     Financial Resource Strain: Not on file   Food Insecurity: Not on file   Transportation Needs: Not on file   Physical Activity: Not on file   Stress: Not on file   Social Connections: Not on file   Intimate Partner Violence: Not on file   Housing Stability: Not on file          Review of Systems   Constitutional:  Positive for fatigue (mild). Negative for activity change, appetite change, chills, diaphoresis, fever and unexpected weight change. HENT: Negative. Negative for congestion, hearing loss, nosebleeds, sinus pain, sore throat and trouble swallowing. Eyes: Negative. Negative for discharge and visual disturbance. Respiratory:  Negative for cough and shortness of breath. Cardiovascular: Negative. Negative for chest pain and leg swelling.    Gastrointestinal:  Negative for abdominal distention, abdominal pain, anal bleeding, blood in stool, constipation, diarrhea, nausea and vomiting. Endocrine: Negative for cold intolerance and heat intolerance. Genitourinary: Negative. Negative for dysuria, menstrual problem and vaginal bleeding. Musculoskeletal:  Positive for arthralgias. Negative for back pain, gait problem and joint swelling. Skin:  Negative for rash. Neurological:  Positive for numbness (improved with dose reduction). Negative for dizziness, seizures, syncope, weakness, light-headedness and headaches. Hematological: Negative. Negative for adenopathy. Psychiatric/Behavioral: Negative. Negative for confusion and sleep disturbance. All other systems reviewed and are negative.       Allergies   Allergen Reactions    Sulfa Antibiotics Hives and Rash    Sulfamethoxazole-Trimethoprim Rash         Past Medical History:   Diagnosis Date    Actinic keratosis     Adjustment disorder with mixed anxiety and depressed mood 2/11/2015    Adverse effect of anesthesia     cystoscope - was awake but could not move - dont remember the anesthesetic given    Corneal dystrophy 2/11/2015    Coronary atherosclerosis of native coronary artery 2/11/2015    GERD (gastroesophageal reflux disease)     Irritable bowel syndrome 2/11/2015    Menopause     Mixed hyperlipidemia 2/11/2015    Primary adenocarcinoma of ascending colon ([facility] [address] 1/31/2022    Psychiatric disorder     anxiety        Past Surgical History:   Procedure Laterality Date    BREAST BIOPSY Left     CARDIAC SURGERY      Stint    COLONOSCOPY      2014    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    CORNEAL TRANSPLANT Left     CYST REMOVAL      breast    EYE SURGERY      Both eyes, Cornea transplants    IR PORT PLACEMENT EQUAL OR GREATER THAN 5 YEARS  2/21/2022    IR PORT PLACEMENT EQUAL OR GREATER THAN 5 YEARS  02/21/2022    IR PORT PLACEMENT EQUAL OR GREATER THAN 5 YEARS 2/21/2022 [facility] RADIOLOGY SPECIALS      Current Outpatient Medications   Medication Sig Dispense Refill    pantoprazole (PROTONIX) 40 MG tablet TAKE 1 TABLET BY MOUTH EVERY DAY 90 tablet 1    levOCARNitine (CARNITOR) 330 MG tablet Take 1 tablet by mouth 2 times daily 60 tablet 3    atorvastatin (LIPITOR) 40 MG tablet Take 1 tablet by mouth daily 90 tablet 1    B Complex Vitamins (B COMPLEX 1 PO) Take 1 tablet by mouth daily      diphenhydrAMINE (BENADRYL) 25 MG tablet Take 25 mg by mouth every 6 hours as needed for Itching PRN      vitamin D 25 MCG (1000 UT) CAPS Take 1,000 Units by mouth Once a day      aspirin 81 MG EC tablet Take 81 mg by mouth daily      Hyoscyamine Sulfate SL 0.125 MG SUBL Place 0.125 mg under the tongue every 6 hours as needed      prednisoLONE acetate (PRED FORTE) 1 % ophthalmic suspension Place 1 drop into both eyes every evening      sucralfate (CARAFATE) 1 GM tablet Take 1 g by mouth daily (Patient not taking: Reported on 11/28/2022)      loperamide (IMODIUM) 2 MG capsule Take 2 mg by mouth 4 times daily as needed for Diarrhea PRN      lidocaine-prilocaine (EMLA) 2.5-2.5 % cream Apply topically as needed (Patient not taking: Reported on 11/28/2022)       No current facility-administered medications for this visit. OBJECTIVE:  Visit Vitals  Vitals:    11/28/22 1312 11/28/22 1317   BP: 113/67 110/65   Site: Left Upper Arm Left Upper Arm   Position: Sitting Standing   Pulse: 70 73   Resp: 18    Temp: 98 °F (36.7 °C)    TempSrc: Oral    SpO2: 98%    Weight: 121 lb 8 oz (55.1 kg)    Height: 5' 2\" (1.575 m)         ECOG PERFORMANCE STATUS - 0-Fully active, able to carry on all pre-disease performance without restriction. Pain - Pain Score:   0 - No pain (Fatigue-4)/10. None/Minimal pain - not affecting QOL     Fatigue - No flowsheet data found. Distress - No flowsheet data found. Physical Exam  Vitals reviewed. Exam conducted with a chaperone present. Constitutional:       General: She is not in acute distress.      Appearance: Normal appearance. She is normal weight. She is not ill-appearing or toxic-appearing. HENT:      Head: Normocephalic and atraumatic. Nose: Nose normal. No congestion. Mouth/Throat:      Mouth: Mucous membranes are moist.      Pharynx: Oropharynx is clear. Eyes:      General: No scleral icterus. Extraocular Movements: Extraocular movements intact. Conjunctiva/sclera: Conjunctivae normal.      Pupils: Pupils are equal, round, and reactive to light. Cardiovascular:      Rate and Rhythm: Normal rate and regular rhythm. Heart sounds: Normal heart sounds. No murmur heard. Pulmonary:      Effort: Pulmonary effort is normal. No respiratory distress. Breath sounds: Normal breath sounds. No wheezing. Abdominal:      General: Bowel sounds are normal. There is no distension. Palpations: Abdomen is soft. There is no mass. Tenderness: There is no abdominal tenderness. There is no guarding or rebound. Musculoskeletal:         General: Normal range of motion. Cervical back: Normal range of motion and neck supple. Right lower leg: No edema. Left lower leg: No edema. Lymphadenopathy:      Cervical: No cervical adenopathy. Upper Body:      Right upper body: No supraclavicular or axillary adenopathy. Left upper body: No supraclavicular or axillary adenopathy. Skin:     General: Skin is warm and dry. Coloration: Skin is not jaundiced or pale. Findings: No rash. Neurological:      General: No focal deficit present. Mental Status: She is alert and oriented to person, place, and time. Gait: Gait normal.   Psychiatric:         Mood and Affect: Mood normal.         Behavior: Behavior normal.         Thought Content:  Thought content normal.        Labs:  Hospital Outpatient Visit on 11/28/2022   Component Date Value Ref Range Status    WBC 11/28/2022 2.4 (A)  4.3 - 11.1 K/uL Final    RESULTS CHECKED X 2    RBC 11/28/2022 3.64 (A)  4.05 - 5.2 M/uL Final    Hemoglobin 11/28/2022 11.3 (A)  11.7 - 15.4 g/dL Final    Hematocrit 11/28/2022 34.0 (A)  35.8 - 46.3 % Final    MCV 11/28/2022 93.4  82.0 - 102.0 FL Final    MCH 11/28/2022 31.0  26.1 - 32.9 PG Final    MCHC 11/28/2022 33.2  31.4 - 35.0 g/dL Final    RDW 11/28/2022 11.9  11.9 - 14.6 % Final    Platelets 21/60/5097 106 (A)  150 - 450 K/uL Final    MPV 11/28/2022 10.3  9.4 - 12.3 FL Final    nRBC 11/28/2022 0.00  0.0 - 0.2 K/uL Final    **Note: Absolute NRBC parameter is now reported with Hemogram**    Differential Type 11/28/2022 AUTOMATED    Final    Seg Neutrophils 11/28/2022 50  43 - 78 % Final    Lymphocytes 11/28/2022 37  13 - 44 % Final    Monocytes 11/28/2022 12  4.0 - 12.0 % Final    Eosinophils % 11/28/2022 0 (A)  0.5 - 7.8 % Final    Basophils 11/28/2022 1  0.0 - 2.0 % Final    Immature Granulocytes 11/28/2022 0  0.0 - 5.0 % Final    Segs Absolute 11/28/2022 1.2 (A)  1.7 - 8.2 K/UL Final    Absolute Lymph # 11/28/2022 0.9  0.5 - 4.6 K/UL Final    Absolute Mono # 11/28/2022 0.3  0.1 - 1.3 K/UL Final    Absolute Eos # 11/28/2022 0.0  0.0 - 0.8 K/UL Final    Basophils Absolute 11/28/2022 0.0  0.0 - 0.2 K/UL Final    Absolute Immature Granulocyte 11/28/2022 0.0  0.0 - 0.5 K/UL Final    Sodium 11/28/2022 140  133 - 143 mmol/L Final    Potassium 11/28/2022 3.9  3.5 - 5.1 mmol/L Final    Chloride 11/28/2022 107  101 - 110 mmol/L Final    CO2 11/28/2022 27  21 - 32 mmol/L Final    Anion Gap 11/28/2022 6  2 - 11 mmol/L Final    Glucose 11/28/2022 131 (A)  65 - 100 mg/dL Final    BUN 11/28/2022 12  8 - 23 MG/DL Final    Creatinine 11/28/2022 0.80  0.6 - 1.0 MG/DL Final    Est, Glom Filt Rate 11/28/2022 >60  >60 ml/min/1.73m2 Final    Comment:      Pediatric calculator link: Brian.at. org/professionals/kdoqi/gfr_calculatorped       Effective Oct 3, 2022       These results are not intended for use in patients <25years of age.        eGFR results are calculated without a race factor using  the 2021 CKD-EPI equation. Careful clinical correlation is recommended, particularly when comparing to results calculated using previous equations. The CKD-EPI equation is less accurate in patients with extremes of muscle mass, extra-renal metabolism of creatinine, excessive creatine ingestion, or following therapy that affects renal tubular secretion. Calcium 11/28/2022 8.5  8.3 - 10.4 MG/DL Final    Total Bilirubin 11/28/2022 0.3  0.2 - 1.1 MG/DL Final    ALT 11/28/2022 62  12 - 65 U/L Final    AST 11/28/2022 37  15 - 37 U/L Final    Alk Phosphatase 11/28/2022 150 (A)  50 - 136 U/L Final    Total Protein 11/28/2022 6.7  6.3 - 8.2 g/dL Final    Albumin 11/28/2022 3.7  3.2 - 4.6 g/dL Final    Globulin 11/28/2022 3.0  2.8 - 4.5 g/dL Final    Albumin/Globulin Ratio 11/28/2022 1.2  0.4 - 1.6   Final    Vit D, 25-Hydroxy 11/28/2022 26.1 (A)  30.0 - 100.0 ng/mL Final    CEA 11/28/2022 1.1  0.0 - 3.0 ng/mL Final    Comment: Nonsmoker:  <3.0 ng/mL  Smoker:     <5.0 ng/mL  [employer]. Patient's results of tumor marker testing may not be comparable to labs using different manufacturers/methods. MELINA 11/28/2022 Negative  Negative   Final    Comment: (NOTE)  Performed At: [facility] & [address] [id number]  Aubrey Alexander MD BE:[id number]            Imaging:  Reviewed       PATHOLOGY:                         ASSESSMENT:    ICD-10-CM    1. Gastroesophageal reflux disease, unspecified whether esophagitis present  K21.9 pantoprazole (PROTONIX) 40 MG tablet     RAFA - Denisse Byers MD, Gastroenterology      2. Arthralgia of both hands  M25.541 MELINA, Direct, w/Reflex    M25.542 levOCARNitine (CARNITOR) 330 MG tablet           Ms. Case is here for FU of colon  cancer.          1. Mid-ascending colon adenocarcinoma - [id number] - Stage IIIB (high risk dz due to poorly diff/perforation/obstruction - we have discussed this and pt VU), MSI - stable,  KRAS mut    - Patient wished to lower the dose of oxaliplatin upfront to 80% of the dose. C9 delayed 1 wk due to thrombocytopenia; C12 held due to immunocompromise/fatigue and neuropathy   - cold sensitivity - improved with 80% of original dosing     - here for FU. Her daughters are present on the phone for the visit. Multiple issues/concerns addressed. - GERD - Pt reported feeling ok when it comes to her GI tract except for some burning. She stopped PPi few days ago and noted worsening of her GERD symptoms. Previously well controlled on PPI/Carafate. She has not seen GI in many years and will be referred to Dr Lolita Varela to establish care for for GERD/re need for further workup. - pain in knuckles - She also reported pain and some swelling in her knuckles, improved now. Will add on MELINA. Itermittent self resolving itching for which she take Benadryl. No rashes. Can try carnitor.    - pancytopenia - Cbc still with wbc 2.4, ANC up to 1200, Hb 11.3 and plt 106. She is wearing a mask and still avoiding being with ppl. Spent Thanksgiving by herself. We discussed increased risk of infection after chemo but also that normalized counts will not necessarily protect her from any future infections. Wishes to repeat labs at next visit, which will be after her restaging CT in Dec.    - Cea continues to drop and at 1.1 today. - Alk phos also improved. - EVON - She completed IV iron    - vit D defic - She is taking 5000 of vitamin D. If still on same dose, can increase: eg if taking 5 k daily can increase by 2K daily or 2-3 additional 5K doses in a week.   Can also check if different formulation works better for her.     - diarrhea - imodium prn.     - low appetite - RX Remeron at bedtime    - insomnia - can use melatonin at night   - elevated liver enzymes - resolved   - Continue good oral nutrition and hydration.   - Encouraged frequent activity throughout the day and rest as needed to combat fatigue.   - Call with any fevers, uncontrolled side effects from treatment or any other worrisome/concerning symptoms.   - hypokalemia - resolved; can d/c potassium   - surveillance reviewed per NCCN guidelines          2. Supportive care    - s/p bilat corneal transplant - sees Dr Fabienne Roberts [phone] [facility]; d/w her eye team - no CI to start tx. RESUSCITATION DIRECTIVES/HOSPICE CARE: Full Support     RTC after CT or sooner as needed        MDM  Number of Diagnoses or Management Options  Arthralgia of both hands: new, needed workup  Gastroesophageal reflux disease, unspecified whether esophagitis present: established, worsening     Amount and/or Complexity of Data Reviewed  Clinical lab tests: ordered and reviewed  Tests in the radiology section of CPT®: ordered  Tests in the medicine section of CPT®: ordered  Obtain history from someone other than the patient: yes  Review and summarize past medical records: yes  Independent visualization of images, tracings, or specimens: yes    Risk of Complications, Morbidity, and/or Mortality  Presenting problems: moderate  Diagnostic procedures: moderate  Management options: high      [40min - chart review, review of results and discussion of options, next steps, coordination of care and charting ]       Historical:    - daughter requested testing for DPYD. Reviewed that typically we would test in pts who have severe or unexpected toxicity from fluoropyrimidines (severe myelosuppression, mucositis, diarrhea, neurotoxicity,  cardiotoxicity) during the first few cycles of fluoropyrimidine therapy, not for screening purposes. If she doesn't do well with tx - we'd dose adjust anyway. Family elected not to p/w testing.     Historical:     - we reviewed the pathophysiology of colon cancer in general, we then reviewed her pathology and staging again and high risk disease.     - to start: FOLFOX: A cycle is every 14 days (6mo)    Oxaliplatin   (Eloxatin)  85 mg/m² IV once on day 1 - dose adjusted upfront to 80% of dose to see how tolerates it per  her wishes     Leucovorin  400 mg/m² IV once on day 1    Fluorouracil  400 mg/m² IV once on day 1    Fluorouracil  1,200 mg/m²/day CIV on days 1 and 2. Total dose = 2,400 mg/m²/cycle. OR CAPoX: A cycle is every 21 days     Capecitabine   (Xeloda)  850 mg/m² orally twice daily on days 1-14, then off 7 days    Oxaliplatin   (Eloxatin)  130 mg/m² IV day 1   - CT chest to complete staging with small RML nodule - ? LN - will monitor on re-imaging, no definite evid of disease. We discussed the CT results that show small nodule in the lung (per rad likely LN, <1cm abutting fissure). We reviewed that this could be a metastatic focus. She has stage III disease, if the lung nodule is cancerous, her tumor staging be stage IV. Intent of treatment will be palliative and not curative in nature. Unable to bx lesion due to size. Daughter and patient aware of this. - Elevated Alk phos - GGT checked and WNL at 49 6/2022   - She did report some numbness or perception of swelling in the ball of her foot bilaterally, closer to lateral phalanges, and she reported that as baseline prior to chemotherapy. Now with some worsening. Able to write, close buttons/zipper. - nausea - emend starting with C2 and worked well, continue with PO anti-emetics prn - denies recent issues with this  - mucositis - resolved with compounded rinse, Rx provided. - here for FU with her daughter and other daughter via telephone. Ultimately after discussion of pros and cons of proceeding with last treatment, she elected not to proceed with treatment due to increased risk of infection with persistent cytopenias. She is comfortable with this plan. She understands that taking the last treatment will not necessarily prevent disease in the future. She also understands her risk of recurrent disease with tx adjustments made along the way to make it tolerable/dose adjusted.   She will follow-up in a couple of months with CT prior. We discussed surveillance per NCCN guidelines. She did have an appointment scheduled on October 21 but can come back next week to determine if her counts are recovering nicely. She wishes to go back into the community to her [employer] but is considering holding off for now due to immune compromise. She is not having any signs and symptoms of infection at this time. - She wishes to switch primary care physicians and I will refer her to [facility] internal medicine. All questions were asked and answered to the best of my ability. The patient verbalized understanding and agrees with the plan above.             Glen Franco MD, MD  [facility] Hematology and Oncology  [address] [place] [address]  Office : [phone]  Fax : [phone]

## 2022-11-28 ENCOUNTER — OFFICE VISIT (OUTPATIENT)
Dept: ONCOLOGY | Age: 77
End: 2022-11-28
Payer: MEDICARE

## 2022-11-28 ENCOUNTER — HOSPITAL ENCOUNTER (OUTPATIENT)
Dept: LAB | Age: 77
Discharge: HOME | End: 2022-12-01
Payer: MEDICARE

## 2022-11-28 VITALS
HEIGHT: 62 IN | TEMPERATURE: 98 F | WEIGHT: 121.5 LBS | HEART RATE: 73 BPM | DIASTOLIC BLOOD PRESSURE: 65 MMHG | BODY MASS INDEX: 22.36 KG/M2 | SYSTOLIC BLOOD PRESSURE: 110 MMHG | RESPIRATION RATE: 18 BRPM | OXYGEN SATURATION: 98 %

## 2022-11-28 DIAGNOSIS — M25.542 ARTHRALGIA OF BOTH HANDS: ICD-10-CM

## 2022-11-28 DIAGNOSIS — C18.9 MALIGNANT NEOPLASM OF COLON, UNSPECIFIED PART OF COLON (HCC): ICD-10-CM

## 2022-11-28 DIAGNOSIS — M25.541 ARTHRALGIA OF BOTH HANDS: ICD-10-CM

## 2022-11-28 DIAGNOSIS — K90.9 INTESTINAL MALABSORPTION, UNSPECIFIED TYPE: ICD-10-CM

## 2022-11-28 DIAGNOSIS — K21.9 GASTROESOPHAGEAL REFLUX DISEASE, UNSPECIFIED WHETHER ESOPHAGITIS PRESENT: Primary | ICD-10-CM

## 2022-11-28 LAB
25(OH)D3 SERPL-MCNC: 26.1 NG/ML (ref 30–100)
ALBUMIN SERPL-MCNC: 3.7 G/DL (ref 3.2–4.6)
ALBUMIN/GLOB SERPL: 1.2 {RATIO} (ref 0.4–1.6)
ALP SERPL-CCNC: 150 U/L (ref 50–136)
ALT SERPL-CCNC: 62 U/L (ref 12–65)
ANION GAP SERPL CALC-SCNC: 6 MMOL/L (ref 2–11)
AST SERPL-CCNC: 37 U/L (ref 15–37)
BASOPHILS # BLD: 0 K/UL (ref 0–0.2)
BASOPHILS NFR BLD: 1 % (ref 0–2)
BILIRUB SERPL-MCNC: 0.3 MG/DL (ref 0.2–1.1)
BUN SERPL-MCNC: 12 MG/DL (ref 8–23)
CALCIUM SERPL-MCNC: 8.5 MG/DL (ref 8.3–10.4)
CEA SERPL-MCNC: 1.1 NG/ML (ref 0–3)
CHLORIDE SERPL-SCNC: 107 MMOL/L (ref 101–110)
CO2 SERPL-SCNC: 27 MMOL/L (ref 21–32)
CREAT SERPL-MCNC: 0.8 MG/DL (ref 0.6–1)
DIFFERENTIAL METHOD BLD: ABNORMAL
EOSINOPHIL # BLD: 0 K/UL (ref 0–0.8)
EOSINOPHIL NFR BLD: 0 % (ref 0.5–7.8)
ERYTHROCYTE [DISTWIDTH] IN BLOOD BY AUTOMATED COUNT: 11.9 % (ref 11.9–14.6)
GLOBULIN SER CALC-MCNC: 3 G/DL (ref 2.8–4.5)
GLUCOSE SERPL-MCNC: 131 MG/DL (ref 65–100)
HCT VFR BLD AUTO: 34 % (ref 35.8–46.3)
HGB BLD-MCNC: 11.3 G/DL (ref 11.7–15.4)
IMM GRANULOCYTES # BLD AUTO: 0 K/UL (ref 0–0.5)
IMM GRANULOCYTES NFR BLD AUTO: 0 % (ref 0–5)
LYMPHOCYTES # BLD: 0.9 K/UL (ref 0.5–4.6)
LYMPHOCYTES NFR BLD: 37 % (ref 13–44)
MCH RBC QN AUTO: 31 PG (ref 26.1–32.9)
MCHC RBC AUTO-ENTMCNC: 33.2 G/DL (ref 31.4–35)
MCV RBC AUTO: 93.4 FL (ref 82–102)
MONOCYTES # BLD: 0.3 K/UL (ref 0.1–1.3)
MONOCYTES NFR BLD: 12 % (ref 4–12)
NEUTS SEG # BLD: 1.2 K/UL (ref 1.7–8.2)
NEUTS SEG NFR BLD: 50 % (ref 43–78)
NRBC # BLD: 0 K/UL (ref 0–0.2)
PLATELET # BLD AUTO: 106 K/UL (ref 150–450)
PMV BLD AUTO: 10.3 FL (ref 9.4–12.3)
POTASSIUM SERPL-SCNC: 3.9 MMOL/L (ref 3.5–5.1)
PROT SERPL-MCNC: 6.7 G/DL (ref 6.3–8.2)
RBC # BLD AUTO: 3.64 M/UL (ref 4.05–5.2)
SODIUM SERPL-SCNC: 140 MMOL/L (ref 133–143)
WBC # BLD AUTO: 2.4 K/UL (ref 4.3–11.1)

## 2022-11-28 PROCEDURE — 86038 ANTINUCLEAR ANTIBODIES: CPT

## 2022-11-28 PROCEDURE — G8420 CALC BMI NORM PARAMETERS: HCPCS | Performed by: INTERNAL MEDICINE

## 2022-11-28 PROCEDURE — 1036F TOBACCO NON-USER: CPT | Performed by: INTERNAL MEDICINE

## 2022-11-28 PROCEDURE — G8399 PT W/DXA RESULTS DOCUMENT: HCPCS | Performed by: INTERNAL MEDICINE

## 2022-11-28 PROCEDURE — 82378 CARCINOEMBRYONIC ANTIGEN: CPT

## 2022-11-28 PROCEDURE — 1090F PRES/ABSN URINE INCON ASSESS: CPT | Performed by: INTERNAL MEDICINE

## 2022-11-28 PROCEDURE — 36415 COLL VENOUS BLD VENIPUNCTURE: CPT

## 2022-11-28 PROCEDURE — G8484 FLU IMMUNIZE NO ADMIN: HCPCS | Performed by: INTERNAL MEDICINE

## 2022-11-28 PROCEDURE — 85025 COMPLETE CBC W/AUTO DIFF WBC: CPT

## 2022-11-28 PROCEDURE — 99215 OFFICE O/P EST HI 40 MIN: CPT | Performed by: INTERNAL MEDICINE

## 2022-11-28 PROCEDURE — 80053 COMPREHEN METABOLIC PANEL: CPT

## 2022-11-28 PROCEDURE — G8428 CUR MEDS NOT DOCUMENT: HCPCS | Performed by: INTERNAL MEDICINE

## 2022-11-28 PROCEDURE — 82306 VITAMIN D 25 HYDROXY: CPT

## 2022-11-28 PROCEDURE — 1123F ACP DISCUSS/DSCN MKR DOCD: CPT | Performed by: INTERNAL MEDICINE

## 2022-11-28 RX ORDER — PANTOPRAZOLE SODIUM 40 MG/1
TABLET, DELAYED RELEASE ORAL
Qty: 90 TABLET | Refills: 1 | Status: SHIPPED | OUTPATIENT
Start: 2022-11-28

## 2022-11-28 RX ORDER — LEVOCARNITINE 330 MG/1
330 TABLET ORAL 2 TIMES DAILY
Qty: 60 TABLET | Refills: 3 | Status: SHIPPED | OUTPATIENT
Start: 2022-11-28

## 2022-11-28 ASSESSMENT — PATIENT HEALTH QUESTIONNAIRE - PHQ9
SUM OF ALL RESPONSES TO PHQ QUESTIONS 1-9: 0
2. FEELING DOWN, DEPRESSED OR HOPELESS: 0
SUM OF ALL RESPONSES TO PHQ QUESTIONS 1-9: 0
SUM OF ALL RESPONSES TO PHQ9 QUESTIONS 1 & 2: 0
SUM OF ALL RESPONSES TO PHQ QUESTIONS 1-9: 0
1. LITTLE INTEREST OR PLEASURE IN DOING THINGS: 0
SUM OF ALL RESPONSES TO PHQ QUESTIONS 1-9: 0

## 2022-11-28 NOTE — PATIENT INSTRUCTIONS
Patient Instructions from Today's Visit    Reason for Visit:  Follow up. Diagnosis Information:  [email or web address]://[email or web address] net/about-us/asco-answers-patient-education-materials/ejfy-shcilfz-aowi-sheets    Plan:  Restart protonix. Gi referral to Dr. Rere Leal. Levocarnitine for joint pains. CT due. Can call [phone] to schedule. Follow Up: After CT scan.       Recent Lab Results:  Hospital Outpatient Visit on 11/28/2022   Component Date Value Ref Range Status    WBC 11/28/2022 2.4 (A)  4.3 - 11.1 K/uL Final    RESULTS CHECKED X 2    RBC 11/28/2022 3.64 (A)  4.05 - 5.2 M/uL Final    Hemoglobin 11/28/2022 11.3 (A)  11.7 - 15.4 g/dL Final    Hematocrit 11/28/2022 34.0 (A)  35.8 - 46.3 % Final    MCV 11/28/2022 93.4  82.0 - 102.0 FL Final    MCH 11/28/2022 31.0  26.1 - 32.9 PG Final    MCHC 11/28/2022 33.2  31.4 - 35.0 g/dL Final    RDW 11/28/2022 11.9  11.9 - 14.6 % Final    Platelets 12/35/6268 106 (A)  150 - 450 K/uL Final    MPV 11/28/2022 10.3  9.4 - 12.3 FL Final    nRBC 11/28/2022 0.00  0.0 - 0.2 K/uL Final    **Note: Absolute NRBC parameter is now reported with Hemogram**    Differential Type 11/28/2022 AUTOMATED    Final    Seg Neutrophils 11/28/2022 50  43 - 78 % Final    Lymphocytes 11/28/2022 37  13 - 44 % Final    Monocytes 11/28/2022 12  4.0 - 12.0 % Final    Eosinophils % 11/28/2022 0 (A)  0.5 - 7.8 % Final    Basophils 11/28/2022 1  0.0 - 2.0 % Final    Immature Granulocytes 11/28/2022 0  0.0 - 5.0 % Final    Segs Absolute 11/28/2022 1.2 (A)  1.7 - 8.2 K/UL Final    Absolute Lymph # 11/28/2022 0.9  0.5 - 4.6 K/UL Final    Absolute Mono # 11/28/2022 0.3  0.1 - 1.3 K/UL Final    Absolute Eos # 11/28/2022 0.0  0.0 - 0.8 K/UL Final    Basophils Absolute 11/28/2022 0.0  0.0 - 0.2 K/UL Final    Absolute Immature Granulocyte 11/28/2022 0.0  0.0 - 0.5 K/UL Final    Sodium 11/28/2022 140  133 - 143 mmol/L Final    Potassium 11/28/2022 3.9  3.5 - 5.1 mmol/L Final    Chloride 11/28/2022 107  101 - 110 mmol/L Final    CO2 11/28/2022 27  21 - 32 mmol/L Final    Anion Gap 11/28/2022 6  2 - 11 mmol/L Final    Glucose 11/28/2022 131 (A)  65 - 100 mg/dL Final    BUN 11/28/2022 12  8 - 23 MG/DL Final    Creatinine 11/28/2022 0.80  0.6 - 1.0 MG/DL Final    Est, Glom Filt Rate 11/28/2022 >60  >60 ml/min/1.73m2 Final    Comment:      Pediatric calculator link: Brian.at. org/professionals/kdoqi/gfr_calculatorped       Effective Oct 3, 2022       These results are not intended for use in patients <25years of age. eGFR results are calculated without a race factor using  the 2021 CKD-EPI equation. Careful clinical correlation is recommended, particularly when comparing to results calculated using previous equations. The CKD-EPI equation is less accurate in patients with extremes of muscle mass, extra-renal metabolism of creatinine, excessive creatine ingestion, or following therapy that affects renal tubular secretion. Calcium 11/28/2022 8.5  8.3 - 10.4 MG/DL Final    Total Bilirubin 11/28/2022 0.3  0.2 - 1.1 MG/DL Final    ALT 11/28/2022 62  12 - 65 U/L Final    AST 11/28/2022 37  15 - 37 U/L Final    Alk Phosphatase 11/28/2022 150 (A)  50 - 136 U/L Final    Total Protein 11/28/2022 6.7  6.3 - 8.2 g/dL Final    Albumin 11/28/2022 3.7  3.2 - 4.6 g/dL Final    Globulin 11/28/2022 3.0  2.8 - 4.5 g/dL Final    Albumin/Globulin Ratio 11/28/2022 1.2  0.4 - 1.6   Final    CEA 11/28/2022 1.1  0.0 - 3.0 ng/mL Final    Comment: Nonsmoker:  <3.0 ng/mL  Smoker:     <5.0 ng/mL  [employer]. Patient's results of tumor marker testing may not be comparable to labs using different manufacturers/methods.            Treatment Summary has been discussed and given to patient: n/a        -------------------------------------------------------------------------------------------------------------------  Please call our office at [phone] if you have any  of the following symptoms:   Fever of 100.5 or greater  Chills  Shortness of breath  Swelling or pain in one leg    After office hours an answering service is available and will contact a provider for emergencies or if you are experiencing any of the above symptoms. Patient did express an interest in My Chart. My Chart log in information explained on the after visit summary printout at the [facility] [address] desk.     Helen Mills RN

## 2022-11-29 LAB — ANA SER QL: NEGATIVE

## 2022-11-30 ENCOUNTER — CLINICAL DOCUMENTATION (OUTPATIENT)
Dept: ONCOLOGY | Age: 77
End: 2022-11-30

## 2022-11-30 NOTE — PROGRESS NOTES
Faxed referral to [facility], Dr Gayle Gonzales, and received confirmation.  ANALY    F [phone]

## 2022-12-11 ASSESSMENT — ENCOUNTER SYMPTOMS
DIARRHEA: 0
NAUSEA: 0
BLOOD IN STOOL: 0
ABDOMINAL PAIN: 0
EYE DISCHARGE: 0
ANAL BLEEDING: 0
ABDOMINAL DISTENTION: 0
SORE THROAT: 0
SINUS PAIN: 0
TROUBLE SWALLOWING: 0
BACK PAIN: 0

## 2022-12-14 ENCOUNTER — CLINICAL DOCUMENTATION (OUTPATIENT)
Dept: ONCOLOGY | Age: 77
End: 2022-12-14

## 2022-12-14 NOTE — PROGRESS NOTES
Encounter Messages    Read Composed From To  Subject   Y 11/29/2022  8:18 AM [facility]AC[facility] Trejo Steph Jasbir  vitamin d     vitamin d  Message [id number]  From   Frandy Cam RN To   Case, Rocael Stephens and Delivered   11/29/2022  8:18 AM   Last Read in [address]   12/8/2022 12:39 PM by Fariha Martell       Vitamin D level is better, but still a little low. Dr. Isis Alva recommends you increase your vitamin D intake/supplement.        Thank you,  Álvaro Duvall RN        [facility] [place]    MyChart User Last Read On   Paul Choi Jasbir 12/8/2022 12:39 PM

## 2022-12-16 PROBLEM — Z12.11 SPECIAL SCREENING FOR MALIGNANT NEOPLASMS, COLON: Status: ACTIVE | Noted: 2022-10-04

## 2022-12-21 ENCOUNTER — HOSPITAL ENCOUNTER (OUTPATIENT)
Dept: CT IMAGING | Age: 77
Discharge: HOME | End: 2022-12-24
Payer: MEDICARE

## 2022-12-21 DIAGNOSIS — C18.9 MALIGNANT NEOPLASM OF COLON, UNSPECIFIED PART OF COLON (HCC): ICD-10-CM

## 2022-12-21 PROCEDURE — 74177 CT ABD & PELVIS W/CONTRAST: CPT

## 2022-12-21 PROCEDURE — 6360000004 HC RX CONTRAST MEDICATION: Performed by: INTERNAL MEDICINE

## 2022-12-21 PROCEDURE — 2580000003 HC RX 258: Performed by: INTERNAL MEDICINE

## 2022-12-21 RX ORDER — SODIUM CHLORIDE 0.9 % (FLUSH) 0.9 %
10 SYRINGE (ML) INJECTION
Status: COMPLETED | OUTPATIENT
Start: 2022-12-21 | End: 2022-12-21

## 2022-12-21 RX ORDER — 0.9 % SODIUM CHLORIDE 0.9 %
100 INTRAVENOUS SOLUTION INTRAVENOUS
Status: COMPLETED | OUTPATIENT
Start: 2022-12-21 | End: 2022-12-21

## 2022-12-21 RX ADMIN — SODIUM CHLORIDE, PRESERVATIVE FREE 10 ML: 5 INJECTION INTRAVENOUS at 10:56

## 2022-12-21 RX ADMIN — DIATRIZOATE MEGLUMINE AND DIATRIZOATE SODIUM 15 ML: 660; 100 LIQUID ORAL; RECTAL at 10:56

## 2022-12-21 RX ADMIN — SODIUM CHLORIDE 100 ML: 9 INJECTION, SOLUTION INTRAVENOUS at 10:56

## 2022-12-21 RX ADMIN — IOPAMIDOL 100 ML: 755 INJECTION, SOLUTION INTRAVENOUS at 10:56

## 2022-12-28 DIAGNOSIS — C18.2 PRIMARY ADENOCARCINOMA OF ASCENDING COLON (HCC): Primary | ICD-10-CM

## 2022-12-28 ASSESSMENT — ENCOUNTER SYMPTOMS
TROUBLE SWALLOWING: 0
SHORTNESS OF BREATH: 0
DIARRHEA: 0
NAUSEA: 0
ABDOMINAL PAIN: 0
ABDOMINAL DISTENTION: 0
SORE THROAT: 0
SINUS PAIN: 0
VOMITING: 0
BLOOD IN STOOL: 0
EYES NEGATIVE: 1
COUGH: 0
ANAL BLEEDING: 0
BACK PAIN: 0
EYE DISCHARGE: 0
CONSTIPATION: 0

## 2022-12-28 NOTE — PROGRESS NOTES
[facility]ematology [facility] Oncology: Office Visit Established Patient    Chief Complaint   Patient presents with    Follow-up      History of Present Illness:  Ms. Martell is a  65 y.o. female who presents today for  follow up regarding colon cancer. she was admitted on 1/22/22 with peritonitis, colon mass, obstruction and bowel perforation. PMhx: anxiety,  corneal dystrophy s/p transplants (bilat) on steroid drops, CAD, GERD, IBS, HLD. She p/w abd pain x2 days. CT AP c/w 2.3cm mass in mid-ascending colon and LAD with obstruction and perforation. S/p subsequent right extended colectomy with ileo-transverse staped anastomosis. Path c/w mod-poorly diff adenocarcinoma - [id number] (2/14 LN +). We were consulted for recs. S/p FOLFOX - adjuvantly, omitted last cycle. Ultimately after discussion of pros and cons of proceeding with last treatment, she elected not to proceed with treatment due to increased risk of infection, with persistent cytopenias, fatigue and neuropathy. She was comfortable with this plan. She understood that taking the last treatment will not necessarily prevent disease in the future. She also understood that her risk of recurrent disease with tx adjustments made along the way to make it tolerable/dose adjusted. We discussed surveillance per NCCN guidelines. She wishes to go back into the community to her [employer] but is considering holding off for now due to immune compromise. She wished to switch primary care physicians and was referred. Today, pt is here for FU. Her daughters are present on the phone for the visit. Pt stated she is feeling normal.  No acute issues or concerns at this time. Seeing Dr Kaelyn Donaldson for colonoscopy in March and wishes to keep her port until then. She has remained on PPI since last visit, with improvement in her symptoms. She is scheduled with GI early in 2023. Labs reviewed - pancytopenia slowly improving. Will check smear.   Will also check nutritional 12/29/22 FU after restaging CT CAP - LOWELL; c/w vit D; GI apt in early 2023; colonoscopy in March - plans to remove port after; port flush today. Nutritional  labs       Family History   Problem Relation Age of Onset    Cancer Mother         Bladder Cancer    Heart Attack Father     Heart Disease Father         Arterosclerotic Cardiovascular Disease    Prostate Cancer Brother     Prostate Cancer Brother     Breast Cancer Maternal Aunt     Colon Cancer Maternal Aunt        Social History     Socioeconomic History    Marital status: Legally      Spouse name: Not on file    Number of children: Not on file    Years of education: Not on file    Highest education level: Not on file   Occupational History    Not on file   Tobacco Use    Smoking status: Never    Smokeless tobacco: Never   Vaping Use    Vaping Use: Never used   Substance and Sexual Activity    Alcohol use: Never    Drug use: Never    Sexual activity: Not on file   Other Topics Concern    Not on file   Social History Narrative    Not on file     Social Determinants of Health     Financial Resource Strain: Not on file   Food Insecurity: Not on file   Transportation Needs: Not on file   Physical Activity: Not on file   Stress: Not on file   Social Connections: Not on file   Intimate Partner Violence: Not on file   Housing Stability: Not on file          Review of Systems   Constitutional:  Positive for fatigue (mild). Negative for activity change, appetite change, chills, diaphoresis, fever and unexpected weight change. HENT: Negative. Negative for congestion, hearing loss, nosebleeds, sinus pain, sore throat and trouble swallowing. Eyes: Negative. Negative for discharge and visual disturbance. Respiratory:  Negative for cough and shortness of breath. Cardiovascular: Negative. Negative for chest pain and leg swelling.    Gastrointestinal:  Negative for abdominal distention, abdominal pain, anal bleeding, blood in stool, constipation, diarrhea, nausea and vomiting. Endocrine: Negative for cold intolerance and heat intolerance. Genitourinary: Negative. Negative for dysuria, menstrual problem and vaginal bleeding. Musculoskeletal:  Positive for arthralgias. Negative for back pain, gait problem and joint swelling. Skin:  Negative for rash. Neurological:  Positive for numbness (improved with dose reduction). Negative for dizziness, seizures, syncope, weakness, light-headedness and headaches. Hematological: Negative. Negative for adenopathy. Psychiatric/Behavioral: Negative. Negative for confusion and sleep disturbance. All other systems reviewed and are negative.       Allergies   Allergen Reactions    Sulfa Antibiotics Hives and Rash    Sulfamethoxazole-Trimethoprim Rash         Past Medical History:   Diagnosis Date    Actinic keratosis     Adjustment disorder with mixed anxiety and depressed mood 2/11/2015    Adverse effect of anesthesia     cystoscope - was awake but could not move - dont remember the anesthesetic given    Corneal dystrophy 2/11/2015    Coronary atherosclerosis of native coronary artery 2/11/2015    GERD (gastroesophageal reflux disease)     Irritable bowel syndrome 2/11/2015    Menopause     Mixed hyperlipidemia 2/11/2015    Primary adenocarcinoma of ascending colon ([facility] [address] 1/31/2022    Psychiatric disorder     anxiety        Past Surgical History:   Procedure Laterality Date    BREAST BIOPSY Left     CARDIAC SURGERY      Stint    COLONOSCOPY      2014    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    CORNEAL TRANSPLANT Left     CYST REMOVAL      breast    EYE SURGERY      Both eyes, Cornea transplants    IR PORT PLACEMENT EQUAL OR GREATER THAN 5 YEARS  2/21/2022    IR PORT PLACEMENT EQUAL OR GREATER THAN 5 YEARS  02/21/2022    IR PORT PLACEMENT EQUAL OR GREATER THAN 5 YEARS 2/21/2022 [facility] RADIOLOGY SPECIALS      Current Outpatient Medications   Medication Sig Dispense Refill    pantoprazole (PROTONIX) 40 MG tablet TAKE 1 TABLET BY MOUTH EVERY DAY 90 tablet 1    atorvastatin (LIPITOR) 40 MG tablet Take 1 tablet by mouth daily 90 tablet 1    B Complex Vitamins (B COMPLEX 1 PO) Take 1 tablet by mouth daily      diphenhydrAMINE (BENADRYL) 25 MG tablet Take 25 mg by mouth every 6 hours as needed for Itching PRN      aspirin 81 MG EC tablet Take 81 mg by mouth daily      Hyoscyamine Sulfate SL 0.125 MG SUBL Place 0.125 mg under the tongue every 6 hours as needed      prednisoLONE acetate (PRED FORTE) 1 % ophthalmic suspension Place 1 drop into both eyes every evening      levOCARNitine (CARNITOR) 330 MG tablet Take 1 tablet by mouth 2 times daily 60 tablet 3    sucralfate (CARAFATE) 1 GM tablet Take 1 g by mouth daily (Patient not taking: No sig reported)      loperamide (IMODIUM) 2 MG capsule Take 2 mg by mouth 4 times daily as needed for Diarrhea PRN (Patient not taking: Reported on 12/29/2022)      vitamin D 25 MCG (1000 UT) CAPS Take 1,000 Units by mouth Once a day (Patient not taking: Reported on 12/29/2022)      lidocaine-prilocaine (EMLA) 2.5-2.5 % cream Apply topically as needed (Patient not taking: No sig reported)       No current facility-administered medications for this visit. OBJECTIVE:  Visit Vitals  Vitals:    12/29/22 1315   BP: 123/61   Pulse: 67   Resp: 16   Temp: 98.7 °F (37.1 °C)   TempSrc: Oral   SpO2: 97%   Weight: 120 lb (54.4 kg)        ECOG PERFORMANCE STATUS - 0-Fully active, able to carry on all pre-disease performance without restriction. Pain - Pain Score:   0 - No pain (fatigue-0)/10. None/Minimal pain - not affecting QOL     Fatigue - No flowsheet data found. Distress - No flowsheet data found. Physical Exam  Vitals reviewed. Constitutional:       General: She is not in acute distress. Appearance: Normal appearance. She is normal weight. She is not ill-appearing or toxic-appearing. HENT:      Head: Normocephalic and atraumatic.       Nose: Nose normal. No congestion. Mouth/Throat:      Mouth: Mucous membranes are moist.      Pharynx: Oropharynx is clear. Eyes:      General: No scleral icterus. Extraocular Movements: Extraocular movements intact. Conjunctiva/sclera: Conjunctivae normal.      Pupils: Pupils are equal, round, and reactive to light. Cardiovascular:      Rate and Rhythm: Normal rate and regular rhythm. Heart sounds: Normal heart sounds. No murmur heard. Pulmonary:      Effort: Pulmonary effort is normal. No respiratory distress. Breath sounds: Normal breath sounds. No wheezing. Abdominal:      General: Bowel sounds are normal. There is no distension. Palpations: Abdomen is soft. There is no mass. Tenderness: There is no abdominal tenderness. There is no guarding or rebound. Musculoskeletal:         General: Normal range of motion. Cervical back: Normal range of motion and neck supple. Right lower leg: No edema. Left lower leg: No edema. Lymphadenopathy:      Cervical: No cervical adenopathy. Upper Body:      Right upper body: No supraclavicular or axillary adenopathy. Left upper body: No supraclavicular or axillary adenopathy. Skin:     General: Skin is warm and dry. Coloration: Skin is not jaundiced or pale. Findings: No rash. Neurological:      General: No focal deficit present. Mental Status: She is alert and oriented to person, place, and time. Motor: No weakness. Coordination: Coordination normal.      Gait: Gait normal.   Psychiatric:         Mood and Affect: Mood normal.         Behavior: Behavior normal.         Thought Content:  Thought content normal.        Labs:  Hospital Outpatient Visit on 12/29/2022   Component Date Value Ref Range Status    WBC 12/29/2022 2.6 (A)  4.3 - 11.1 K/uL Final    RBC 12/29/2022 3.93 (A)  4.05 - 5.2 M/uL Final    Hemoglobin 12/29/2022 11.9  11.7 - 15.4 g/dL Final    Hematocrit 12/29/2022 36.1  35.8 - 46.3 % Final MCV 12/29/2022 91.9  82.0 - 102.0 FL Final    MCH 12/29/2022 30.3  26.1 - 32.9 PG Final    MCHC 12/29/2022 33.0  31.4 - 35.0 g/dL Final    RDW 12/29/2022 12.1  11.9 - 14.6 % Final    Platelets 36/06/9933 130 (A)  150 - 450 K/uL Final    MPV 12/29/2022 10.1  9.4 - 12.3 FL Final    nRBC 12/29/2022 0.00  0.0 - 0.2 K/uL Final    **Note: Absolute NRBC parameter is now reported with Hemogram**    Seg Neutrophils 12/29/2022 57  43 - 78 % Final    Lymphocytes 12/29/2022 32  13 - 44 % Final    Monocytes 12/29/2022 11  4.0 - 12.0 % Final    Eosinophils % 12/29/2022 0 (A)  0.5 - 7.8 % Final    Basophils 12/29/2022 0  0.0 - 2.0 % Final    Immature Granulocytes 12/29/2022 0  0.0 - 5.0 % Final    Segs Absolute 12/29/2022 1.5 (A)  1.7 - 8.2 K/UL Final    Absolute Lymph # 12/29/2022 0.8  0.5 - 4.6 K/UL Final    Absolute Mono # 12/29/2022 0.3  0.1 - 1.3 K/UL Final    Absolute Eos # 12/29/2022 0.0  0.0 - 0.8 K/UL Final    Basophils Absolute 12/29/2022 0.0  0.0 - 0.2 K/UL Final    Absolute Immature Granulocyte 12/29/2022 0.0  0.0 - 0.5 K/UL Final    Differential Type 12/29/2022 AUTOMATED    Final    Sodium 12/29/2022 138  133 - 143 mmol/L Final    Potassium 12/29/2022 3.9  3.5 - 5.1 mmol/L Final    Chloride 12/29/2022 105  101 - 110 mmol/L Final    CO2 12/29/2022 28  21 - 32 mmol/L Final    Anion Gap 12/29/2022 5  2 - 11 mmol/L Final    Glucose 12/29/2022 119 (A)  65 - 100 mg/dL Final    BUN 12/29/2022 12  8 - 23 MG/DL Final    Creatinine 12/29/2022 1.00  0.6 - 1.0 MG/DL Final    Est, Glom Filt Rate 12/29/2022 58 (A)  >60 ml/min/1.73m2 Final    Comment:      Pediatric calculator link: [employer].at. org/professionals/kdoqi/gfr_calculatorped       These results are not intended for use in patients <25years of age. eGFR results are calculated without a race factor using  the 2021 CKD-EPI equation. Careful clinical correlation is recommended, particularly when comparing to results calculated using previous equations.   The CKD-EPI equation is less accurate in patients with extremes of muscle mass, extra-renal metabolism of creatinine, excessive creatine ingestion, or following therapy that affects renal tubular secretion. Calcium 12/29/2022 8.8  8.3 - 10.4 MG/DL Final    Total Bilirubin 12/29/2022 0.4  0.2 - 1.1 MG/DL Final    ALT 12/29/2022 51  12 - 65 U/L Final    AST 12/29/2022 33  15 - 37 U/L Final    Alk Phosphatase 12/29/2022 123  50 - 136 U/L Final    Total Protein 12/29/2022 7.1  6.3 - 8.2 g/dL Final    Albumin 12/29/2022 4.0  3.2 - 4.6 g/dL Final    Globulin 12/29/2022 3.1  2.8 - 4.5 g/dL Final    Albumin/Globulin Ratio 12/29/2022 1.3  0.4 - 1.6   Final          Imaging:  Reviewed       PATHOLOGY:                         ASSESSMENT:    ICD-10-CM    1. Pancytopenia (HCC)  D61.818 Ferritin     Vitamin B12     Folate     Transferrin Saturation     Path Review, Smear      2. Anemia, unspecified type   D64.9 Ferritin     Vitamin B12     Folate      3. Other general symptoms and signs   R68.89 Vitamin B12     Folate      4. Vitamin D deficiency  E55.9 CBC with Auto Differential     Comprehensive Metabolic Panel     Vitamin D 25 Hydroxy     CEA      5. Primary adenocarcinoma of ascending colon (HCC)  C18.2 CBC with Auto Differential     Comprehensive Metabolic Panel     Vitamin D 25 Hydroxy     CEA        Ms. Case is here for FU of colon  cancer. 1. Mid-ascending colon adenocarcinoma - [id number] - Stage IIIB (high risk dz due to poorly diff/perforation/obstruction - we have discussed this and pt VU), MSI - stable,  KRAS mut    - Patient wished to lower the dose of oxaliplatin upfront to 80% of the dose. C9 delayed 1 wk due to thrombocytopenia; C12 held due to immunocompromise/fatigue and neuropathy   - cold sensitivity - improved with 80% of original dosing     - here for FU. Her daughters are present on the phone for the visit. Pt stated she is feeling normal.  No acute issues or concerns at this time.     - Seeing  Pranay De Leon for colonoscopy in March and wishes to keep her port until then. - GERD - She has remained on PPI since last visit, with improvement in her symptoms. She is scheduled with GI early in 2023.    - pancytopenia slowly improving. Will check smear. Will also check nutritional labs. - surveillance - CT CAP discussed and with no evid of disease recurrence and resolution of pulm fissure/nodule. - low vit D - Has been on vit D 5KIU in last couple of weeks - we can repeat at next apt.    - Wears mask and avoiding contact with others. She spent Maye by herself as both daughters had [address] flush today. - Cea continues to drop and at 1.1 in Nov - will check it next in 3mo      - EVON - She completed IV iron - will repeat nutritional labs    - low appetite - RX Remeron at bedtime    - insomnia - can use melatonin at night   - Encouraged frequent activity throughout the day and rest as needed to combat fatigue. Pt wishes to restart her exercise routine   - Call with any fevers, uncontrolled side effects from treatment or any other worrisome/concerning symptoms. - surveillance reviewed per NCCN guidelines:   H&P every 3 to 6 months for 2 years, then every 6 months for total of 5 years  CEA every 3 to 6 months for 2 years and every 6 months for total of 5 years  Chest abdomen pelvis CT every 6 to 12 months for total of 5 years, category 2B for frequency less than 12 months  Colonoscopy in 1 year after surgery except if no preoperative colonoscopy due to obstructing lesion, colonoscopy in 2 to 6 months if advanced adenoma, repeat in 1 year; if no advanced adenoma, repeat in 3 years, then every 5 years. PET scan is not indicated      2. Supportive care    - s/p bilat corneal transplant - sees Dr aFviola Gutierrez [phone] [facility]; d/w her eye team - no CI to start tx.          RESUSCITATION DIRECTIVES/HOSPICE CARE: Full Support     RTC 3mo or sooner as needed        MDM  Number of Diagnoses or Management Options  Anemia, unspecified type : established, improving  Pancytopenia ([facility] [address]): established, improving  Primary adenocarcinoma of ascending colon ([facility] [address]): established, improving  Vitamin D deficiency: established, improving     Amount and/or Complexity of Data Reviewed  Clinical lab tests: ordered and reviewed  Tests in the radiology section of CPT®: ordered and reviewed  Tests in the medicine section of CPT®: ordered  Obtain history from someone other than the patient: yes  Review and summarize past medical records: yes  Independent visualization of images, tracings, or specimens: yes    Risk of Complications, Morbidity, and/or Mortality  Presenting problems: moderate  Diagnostic procedures: moderate  Management options: moderate         Historical:    - daughter requested testing for DPYD. Reviewed that typically we would test in pts who have severe or unexpected toxicity from fluoropyrimidines (severe myelosuppression, mucositis, diarrhea, neurotoxicity,  cardiotoxicity) during the first few cycles of fluoropyrimidine therapy, not for screening purposes. If she doesn't do well with tx - we'd dose adjust anyway. Family elected not to p/w testing. Historical:     - we reviewed the pathophysiology of colon cancer in general, we then reviewed her pathology and staging again and high risk disease.     - to start: FOLFOX: A cycle is every 14 days (6mo)    Oxaliplatin   (Eloxatin)  85 mg/m² IV once on day 1 - dose adjusted upfront to 80% of dose to see how tolerates it per  her wishes     Leucovorin  400 mg/m² IV once on day 1    Fluorouracil  400 mg/m² IV once on day 1    Fluorouracil  1,200 mg/m²/day CIV on days 1 and 2. Total dose = 2,400 mg/m²/cycle. OR CAPoX: A cycle is every 21 days     Capecitabine   (Xeloda)  850 mg/m² orally twice daily on days 1-14, then off 7 days    Oxaliplatin   (Eloxatin)  130 mg/m² IV day 1   - CT chest to complete staging with small RML nodule - ? LN - will monitor on re-imaging, no definite evid of disease. We discussed the CT results that show small nodule in the lung (per rad likely LN, <1cm abutting fissure). We reviewed that this could be a metastatic focus. She has stage III disease, if the lung nodule is cancerous, her tumor staging be stage IV. Intent of treatment will be palliative and not curative in nature. Unable to bx lesion due to size. Daughter and patient aware of this. - Elevated Alk phos - GGT checked and WNL at 49 6/2022   - She did report some numbness or perception of swelling in the ball of her foot bilaterally, closer to lateral phalanges, and she reported that as baseline prior to chemotherapy. Now with some worsening. Able to write, close buttons/zipper. - nausea - emend starting with C2 and worked well, continue with PO anti-emetics prn - denies recent issues with this  - mucositis - resolved with compounded rinse, Rx provided. - here for FU with her daughter and other daughter via telephone. Ultimately after discussion of pros and cons of proceeding with last treatment, she elected not to proceed with treatment due to increased risk of infection with persistent cytopenias. She is comfortable with this plan. She understands that taking the last treatment will not necessarily prevent disease in the future. She also understands her risk of recurrent disease with tx adjustments made along the way to make it tolerable/dose adjusted. She will follow-up in a couple of months with CT prior. We discussed surveillance per NCCN guidelines. She did have an appointment scheduled on October 21 but can come back next week to determine if her counts are recovering nicely. She wishes to go back into the community to her [employer] but is considering holding off for now due to immune compromise. She is not having any signs and symptoms of infection at this time.     - She wishes to switch primary care physicians and I will refer her to [facility] internal medicine.       - GERD - Pt reported feeling ok when it comes to her GI tract except for some burning. She stopped PPi few days ago and noted worsening of her GERD symptoms. Previously well controlled on PPI/Carafate. She has not seen GI in many years and will be referred to Dr Dylon Wood to establish care for for GERD/re need for further workup. - pain in knuckles - She also reported pain and some swelling in her knuckles, improved now. Will add on MELINA. Itermittent self resolving itching for which she take Benadryl. No rashes. Can try carnitor.    - pancytopenia - Cbc still with wbc 2.4, ANC up to 1200, Hb 11.3 and plt 106. She is wearing a mask and still avoiding being with ppl. Spent Thanksgiving by herself. We discussed increased risk of infection after chemo but also that normalized counts will not necessarily protect her from any future infections. Wishes to repeat labs at next visit, which will be after her restaging CT in Dec.   - vit D defic - She is taking 5000 of vitamin D. If still on same dose, can increase: eg if taking 5 k daily can increase by 2K daily or 2-3 additional 5K doses in a week. Can also check if different formulation works better for her. - elevated liver enzymes - resolved   - Continue good oral nutrition and hydration.   - hypokalemia - resolved; can d/c potassium      All questions were asked and answered to the best of my ability. The patient verbalized understanding and agrees with the plan above.             Alta Aguila MD, MD  [facility] Hematology and Oncology  [address] [place] [address]  Office : [phone]  Fax : [phone]

## 2022-12-29 ENCOUNTER — HOSPITAL ENCOUNTER (OUTPATIENT)
Dept: LAB | Age: 77
Discharge: HOME | End: 2022-12-29
Payer: MEDICARE

## 2022-12-29 ENCOUNTER — OFFICE VISIT (OUTPATIENT)
Dept: ONCOLOGY | Age: 77
End: 2022-12-29
Payer: MEDICARE

## 2022-12-29 ENCOUNTER — HOSPITAL ENCOUNTER (OUTPATIENT)
Dept: INFUSION THERAPY | Age: 77
Discharge: HOME | End: 2022-12-29
Payer: MEDICARE

## 2022-12-29 VITALS
RESPIRATION RATE: 16 BRPM | TEMPERATURE: 98.7 F | DIASTOLIC BLOOD PRESSURE: 61 MMHG | OXYGEN SATURATION: 97 % | SYSTOLIC BLOOD PRESSURE: 123 MMHG | BODY MASS INDEX: 21.95 KG/M2 | HEART RATE: 67 BPM | WEIGHT: 120 LBS

## 2022-12-29 DIAGNOSIS — D64.9 ANEMIA, UNSPECIFIED TYPE: ICD-10-CM

## 2022-12-29 DIAGNOSIS — E55.9 VITAMIN D DEFICIENCY: ICD-10-CM

## 2022-12-29 DIAGNOSIS — D61.818 PANCYTOPENIA (HCC): ICD-10-CM

## 2022-12-29 DIAGNOSIS — C18.2 PRIMARY ADENOCARCINOMA OF ASCENDING COLON (HCC): ICD-10-CM

## 2022-12-29 DIAGNOSIS — R68.89 OTHER GENERAL SYMPTOMS AND SIGNS: ICD-10-CM

## 2022-12-29 DIAGNOSIS — C18.2 PRIMARY ADENOCARCINOMA OF ASCENDING COLON (HCC): Primary | ICD-10-CM

## 2022-12-29 LAB
ALBUMIN SERPL-MCNC: 4 G/DL (ref 3.2–4.6)
ALBUMIN/GLOB SERPL: 1.3 {RATIO} (ref 0.4–1.6)
ALP SERPL-CCNC: 123 U/L (ref 50–136)
ALT SERPL-CCNC: 51 U/L (ref 12–65)
ANION GAP SERPL CALC-SCNC: 5 MMOL/L (ref 2–11)
AST SERPL-CCNC: 33 U/L (ref 15–37)
BASOPHILS # BLD: 0 K/UL (ref 0–0.2)
BASOPHILS NFR BLD: 0 % (ref 0–2)
BILIRUB SERPL-MCNC: 0.4 MG/DL (ref 0.2–1.1)
BUN SERPL-MCNC: 12 MG/DL (ref 8–23)
CALCIUM SERPL-MCNC: 8.8 MG/DL (ref 8.3–10.4)
CHLORIDE SERPL-SCNC: 105 MMOL/L (ref 101–110)
CO2 SERPL-SCNC: 28 MMOL/L (ref 21–32)
CREAT SERPL-MCNC: 1 MG/DL (ref 0.6–1)
DIFFERENTIAL METHOD BLD: ABNORMAL
EOSINOPHIL # BLD: 0 K/UL (ref 0–0.8)
EOSINOPHIL NFR BLD: 0 % (ref 0.5–7.8)
ERYTHROCYTE [DISTWIDTH] IN BLOOD BY AUTOMATED COUNT: 12.1 % (ref 11.9–14.6)
FERRITIN SERPL-MCNC: 498 NG/ML (ref 8–388)
GLOBULIN SER CALC-MCNC: 3.1 G/DL (ref 2.8–4.5)
GLUCOSE SERPL-MCNC: 119 MG/DL (ref 65–100)
HCT VFR BLD AUTO: 36.1 % (ref 35.8–46.3)
HGB BLD-MCNC: 11.9 G/DL (ref 11.7–15.4)
IMM GRANULOCYTES # BLD AUTO: 0 K/UL (ref 0–0.5)
IMM GRANULOCYTES NFR BLD AUTO: 0 % (ref 0–5)
IRON SATN MFR SERPL: 28 %
IRON SERPL-MCNC: 72 UG/DL (ref 35–150)
LYMPHOCYTES # BLD: 0.8 K/UL (ref 0.5–4.6)
LYMPHOCYTES NFR BLD: 32 % (ref 13–44)
MCH RBC QN AUTO: 30.3 PG (ref 26.1–32.9)
MCHC RBC AUTO-ENTMCNC: 33 G/DL (ref 31.4–35)
MCV RBC AUTO: 91.9 FL (ref 82–102)
MONOCYTES # BLD: 0.3 K/UL (ref 0.1–1.3)
MONOCYTES NFR BLD: 11 % (ref 4–12)
NEUTS SEG # BLD: 1.5 K/UL (ref 1.7–8.2)
NEUTS SEG NFR BLD: 57 % (ref 43–78)
NRBC # BLD: 0 K/UL (ref 0–0.2)
PLATELET # BLD AUTO: 130 K/UL (ref 150–450)
PMV BLD AUTO: 10.1 FL (ref 9.4–12.3)
POTASSIUM SERPL-SCNC: 3.9 MMOL/L (ref 3.5–5.1)
PROT SERPL-MCNC: 7.1 G/DL (ref 6.3–8.2)
RBC # BLD AUTO: 3.93 M/UL (ref 4.05–5.2)
SODIUM SERPL-SCNC: 138 MMOL/L (ref 133–143)
TIBC SERPL-MCNC: 257 UG/DL (ref 250–450)
VIT B12 SERPL-MCNC: 486 PG/ML (ref 193–986)
WBC # BLD AUTO: 2.6 K/UL (ref 4.3–11.1)

## 2022-12-29 PROCEDURE — 2580000003 HC RX 258: Performed by: INTERNAL MEDICINE

## 2022-12-29 PROCEDURE — 99214 OFFICE O/P EST MOD 30 MIN: CPT | Performed by: INTERNAL MEDICINE

## 2022-12-29 PROCEDURE — 36591 DRAW BLOOD OFF VENOUS DEVICE: CPT

## 2022-12-29 PROCEDURE — 85025 COMPLETE CBC W/AUTO DIFF WBC: CPT

## 2022-12-29 PROCEDURE — 80053 COMPREHEN METABOLIC PANEL: CPT

## 2022-12-29 PROCEDURE — 82746 ASSAY OF FOLIC ACID SERUM: CPT

## 2022-12-29 PROCEDURE — G8399 PT W/DXA RESULTS DOCUMENT: HCPCS | Performed by: INTERNAL MEDICINE

## 2022-12-29 PROCEDURE — 1036F TOBACCO NON-USER: CPT | Performed by: INTERNAL MEDICINE

## 2022-12-29 PROCEDURE — 83540 ASSAY OF IRON: CPT

## 2022-12-29 PROCEDURE — 36415 COLL VENOUS BLD VENIPUNCTURE: CPT

## 2022-12-29 PROCEDURE — G8420 CALC BMI NORM PARAMETERS: HCPCS | Performed by: INTERNAL MEDICINE

## 2022-12-29 PROCEDURE — G8427 DOCREV CUR MEDS BY ELIG CLIN: HCPCS | Performed by: INTERNAL MEDICINE

## 2022-12-29 PROCEDURE — 82728 ASSAY OF FERRITIN: CPT

## 2022-12-29 PROCEDURE — 1123F ACP DISCUSS/DSCN MKR DOCD: CPT | Performed by: INTERNAL MEDICINE

## 2022-12-29 PROCEDURE — 82607 VITAMIN B-12: CPT

## 2022-12-29 PROCEDURE — G8484 FLU IMMUNIZE NO ADMIN: HCPCS | Performed by: INTERNAL MEDICINE

## 2022-12-29 PROCEDURE — 1090F PRES/ABSN URINE INCON ASSESS: CPT | Performed by: INTERNAL MEDICINE

## 2022-12-29 RX ORDER — SODIUM CHLORIDE 0.9 % (FLUSH) 0.9 %
10 SYRINGE (ML) INJECTION PRN
Status: DISCONTINUED | OUTPATIENT
Start: 2022-12-29 | End: 2022-12-30 | Stop reason: HOSPADM

## 2022-12-29 RX ADMIN — SODIUM CHLORIDE, PRESERVATIVE FREE 10 ML: 5 INJECTION INTRAVENOUS at 14:22

## 2022-12-29 ASSESSMENT — PATIENT HEALTH QUESTIONNAIRE - PHQ9
SUM OF ALL RESPONSES TO PHQ QUESTIONS 1-9: 0
1. LITTLE INTEREST OR PLEASURE IN DOING THINGS: 0
2. FEELING DOWN, DEPRESSED OR HOPELESS: 0
SUM OF ALL RESPONSES TO PHQ9 QUESTIONS 1 & 2: 0

## 2022-12-29 NOTE — PATIENT INSTRUCTIONS
Patient Instructions from Today's Visit    Reason for Visit:  Follow up. Diagnosis Information:  [email or web address] [email or web address]-[email or web address]-answers-patient-education-[email or web address]-kmpozyw-[email or web address]-sheets      Plan:  CT reviewed. Another one in 6 months. Port flush today. Follow Up:  3 months with labs.       Recent Lab Results:  Hospital Outpatient Visit on 12/29/2022   Component Date Value Ref Range Status    WBC 12/29/2022 2.6 (A)  4.3 - 11.1 K/uL Final    RBC 12/29/2022 3.93 (A)  4.05 - 5.2 M/uL Final    Hemoglobin 12/29/2022 11.9  11.7 - 15.4 g/dL Final    Hematocrit 12/29/2022 36.1  35.8 - 46.3 % Final    MCV 12/29/2022 91.9  82.0 - 102.0 FL Final    MCH 12/29/2022 30.3  26.1 - 32.9 PG Final    MCHC 12/29/2022 33.0  31.4 - 35.0 g/dL Final    RDW 12/29/2022 12.1  11.9 - 14.6 % Final    Platelets 68/31/8838 130 (A)  150 - 450 K/uL Final    MPV 12/29/2022 10.1  9.4 - 12.3 FL Final    nRBC 12/29/2022 0.00  0.0 - 0.2 K/uL Final    **Note: Absolute NRBC parameter is now reported with Hemogram**    Seg Neutrophils 12/29/2022 57  43 - 78 % Final    Lymphocytes 12/29/2022 32  13 - 44 % Final    Monocytes 12/29/2022 11  4.0 - 12.0 % Final    Eosinophils % 12/29/2022 0 (A)  0.5 - 7.8 % Final    Basophils 12/29/2022 0  0.0 - 2.0 % Final    Immature Granulocytes 12/29/2022 0  0.0 - 5.0 % Final    Segs Absolute 12/29/2022 1.5 (A)  1.7 - 8.2 K/UL Final    Absolute Lymph # 12/29/2022 0.8  0.5 - 4.6 K/UL Final    Absolute Mono # 12/29/2022 0.3  0.1 - 1.3 K/UL Final    Absolute Eos # 12/29/2022 0.0  0.0 - 0.8 K/UL Final    Basophils Absolute 12/29/2022 0.0  0.0 - 0.2 K/UL Final    Absolute Immature Granulocyte 12/29/2022 0.0  0.0 - 0.5 K/UL Final    Differential Type 12/29/2022 AUTOMATED    Final    Sodium 12/29/2022 138  133 - 143 mmol/L Final    Potassium 12/29/2022 3.9  3.5 - 5.1 mmol/L Final    Chloride 12/29/2022 105  101 - 110 mmol/L Final    CO2 12/29/2022 28  21 - 32 mmol/L Final    Anion Gap 12/29/2022 5  2 - 11 mmol/L Final    Glucose 12/29/2022 119 (A)  65 - 100 mg/dL Final    BUN 12/29/2022 12  8 - 23 MG/DL Final    Creatinine 12/29/2022 1.00  0.6 - 1.0 MG/DL Final    Est, Glom Filt Rate 12/29/2022 58 (A)  >60 ml/min/1.73m2 Final    Comment:      Pediatric calculator link: Brian.at. org/professionals/kdoqi/gfr_calculatorped       These results are not intended for use in patients <25years of age. eGFR results are calculated without a race factor using  the 2021 CKD-EPI equation. Careful clinical correlation is recommended, particularly when comparing to results calculated using previous equations. The CKD-EPI equation is less accurate in patients with extremes of muscle mass, extra-renal metabolism of creatinine, excessive creatine ingestion, or following therapy that affects renal tubular secretion. Calcium 12/29/2022 8.8  8.3 - 10.4 MG/DL Final    Total Bilirubin 12/29/2022 0.4  0.2 - 1.1 MG/DL Final    ALT 12/29/2022 51  12 - 65 U/L Final    AST 12/29/2022 33  15 - 37 U/L Final    Alk Phosphatase 12/29/2022 123  50 - 136 U/L Final    Total Protein 12/29/2022 7.1  6.3 - 8.2 g/dL Final    Albumin 12/29/2022 4.0  3.2 - 4.6 g/dL Final    Globulin 12/29/2022 3.1  2.8 - 4.5 g/dL Final    Albumin/Globulin Ratio 12/29/2022 1.3  0.4 - 1.6   Final         Treatment Summary has been discussed and given to patient: n/a        -------------------------------------------------------------------------------------------------------------------  Please call our office at [phone] if you have any  of the following symptoms:   Fever of 100.5 or greater  Chills  Shortness of breath  Swelling or pain in one leg    After office hours an answering service is available and will contact a provider for emergencies or if you are experiencing any of the above symptoms. Patient did express an interest in My Chart.   My Chart log in information explained on the after visit summary printout at the check-out nichok.    Taylor Escobar RN

## 2022-12-29 NOTE — PROGRESS NOTES
Patient arrived to port lab for port access and lab draw   Alejandro Cerna 45 accessed and labs drawn per protocal  *Port flushed and de-accessed  Patient discharged from port lab ambulatory*

## 2022-12-30 LAB
FOLATE SERPL-MCNC: 54.6 NG/ML (ref 3.1–17.5)
PATH REV BLD -IMP: NORMAL

## 2023-01-03 ENCOUNTER — TELEPHONE (OUTPATIENT)
Dept: ONCOLOGY | Age: 78
End: 2023-01-03

## 2023-01-03 NOTE — TELEPHONE ENCOUNTER
Pt notified.      ----- Message from Juaquin Blum MD sent at 12/30/2022  5:45 PM EST -----  Can cut back on folate. Smear without worrisome cells.   Pls let pt know   TY     ----- Message -----  From: Jules Zabala Incoming Lab For Copath Pdfs  Sent: 12/29/2022   1:21 PM EST  To: Juaquin Blum MD

## 2023-01-06 ENCOUNTER — TELEPHONE (OUTPATIENT)
Dept: INTERNAL MEDICINE CLINIC | Facility: CLINIC | Age: 78
End: 2023-01-06

## 2023-01-06 DIAGNOSIS — R73.9 HYPERGLYCEMIA: Primary | ICD-10-CM

## 2023-01-06 NOTE — TELEPHONE ENCOUNTER
----- Message from Graham Roberts sent at 1/6/2023  4:15 PM EST -----  Subject: Message to Provider    QUESTIONS  Information for Provider? Patient had a series of lab orders done 12/29/22   would like to know if she needs to have any other labs done prior to her   upcoming appointment. Please call patient and advise  ---------------------------------------------------------------------------  --------------  [facility] INFO  [phone]; OK to leave message on voicemail  ---------------------------------------------------------------------------  --------------  SCRIPT ANSWERS  Relationship to Patient?  Self

## 2023-01-09 NOTE — TELEPHONE ENCOUNTER
Spoke with pt and advised per Dr. Rancho Munson that no labs are needed prior to appt. Did advise that Dr. Rancho Munson might do a1c at time of appointment.

## 2023-01-15 PROBLEM — Z12.11 SPECIAL SCREENING FOR MALIGNANT NEOPLASMS, COLON: Status: RESOLVED | Noted: 2022-10-04 | Resolved: 2023-01-15

## 2023-02-06 SDOH — HEALTH STABILITY: PHYSICAL HEALTH
ON AVERAGE, HOW MANY DAYS PER WEEK DO YOU ENGAGE IN MODERATE TO STRENUOUS EXERCISE (LIKE A BRISK WALK)?: PATIENT DECLINED

## 2023-02-06 ASSESSMENT — LIFESTYLE VARIABLES
HOW OFTEN DO YOU HAVE A DRINK CONTAINING ALCOHOL: NEVER
HOW MANY STANDARD DRINKS CONTAINING ALCOHOL DO YOU HAVE ON A TYPICAL DAY: 0
HOW MANY STANDARD DRINKS CONTAINING ALCOHOL DO YOU HAVE ON A TYPICAL DAY: PATIENT DOES NOT DRINK
HOW OFTEN DO YOU HAVE A DRINK CONTAINING ALCOHOL: 1
HOW OFTEN DO YOU HAVE SIX OR MORE DRINKS ON ONE OCCASION: 1

## 2023-02-06 ASSESSMENT — PATIENT HEALTH QUESTIONNAIRE - PHQ9
1. LITTLE INTEREST OR PLEASURE IN DOING THINGS: 0
2. FEELING DOWN, DEPRESSED OR HOPELESS: 0
SUM OF ALL RESPONSES TO PHQ9 QUESTIONS 1 & 2: 0
SUM OF ALL RESPONSES TO PHQ QUESTIONS 1-9: 0

## 2023-02-07 ENCOUNTER — TELEPHONE (OUTPATIENT)
Dept: INTERNAL MEDICINE CLINIC | Facility: CLINIC | Age: 78
End: 2023-02-07

## 2023-02-07 DIAGNOSIS — R79.9 ABNORMAL FINDING OF BLOOD CHEMISTRY, UNSPECIFIED: ICD-10-CM

## 2023-02-07 DIAGNOSIS — C18.2 PRIMARY ADENOCARCINOMA OF ASCENDING COLON (HCC): Primary | ICD-10-CM

## 2023-02-07 NOTE — TELEPHONE ENCOUNTER
----- Message from Emiliano Garcia sent at 2/6/2023 11:23 AM EST -----  Subject: Referral Request    Reason for referral request? patient is wanting a full blood work order . She is asking for the full work order to come from her PCP. Provider patient wants to be referred to(if known):     Provider Phone Number(if known):     Additional Information for Provider?   ---------------------------------------------------------------------------  --------------  [address]    [phone]; OK to leave message on voicemail  ---------------------------------------------------------------------------  --------------

## 2023-02-08 NOTE — TELEPHONE ENCOUNTER
Pt notified that the orders were placed by Dr Amber Quigley and that she should have the blood work done about a week before her appointment.

## 2023-02-16 ENCOUNTER — PREP FOR PROCEDURE (OUTPATIENT)
Dept: SURGERY | Age: 78
End: 2023-02-16

## 2023-02-16 RX ORDER — SODIUM CHLORIDE 0.9 % (FLUSH) 0.9 %
5-40 SYRINGE (ML) INJECTION PRN
Status: CANCELLED | OUTPATIENT
Start: 2023-02-16

## 2023-02-16 RX ORDER — SODIUM CHLORIDE 9 MG/ML
INJECTION, SOLUTION INTRAVENOUS PRN
Status: CANCELLED | OUTPATIENT
Start: 2023-02-16

## 2023-02-16 RX ORDER — SODIUM CHLORIDE 0.9 % (FLUSH) 0.9 %
5-40 SYRINGE (ML) INJECTION EVERY 12 HOURS SCHEDULED
Status: CANCELLED | OUTPATIENT
Start: 2023-02-16

## 2023-02-17 NOTE — PERIOP NOTE
Dear Mrs. Martell,      Thank you for completing your phone assessment with me today. Here are your requested procedure instructions. Please call #[phone] with any questions/concerns. Your procedure is scheduled at [address] [facility], [place], [address]. Please arrive at MAIN Entrance. GI Lab (#[phone]) will call you on the business day before your surgery with your arrival time. If you have any questions on the day of procedure, please call the GI dept. at the telephone number above. Follow procedure instructions for EGD or colonoscopy prep received from the GI/Surgeon office. If you have not received instructions from GI office, please call their office to receive prep instructions for procedure. Please take these medications on the morning of the procedure with a small sip of water: NONE. Please stop all vitamins/supplements 7 days prior to the procedure and stop all NSAIDS (ibuprofen, naproxen, aleve, motrin, advil) 5 days before your procedure. A responsible adult must drive you to the hospital, remain in the building during the procedure and you will need adult supervision for 24 hours after anesthesia. Please use a antibacterial soap the night before the procedure and on the morning of the procedure. Do NOT wear: make-up, nail polish, lotions, cologne, perfumes, powders or oil on your skin. All piercings/metal/jewelry must be removed prior to arrival.  If you wear contacts then you will need to bring a case to store them in or wear your glasses. Deodorant is acceptable with all EGDs and/or colonoscopies. Medication given during procedure may cause drowsiness for several hours, therefore, do not drive or operate machinery for remainder of the day. You may not drink alcohol on the day of your procedure, please resume regular diet and activity unless otherwise directed. For colonoscopy:  You may experience abdominal distention for several hours that is relieved by the passage of gas. Contact your physician if you have any of the following: fever or chills, severe abdominal pain or excessive amount of bleeding or a large amount when having a bowel movement. Occasional specks of blood with bowel  movement would not be unusual.     Please leave all your valuables at home but be sure to bring your insurance card and ID on the day of surgery for registration/identification. Our Guide to Surgery with additional information can be found:  [email or web address]    Emailed to: [email or web address]

## 2023-02-17 NOTE — PERIOP NOTE
Patient verified name, , and procedure. Type: 1a; abbreviated assessment per anesthesia guidelines    Labs per anesthesia: None per protocol    Instructed pt that they will be notified the day before their procedure by the GI Lab for time of arrival if [facility] for [place] cases. Arrival times should be called by 5 pm. If no phone is received the patient should contact their respective hospital. The GI lab telephone number is [phone]. Follow diet and prep instructions per office including NPO status. If patient has NOT received instructions from office patient is advised to call surgeon office, verbalizes understanding. Bath or shower the night before and the am of surgery with antibacterial soap. No lotions, oils, powders, cologne on skin. No make up, eye make up or jewelry. Wear loose fitting comfortable, clean clothing. Must have adult present in building the entire time . Medications for the day of procedure: NONE, patient to hold: vitamins, NSAIDs    The following discharge instructions reviewed with patient: medication given during procedure may cause drowsiness for several hours, therefore, do not drive or operate machinery for remainder of the day. You may not drink alcohol on the day of your procedure, please resume regular diet and activity unless otherwise directed. You may experience abdominal distention for several hours that is relieved by the passage of gas. Contact your physician if you have any of the following: fever or chills, severe abdominal pain or excessive amount of bleeding or a large amount when having a bowel movement.  Occasional specks of blood with bowel movement would not be unusual.

## 2023-02-20 ENCOUNTER — OFFICE VISIT (OUTPATIENT)
Dept: INTERNAL MEDICINE CLINIC | Facility: CLINIC | Age: 78
End: 2023-02-20

## 2023-02-20 VITALS
OXYGEN SATURATION: 97 % | WEIGHT: 121 LBS | HEIGHT: 63 IN | HEART RATE: 78 BPM | BODY MASS INDEX: 21.44 KG/M2 | SYSTOLIC BLOOD PRESSURE: 128 MMHG | DIASTOLIC BLOOD PRESSURE: 62 MMHG

## 2023-02-20 DIAGNOSIS — Z00.00 MEDICARE ANNUAL WELLNESS VISIT, SUBSEQUENT: Primary | ICD-10-CM

## 2023-02-20 SDOH — ECONOMIC STABILITY: HOUSING INSECURITY
IN THE LAST 12 MONTHS, WAS THERE A TIME WHEN YOU DID NOT HAVE A STEADY PLACE TO SLEEP OR SLEPT IN A SHELTER (INCLUDING NOW)?: NO

## 2023-02-20 SDOH — ECONOMIC STABILITY: INCOME INSECURITY: HOW HARD IS IT FOR YOU TO PAY FOR THE VERY BASICS LIKE FOOD, HOUSING, MEDICAL CARE, AND HEATING?: NOT HARD AT ALL

## 2023-02-20 SDOH — ECONOMIC STABILITY: FOOD INSECURITY: WITHIN THE PAST 12 MONTHS, YOU WORRIED THAT YOUR FOOD WOULD RUN OUT BEFORE YOU GOT MONEY TO BUY MORE.: NEVER TRUE

## 2023-02-20 SDOH — ECONOMIC STABILITY: FOOD INSECURITY: WITHIN THE PAST 12 MONTHS, THE FOOD YOU BOUGHT JUST DIDN'T LAST AND YOU DIDN'T HAVE MONEY TO GET MORE.: NEVER TRUE

## 2023-02-20 ASSESSMENT — LIFESTYLE VARIABLES: HOW OFTEN DO YOU HAVE A DRINK CONTAINING ALCOHOL: NEVER

## 2023-02-20 ASSESSMENT — PATIENT HEALTH QUESTIONNAIRE - PHQ9
SUM OF ALL RESPONSES TO PHQ QUESTIONS 1-9: 0
SUM OF ALL RESPONSES TO PHQ QUESTIONS 1-9: 0
1. LITTLE INTEREST OR PLEASURE IN DOING THINGS: 0
SUM OF ALL RESPONSES TO PHQ QUESTIONS 1-9: 0
2. FEELING DOWN, DEPRESSED OR HOPELESS: 0
SUM OF ALL RESPONSES TO PHQ9 QUESTIONS 1 & 2: 0
SUM OF ALL RESPONSES TO PHQ QUESTIONS 1-9: 0

## 2023-02-20 NOTE — PROGRESS NOTES
Medicare Annual Wellness Visit    Cassia MOSQUEDA Case is here for No chief complaint on file.    Assessment & Plan   Medicare annual wellness visit, subsequent      Recommendations for Preventive Services Due: see orders and patient instructions/AVS.  Recommended screening schedule for the next 5-10 years is provided to the patient in written form: see Patient Instructions/AVS.    Constipation: may alternate sennakot and miralax. Seeing GI next month.    Discussed BMI and healthy weight and diet, exercise, and medication compliance.  The patient was counseled regarding screening procedures and recommended schedules for Pap smears, mammography, colon cancer screening, BMD, and regular immunizations.        Return in 4 months (on 6/20/2023) for Medicare Annual Wellness Visit in 1 year, routine follow up.     Subjective   The following acute and/or chronic problems were also addressed today:    Lives alone in 1 story house. No falls in the past year.    Healthcare maintenance: Needs to schedule mammo. Last pap around age 64 and no abnormal paps. Due for flu shot, shingles. Has not had COVID vaccine (had COVID twice). Had PPSV23 in 2020, does not appear she had prior pneumonia vaccine.    Constipation: Takes miralax half a dose every other day, doing ok on this with bowel movement about every day.   Getting repeat colonoscopy next week with surgery.   Seeing GI 3/14/23 Dr. Mancini.    Body itching: thighs, hips, arms associated with skin dryness. Ongoing since chemo started. Takes as needed benadryl, lotion which helps.    GERD: Controlled, takes gaviscon as needed. Currently not taking protonix, sucralfate.    Vitamin D deficiency: on vitamin D supplement    About a week ago had vaginal itching and was given over the counter cream (pt can't recall name) by Dr. Chaparro which helped, and itching has resolved.     Other medical history:  Colon adenocarcinoma stage III: S/p subsequent right extended colectomy with  ileo-transverse staped anastomosis with Dr. De Oliveira President. Path showed mod-poorly diff adenocarcinoma - [id number] (2/14 LN +). Had 11 cycles total with FOLFOX, held off on her last treatment due to cytopenia, fatigue, neuropathy. CAD s/p AYDEE to LAD in 2016: sees cardiology Dr. Hamilton Pereira. On atorvastatin, baby aspirin. GERD  Osteopenia: T score of femoral neck -1.7, L spine -1.1 noted on DEXA scan from 1/5/22. Patient's complete Health Risk Assessment and screening values have been reviewed and are found in Flowsheets. The following problems were reviewed today and where indicated follow up appointments were made and/or referrals ordered. Positive Risk Factor Screenings with Interventions:       Cognitive: Words recalled: 3 Words Recalled   Clock Drawing Test (CDT): (!) Abnormal   Total Score: 3   Total Score Interpretation: Normal Mini-Cog      Interventions:  See AVS for additional education material             Weight and Activity:  Physical Activity: Inactive    Days of Exercise per Week: 0 days    Minutes of Exercise per Session: 0 min     On average, how many days per week do you engage in moderate to strenuous exercise (like a brisk walk)?: 0 days  Have you lost any weight without trying in the past 3 months?: No  Body mass index: 21.78      Inactivity Interventions:  Recommendations: patient agrees to increase physical activity as follows: Get back to walking (limited due to low energy since chemo, cancer)      Dentist Screen:  Have you seen the dentist within the past year?: (!) No    Intervention:  Advised to schedule with their dentist                        Objective   Vitals:    02/20/23 1026   BP: 128/62   Site: Left Upper Arm   Position: Sitting   Cuff Size: Medium Adult   Pulse: 78   SpO2: 97%   Weight: 121 lb (54.9 kg)   Height: 5' 2.5\" (1.588 m)      Body mass index is 21.78 kg/m².         General Appearance: alert and oriented to person, place and time, well developed and well- nourished, in no acute distress  Skin: warm and dry, no rash or erythema  Head: normocephalic and atraumatic  Eyes: pupils equal, round, and reactive to light, extraocular eye movements intact, conjunctivae normal  ENT: tympanic membrane, external ear and ear canal normal bilaterally, nose without deformity, nasal mucosa and turbinates normal without polyps  Neck: supple and non-tender without mass, no thyromegaly or thyroid nodules, no cervical lymphadenopathy  Pulmonary/Chest: clear to auscultation bilaterally- no wheezes, rales or rhonchi, normal air movement, no respiratory distress  Cardiovascular: normal rate, regular rhythm, normal S1 and S2, no murmurs, rubs, clicks, or gallops, distal pulses intact, no carotid bruits  Abdomen: soft, non-tender, non-distended, normal bowel sounds, no masses or organomegaly  Breast: appear normal, no suspicious masses, no skin or nipple changes or axillary nodes  Extremities: no cyanosis, clubbing or edema  Musculoskeletal: normal range of motion, no joint swelling, deformity or tenderness  Neurologic: reflexes normal and symmetric, no cranial nerve deficit, gait, coordination and speech normal       Allergies   Allergen Reactions    Sulfa Antibiotics Hives and Rash    Sulfamethoxazole-Trimethoprim Hives and Rash     Prior to Visit Medications    Medication Sig Taking?  Authorizing Provider   Polyethylene Glycol 3350 (MIRALAX PO) Take by mouth Yes Historical Provider, MD   Lidocaine-Glycerin (PREPARATION H EX) Apply topically Yes Historical Provider, MD   atorvastatin (LIPITOR) 40 MG tablet Take 1 tablet by mouth daily  Patient taking differently: Take 40 mg by mouth every evening Yes Phylicia Perez MD   diphenhydrAMINE (BENADRYL) 25 MG tablet Take 25 mg by mouth every 6 hours as needed for Itching PRN Yes Historical Provider, MD   vitamin D 25 MCG (1000 UT) CAPS Take 1,000 Units by mouth Once a day Yes Historical Provider, MD   aspirin 81 MG EC tablet Take 81 mg by mouth every evening Yes Historical Provider, MD   prednisoLONE acetate (PRED FORTE) 1 % ophthalmic suspension Place 1 drop into both eyes every evening Yes Ar Automatic Reconciliation   pantoprazole (PROTONIX) 40 MG tablet TAKE 1 TABLET BY MOUTH EVERY DAY  Patient not taking: No sig reported  Monica Bertrand MD   levOCARNitine (CARNITOR) 330 MG tablet Take 1 tablet by mouth 2 times daily  Patient not taking: No sig reported  Monica Bertrand MD   sucralfate (CARAFATE) 1 GM tablet Take 1 g by mouth daily  Patient not taking: No sig reported  Historical Provider, MD   loperamide (IMODIUM) 2 MG capsule Take 2 mg by mouth 4 times daily as needed for Diarrhea PRN  Patient not taking: No sig reported  Historical Provider, MD   Hyoscyamine Sulfate SL 0.125 MG SUBL Place 0.125 mg under the tongue every 6 hours as needed  Patient not taking: Reported on 2/20/2023  Ar Automatic Reconciliation   lidocaine-prilocaine (EMLA) 2.5-2.5 % cream Apply topically as needed  Patient not taking: No sig reported  Ar Automatic Reconciliation       CareTeam (Including outside providers/suppliers regularly involved in providing care):   Patient Care Team:  Munira Tam MD as PCP - General (Internal Medicine)  Munira Tam MD as PCP - Empaneled Provider  Monica Bertrand MD (Hematology and Oncology)     Reviewed and updated this visit:  Tobacco  Allergies  Meds  Problems  Med Hx  Surg Hx  Soc Hx  Fam Hx               Munira Tam MD

## 2023-02-20 NOTE — PATIENT INSTRUCTIONS
Learning About Being Active as an Older Adult  Why is being active important as you get older? Being active is one of the best things you can do for your health. And it's never too late to start. Being active--or getting active, if you aren't already--has definite benefits. It can:  Give you more energy,  Keep your mind sharp. Improve balance to reduce your risk of falls. Help you manage chronic illness with fewer medicines. No matter how old you are, how fit you are, or what health problems you have, there is a form of activity that will work for you. And the more physical activity you can do, the better your overall health will be. What kinds of activity can help you stay healthy? Being more active will make your daily activities easier. Physical activity includes planned exercise and things you do in daily life. There are four types of activity:  Aerobic. Doing aerobic activity makes your heart and lungs strong. Includes walking, dancing, and gardening. Aim for at least 2½ hours spread throughout the week. It improves your energy and can help you sleep better. Muscle-strengthening. This type of activity can help maintain muscle and strengthen bones. Includes climbing stairs, using resistance bands, and lifting or carrying heavy loads. Aim for at least twice a week. It can help protect the knees and other joints. Stretching. Stretching gives you better range of motion in joints and muscles. Includes upper arm stretches, calf stretches, and gentle yoga. Aim for at least twice a week, preferably after your muscles are warmed up from other activities. It can help you function better in daily life. Balancing. This helps you stay coordinated and have good posture. Includes heel-to-toe walking, vijay chi, and certain types of yoga. Aim for at least 3 days a week. It can reduce your risk of falling.   Even if you have a hard time meeting the recommendations, it's better to be more active than less active. All activity done in each category counts toward your weekly total. You'd be surprised how daily things like carrying groceries, keeping up with grandchildren, and taking the stairs can add up. What keeps you from being active? If you've had a hard time being more active, you're not alone. Maybe you remember being able to do more. Or maybe you've never thought of yourself as being active. It's frustrating when you can't do the things you want. Being more active can help. What's holding you back? Getting started. Have a goal, but break it into easy tasks. Small steps build into big accomplishments. Staying motivated. If you feel like skipping your activity, remember your goal. Maybe you want to move better and stay independent. Every activity gets you one step closer. Not feeling your best.  Start with 5 minutes of an activity you enjoy. Prove to yourself you can do it. As you get comfortable, increase your time. You may not be where you want to be. But you're in the process of getting there. Everyone starts somewhere. How can you find safe ways to stay active? Talk with your doctor about any physical challenges you're facing. Make a plan with your doctor if you have a health problem or aren't sure how to get started with activity. If you're already active, ask your doctor if there is anything you should change to stay safe as your body and health change. If you tend to feel dizzy after you take medicine, avoid activity at that time. Try being active before you take your medicine. This will reduce your risk of falls. If you plan to be active at home, make sure to clear your space before you get started. Remove things like TV cords, coffee tables, and throw rugs. It's safest to have plenty of space to move freely. The key to getting more active is to take it slow and steady. Try to improve only a little bit at a time.  Pick just one area to improve on at first. And if an activity hurts, stop and talk to your doctor. Where can you learn more? Go to http://www.rizvi.com/ and enter P600 to learn more about \"Learning About Being Active as an Older Adult. \"  Current as of: October 10, 2022               Content Version: 13.5  © 0483-6681 Healthwise, Incorporated. Care instructions adapted under license by [facility] ([facility]). If you have questions about a medical condition or this instruction, always ask your healthcare professional. [place] [address] any warranty or liability for your use of this information. Learning About Dental Care for Older Adults  Dental care for older adults: Overview  Dental care for older people is much the same as for younger adults. But older adults do have concerns that younger adults do not. Older adults may have problems with gum disease and decay on the roots of their teeth. They may need missing teeth replaced or broken fillings fixed. Or they may have dentures that need to be cared for. Some older adults may have trouble holding a toothbrush. You can help remind the person you are caring for to brush and floss their teeth or to clean their dentures. In some cases, you may need to do the brushing and other dental care tasks. People who have trouble using their hands or who have dementia may need this extra help. How can you help with dental care? Normal dental care  To keep the teeth and gums healthy:  Brush the teeth with fluoride toothpaste twice a day--in the morning and at night--and floss at least once a day. Plaque can quickly build up on the teeth of older adults. Watch for the signs of gum disease. These signs include gums that bleed after brushing or after eating hard foods, such as apples. See a dentist regularly. Many experts recommend checkups every 6 months. Keep the dentist up to date on any new medications the person is taking.   Encourage a balanced diet that includes whole grains, vegetables, and fruits, and that is low in saturated fat and sodium. Encourage the person you're caring for not to use tobacco products. They can affect dental and general health. Many older adults have a fixed income and feel that they can't afford dental care. But most towns and [facility] have programs in which dentists help older adults by lowering fees. Contact your area's public health offices or  for information about dental care in your area. Using a toothbrush  Older adults with arthritis sometimes have trouble brushing their teeth because they can't easily hold the toothbrush. Their hands and fingers may be stiff, painful, or weak. If this is the case, you can: Offer an electric toothbrush. Enlarge the handle of a non-electric toothbrush by wrapping a sponge, an elastic bandage, or adhesive tape around it. Push the toothbrush handle through a ball made of rubber or soft foam.  Make the handle longer and thicker by taping Popsicle sticks or tongue depressors to it. You may also be able to buy special toothbrushes, toothpaste dispensers, and floss holders. Your doctor may recommend a soft-bristle toothbrush if the person you care for bleeds easily. Bleeding can happen because of a health problem or from certain medicines. A toothpaste for sensitive teeth may help if the person you care for has sensitive teeth. How do you brush and floss someone's teeth? If the person you are caring for has a hard time cleaning their teeth on their own, you may need to brush and floss their teeth for them. It may be easiest to have the person sit and face away from you, and to sit or stand behind them. That way you can steady their head against your arm as you reach around to floss and brush their teeth. Choose a place that has good lighting and is comfortable for both of you. Before you begin, gather your supplies. You will need gloves, floss, a toothbrush, and a container to hold water if you are not near a sink.  Wash and dry your hands well and put on gloves. Start by flossing:  Gently work a piece of floss between each of the teeth toward the gums. A plastic flossing tool may make this easier, and they are available at most [facility]. Curve the floss around each tooth into a U-shape and gently slide it under the gum line. Move the floss firmly up and down several times to scrape off the plaque. After you've finished flossing, throw away the used floss and begin brushing:  Wet the brush and apply toothpaste. Place the brush at a 45-degree angle where the teeth meet the gums. Press firmly, and move the brush in small circles over the surface of the teeth. Be careful not to brush too hard. Vigorous brushing can make the gums pull away from the teeth and can scratch the tooth enamel. Brush all surfaces of the teeth, on the tongue side and on the cheek side. Pay special attention to the front teeth and all surfaces of the back teeth. Brush chewing surfaces with short back-and-forth strokes. After you've finished, help the person rinse the remaining toothpaste from their mouth. Where can you learn more? Go to http://www.woods.com/ and enter F944 to learn more about \"Learning About Dental Care for Older Adults. \"  Current as of: June 16, 2022               Content Version: 13.5  © 3346-7483 Healthwise, Incorporated. Care instructions adapted under license by [facility] ([facility]). If you have questions about a medical condition or this instruction, always ask your healthcare professional. [place] [address] any warranty or liability for your use of this information. Advance Directives: Care Instructions  Overview  An advance directive is a legal way to state your wishes at the end of your life. It tells your family and your doctor what to do if you can't say what you want. There are two main types of advance directives. You can change them any time your wishes change. Living will.   This form tells your family and your doctor your wishes about life support and other treatment. The form is also called a declaration. Medical power of . This form lets you name a person to make treatment decisions for you when you can't speak for yourself. This person is called a health care agent (health care proxy, health care surrogate). The form is also called a durable power of  for health care. If you do not have an advance directive, decisions about your medical care may be made by a family member, or by a doctor or a  who doesn't know you. It may help to think of an advance directive as a gift to the people who care for you. If you have one, they won't have to make tough decisions by themselves. For more information, including forms for your state, see the [address] website (www.caringinfo.org/planning/advance-directives/). Follow-up care is a key part of your treatment and safety. Be sure to make and go to all appointments, and call your doctor if you are having problems. It's also a good idea to know your test results and keep a list of the medicines you take. What should you include in an advance directive? Many states have a unique advance directive form. (It may ask you to address specific issues.) Or you might use a universal form that's approved by many states. If your form doesn't tell you what to address, it may be hard to know what to include in your advance directive. Use the questions below to help you get started. Who do you want to make decisions about your medical care if you are not able to? What life-support measures do you want if you have a serious illness that gets worse over time or can't be cured? What are you most afraid of that might happen? (Maybe you're afraid of having pain, losing your independence, or being kept alive by machines.)  Where would you prefer to die? (Your home? A hospital? A nursing home?)  Do you want to donate your organs when you die?   Do you want certain [employer] practices performed before you die? When should you call for help? Be sure to contact your doctor if you have any questions. Where can you learn more? Go to http://www.rizvi.com/ and enter R264 to learn more about \"Advance Directives: Care Instructions. \"  Current as of: June 16, 2022               Content Version: 13.5  © 2006-2022 [employer]. Care instructions adapted under license by [facility] ([facility]). If you have questions about a medical condition or this instruction, always ask your healthcare professional. [address] any warranty or liability for your use of this information. A Healthy Heart: Care Instructions  Your Care Instructions     Coronary artery disease, also called heart disease, occurs when a substance called plaque builds up in the vessels that supply oxygen-rich blood to your heart muscle. This can narrow the blood vessels and reduce blood flow. A heart attack happens when blood flow is completely blocked. A high-fat diet, smoking, and other factors increase the risk of heart disease. Your doctor has found that you have a chance of having heart disease. You can do lots of things to keep your heart healthy. It may not be easy, but you can change your diet, exercise more, and quit smoking. These steps really work to lower your chance of heart disease. Follow-up care is a key part of your treatment and safety. Be sure to make and go to all appointments, and call your doctor if you are having problems. It's also a good idea to know your test results and keep a list of the medicines you take. How can you care for yourself at home? Diet    Use less salt when you cook and eat. This helps lower your blood pressure. Taste food before salting. Add only a little salt when you think you need it.  With time, your taste buds will adjust to less salt.     Eat fewer snack items, fast foods, canned soups, and other high-salt, high-fat, processed foods.     Read food labels and try to avoid saturated and trans fats. They increase your risk of heart disease by raising cholesterol levels.     Limit the amount of solid fat-butter, margarine, and shortening-you eat. Use olive, peanut, or canola oil when you cook. Bake, broil, and steam foods instead of frying them.     Eat a variety of fruit and vegetables every day. Dark green, deep orange, red, or yellow fruits and vegetables are especially good for you. Examples include spinach, carrots, peaches, and berries.     Foods high in fiber can reduce your cholesterol and provide important vitamins and minerals. High-fiber foods include whole-grain cereals and breads, oatmeal, beans, brown rice, citrus fruits, and apples.     Eat lean proteins. Heart-healthy proteins include seafood, lean meats and poultry, eggs, beans, peas, nuts, seeds, and soy products.     Limit drinks and foods with added sugar. These include candy, desserts, and soda pop. Lifestyle changes    If your doctor recommends it, get more exercise. Walking is a good choice. Bit by bit, increase the amount you walk every day. Try for at least 30 minutes on most days of the week. You also may want to swim, bike, or do other activities.     Do not smoke. If you need help quitting, talk to your doctor about stop-smoking programs and medicines. These can increase your chances of quitting for good. Quitting smoking may be the most important step you can take to protect your heart. It is never too late to quit.     Limit alcohol to 2 drinks a day for men and 1 drink a day for women. Too much alcohol can cause health problems.     Manage other health problems such as diabetes, high blood pressure, and high cholesterol. If you think you may have a problem with alcohol or drug use, talk to your doctor. Medicines    Take your medicines exactly as prescribed.  Call your doctor if you think you are having a problem with your medicine.     If your doctor recommends aspirin, take the amount directed each day. Make sure you take aspirin and not another kind of pain reliever, such as acetaminophen (Tylenol). When should you call for help? Call 911 if you have symptoms of a heart attack. These may include:    Chest pain or pressure, or a strange feeling in the chest.     Sweating.     Shortness of breath.     Pain, pressure, or a strange feeling in the back, neck, jaw, or upper belly or in one or both shoulders or arms.     Lightheadedness or sudden weakness.     A fast or irregular heartbeat. After you call 911, the  may tell you to chew 1 adult-strength or 2 to 4 low-dose aspirin. Wait for an ambulance. Do not try to drive yourself. Watch closely for changes in your health, and be sure to contact your doctor if you have any problems. Where can you learn more? Go to http://www.rizvi.com/ and enter F075 to learn more about \"A Healthy Heart: Care Instructions. \"  Current as of: September 7, 2022               Content Version: 13.5  © 3142-3415 Healthwise, [employer]. Care instructions adapted under license by [facility] ([facility]). If you have questions about a medical condition or this instruction, always ask your healthcare professional. [address] any warranty or liability for your use of this information. Personalized Preventive Plan for Oral Bri Case - 2/20/2023  Medicare offers a range of preventive health benefits. Some of the tests and screenings are paid in full while other may be subject to a deductible, co-insurance, and/or copay. Some of these benefits include a comprehensive review of your medical history including lifestyle, illnesses that may run in your family, and various assessments and screenings as appropriate.     After reviewing your medical record and screening and assessments performed today your provider may have ordered immunizations, labs, imaging, and/or referrals for you.  A list of these orders (if applicable) as well as your Preventive Care list are included within your After Visit Summary for your review. Other Preventive Recommendations:    A preventive eye exam performed by an eye specialist is recommended every 1-2 years to screen for glaucoma; cataracts, macular degeneration, and other eye disorders. A preventive dental visit is recommended every 6 months. Try to get at least 150 minutes of exercise per week or 10,000 steps per day on a pedometer . Order or download the FREE \"Exercise & Physical Activity: Your Everyday Guide\" from [employer]. Call 5-430.682.6742 or search [employer] online. You need 5850-5158 mg of calcium and 7849-1786 IU of vitamin D per day. It is possible to meet your calcium requirement with diet alone, but a vitamin D supplement is usually necessary to meet this goal.  When exposed to the sun, use a sunscreen that protects against both UVA and UVB radiation with an SPF of 30 or greater. Reapply every 2 to 3 hours or after sweating, drying off with a towel, or swimming. Always wear a seat belt when traveling in a car. Always wear a helmet when riding a bicycle or motorcycle.

## 2023-02-21 ENCOUNTER — OFFICE VISIT (OUTPATIENT)
Dept: CARDIOLOGY CLINIC | Age: 78
End: 2023-02-21
Payer: MEDICARE

## 2023-02-21 VITALS
WEIGHT: 121 LBS | HEIGHT: 63 IN | HEART RATE: 76 BPM | SYSTOLIC BLOOD PRESSURE: 120 MMHG | BODY MASS INDEX: 21.44 KG/M2 | DIASTOLIC BLOOD PRESSURE: 62 MMHG

## 2023-02-21 DIAGNOSIS — E78.2 MIXED HYPERLIPIDEMIA: ICD-10-CM

## 2023-02-21 DIAGNOSIS — R07.2 PRECORDIAL PAIN: ICD-10-CM

## 2023-02-21 DIAGNOSIS — I25.119 ATHEROSCLEROSIS OF NATIVE CORONARY ARTERY OF NATIVE HEART WITH ANGINA PECTORIS (HCC): Primary | ICD-10-CM

## 2023-02-21 PROBLEM — Z12.11 SPECIAL SCREENING FOR MALIGNANT NEOPLASMS, COLON: Status: ACTIVE | Noted: 2022-10-04

## 2023-02-21 PROCEDURE — 99214 OFFICE O/P EST MOD 30 MIN: CPT | Performed by: INTERNAL MEDICINE

## 2023-02-21 PROCEDURE — G8484 FLU IMMUNIZE NO ADMIN: HCPCS | Performed by: INTERNAL MEDICINE

## 2023-02-21 PROCEDURE — G8428 CUR MEDS NOT DOCUMENT: HCPCS | Performed by: INTERNAL MEDICINE

## 2023-02-21 PROCEDURE — 1090F PRES/ABSN URINE INCON ASSESS: CPT | Performed by: INTERNAL MEDICINE

## 2023-02-21 PROCEDURE — G8420 CALC BMI NORM PARAMETERS: HCPCS | Performed by: INTERNAL MEDICINE

## 2023-02-21 PROCEDURE — 93000 ELECTROCARDIOGRAM COMPLETE: CPT | Performed by: INTERNAL MEDICINE

## 2023-02-21 PROCEDURE — G8399 PT W/DXA RESULTS DOCUMENT: HCPCS | Performed by: INTERNAL MEDICINE

## 2023-02-21 PROCEDURE — 1123F ACP DISCUSS/DSCN MKR DOCD: CPT | Performed by: INTERNAL MEDICINE

## 2023-02-21 PROCEDURE — 1036F TOBACCO NON-USER: CPT | Performed by: INTERNAL MEDICINE

## 2023-02-21 NOTE — PROGRESS NOTES
[facility] CARDIOLOGY  [address] [facility], [address] [facility], [address] [place] [address]  PHONE: [phone]      23    NAME:  Anna Martell  : 1945  MRN: [id number]       SUBJECTIVE:   Anna Kaminski Case is a 65 y.o. female seen for a follow up visit regarding the following:     Chief Complaint   Patient presents with    Coronary Artery Disease         HPI:    No walker. No orthopnea or pnd. No palpitations or syncope. Occasional fleeting cp. Non-exertional . No associated symptoms. Past Medical History, Past Surgical History, Family history, Social History, and Medications were all reviewed with the patient today and updated as necessary.      Current Outpatient Medications   Medication Sig Dispense Refill    atorvastatin (LIPITOR) 40 MG tablet Take 1 tablet by mouth daily (Patient taking differently: Take 40 mg by mouth every evening) 90 tablet 1    diphenhydrAMINE (BENADRYL) 25 MG tablet Take 25 mg by mouth every 6 hours as needed for Itching PRN      vitamin D 25 MCG (1000 UT) CAPS Take 1,000 Units by mouth Once a day      aspirin 81 MG EC tablet Take 81 mg by mouth every evening      prednisoLONE acetate (PRED FORTE) 1 % ophthalmic suspension Place 1 drop into both eyes every evening      Polyethylene Glycol 3350 (MIRALAX PO) Take by mouth      Lidocaine-Glycerin (PREPARATION H EX) Apply topically      pantoprazole (PROTONIX) 40 MG tablet TAKE 1 TABLET BY MOUTH EVERY DAY (Patient not taking: No sig reported) 90 tablet 1    levOCARNitine (CARNITOR) 330 MG tablet Take 1 tablet by mouth 2 times daily (Patient not taking: No sig reported) 60 tablet 3    sucralfate (CARAFATE) 1 GM tablet Take 1 g by mouth daily      loperamide (IMODIUM) 2 MG capsule Take 2 mg by mouth 4 times daily as needed for Diarrhea PRN (Patient not taking: No sig reported)      Hyoscyamine Sulfate SL 0.125 MG SUBL Place 0.125 mg under the tongue every 6 hours as needed (Patient not taking: Reported on 2023)      lidocaine-prilocaine (EMLA) 2.5-2.5 % cream Apply topically as needed (Patient not taking: No sig reported)       No current facility-administered medications for this visit. Social History     Tobacco Use    Smoking status: Never    Smokeless tobacco: Never   Substance Use Topics    Alcohol use: Never              PHYSICAL EXAM:   /62   Pulse 76   Ht 5' 2.5\" (1.588 m)   Wt 121 lb (54.9 kg)   BMI 21.78 kg/m²    Constitutional: Oriented to person, place, and time. Appears well-developed and well-nourished. Head: Normocephalic and atraumatic. Neck: Neck supple. Cardiovascular: Normal rate and regular rhythm with no murmur -No JVP  Pulmonary/Chest: Breath sounds normal.   Abdominal: Soft. Musculoskeletal: No edema. Neurological: Alert and oriented to person, place, and time. Skin: Skin is warm and dry. Psychiatric: Normal mood and affect. Vitals reviewed. Wt Readings from Last 3 Encounters:   02/21/23 121 lb (54.9 kg)   02/20/23 121 lb (54.9 kg)   12/29/22 120 lb (54.4 kg)       Medical problems and test results were reviewed with the patient today. Ekg-Sinus  Rhythm   nsst changes  hr 76    ASSESSMENT and PLAN    1. Atherosclerosis of native coronary artery of native heart with angina pectoris ([address]  unknown. Continue asa  Stress test  - EKG 12 Lead    2. Mixed hyperlipidemia  Stable. Continue lipitor      3. Precordial pain  Worse- as above         Return for follow-up after testing.          Sherry Beverly MD  2/21/2023  10:35 AM

## 2023-03-01 NOTE — PROGRESS NOTES
Called and confirmed scheduled procedure for patient. Informed on patients arrival time (0740), entry location ([address] and  policy. Opportunity for questions provided, no voiced concerns. Patient not taking Tikosyn  Patient not on Dialysis  Patient with no COVID signs/symptoms    Patient has chest port but prefers to have IV started in arm.

## 2023-03-02 ENCOUNTER — ANESTHESIA EVENT (OUTPATIENT)
Dept: ENDOSCOPY | Age: 78
End: 2023-03-02
Payer: MEDICARE

## 2023-03-02 ENCOUNTER — HOSPITAL ENCOUNTER (OUTPATIENT)
Age: 78
Setting detail: OUTPATIENT SURGERY
Discharge: HOME | End: 2023-03-02
Attending: SURGERY | Admitting: SURGERY
Payer: MEDICARE

## 2023-03-02 ENCOUNTER — ANESTHESIA (OUTPATIENT)
Dept: ENDOSCOPY | Age: 78
End: 2023-03-02
Payer: MEDICARE

## 2023-03-02 VITALS
HEART RATE: 69 BPM | RESPIRATION RATE: 16 BRPM | SYSTOLIC BLOOD PRESSURE: 112 MMHG | WEIGHT: 121 LBS | TEMPERATURE: 98.8 F | HEIGHT: 62 IN | BODY MASS INDEX: 22.26 KG/M2 | DIASTOLIC BLOOD PRESSURE: 56 MMHG | OXYGEN SATURATION: 92 %

## 2023-03-02 DIAGNOSIS — Z12.11 SPECIAL SCREENING FOR MALIGNANT NEOPLASMS, COLON: ICD-10-CM

## 2023-03-02 PROCEDURE — 3700000001 HC ADD 15 MINUTES (ANESTHESIA): Performed by: SURGERY

## 2023-03-02 PROCEDURE — 2580000003 HC RX 258: Performed by: NURSE ANESTHETIST, CERTIFIED REGISTERED

## 2023-03-02 PROCEDURE — 2580000003 HC RX 258: Performed by: ANESTHESIOLOGY

## 2023-03-02 PROCEDURE — 7100000010 HC PHASE II RECOVERY - FIRST 15 MIN: Performed by: SURGERY

## 2023-03-02 PROCEDURE — 2500000003 HC RX 250 WO HCPCS: Performed by: NURSE ANESTHETIST, CERTIFIED REGISTERED

## 2023-03-02 PROCEDURE — 2709999900 HC NON-CHARGEABLE SUPPLY: Performed by: SURGERY

## 2023-03-02 PROCEDURE — 6360000002 HC RX W HCPCS: Performed by: NURSE ANESTHETIST, CERTIFIED REGISTERED

## 2023-03-02 PROCEDURE — 3609027000 HC COLONOSCOPY: Performed by: SURGERY

## 2023-03-02 PROCEDURE — 3700000000 HC ANESTHESIA ATTENDED CARE: Performed by: SURGERY

## 2023-03-02 PROCEDURE — 7100000011 HC PHASE II RECOVERY - ADDTL 15 MIN: Performed by: SURGERY

## 2023-03-02 RX ORDER — SODIUM CHLORIDE 0.9 % (FLUSH) 0.9 %
5-40 SYRINGE (ML) INJECTION EVERY 12 HOURS SCHEDULED
Status: DISCONTINUED | OUTPATIENT
Start: 2023-03-02 | End: 2023-03-02 | Stop reason: HOSPADM

## 2023-03-02 RX ORDER — MIDAZOLAM HYDROCHLORIDE 2 MG/2ML
2 INJECTION, SOLUTION INTRAMUSCULAR; INTRAVENOUS
Status: DISCONTINUED | OUTPATIENT
Start: 2023-03-02 | End: 2023-03-02 | Stop reason: HOSPADM

## 2023-03-02 RX ORDER — PROPOFOL 10 MG/ML
INJECTION, EMULSION INTRAVENOUS PRN
Status: DISCONTINUED | OUTPATIENT
Start: 2023-03-02 | End: 2023-03-02 | Stop reason: SDUPTHER

## 2023-03-02 RX ORDER — LIDOCAINE HYDROCHLORIDE 10 MG/ML
1 INJECTION, SOLUTION INFILTRATION; PERINEURAL
Status: DISCONTINUED | OUTPATIENT
Start: 2023-03-02 | End: 2023-03-02 | Stop reason: HOSPADM

## 2023-03-02 RX ORDER — SODIUM CHLORIDE, SODIUM LACTATE, POTASSIUM CHLORIDE, CALCIUM CHLORIDE 600; 310; 30; 20 MG/100ML; MG/100ML; MG/100ML; MG/100ML
INJECTION, SOLUTION INTRAVENOUS CONTINUOUS
Status: DISCONTINUED | OUTPATIENT
Start: 2023-03-02 | End: 2023-03-02 | Stop reason: HOSPADM

## 2023-03-02 RX ORDER — SODIUM CHLORIDE 9 MG/ML
INJECTION, SOLUTION INTRAVENOUS PRN
Status: DISCONTINUED | OUTPATIENT
Start: 2023-03-02 | End: 2023-03-02 | Stop reason: HOSPADM

## 2023-03-02 RX ORDER — EPHEDRINE SULFATE/0.9% NACL/PF 50 MG/5 ML
SYRINGE (ML) INTRAVENOUS PRN
Status: DISCONTINUED | OUTPATIENT
Start: 2023-03-02 | End: 2023-03-02 | Stop reason: SDUPTHER

## 2023-03-02 RX ORDER — LIDOCAINE HYDROCHLORIDE 20 MG/ML
INJECTION, SOLUTION EPIDURAL; INFILTRATION; INTRACAUDAL; PERINEURAL PRN
Status: DISCONTINUED | OUTPATIENT
Start: 2023-03-02 | End: 2023-03-02 | Stop reason: SDUPTHER

## 2023-03-02 RX ORDER — SODIUM CHLORIDE 0.9 % (FLUSH) 0.9 %
5-40 SYRINGE (ML) INJECTION PRN
Status: DISCONTINUED | OUTPATIENT
Start: 2023-03-02 | End: 2023-03-02 | Stop reason: HOSPADM

## 2023-03-02 RX ADMIN — PROPOFOL 30 MG: 10 INJECTION, EMULSION INTRAVENOUS at 09:24

## 2023-03-02 RX ADMIN — Medication 5 MG: at 09:26

## 2023-03-02 RX ADMIN — PROPOFOL 20 MG: 10 INJECTION, EMULSION INTRAVENOUS at 09:08

## 2023-03-02 RX ADMIN — SODIUM CHLORIDE, POTASSIUM CHLORIDE, SODIUM LACTATE AND CALCIUM CHLORIDE: 600; 310; 30; 20 INJECTION, SOLUTION INTRAVENOUS at 08:58

## 2023-03-02 RX ADMIN — PROPOFOL 50 MG: 10 INJECTION, EMULSION INTRAVENOUS at 09:03

## 2023-03-02 RX ADMIN — PROPOFOL 30 MG: 10 INJECTION, EMULSION INTRAVENOUS at 09:16

## 2023-03-02 RX ADMIN — Medication 5 MG: at 09:08

## 2023-03-02 RX ADMIN — PHENYLEPHRINE HYDROCHLORIDE 50 MCG: 10 INJECTION INTRAVENOUS at 09:08

## 2023-03-02 RX ADMIN — PHENYLEPHRINE HYDROCHLORIDE 50 MCG: 10 INJECTION INTRAVENOUS at 09:26

## 2023-03-02 RX ADMIN — PROPOFOL 30 MG: 10 INJECTION, EMULSION INTRAVENOUS at 09:19

## 2023-03-02 RX ADMIN — PROPOFOL 20 MG: 10 INJECTION, EMULSION INTRAVENOUS at 09:11

## 2023-03-02 RX ADMIN — LIDOCAINE HYDROCHLORIDE 60 MG: 20 INJECTION, SOLUTION EPIDURAL; INFILTRATION; INTRACAUDAL; PERINEURAL at 09:03

## 2023-03-02 RX ADMIN — PROPOFOL 20 MG: 10 INJECTION, EMULSION INTRAVENOUS at 09:06

## 2023-03-02 ASSESSMENT — PAIN - FUNCTIONAL ASSESSMENT: PAIN_FUNCTIONAL_ASSESSMENT: NONE - DENIES PAIN

## 2023-03-02 NOTE — INTERVAL H&P NOTE
Update History & Physical    The patient's History and Physical of March 1, 2023 was reviewed with the patient and I examined the patient. There was no change. The surgical site was confirmed by the patient and me. Plan: The risks, benefits, expected outcome, and alternative to the recommended procedure have been discussed with the patient. Patient understands and wants to proceed with the procedure.      Electronically signed by Truman Hansen MD on 3/2/2023 at 9:01 AM

## 2023-03-02 NOTE — ANESTHESIA PRE PROCEDURE
Department of Anesthesiology  Preprocedure Note       Name:  Cassia Sanchez Case   Age:  77 y.o.  :  1945                                          MRN:  [id number]         Date:  3/2/2023      Surgeon: Surgeon(s):  Gopi Parks MD    Procedure: Procedure(s):  COLORECTAL CANCER SCREENING, NOT HIGH RISK    Medications prior to admission:   Prior to Admission medications    Medication Sig Start Date End Date Taking? Authorizing Provider   Polyethylene Glycol 3350 (MIRALAX PO) Take by mouth    Historical Provider, MD   Lidocaine-Glycerin (PREPARATION H EX) Apply topically    Historical Provider, MD   pantoprazole (PROTONIX) 40 MG tablet TAKE 1 TABLET BY MOUTH EVERY DAY  Patient not taking: No sig reported 22   Bella Chaparro MD   levOCARNitine (CARNITOR) 330 MG tablet Take 1 tablet by mouth 2 times daily  Patient not taking: No sig reported 22   Bella Chaparro MD   sucralfate (CARAFATE) 1 GM tablet Take 1 g by mouth daily    Historical Provider, MD   atorvastatin (LIPITOR) 40 MG tablet Take 1 tablet by mouth daily  Patient taking differently: Take 40 mg by mouth every evening 10/18/22   Lexy Andre MD   loperamide (IMODIUM) 2 MG capsule Take 2 mg by mouth 4 times daily as needed for Diarrhea PRN  Patient not taking: No sig reported    Historical Provider, MD   diphenhydrAMINE (BENADRYL) 25 MG tablet Take 25 mg by mouth every 6 hours as needed for Itching PRN    Historical Provider, MD   vitamin D 25 MCG (1000 UT) CAPS Take 1,000 Units by mouth Once a day    Historical Provider, MD   aspirin 81 MG EC tablet Take 81 mg by mouth every evening    Historical Provider, MD   Hyoscyamine Sulfate SL 0.125 MG SUBL Place 0.125 mg under the tongue every 6 hours as needed  Patient not taking: Reported on 2023 3/14/22   Ar Automatic Reconciliation   lidocaine-prilocaine (EMLA) 2.5-2.5 % cream Apply topically as needed  Patient not taking: No sig reported 22   Ar Automatic Reconciliation  prednisoLONE acetate (PRED FORTE) 1 % ophthalmic suspension Place 1 drop into both eyes every evening    Ar Automatic Reconciliation       Current medications:    No current facility-administered medications for this encounter. Allergies:     Allergies   Allergen Reactions    Sulfa Antibiotics Hives and Rash    Sulfamethoxazole-Trimethoprim Hives and Rash       Problem List:    Patient Active Problem List   Diagnosis Code    Iron deficiency anemia D50.9    Precordial pain R07.2    Irritable bowel syndrome K58.9    High risk medication use Z79.899    Perforation of colon ([address] K63.1    Redness of skin L53.9    Corneal dystrophy H18.509    Adjustment disorder with mixed anxiety and depressed mood F43.23    Mixed hyperlipidemia E78.2    Atherosclerosis of native coronary artery of native heart with angina pectoris (HCC) I25.119    Primary adenocarcinoma of ascending colon (HCC) C18.2    Postoperative seroma of subcutaneous tissue after non-dermatologic procedure L76.34    Corneal transplant status Z94.7    Hypomagnesemia E83.42    Vitamin D deficiency E55.9    Mucositis K12.30    Diarrhea R19.7    Hypokalemia E87.6    Osteopenia of neck of femur M85.859    Special screening for malignant neoplasms, colon Z12.11       Past Medical History:        Diagnosis Date    Actinic keratosis     Adjustment disorder with mixed anxiety and depressed mood 2/11/2015    Adverse effect of anesthesia     cystoscopy - was awake but could not move - dont remember the anesthesetic given    Arthritis     osteoarthritis    Corneal dystrophy 2/11/2015    eye drops    Coronary atherosclerosis of native coronary artery 2016    x1 stent    GERD (gastroesophageal reflux disease)     gaviscon as needed per patient; does not sleep elevated    Irritable bowel syndrome 2/11/2015    not a problem per patient as of 2/17/2023    Menopause     Mixed hyperlipidemia 2/11/2015    statin    Primary adenocarcinoma of ascending colon ([facility] [address] 1/31/2022    has had colon resection and chemo    Psychiatric disorder     anxiety - pt denies as of 2/17/2023       Past Surgical History:        Procedure Laterality Date    BREAST BIOPSY Left     CARDIAC SURGERY      stent x1    COLECTOMY  2022    removed 3' feet of colon per patient    COLONOSCOPY      2014    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    CORNEAL TRANSPLANT Left     CYST REMOVAL      breast    EYE SURGERY      Both eyes, Cornea transplants    HAND SURGERY Left     dupuytren's    IR PORT PLACEMENT EQUAL OR GREATER THAN 5 YEARS  2/21/2022    IR PORT PLACEMENT EQUAL OR GREATER THAN 5 YEARS  02/21/2022    IR PORT PLACEMENT EQUAL OR GREATER THAN 5 YEARS 2/21/2022 [facility]       Social History:    Social History     Tobacco Use    Smoking status: Never    Smokeless tobacco: Never   Substance Use Topics    Alcohol use: Never                                Counseling given: Not Answered      Vital Signs (Current):   Vitals:    02/17/23 1059   Weight: 120 lb (54.4 kg)   Height: 5' 2\" (1.575 m)                                              BP Readings from Last 3 Encounters:   02/21/23 120/62   02/20/23 128/62   12/29/22 123/61       NPO Status:                                                                                 BMI:   Wt Readings from Last 3 Encounters:   02/17/23 120 lb (54.4 kg)   02/21/23 121 lb (54.9 kg)   02/20/23 121 lb (54.9 kg)     Body mass index is 21.95 kg/m².     CBC:   Lab Results   Component Value Date/Time    WBC 2.8 02/14/2023 08:59 AM    RBC 4.27 02/14/2023 08:59 AM    HGB 13.0 02/14/2023 08:59 AM    HCT 39.4 02/14/2023 08:59 AM    MCV 92.3 02/14/2023 08:59 AM    RDW 12.3 02/14/2023 08:59 AM     02/14/2023 08:59 AM       CMP:   Lab Results   Component Value Date/Time     02/14/2023 08:59 AM    K 4.5 02/14/2023 08:59 AM     02/14/2023 08:59 AM    CO2 28 02/14/2023 08:59 AM    BUN 18 02/14/2023 08:59 AM    CREATININE 0.80 02/14/2023 08:59 AM    GFRAA >60 09/28/2022 11:14 AM    AGRATIO 1.0 05/16/2022 08:17 AM    LABGLOM >60 02/14/2023 08:59 AM    GLUCOSE 92 02/14/2023 08:59 AM    PROT 7.0 02/14/2023 08:59 AM    CALCIUM 9.3 02/14/2023 08:59 AM    BILITOT 0.6 02/14/2023 08:59 AM    ALKPHOS 109 02/14/2023 08:59 AM    ALKPHOS 111 05/16/2022 08:17 AM    AST 40 02/14/2023 08:59 AM    ALT 66 02/14/2023 08:59 AM       POC Tests: No results for input(s): POCGLU, POCNA, POCK, POCCL, POCBUN, POCHEMO, POCHCT in the last 72 hours. Coags: No results found for: PROTIME, INR, APTT    HCG (If Applicable): No results found for: PREGTESTUR, PREGSERUM, HCG, HCGQUANT     ABGs: No results found for: PHART, PO2ART, NGI1WOK, MHZ5ZWL, BEART, F4DTNSQE     Type & Screen (If Applicable):  No results found for: LABABO, LABRH    Drug/Infectious Status (If Applicable):  No results found for: HIV, HEPCAB    COVID-19 Screening (If Applicable):   Lab Results   Component Value Date/Time    COVID19 Not detected 01/22/2022 08:27 AM           Anesthesia Evaluation  Patient summary reviewed  Airway: Mallampati: II  TM distance: >3 FB   Neck ROM: full  Mouth opening: > = 3 FB   Dental:          Pulmonary:Negative Pulmonary ROS and normal exam                               Cardiovascular:  Exercise tolerance: good (>4 METS),   (+) CAD: no interval change, CABG/stent (AYDEE > 5 years ago on bASA): no interval change,                   Neuro/Psych:   (+) depression/anxiety             GI/Hepatic/Renal:   (+) GERD: well controlled,           Endo/Other: Negative Endo/Other ROS   (+) malignancy/cancer (Hx of colon cancer). Abdominal:             Vascular: negative vascular ROS. Other Findings:           Anesthesia Plan      TIVA     ASA 3       Induction: intravenous. Anesthetic plan and risks discussed with patient.                         Anny Grossman MD   3/2/2023

## 2023-03-02 NOTE — DISCHARGE INSTRUCTIONS
Gastrointestinal Colonoscopy/Flexible Sigmoidoscopy - Lower Exam Discharge Instructions  Call Dr. Mandie Gomez at [phone] for any problems or questions. Contact the doctors office for follow up appointment as directed  Medication may cause drowsiness for several hours, therefore, do not drive or operate machinery for remainder of the day. No alcohol today. Do not make any important decisions such signing legal paperwork. Ordinarily, you may resume regular diet and activity after exam unless otherwise specified by your physician. Because of air put into your colon during exam, you may experience some abdominal distension, relieved by the passage of gas, for several hours. Contact your physician if you have any of the following:  Excessive amount of bleeding - large amount when having a bowel movement. Occasional specks of blood with bowel movement would not be unusual.  Severe abdominal pain  Fever or Chills      Any additional instructions:    Follow up with PCP   Next colonoscopy in 3 years (2026)

## 2023-03-02 NOTE — H&P
[address] [place] [address], [facility]  [phone]    H&P Note   Florin Villanueva Case   MRN: [id number]     : 1945        HPI: Florin Villanueva Case is a 65 y.o. female who comes back to the office for cancer follow-up. She has been seen by Dr. Stephan Nuñez in oncology and has completed her adjuvant chemotherapy for her stage III colon cancer. She completed 11 cycles with the 12 cycle being eliminated due to worsening of symptoms. We discussed that routine surveillance includes colonoscopy at 1 year postop. Her last colonoscopy was in  and no abnormalities were present. She just finished her chemo and is still recovering from the GI symptoms from it. We did discuss the possibility of needing to manage constipation with a stool management program of stool softeners plus or minus MiraLAX. She has her port in place. Her prior colonoscopies were performed by Dr. Chapincito Fleming who is now retired. I told her that this is something that I could do for her. She initially presented to the ER on 2022 with diagnosis of Peritonitis ([address] Diagnoses of Colonic mass, Partial intestinal obstruction, unspecified cause ([address] and Bowel perforation ([address] were also pertinent to this visit. Rachel Ford Her PMH includes CAD s/p stents and GERD. She presented with c/o worsening abdominal pain x 2 days. CT AP with 2.3 cm mass in mid-ascending colon with few adjacent enlarged LNs with obstruction and perforation as well as enlarged LN in anterior aspect of mid lower abdomen. Hospital Course:  CT AP c/w 2.3 cm mass in mid-ascending colon and LAD with obstruction and perforation. She underwent R extended colectomy with ileo-transverse colon staped anastomosis by Dr. Emory Park. Path +mod to poorly differentiated adenocarcinoma invades through muscularis propria into pericolonic soft tissue. Margins neg.  2/14 LNs +ve. pT3 N1b. Hgb 8.4.       S/p subsequent right extended colectomy with ileo-transverse stapled anastomosis. Path c/w mod-poorly diff adenocarcinoma - [id number] (2/14 LN +). Oncology consulted and seen by Dr. Gaby Mobley. PATH DIAGNOSIS       \"RIGHT COLON\":               MODERATELY TO POORLY DIFFERENTIATED COLONIC ADENOCARCINOMA. CARCINOMA IS 4.9 CM IN GREATEST DIMENSION. CARCINOMA INVADES THROUGH MUSCULARIS PROPRIA INTO PERICOLONIC                  SOFT TISSUE. MARGINS ARE NEGATIVE FOR CARCINOMA. ACUTE APPENDICITIS. TWO LYMPH NODES POSITIVE FOR METASTATIC CARCINOMA WITH EXTRA-                  CAPSULAR EXTENSION (2/14). SEE ATTACHED CANCER CHECKLIST. Sign Out Date: 1/25/2022  Fabiano Saldivar MD              2/7/2022 POD #16 She presents with her mother in follow-up. She has an appointment to see Dr. Gaby Mobley on Wednesday. They will discuss plans for chemotherapy. He had some questions about diet. She should remain on a GI soft diet for about 3 months. We discussed several food choices. She is feeling a bit constipated and I recommended starting daily MiraLAX and continue with the Colace as well. They asked about Protonix and I think she should take this for 1 month and then can transition to her as needed Gaviscon as long as her symptoms of GERD are controlled. We discussed port placement and I recommend this being done in IR. On exam, her abdomen is soft. Little bit of erythema on the lower part of incision is the same as it was at the time of discharge. There is no drainage. Her staples are fairly wide apart and I would suspect there would be drainage by this time. Staples were removed and Steri-Strips applied. If redness worsens or drainage occurs then she will call. Otherwise I will see her back in 4 weeks. 2/11/2022 POD #20 She returns to the office after being seen by oncology who felt that she had developed a wound infection.   She did have drainage of her wound. The drainage appears serous. Her fascia is intact. I packed the wound and will have her remove the packing tomorrow. I do not need to reinforce the wound edges as they appear stable. She may shower normally but not spray water into the wound and just redressed the wound following packing removal with dry gauze covering it but no need for repacking. I believe this will resolve quickly. I will see her back in 1 week. 2/18/2022 POD #27 She returns to the office for 1 week follow-up from repacking her wound. She unpacked it the following day and has been showering up. There has been continued resolution of erythema and no further drainage. The wound is slightly open but does not undermine the incision. There is no tenderness. There is no purulence. The wound was covered with a dry dressing. She is set up for IR placement of implantable port on Monday. She will begin chemotherapy.       Past Medical History:   Diagnosis Date    Actinic keratosis     Adjustment disorder with mixed anxiety and depressed mood 2/11/2015    Adverse effect of anesthesia     cystoscopy - was awake but could not move - dont remember the anesthesetic given    Arthritis     osteoarthritis    Corneal dystrophy 2/11/2015    eye drops    Coronary atherosclerosis of native coronary artery 2016    x1 stent    GERD (gastroesophageal reflux disease)     gaviscon as needed per patient; does not sleep elevated    Irritable bowel syndrome 2/11/2015    not a problem per patient as of 2/17/2023    Menopause     Mixed hyperlipidemia 2/11/2015    statin    Primary adenocarcinoma of ascending colon ([facility] 1/31/2022    has had colon resection and chemo    Psychiatric disorder     anxiety - pt denies as of 2/17/2023     Past Surgical History:   Procedure Laterality Date    BREAST BIOPSY Left     CARDIAC SURGERY      stent x1    COLECTOMY  2022    removed 3' feet of colon per patient    COLONOSCOPY      2014    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      Current Outpatient Medications   Medication Sig    Polyethylene Glycol 3350 (MIRALAX PO) Take by mouth    Lidocaine-Glycerin (PREPARATION H EX) Apply topically    pantoprazole (PROTONIX) 40 MG tablet TAKE 1 TABLET BY MOUTH EVERY DAY (Patient not taking: No sig reported)    levOCARNitine (CARNITOR) 330 MG tablet Take 1 tablet by mouth 2 times daily (Patient not taking: No sig reported)    sucralfate (CARAFATE) 1 GM tablet Take 1 g by mouth daily    atorvastatin (LIPITOR) 40 MG tablet Take 1 tablet by mouth daily (Patient taking differently: Take 40 mg by mouth every evening)    loperamide (IMODIUM) 2 MG capsule Take 2 mg by mouth 4 times daily as needed for Diarrhea PRN (Patient not taking: No sig reported)    diphenhydrAMINE (BENADRYL) 25 MG tablet Take 25 mg by mouth every 6 hours as needed for Itching PRN    vitamin D 25 MCG (1000 UT) CAPS Take 1,000 Units by mouth Once a day    aspirin 81 MG EC tablet Take 81 mg by mouth every evening    Hyoscyamine Sulfate SL 0.125 MG SUBL Place 0.125 mg under the tongue every 6 hours as needed (Patient not taking: Reported on 2/20/2023)    lidocaine-prilocaine (EMLA) 2.5-2.5 % cream Apply topically as needed (Patient not taking: No sig reported)    prednisoLONE acetate (PRED FORTE) 1 % ophthalmic suspension Place 1 drop into both eyes every evening     ALLERGIES:  Sulfa antibiotics and Sulfamethoxazole-trimethoprim    Social History     Socioeconomic History    Marital status: Legally  Spouse name: None    Number of children: None    Years of education: None    Highest education level: None   Tobacco Use    Smoking status: Never    Smokeless tobacco: Never   Vaping Use    Vaping Use: Never used   Substance and Sexual Activity    Alcohol use: Never    Drug use: Never    Sexual activity: Not Currently     Social Determinants of Health     Financial Resource Strain: Low Risk     Difficulty of Paying Living Expenses: Not hard at all   Food Insecurity: No Food Insecurity    Worried About Running Out of Food in the Last Year: Never true    Ran Out of Food in the Last Year: Never true   Transportation Needs: Unknown    Lack of Transportation (Non-Medical): No   Physical Activity: Inactive    Days of Exercise per Week: 0 days    Minutes of Exercise per Session: 0 min   Housing Stability: Unknown    Unstable Housing in the Last Year: No     Social History     Tobacco Use   Smoking Status Never   Smokeless Tobacco Never     Family History   Problem Relation Age of Onset    Cancer Mother         Bladder Cancer    Heart Attack Father     Heart Disease Father         Arterosclerotic Cardiovascular Disease    Prostate Cancer Brother     Prostate Cancer Brother     Breast Cancer Maternal Aunt     Colon Cancer Maternal Aunt        ROS: The patient has no difficulty with chest pain or shortness of breath. No fever or chills. The patient denies any personal or family history of abnormal clotting or bleeding. Comprehensive review of systems was otherwise unremarkable except as noted above. Physical Exam:   Constitutional: Alert oriented cooperative patient in no acute distress. Ht 5' 2\" (1.575 m)   Wt 120 lb (54.4 kg)   BMI 21.95 kg/m²   Eyes:Sclera are clear without icterus. ENMT: no obvious neck masses, no ear or lip lesions  CV: RRR. Normal perfusion  Resp: No JVD. Breathing is  non-labored.     GI: soft and non-distended, well healed midline incision scar     Musculoskeletal: unremarkable with normal function. Neuro:  No obvious focal deficits  Psychiatric: normal affect and mood, no memory impairment    Lab Results   Component Value Date/Time    WBC 2.8 02/14/2023 08:59 AM    HGB 13.0 02/14/2023 08:59 AM    HCT 39.4 02/14/2023 08:59 AM     02/14/2023 08:59 AM    MCV 92.3 02/14/2023 08:59 AM       Lab Results   Component Value Date     02/14/2023    K 4.5 02/14/2023     02/14/2023    CO2 28 02/14/2023    BUN 18 02/14/2023    CREATININE 0.80 02/14/2023    GLUCOSE 92 02/14/2023    CALCIUM 9.3 02/14/2023    PROT 7.0 02/14/2023    LABALBU 4.1 02/14/2023    BILITOT 0.6 02/14/2023    ALKPHOS 109 02/14/2023    AST 40 (H) 02/14/2023    ALT 66 (H) 02/14/2023    LABGLOM >60 02/14/2023    GFRAA >60 09/28/2022    AGRATIO 1.0 (L) 05/16/2022    GLOB 2.9 02/14/2023         No results found for: AMYLASE  Lab Results   Component Value Date    LIPASE 82 01/22/2022        Lab Results   Component Value Date/Time    CEA 12.3 (H) 01/27/2022 01:15 PM     Lab Results   Component Value Date    CEA 1.1 11/28/2022     CT Result (most recent):  CT CHEST ABDOMEN PELVIS W CONTRAST 12/21/2022    Narrative  EXAM: CT CHEST, ABDOMEN, AND PELVIS WITH CONTRAST    INDICATION: Malignant neoplasm of colon, unspecified. COMPARISON: CT chest 1/28/2022, CT abdomen pelvis 1/22/2022    TECHNIQUE:  CT imaging was performed of the chest, abdomen, and pelvis after  intravenous injection of 100 mL Isovue 370. Intravenous contrast was used for  better evaluation of solid organs and vascular structures. Oral contrast was  used for bowel opacification. Coronal reformatted imaging provided. Radiation  dose reduction techniques were used for this study. Our CT scanners use one or  all of the following: Automated exposure control, adjustment of the mA and/or kV  according to patient size, iterative reconstruction.     FINDINGS:    CHEST:    Mediastinum and visualized thyroid: Normal.    Heart: Multivessel coronary atherosclerotic calcifications. Large Vessels: Atherosclerotic disease. Pleura: Normal.    Lungs: Intrafissural lymph node previously seen at the medial aspect of the  minor fissure on the right has resolved. There is no interval new lung nodule. Airways: Normal.    ABDOMEN AND PELVIS:    Liver: Normal in size and morphology. No focal lesions. Gallbladder/biliary: Normal gallbladder. No biliary dilatation. Pancreas: Normal.    Spleen: Normal.    Adrenals: Normal.    Kidneys: Simple appearing left renal cysts. No solid mass or hydronephrosis. .    Bladder: Normal.    Pelvic organs: Uterus is mildly heterogeneous. No adnexal mass. Gastrointestinal: Status post ascending colectomy and primary ileocolonic  anastomosis. No no locally recurrent mass or regional lymphadenopathy. Peritoneum/retroperitoneum: Normal.    Lymph nodes: Normal.    Vessels: Normal.    Bones/Soft tissues: No aggressive appearing bone lesions. Degenerative changes  of the spine. Impression  1. Status post ascending colectomy without evidence of local tumor recurrence  or metastatic disease in the chest, abdomen, or pelvis. 2.  Interval resolution of the intrafissural lymph node in the right lung. Assessment/Plan:     Titi Martell is a 65 y.o. female who who presents to the office 16 days status post emergent extended right hemicolectomy with anastomosis for cecal perforation associated with near obstructing ascending colon cancer. She had a significantly large tumor with poorly differentiated histology. 2 out of 14 lymph nodes are positive for metastatic disease. CEA is elevated. She has been seen by oncology who have recommended adjuvant chemotherapy. There will meet with Dr. Zahida Mejía on Wednesday. Staples were removed from her incision and Steri-Stripped. She does have some inferior erythema. This looks similar to the day of discharge.   I think this is most likely bruising, but they should keep an eye on it and let us know if it changes or drains. I think she should be appropriate for IR placement of a port when needed as long as this wound does not worsen. I will see her back in 4 weeks. She returns to the office on 2/11/2022. She did not make it the full 4 weeks as she developed drainage from her wound. This appears to be serous and not a full-blown wound infection. The induration is largely resolved and the open area of the wound was packed with gauze. Care instructions were given. I will see her back in 1 week. She returns on 2/18/2022, 1 week later for reassessment of her wound. This is healing nicely following drainage of wound seroma. This did not appear to be a wound infection. Her fascia is intact. The wound is closing nicely. There is no contraindication to proceeding with port placement and chemotherapy. She will have port placement on Monday and begin chemotherapy shortly thereafter. If she has any further issues, she may contact me otherwise I will see her back in 3 months. She returns almost 8 months later on 10/4/2022. She has completed her adjuvant chemotherapy having completed 11 of 12 intended cycles. She is already feeling improvement being off of chemo. She sees Dr. Stephan Nuñez who will be coordinating her surveillance CT scans and blood work-up. We discussed performing colonoscopy at 1 year postop and we will set her up for that today. We did also discuss need for stool modification while she is fully recovered from being off chemotherapy if she starts developing chronic constipation. She can use stool softeners plus or minus MiraLAX and we talked about dosing MiraLAX to achieve a good stool consistency. She is appropriate for 1 day prep. She is appropriate for the pediatric scope. She has a port in place and we will try to get this access so she does not need another IV started on the day of the procedure. She will apply Emla to her port site. We discussed proceeding with colonoscopy. I discussed the patient's condition and treatment options with the patient. I discussed risks of colonoscopy in language the patient could understand including bleeding, infection, aspiration, perforation, medication reaction, need for further endoscopy or surgery, abscess, fistula, SBO, DVT, PE, heart attack, stroke, renal failure, respiratory failure, ventilatory dependence, and death. The patient voiced understanding of all this and all questions were answered. Alternatives to colonoscopy were discussed also and risks of the alternatives. The patient requested that we proceed with colonoscopy. Informed consent was obtained.      Patient Active Problem List    Diagnosis Date Noted    Osteopenia of neck of femur 10/18/2022     Priority: Medium    Hypokalemia 09/29/2022     Priority: Medium    Diarrhea 07/21/2022     Priority: Medium    Mucositis 07/04/2022     Priority: Medium    Special screening for malignant neoplasms, colon 10/04/2022     Added automatically from request for surgery [id number]      Vitamin D deficiency 04/13/2022    Hypomagnesemia 04/11/2022    Iron deficiency anemia 02/28/2022    High risk medication use 02/28/2022    Redness of skin 02/15/2022    Corneal transplant status 02/15/2022    Postoperative seroma of subcutaneous tissue after non-dermatologic procedure 02/11/2022    Primary adenocarcinoma of ascending colon ([facility] 01/31/2022    Perforation of colon ([address] 01/22/2022    Precordial pain 01/12/2017    Atherosclerosis of native coronary artery of native heart with angina pectoris ([address]     Irritable bowel syndrome 02/11/2015    Corneal dystrophy 02/11/2015    Adjustment disorder with mixed anxiety and depressed mood 02/11/2015    Mixed hyperlipidemia 02/11/2015          Cesar Elliott MD,  FACS

## 2023-03-02 NOTE — ANESTHESIA POSTPROCEDURE EVALUATION
Department of Anesthesiology  Postprocedure Note    Patient: Carter Martell  MRN: [id number]  YOB: 1945  Date of evaluation: 3/2/2023      Procedure Summary     Date: 03/02/23 Room / Location: [facility] ENDO  / [facility] ENDOSCOPY    Anesthesia Start: 0901 Anesthesia Stop: 0933    Procedure: COLORECTAL CANCER SCREENING, NOT HIGH RISK Diagnosis:       Special screening for malignant neoplasms, colon      (Special screening for malignant neoplasms, colon [Z12.11])    Surgeons: Sriram Lundberg MD Responsible Provider: Vannessa Navarro MD    Anesthesia Type: TIVA ASA Status: 3          Anesthesia Type: TIVA    Cheri Phase I: Cheri Score: 10    Cheri Phase II: Cheri Score: 10      Anesthesia Post Evaluation    Patient location during evaluation: PACU  Patient participation: complete - patient participated  Level of consciousness: awake  Pain score: 0  Airway patency: patent  Nausea & Vomiting: no nausea and no vomiting  Complications: no  Cardiovascular status: blood pressure returned to baseline and hemodynamically stable  Respiratory status: acceptable, spontaneous ventilation and nonlabored ventilation  Hydration status: euvolemic  Multimodal analgesia pain management approach

## 2023-03-12 NOTE — OP NOTE
Operative Note      Patient: Hugo Umana Case  YOB: 1945  MRN: [id number]    [address]  [phone]    Colonoscopy Procedure Note    Name: Hugo Umana Case     Date: 3/2/2023   Med Record Number: [id number]   Age: 65 y.o. Sex: female   Procedure: Colonoscopy COLORECTAL CANCER HIGH RISK SCREENING 1 YEAR s/p R HEMICOLECTOMY  Pre-operative Diagnosis:  Special screening for malignant neoplasms, colon [Z12.11]; h/o R colon cancer Stage III with perforation, s/p R hemicolectomy and adjuvant chemotherapy  Post-operative Diagnosis: Same and no mucosal abnormalities, widely patent ileocolic anastomosis  Indications: previous colon cancer  Anesthesia/Sedation: MAC IV MAC anesthesia Propofol  Procedure Details:    Informed consent was obtained for the procedure, including sedation. Risks of perforation, hemorrhage, adverse drug reaction and aspiration were discussed. The patient was placed in the left lateral decubitus position. Based on the pre-procedure assessment, including review of the patient's medical history, medications, allergies, and review of systems, she had been deemed to be an appropriate candidate for sedation by the Anesthesia Dept. The patient was monitored continuously with ECG tracing, pulse oximetry, blood pressure monitoring, and direct observations. A time out was performed. Once sedation was adequate, a rectal examination was performed. The [id number] was inserted into the rectum and advanced under direct vision to the ileum to transverse colon anastomosis. The quality of the colonic preparation was good. The colon was very tortuous. A careful inspection was made as the colonoscope was withdrawn, including a retroflexed view of the rectum; findings and interventions are described below. Appropriate photodocumentation was obtained.     Findings: ANUS: Anal exam reveals no masses or hemorrhoids, sphincter tone is normal.   RECTUM: Rectal exam reveals no masses or hemorrhoids. SIGMOID COLON: The mucosa is normal with good vascular pattern and without ulcers, diverticula, and polyps. DESCENDING COLON: The mucosa is normal with good vascular pattern and without ulcers, diverticula, and polyps. TRANSVERSE COLON: The mucosa is normal with good vascular pattern and without ulcers, diverticula, and polyps. Normal      -Widely patent side-to-side ileocolic anastomosis, hepatic flexure is surgically absent. ASCENDING COLON: Surgically absent  CECUM: Surgically absent. TERMINAL ILEUM: The terminal ileum was normal.     Specimens: none    Estimated Blood Loss:  0-minimum           Complications: None; patient tolerated the procedure well. Attending Attestation: I performed the procedure. Impression:  Otherwise normal colonoscopy to the terminal ileum via ileocolic anastomosis. , See post-procedure diagnoses    Recommendations:-Follow-up with primary care physician and oncology on normal schedule and -Repeat Colonoscopy in 3 years (2026) with h/o colon cancer and normal 1 year surveillance.     Ghulam Puckett MD, FACS

## 2023-03-23 PROBLEM — Z12.11 SPECIAL SCREENING FOR MALIGNANT NEOPLASMS, COLON: Status: RESOLVED | Noted: 2022-10-04 | Resolved: 2023-03-23

## 2023-03-28 ASSESSMENT — ENCOUNTER SYMPTOMS
SORE THROAT: 0
NAUSEA: 0
VOMITING: 0
EYES NEGATIVE: 1
ABDOMINAL DISTENTION: 0
BLOOD IN STOOL: 0
EYE DISCHARGE: 0
COUGH: 0
BACK PAIN: 0
SINUS PAIN: 0
CONSTIPATION: 0
DIARRHEA: 0
SHORTNESS OF BREATH: 0
ABDOMINAL PAIN: 0
TROUBLE SWALLOWING: 0
ANAL BLEEDING: 0

## 2023-03-30 ENCOUNTER — HOSPITAL ENCOUNTER (OUTPATIENT)
Dept: INFUSION THERAPY | Age: 78
Discharge: HOME | End: 2023-03-30
Payer: MEDICARE

## 2023-03-30 ENCOUNTER — OFFICE VISIT (OUTPATIENT)
Dept: ONCOLOGY | Age: 78
End: 2023-03-30
Payer: MEDICARE

## 2023-03-30 VITALS
TEMPERATURE: 98.1 F | WEIGHT: 123 LBS | BODY MASS INDEX: 22.63 KG/M2 | HEIGHT: 62 IN | RESPIRATION RATE: 16 BRPM | DIASTOLIC BLOOD PRESSURE: 60 MMHG | HEART RATE: 74 BPM | OXYGEN SATURATION: 99 % | SYSTOLIC BLOOD PRESSURE: 119 MMHG

## 2023-03-30 DIAGNOSIS — K12.30 MUCOSITIS: ICD-10-CM

## 2023-03-30 DIAGNOSIS — Z95.828 PORT-A-CATH IN PLACE: ICD-10-CM

## 2023-03-30 DIAGNOSIS — C18.2 PRIMARY ADENOCARCINOMA OF ASCENDING COLON (HCC): ICD-10-CM

## 2023-03-30 DIAGNOSIS — C18.2 PRIMARY ADENOCARCINOMA OF ASCENDING COLON (HCC): Primary | ICD-10-CM

## 2023-03-30 DIAGNOSIS — E55.9 VITAMIN D DEFICIENCY: ICD-10-CM

## 2023-03-30 LAB
25(OH)D3 SERPL-MCNC: 33.2 NG/ML (ref 30–100)
ALBUMIN SERPL-MCNC: 3.5 G/DL (ref 3.2–4.6)
ALBUMIN/GLOB SERPL: 1.3 (ref 0.4–1.6)
ALP SERPL-CCNC: 103 U/L (ref 50–136)
ALT SERPL-CCNC: 53 U/L (ref 12–65)
ANION GAP SERPL CALC-SCNC: 3 MMOL/L (ref 2–11)
AST SERPL-CCNC: 30 U/L (ref 15–37)
BASOPHILS # BLD: 0 K/UL (ref 0–0.2)
BASOPHILS NFR BLD: 1 % (ref 0–2)
BILIRUB SERPL-MCNC: 0.3 MG/DL (ref 0.2–1.1)
BUN SERPL-MCNC: 17 MG/DL (ref 8–23)
CALCIUM SERPL-MCNC: 8.2 MG/DL (ref 8.3–10.4)
CEA SERPL-MCNC: 0.9 NG/ML (ref 0–3)
CHLORIDE SERPL-SCNC: 111 MMOL/L (ref 101–110)
CO2 SERPL-SCNC: 27 MMOL/L (ref 21–32)
CREAT SERPL-MCNC: 0.8 MG/DL (ref 0.6–1)
DIFFERENTIAL METHOD BLD: ABNORMAL
EOSINOPHIL # BLD: 0 K/UL (ref 0–0.8)
EOSINOPHIL NFR BLD: 0 % (ref 0.5–7.8)
ERYTHROCYTE [DISTWIDTH] IN BLOOD BY AUTOMATED COUNT: 12.1 % (ref 11.9–14.6)
GLOBULIN SER CALC-MCNC: 2.8 G/DL (ref 2.8–4.5)
GLUCOSE SERPL-MCNC: 163 MG/DL (ref 65–100)
HCT VFR BLD AUTO: 36.2 % (ref 35.8–46.3)
HGB BLD-MCNC: 12.1 G/DL (ref 11.7–15.4)
IMM GRANULOCYTES # BLD AUTO: 0 K/UL (ref 0–0.5)
IMM GRANULOCYTES NFR BLD AUTO: 0 % (ref 0–5)
LYMPHOCYTES # BLD: 1.1 K/UL (ref 0.5–4.6)
LYMPHOCYTES NFR BLD: 31 % (ref 13–44)
MCH RBC QN AUTO: 30.3 PG (ref 26.1–32.9)
MCHC RBC AUTO-ENTMCNC: 33.4 G/DL (ref 31.4–35)
MCV RBC AUTO: 90.5 FL (ref 82–102)
MONOCYTES # BLD: 0.4 K/UL (ref 0.1–1.3)
MONOCYTES NFR BLD: 11 % (ref 4–12)
NEUTS SEG # BLD: 1.9 K/UL (ref 1.7–8.2)
NEUTS SEG NFR BLD: 57 % (ref 43–78)
NRBC # BLD: 0 K/UL (ref 0–0.2)
PLATELET # BLD AUTO: 140 K/UL (ref 150–450)
PMV BLD AUTO: 10.2 FL (ref 9.4–12.3)
POTASSIUM SERPL-SCNC: 3.4 MMOL/L (ref 3.5–5.1)
PROT SERPL-MCNC: 6.3 G/DL (ref 6.3–8.2)
RBC # BLD AUTO: 4 M/UL (ref 4.05–5.2)
SODIUM SERPL-SCNC: 141 MMOL/L (ref 133–143)
WBC # BLD AUTO: 3.4 K/UL (ref 4.3–11.1)

## 2023-03-30 PROCEDURE — 36591 DRAW BLOOD OFF VENOUS DEVICE: CPT

## 2023-03-30 PROCEDURE — 99215 OFFICE O/P EST HI 40 MIN: CPT | Performed by: INTERNAL MEDICINE

## 2023-03-30 PROCEDURE — 82378 CARCINOEMBRYONIC ANTIGEN: CPT

## 2023-03-30 PROCEDURE — G8420 CALC BMI NORM PARAMETERS: HCPCS | Performed by: INTERNAL MEDICINE

## 2023-03-30 PROCEDURE — 2580000003 HC RX 258: Performed by: INTERNAL MEDICINE

## 2023-03-30 PROCEDURE — 85025 COMPLETE CBC W/AUTO DIFF WBC: CPT

## 2023-03-30 PROCEDURE — 1123F ACP DISCUSS/DSCN MKR DOCD: CPT | Performed by: INTERNAL MEDICINE

## 2023-03-30 PROCEDURE — 82306 VITAMIN D 25 HYDROXY: CPT

## 2023-03-30 PROCEDURE — G8484 FLU IMMUNIZE NO ADMIN: HCPCS | Performed by: INTERNAL MEDICINE

## 2023-03-30 PROCEDURE — 1090F PRES/ABSN URINE INCON ASSESS: CPT | Performed by: INTERNAL MEDICINE

## 2023-03-30 PROCEDURE — G8399 PT W/DXA RESULTS DOCUMENT: HCPCS | Performed by: INTERNAL MEDICINE

## 2023-03-30 PROCEDURE — G8427 DOCREV CUR MEDS BY ELIG CLIN: HCPCS | Performed by: INTERNAL MEDICINE

## 2023-03-30 PROCEDURE — 1036F TOBACCO NON-USER: CPT | Performed by: INTERNAL MEDICINE

## 2023-03-30 PROCEDURE — 80053 COMPREHEN METABOLIC PANEL: CPT

## 2023-03-30 RX ORDER — SODIUM CHLORIDE 0.9 % (FLUSH) 0.9 %
5-40 SYRINGE (ML) INJECTION PRN
Status: DISCONTINUED | OUTPATIENT
Start: 2023-03-30 | End: 2023-03-31 | Stop reason: HOSPADM

## 2023-03-30 RX ADMIN — SODIUM CHLORIDE, PRESERVATIVE FREE 10 ML: 5 INJECTION INTRAVENOUS at 14:50

## 2023-03-30 ASSESSMENT — PATIENT HEALTH QUESTIONNAIRE - PHQ9
SUM OF ALL RESPONSES TO PHQ QUESTIONS 1-9: 0
SUM OF ALL RESPONSES TO PHQ QUESTIONS 1-9: 0
SUM OF ALL RESPONSES TO PHQ9 QUESTIONS 1 & 2: 0
1. LITTLE INTEREST OR PLEASURE IN DOING THINGS: 0
SUM OF ALL RESPONSES TO PHQ QUESTIONS 1-9: 0
2. FEELING DOWN, DEPRESSED OR HOPELESS: 0
SUM OF ALL RESPONSES TO PHQ QUESTIONS 1-9: 0

## 2023-03-30 NOTE — PATIENT INSTRUCTIONS
n/a        -------------------------------------------------------------------------------------------------------------------  Please call our office at [phone] if you have any  of the following symptoms:   Fever of 100.5 or greater  Chills  Shortness of breath  Swelling or pain in one leg    After office hours an answering service is available and will contact a provider for emergencies or if you are experiencing any of the above symptoms. Patient did express an interest in My Chart. My Chart log in information explained on the after visit summary printout at the [address] desk.     Dinorah Moreno RN

## 2023-03-30 NOTE — PROGRESS NOTES
Mucositis rinse called in to [facility]
Seeing Dr Rohan Hall for colonoscopy in March and wishes to keep her port until then. - GERD - She has remained on PPI since last visit, with improvement in her symptoms. She is scheduled with GI early in 2023.  - pancytopenia slowly improving. Will check smear. Will also check nutritional labs. - surveillance - CT CAP discussed and with no evid of disease recurrence and resolution of pulm fissure/nodule. All questions were asked and answered to the best of my ability. The patient verbalized understanding and agrees with the plan above.             Monica Cortez MD  [facility] Hematology and Oncology  [address] [place] [address]  Office : [phone]  Fax : [phone]

## 2023-04-18 ENCOUNTER — OFFICE VISIT (OUTPATIENT)
Dept: CARDIOLOGY CLINIC | Age: 78
End: 2023-04-18
Payer: MEDICARE

## 2023-04-18 VITALS
DIASTOLIC BLOOD PRESSURE: 70 MMHG | SYSTOLIC BLOOD PRESSURE: 110 MMHG | HEART RATE: 64 BPM | BODY MASS INDEX: 22.63 KG/M2 | WEIGHT: 123 LBS | HEIGHT: 62 IN

## 2023-04-18 DIAGNOSIS — E78.2 MIXED HYPERLIPIDEMIA: Primary | ICD-10-CM

## 2023-04-18 DIAGNOSIS — I25.119 ATHEROSCLEROSIS OF NATIVE CORONARY ARTERY OF NATIVE HEART WITH ANGINA PECTORIS (HCC): ICD-10-CM

## 2023-04-18 PROCEDURE — 1123F ACP DISCUSS/DSCN MKR DOCD: CPT | Performed by: INTERNAL MEDICINE

## 2023-04-18 PROCEDURE — G8399 PT W/DXA RESULTS DOCUMENT: HCPCS | Performed by: INTERNAL MEDICINE

## 2023-04-18 PROCEDURE — G8420 CALC BMI NORM PARAMETERS: HCPCS | Performed by: INTERNAL MEDICINE

## 2023-04-18 PROCEDURE — 1036F TOBACCO NON-USER: CPT | Performed by: INTERNAL MEDICINE

## 2023-04-18 PROCEDURE — 99214 OFFICE O/P EST MOD 30 MIN: CPT | Performed by: INTERNAL MEDICINE

## 2023-04-18 PROCEDURE — G8428 CUR MEDS NOT DOCUMENT: HCPCS | Performed by: INTERNAL MEDICINE

## 2023-04-18 PROCEDURE — 1090F PRES/ABSN URINE INCON ASSESS: CPT | Performed by: INTERNAL MEDICINE

## 2023-04-18 NOTE — PROGRESS NOTES
[address], [facility] [address]  PHONE: [phone]      23    NAME:  Rere Martell  : 1945  MRN: [id number]       SUBJECTIVE:   Rere Herrera Case is a 77 y.o. female seen for a follow up visit regarding the following:     Chief Complaint   Patient presents with    Coronary Artery Disease    Results     nuke         HPI:    No cp or walker. No orthopnea or pnd. No palpitations or syncope. Past Medical History, Past Surgical History, Family history, Social History, and Medications were all reviewed with the patient today and updated as necessary. Current Outpatient Medications   Medication Sig Dispense Refill    Polyethylene Glycol 3350 (MIRALAX PO) Take by mouth      levOCARNitine (CARNITOR) 330 MG tablet Take 1 tablet by mouth 2 times daily 60 tablet 3    atorvastatin (LIPITOR) 40 MG tablet Take 1 tablet by mouth daily (Patient taking differently: Take 1 tablet by mouth every evening) 90 tablet 1    diphenhydrAMINE (BENADRYL) 25 MG tablet Take 1 tablet by mouth every 6 hours as needed for Itching PRN      vitamin D 25 MCG (1000 UT) CAPS Take 1 capsule by mouth Once a day      aspirin 81 MG EC tablet Take 1 tablet by mouth every evening      HYOSCYAMINE SULFATE SL SL Take 0.125 mg by mouth as needed      prednisoLONE acetate (PRED FORTE) 1 % ophthalmic suspension Place 1 drop into both eyes every evening      Lidocaine-Glycerin (PREPARATION H EX) Apply topically      pantoprazole (PROTONIX) 40 MG tablet TAKE 1 TABLET BY MOUTH EVERY DAY (Patient not taking: Reported on 2023) 90 tablet 1    sucralfate (CARAFATE) 1 GM tablet Take 1 g by mouth daily (Patient not taking: Reported on 3/30/2023)      loperamide (IMODIUM) 2 MG capsule Take 2 mg by mouth 4 times daily as needed for Diarrhea PRN (Patient not taking: Reported on 2022)       No current facility-administered medications for this visit.                Social History

## 2023-04-20 DIAGNOSIS — I25.10 ATHEROSCLEROSIS OF NATIVE CORONARY ARTERY OF NATIVE HEART WITHOUT ANGINA PECTORIS: ICD-10-CM

## 2023-04-20 RX ORDER — ATORVASTATIN CALCIUM 40 MG/1
40 TABLET, FILM COATED ORAL EVERY EVENING
Qty: 90 TABLET | Refills: 2 | Status: SHIPPED | OUTPATIENT
Start: 2023-04-20

## 2023-04-21 ENCOUNTER — OFFICE VISIT (OUTPATIENT)
Dept: INTERNAL MEDICINE CLINIC | Facility: CLINIC | Age: 78
End: 2023-04-21
Payer: MEDICARE

## 2023-04-21 VITALS
BODY MASS INDEX: 22.54 KG/M2 | OXYGEN SATURATION: 98 % | HEART RATE: 61 BPM | SYSTOLIC BLOOD PRESSURE: 128 MMHG | WEIGHT: 122.5 LBS | HEIGHT: 62 IN | DIASTOLIC BLOOD PRESSURE: 68 MMHG

## 2023-04-21 DIAGNOSIS — L03.012 CELLULITIS OF FINGER OF LEFT HAND: Primary | ICD-10-CM

## 2023-04-21 PROCEDURE — 1036F TOBACCO NON-USER: CPT | Performed by: INTERNAL MEDICINE

## 2023-04-21 PROCEDURE — G8427 DOCREV CUR MEDS BY ELIG CLIN: HCPCS | Performed by: INTERNAL MEDICINE

## 2023-04-21 PROCEDURE — 1090F PRES/ABSN URINE INCON ASSESS: CPT | Performed by: INTERNAL MEDICINE

## 2023-04-21 PROCEDURE — G8420 CALC BMI NORM PARAMETERS: HCPCS | Performed by: INTERNAL MEDICINE

## 2023-04-21 PROCEDURE — 99212 OFFICE O/P EST SF 10 MIN: CPT | Performed by: INTERNAL MEDICINE

## 2023-04-21 PROCEDURE — 1123F ACP DISCUSS/DSCN MKR DOCD: CPT | Performed by: INTERNAL MEDICINE

## 2023-04-21 PROCEDURE — G8399 PT W/DXA RESULTS DOCUMENT: HCPCS | Performed by: INTERNAL MEDICINE

## 2023-04-21 RX ORDER — CEPHALEXIN 500 MG/1
500 CAPSULE ORAL 3 TIMES DAILY
Qty: 15 CAPSULE | Refills: 0 | Status: SHIPPED | OUTPATIENT
Start: 2023-04-21 | End: 2023-04-26

## 2023-04-21 ASSESSMENT — ENCOUNTER SYMPTOMS: COLOR CHANGE: 1

## 2023-04-21 NOTE — PROGRESS NOTES
Sulfamethoxazole-Trimethoprim Hives and Rash     Patient Active Problem List   Diagnosis    Iron deficiency anemia    Precordial pain    Irritable bowel syndrome    High risk medication use    Perforation of colon (HCC)    Redness of skin    Corneal dystrophy    Adjustment disorder with mixed anxiety and depressed mood    Mixed hyperlipidemia    Atherosclerosis of native coronary artery of native heart with angina pectoris ([facility] [address]    Primary adenocarcinoma of ascending colon (HCC)    Postoperative seroma of subcutaneous tissue after non-dermatologic procedure    Corneal transplant status    Hypomagnesemia    Vitamin D deficiency    Mucositis    Diarrhea    Hypokalemia    Osteopenia of neck of femur     Social History     Tobacco Use    Smoking status: Never    Smokeless tobacco: Never   Substance Use Topics    Alcohol use: Never          Review of Systems   Skin:  Positive for color change. OBJECTIVE:  /68 (Site: Right Upper Arm, Position: Sitting)   Pulse 61   Ht 5' 2\" (1.575 m)   Wt 122 lb 8 oz (55.6 kg)   SpO2 98%   BMI 22.41 kg/m²      Physical Exam  Skin:     Comments: Punctate bite or wound distal to pip on left index; joint not tender-skin is pink in area          Medical problems and test results were reviewed with the patient today. ASSESSMENT and PLAN     1. Cellulitis of finger of left hand  -     cephALEXin (KEFLEX) 500 MG capsule; Take 1 capsule by mouth 3 times daily for 5 days, Disp-15 capsule, R-0Print   May be improving since color not as red as earlier-gave written keflex if worse-last wbc better at 3400, and neutrophils ok    lab results and schedule for future lab studies reviewed with patient  reviewed medications and side effects in detail     Return if symptoms worsen or fail to improve.

## 2023-04-25 ENCOUNTER — HOSPITAL ENCOUNTER (OUTPATIENT)
Dept: INTERVENTIONAL RADIOLOGY/VASCULAR | Age: 78
Discharge: HOME | End: 2023-04-28
Payer: MEDICARE

## 2023-04-25 VITALS
HEIGHT: 62 IN | TEMPERATURE: 97.4 F | SYSTOLIC BLOOD PRESSURE: 127 MMHG | OXYGEN SATURATION: 96 % | DIASTOLIC BLOOD PRESSURE: 60 MMHG | RESPIRATION RATE: 16 BRPM | BODY MASS INDEX: 22.45 KG/M2 | WEIGHT: 122 LBS | HEART RATE: 67 BPM

## 2023-04-25 DIAGNOSIS — Z95.828 PORT-A-CATH IN PLACE: ICD-10-CM

## 2023-04-25 PROCEDURE — 36590 REMOVAL TUNNELED CV CATH: CPT

## 2023-04-25 PROCEDURE — 2500000003 HC RX 250 WO HCPCS: Performed by: PHYSICIAN ASSISTANT

## 2023-04-25 RX ORDER — LIDOCAINE HYDROCHLORIDE AND EPINEPHRINE BITARTRATE 20; .01 MG/ML; MG/ML
INJECTION, SOLUTION SUBCUTANEOUS PRN
Status: COMPLETED | OUTPATIENT
Start: 2023-04-25 | End: 2023-04-25

## 2023-04-25 RX ADMIN — LIDOCAINE HYDROCHLORIDE,EPINEPHRINE BITARTRATE 5 ML: 20; .01 INJECTION, SOLUTION INFILTRATION; PERINEURAL at 13:46

## 2023-04-25 ASSESSMENT — PAIN - FUNCTIONAL ASSESSMENT: PAIN_FUNCTIONAL_ASSESSMENT: NONE - DENIES PAIN

## 2023-04-25 NOTE — DISCHARGE INSTRUCTIONS
Port/Catheter Removal Discharge Instructions          General information: Your doctor has ordered us to remove your port, or catheter. This could be that you do not need it anymore, it is not doing its job, or your physician has decided on another plan for your treatment. Post-Op Instructions:     Patients may experience some mild swelling, redness and or bruising around the incision. The area may be tender. This is normal after a surgical procedure. This may take several days to resolve. Do not allow bra straps or any clothing to rub the skin over the incision with skin glue present     You may start taking showers again after 48 hours (about 2 days). Please protect the area with saran wrap if possible. Do not take a bath or swim until the skin has healed. Skin glue will flake off within 7-10 days (about 1 and a half weeks). Once it is healed it is okay to bathe and swim. Do not use antibiotic ointment, rubbing alcohol or hydrogen peroxide on the skin glue as it can break down the adhesive     Restrict yourself to light activity for the first 3 days after getting the port out, after that, resume normal activity slowly. You may resume your normal diet and medications. If you have pain, you can take whatever you have at home including acetaminophen (Tylenol) or Ibuprofren (Advil) as needed. Ice packs are also recommended. Please do not drive, make important legal decisions, or drink alcohol after receiving sedation or anesthesia for the next 24 hours. Call if: You should call your Physician and/or the Radiology Nurse if you notice:      Signs of infection such as swelling and redness that is warm to the touch or streaking, or pus     Fever (greater than 100.4). Severe arm / chest pain coming from your port site     Active bleeding     We can be reached at ([phone], between 8 am and 5 pm Monday through Friday.  After hours call the answering service at [phone]

## 2023-06-20 ENCOUNTER — OFFICE VISIT (OUTPATIENT)
Dept: INTERNAL MEDICINE CLINIC | Facility: CLINIC | Age: 78
End: 2023-06-20
Payer: MEDICARE

## 2023-06-20 VITALS
OXYGEN SATURATION: 97 % | BODY MASS INDEX: 23.55 KG/M2 | WEIGHT: 128 LBS | HEART RATE: 74 BPM | HEIGHT: 62 IN | SYSTOLIC BLOOD PRESSURE: 120 MMHG | DIASTOLIC BLOOD PRESSURE: 62 MMHG

## 2023-06-20 DIAGNOSIS — E55.9 VITAMIN D DEFICIENCY: ICD-10-CM

## 2023-06-20 DIAGNOSIS — K58.9 IRRITABLE BOWEL SYNDROME, UNSPECIFIED TYPE: ICD-10-CM

## 2023-06-20 DIAGNOSIS — H18.509 CORNEAL DYSTROPHY: ICD-10-CM

## 2023-06-20 DIAGNOSIS — E87.6 HYPOKALEMIA: ICD-10-CM

## 2023-06-20 DIAGNOSIS — I25.119 ATHEROSCLEROSIS OF NATIVE CORONARY ARTERY OF NATIVE HEART WITH ANGINA PECTORIS (HCC): ICD-10-CM

## 2023-06-20 DIAGNOSIS — E78.5 HYPERLIPIDEMIA, UNSPECIFIED HYPERLIPIDEMIA TYPE: ICD-10-CM

## 2023-06-20 DIAGNOSIS — C18.2 PRIMARY ADENOCARCINOMA OF ASCENDING COLON (HCC): ICD-10-CM

## 2023-06-20 DIAGNOSIS — Z94.7 HISTORY OF CORNEAL TRANSPLANT: ICD-10-CM

## 2023-06-20 DIAGNOSIS — L29.9 PRURITUS: Primary | ICD-10-CM

## 2023-06-20 PROBLEM — K44.9 DIAPHRAGMATIC HERNIA WITHOUT OBSTRUCTION OR GANGRENE: Status: ACTIVE | Noted: 2023-04-19

## 2023-06-20 PROBLEM — K21.9 GASTRO-ESOPHAGEAL REFLUX DISEASE WITHOUT ESOPHAGITIS: Status: ACTIVE | Noted: 2023-04-19

## 2023-06-20 PROCEDURE — G8399 PT W/DXA RESULTS DOCUMENT: HCPCS | Performed by: STUDENT IN AN ORGANIZED HEALTH CARE EDUCATION/TRAINING PROGRAM

## 2023-06-20 PROCEDURE — 1090F PRES/ABSN URINE INCON ASSESS: CPT | Performed by: STUDENT IN AN ORGANIZED HEALTH CARE EDUCATION/TRAINING PROGRAM

## 2023-06-20 PROCEDURE — 1123F ACP DISCUSS/DSCN MKR DOCD: CPT | Performed by: STUDENT IN AN ORGANIZED HEALTH CARE EDUCATION/TRAINING PROGRAM

## 2023-06-20 PROCEDURE — 99214 OFFICE O/P EST MOD 30 MIN: CPT | Performed by: STUDENT IN AN ORGANIZED HEALTH CARE EDUCATION/TRAINING PROGRAM

## 2023-06-20 PROCEDURE — G8420 CALC BMI NORM PARAMETERS: HCPCS | Performed by: STUDENT IN AN ORGANIZED HEALTH CARE EDUCATION/TRAINING PROGRAM

## 2023-06-20 PROCEDURE — 1036F TOBACCO NON-USER: CPT | Performed by: STUDENT IN AN ORGANIZED HEALTH CARE EDUCATION/TRAINING PROGRAM

## 2023-06-20 PROCEDURE — G8427 DOCREV CUR MEDS BY ELIG CLIN: HCPCS | Performed by: STUDENT IN AN ORGANIZED HEALTH CARE EDUCATION/TRAINING PROGRAM

## 2023-06-20 RX ORDER — GABAPENTIN 100 MG/1
100 CAPSULE ORAL 3 TIMES DAILY PRN
Qty: 60 CAPSULE | Refills: 1 | Status: SHIPPED | OUTPATIENT
Start: 2023-06-20 | End: 2023-07-30

## 2023-06-20 ASSESSMENT — PATIENT HEALTH QUESTIONNAIRE - PHQ9
SUM OF ALL RESPONSES TO PHQ QUESTIONS 1-9: 0
SUM OF ALL RESPONSES TO PHQ QUESTIONS 1-9: 0
2. FEELING DOWN, DEPRESSED OR HOPELESS: 0
SUM OF ALL RESPONSES TO PHQ9 QUESTIONS 1 & 2: 0
SUM OF ALL RESPONSES TO PHQ QUESTIONS 1-9: 0
1. LITTLE INTEREST OR PLEASURE IN DOING THINGS: 0
SUM OF ALL RESPONSES TO PHQ QUESTIONS 1-9: 0

## 2023-06-20 ASSESSMENT — ENCOUNTER SYMPTOMS: SHORTNESS OF BREATH: 0

## 2023-06-20 NOTE — PROGRESS NOTES
FOLLOW UP VISIT    Subjective:    Oumou Arredondo Case (: 1945) is a 77 y.o., female,   Chief Complaint   Patient presents with    Follow-up    Pruritis     Itching since chemo. Worse on upper legs and upper arms       HPI: Here with daughter today. Pruritis: Since chemo was started 2022. No rash. Comes and goes and is not every day. The itching is mainly on the thighs, legs sometimes into arms and back. Takes benadryl, three times per day. She uses lotion. Itching has not gotten better or worse. Constipation: doing ok. No bloody stool or melena. Colonoscopy 3/2023 was normal, repeat in . Also had EGD which was essentially normal, did note hiatal hernia. Other medical history:  -Colon adenocarcinoma stage III: Diagnosed 2022, s/p subsequent right extended colectomy with ileo-transverse staped anastomosis with Dr. Piotr Ramires. Path showed mod-poorly diff adenocarcinoma - [id number] ( LN +). Had 11 cycles total with FOLFOX, held off on her last treatment due to cytopenia, fatigue, neuropathy.  -Corneal dystrophy status post corneal transplant  -CAD s/p AYDEE to LAD in : sees cardiology  [facility] [place]. On atorvastatin, baby aspirin. -GERD, IBS: Controlled, takes gaviscon as needed.   -Low vitamin D: On supplement  -Osteopenia: T score of femoral neck -1.7, L spine -1.1 noted on DEXA scan from 22.   -Healthcare maintenance: Mammo scheduled. Last pap around age 71 and no abnormal paps. Due for flu shot, shingles. Has not had COVID vaccine (had COVID twice). Had PPSV23 in , does not appear she had prior pneumonia vaccine.     The following portions of the patient's history were reviewed and updated as appropriate:      Patient Active Problem List   Diagnosis    Iron deficiency anemia    Precordial pain    Irritable bowel syndrome    High risk medication use    Perforation of colon (HCC)    Redness of skin    Corneal dystrophy    Hyperlipidemia    Atherosclerosis of native coronary

## 2023-06-29 ENCOUNTER — HOSPITAL ENCOUNTER (OUTPATIENT)
Dept: CT IMAGING | Age: 78
Discharge: HOME | End: 2023-06-29
Payer: MEDICARE

## 2023-06-29 DIAGNOSIS — C18.2 PRIMARY ADENOCARCINOMA OF ASCENDING COLON (HCC): ICD-10-CM

## 2023-06-29 LAB — CREAT BLD-MCNC: 0.63 MG/DL (ref 0.8–1.5)

## 2023-06-29 PROCEDURE — 82565 ASSAY OF CREATININE: CPT

## 2023-06-29 PROCEDURE — 71260 CT THORAX DX C+: CPT

## 2023-06-29 PROCEDURE — 6360000004 HC RX CONTRAST MEDICATION: Performed by: INTERNAL MEDICINE

## 2023-06-29 RX ORDER — 0.9 % SODIUM CHLORIDE 0.9 %
100 INTRAVENOUS SOLUTION INTRAVENOUS
Status: DISCONTINUED | OUTPATIENT
Start: 2023-06-29 | End: 2023-07-03 | Stop reason: HOSPADM

## 2023-06-29 RX ORDER — SODIUM CHLORIDE 0.9 % (FLUSH) 0.9 %
10 SYRINGE (ML) INJECTION
Status: DISCONTINUED | OUTPATIENT
Start: 2023-06-29 | End: 2023-07-03 | Stop reason: HOSPADM

## 2023-06-29 RX ADMIN — IOPAMIDOL 100 ML: 755 INJECTION, SOLUTION INTRAVENOUS at 11:14

## 2023-06-29 RX ADMIN — DIATRIZOATE MEGLUMINE AND DIATRIZOATE SODIUM 15 ML: 660; 100 LIQUID ORAL; RECTAL at 11:14

## 2023-06-30 ENCOUNTER — CLINICAL DOCUMENTATION (OUTPATIENT)
Dept: ONCOLOGY | Age: 78
End: 2023-06-30

## 2023-07-06 DIAGNOSIS — C18.2 PRIMARY ADENOCARCINOMA OF ASCENDING COLON (HCC): Primary | ICD-10-CM

## 2023-07-06 DIAGNOSIS — E55.9 VITAMIN D DEFICIENCY: ICD-10-CM

## 2023-07-06 DIAGNOSIS — D64.9 ANEMIA, UNSPECIFIED TYPE: ICD-10-CM

## 2023-07-20 ENCOUNTER — HOSPITAL ENCOUNTER (OUTPATIENT)
Dept: MAMMOGRAPHY | Age: 78
Discharge: HOME | End: 2023-07-20
Attending: STUDENT IN AN ORGANIZED HEALTH CARE EDUCATION/TRAINING PROGRAM
Payer: MEDICARE

## 2023-07-20 DIAGNOSIS — Z12.31 ENCOUNTER FOR SCREENING MAMMOGRAM FOR MALIGNANT NEOPLASM OF BREAST: ICD-10-CM

## 2023-07-20 PROCEDURE — 77067 SCR MAMMO BI INCL CAD: CPT

## 2023-07-25 ASSESSMENT — ENCOUNTER SYMPTOMS
SINUS PAIN: 0
SORE THROAT: 0
ANAL BLEEDING: 0
DIARRHEA: 0
EYES NEGATIVE: 1
TROUBLE SWALLOWING: 0
COUGH: 0
BLOOD IN STOOL: 0
EYE DISCHARGE: 0
VOMITING: 0
ABDOMINAL DISTENTION: 0
ABDOMINAL PAIN: 0
SHORTNESS OF BREATH: 0
CONSTIPATION: 0
NAUSEA: 0
BACK PAIN: 0

## 2023-07-25 NOTE — PROGRESS NOTES
check if different formulation works better for her. - elevated liver enzymes - resolved   - Continue good oral nutrition and hydration.   - hypokalemia - resolved; can d/c potassium   - here for FU. Her daughters are present on the phone for the visit. Pt stated she is feeling normal.  No acute issues or concerns at this time. - Seeing Dr Salinas Lyn for colonoscopy in March and wishes to keep her port until then. - GERD - She has remained on PPI since last visit, with improvement in her symptoms. She is scheduled with GI early in 2023.  - pancytopenia slowly improving. Will check smear. Will also check nutritional labs. - surveillance - CT CAP discussed and with no evid of disease recurrence and resolution of pulm fissure/nodule. - Some changes on the tongue - can try MMW again.    - Plan for EGD next month for GERD w GI. CT end of June. All questions were asked and answered to the best of my ability. The patient verbalized understanding and agrees with the plan above.             Jace Mtz MD  [facility] Hematology and Oncology  [address]  Office : [phone]  Fax : [phone]

## 2023-07-27 ENCOUNTER — OFFICE VISIT (OUTPATIENT)
Dept: ONCOLOGY | Age: 78
End: 2023-07-27
Payer: MEDICARE

## 2023-07-27 ENCOUNTER — HOSPITAL ENCOUNTER (OUTPATIENT)
Dept: LAB | Age: 78
Discharge: HOME | End: 2023-07-27
Payer: MEDICARE

## 2023-07-27 VITALS
OXYGEN SATURATION: 97 % | HEART RATE: 74 BPM | HEIGHT: 62 IN | WEIGHT: 130.5 LBS | DIASTOLIC BLOOD PRESSURE: 56 MMHG | SYSTOLIC BLOOD PRESSURE: 119 MMHG | BODY MASS INDEX: 24.01 KG/M2 | TEMPERATURE: 98.9 F | RESPIRATION RATE: 16 BRPM

## 2023-07-27 DIAGNOSIS — L29.9 ITCHING: ICD-10-CM

## 2023-07-27 DIAGNOSIS — C18.2 PRIMARY ADENOCARCINOMA OF ASCENDING COLON (HCC): ICD-10-CM

## 2023-07-27 DIAGNOSIS — R53.83 OTHER FATIGUE: ICD-10-CM

## 2023-07-27 DIAGNOSIS — C18.2 PRIMARY ADENOCARCINOMA OF ASCENDING COLON (HCC): Primary | ICD-10-CM

## 2023-07-27 DIAGNOSIS — E55.9 VITAMIN D DEFICIENCY: ICD-10-CM

## 2023-07-27 PROBLEM — Z79.899 HIGH RISK MEDICATION USE: Status: RESOLVED | Noted: 2022-02-28 | Resolved: 2023-07-27

## 2023-07-27 LAB
25(OH)D3 SERPL-MCNC: 26.4 NG/ML (ref 30–100)
ALBUMIN SERPL-MCNC: 3.9 G/DL (ref 3.2–4.6)
ALBUMIN/GLOB SERPL: 1.2 (ref 0.4–1.6)
ALP SERPL-CCNC: 100 U/L (ref 50–136)
ALT SERPL-CCNC: 52 U/L (ref 12–65)
ANION GAP SERPL CALC-SCNC: 9 MMOL/L (ref 2–11)
AST SERPL-CCNC: 34 U/L (ref 15–37)
BASOPHILS # BLD: 0 K/UL (ref 0–0.2)
BASOPHILS NFR BLD: 1 % (ref 0–2)
BILIRUB SERPL-MCNC: 0.5 MG/DL (ref 0.2–1.1)
BUN SERPL-MCNC: 15 MG/DL (ref 8–23)
CALCIUM SERPL-MCNC: 8.9 MG/DL (ref 8.3–10.4)
CEA SERPL-MCNC: 0.9 NG/ML (ref 0–3)
CHLORIDE SERPL-SCNC: 106 MMOL/L (ref 101–110)
CO2 SERPL-SCNC: 26 MMOL/L (ref 21–32)
CREAT SERPL-MCNC: 0.9 MG/DL (ref 0.6–1)
DIFFERENTIAL METHOD BLD: ABNORMAL
EOSINOPHIL # BLD: 0 K/UL (ref 0–0.8)
EOSINOPHIL NFR BLD: 1 % (ref 0.5–7.8)
ERYTHROCYTE [DISTWIDTH] IN BLOOD BY AUTOMATED COUNT: 12.4 % (ref 11.9–14.6)
FOLATE SERPL-MCNC: 43.4 NG/ML (ref 3.1–17.5)
GLOBULIN SER CALC-MCNC: 3.2 G/DL (ref 2.8–4.5)
GLUCOSE SERPL-MCNC: 127 MG/DL (ref 65–100)
HCT VFR BLD AUTO: 41.1 % (ref 35.8–46.3)
HGB BLD-MCNC: 13.6 G/DL (ref 11.7–15.4)
IMM GRANULOCYTES # BLD AUTO: 0 K/UL (ref 0–0.5)
IMM GRANULOCYTES NFR BLD AUTO: 1 % (ref 0–5)
LYMPHOCYTES # BLD: 1.2 K/UL (ref 0.5–4.6)
LYMPHOCYTES NFR BLD: 31 % (ref 13–44)
MCH RBC QN AUTO: 29.8 PG (ref 26.1–32.9)
MCHC RBC AUTO-ENTMCNC: 33.1 G/DL (ref 31.4–35)
MCV RBC AUTO: 89.9 FL (ref 82–102)
MONOCYTES # BLD: 0.5 K/UL (ref 0.1–1.3)
MONOCYTES NFR BLD: 13 % (ref 4–12)
NEUTS SEG # BLD: 2 K/UL (ref 1.7–8.2)
NEUTS SEG NFR BLD: 54 % (ref 43–78)
NRBC # BLD: 0 K/UL (ref 0–0.2)
PLATELET # BLD AUTO: 149 K/UL (ref 150–450)
PMV BLD AUTO: 10.5 FL (ref 9.4–12.3)
POTASSIUM SERPL-SCNC: 3.9 MMOL/L (ref 3.5–5.1)
PROT SERPL-MCNC: 7.1 G/DL (ref 6.3–8.2)
RBC # BLD AUTO: 4.57 M/UL (ref 4.05–5.2)
SODIUM SERPL-SCNC: 141 MMOL/L (ref 133–143)
TSH, 3RD GENERATION: 1.67 UIU/ML (ref 0.36–3)
VIT B12 SERPL-MCNC: 328 PG/ML (ref 193–986)
WBC # BLD AUTO: 3.8 K/UL (ref 4.3–11.1)

## 2023-07-27 PROCEDURE — 1090F PRES/ABSN URINE INCON ASSESS: CPT | Performed by: INTERNAL MEDICINE

## 2023-07-27 PROCEDURE — 1123F ACP DISCUSS/DSCN MKR DOCD: CPT | Performed by: INTERNAL MEDICINE

## 2023-07-27 PROCEDURE — 80053 COMPREHEN METABOLIC PANEL: CPT

## 2023-07-27 PROCEDURE — 84443 ASSAY THYROID STIM HORMONE: CPT

## 2023-07-27 PROCEDURE — G8399 PT W/DXA RESULTS DOCUMENT: HCPCS | Performed by: INTERNAL MEDICINE

## 2023-07-27 PROCEDURE — G8420 CALC BMI NORM PARAMETERS: HCPCS | Performed by: INTERNAL MEDICINE

## 2023-07-27 PROCEDURE — 99214 OFFICE O/P EST MOD 30 MIN: CPT | Performed by: INTERNAL MEDICINE

## 2023-07-27 PROCEDURE — 82306 VITAMIN D 25 HYDROXY: CPT

## 2023-07-27 PROCEDURE — 82378 CARCINOEMBRYONIC ANTIGEN: CPT

## 2023-07-27 PROCEDURE — 1036F TOBACCO NON-USER: CPT | Performed by: INTERNAL MEDICINE

## 2023-07-27 PROCEDURE — G8427 DOCREV CUR MEDS BY ELIG CLIN: HCPCS | Performed by: INTERNAL MEDICINE

## 2023-07-27 PROCEDURE — 82607 VITAMIN B-12: CPT

## 2023-07-27 PROCEDURE — 82746 ASSAY OF FOLIC ACID SERUM: CPT

## 2023-07-27 PROCEDURE — 36415 COLL VENOUS BLD VENIPUNCTURE: CPT

## 2023-07-27 PROCEDURE — 85025 COMPLETE CBC W/AUTO DIFF WBC: CPT

## 2023-07-27 ASSESSMENT — PATIENT HEALTH QUESTIONNAIRE - PHQ9
SUM OF ALL RESPONSES TO PHQ QUESTIONS 1-9: 0
SUM OF ALL RESPONSES TO PHQ QUESTIONS 1-9: 0
10. IF YOU CHECKED OFF ANY PROBLEMS, HOW DIFFICULT HAVE THESE PROBLEMS MADE IT FOR YOU TO DO YOUR WORK, TAKE CARE OF THINGS AT HOME, OR GET ALONG WITH OTHER PEOPLE: 0
SUM OF ALL RESPONSES TO PHQ9 QUESTIONS 1 & 2: 0
9. THOUGHTS THAT YOU WOULD BE BETTER OFF DEAD, OR OF HURTING YOURSELF: 0
2. FEELING DOWN, DEPRESSED OR HOPELESS: 0
1. LITTLE INTEREST OR PLEASURE IN DOING THINGS: 0
7. TROUBLE CONCENTRATING ON THINGS, SUCH AS READING THE NEWSPAPER OR WATCHING TELEVISION: 0
5. POOR APPETITE OR OVEREATING: 0
SUM OF ALL RESPONSES TO PHQ QUESTIONS 1-9: 0
3. TROUBLE FALLING OR STAYING ASLEEP: 0
SUM OF ALL RESPONSES TO PHQ QUESTIONS 1-9: 0
8. MOVING OR SPEAKING SO SLOWLY THAT OTHER PEOPLE COULD HAVE NOTICED. OR THE OPPOSITE, BEING SO FIGETY OR RESTLESS THAT YOU HAVE BEEN MOVING AROUND A LOT MORE THAN USUAL: 0
6. FEELING BAD ABOUT YOURSELF - OR THAT YOU ARE A FAILURE OR HAVE LET YOURSELF OR YOUR FAMILY DOWN: 0
4. FEELING TIRED OR HAVING LITTLE ENERGY: 0

## 2023-07-27 ASSESSMENT — ANXIETY QUESTIONNAIRES
7. FEELING AFRAID AS IF SOMETHING AWFUL MIGHT HAPPEN: 0
3. WORRYING TOO MUCH ABOUT DIFFERENT THINGS: 0
IF YOU CHECKED OFF ANY PROBLEMS ON THIS QUESTIONNAIRE, HOW DIFFICULT HAVE THESE PROBLEMS MADE IT FOR YOU TO DO YOUR WORK, TAKE CARE OF THINGS AT HOME, OR GET ALONG WITH OTHER PEOPLE: NOT DIFFICULT AT ALL
2. NOT BEING ABLE TO STOP OR CONTROL WORRYING: 0
6. BECOMING EASILY ANNOYED OR IRRITABLE: 0
4. TROUBLE RELAXING: 0
GAD7 TOTAL SCORE: 0
1. FEELING NERVOUS, ANXIOUS, OR ON EDGE: 0
5. BEING SO RESTLESS THAT IT IS HARD TO SIT STILL: 0

## 2023-07-27 NOTE — PATIENT INSTRUCTIONS
Patient Instructions from Today's Visit    Reason for Visit:  Follow Up    Diagnosis Information:  [email or web address]     Plan:  Labs reviewed. Scan reviewed. CT chest abdomen pelvis due in December before your appointment with Dr. Lance Vanegas. You can call to schedule this at [phone]. Follow Up:  September.     Recent Lab Results:  Hospital Outpatient Visit on 07/27/2023   Component Date Value Ref Range Status    WBC 07/27/2023 3.8 (L)  4.3 - 11.1 K/uL Final    RBC 07/27/2023 4.57  4.05 - 5.2 M/uL Final    Hemoglobin 07/27/2023 13.6  11.7 - 15.4 g/dL Final    Hematocrit 07/27/2023 41.1  35.8 - 46.3 % Final    MCV 07/27/2023 89.9  82.0 - 102.0 FL Final    MCH 07/27/2023 29.8  26.1 - 32.9 PG Final    MCHC 07/27/2023 33.1  31.4 - 35.0 g/dL Final    RDW 07/27/2023 12.4  11.9 - 14.6 % Final    Platelets 79/53/2875 149 (L)  150 - 450 K/uL Final    MPV 07/27/2023 10.5  9.4 - 12.3 FL Final    nRBC 07/27/2023 0.00  0.0 - 0.2 K/uL Final    **Note: Absolute NRBC parameter is now reported with Hemogram**    Differential Type 07/27/2023 AUTOMATED    Final    Neutrophils % 07/27/2023 54  43 - 78 % Final    Lymphocytes % 07/27/2023 31  13 - 44 % Final    Monocytes % 07/27/2023 13 (H)  4.0 - 12.0 % Final    Eosinophils % 07/27/2023 1  0.5 - 7.8 % Final    Basophils % 07/27/2023 1  0.0 - 2.0 % Final    Immature Granulocytes 07/27/2023 1  0.0 - 5.0 % Final    Neutrophils Absolute 07/27/2023 2.0  1.7 - 8.2 K/UL Final    Lymphocytes Absolute 07/27/2023 1.2  0.5 - 4.6 K/UL Final    Monocytes Absolute 07/27/2023 0.5  0.1 - 1.3 K/UL Final    Eosinophils Absolute 07/27/2023 0.0  0.0 - 0.8 K/UL Final    Basophils Absolute 07/27/2023 0.0  0.0 - 0.2 K/UL Final    Absolute Immature Granulocyte 07/27/2023 0.0  0.0 - 0.5 K/UL Final    Sodium 07/27/2023 141  133 - 143 mmol/L Final    Potassium 07/27/2023 3.9  3.5 - 5.1 mmol/L Final    Chloride 07/27/2023 106

## 2023-08-22 ENCOUNTER — TELEMEDICINE (OUTPATIENT)
Dept: INTERNAL MEDICINE CLINIC | Facility: CLINIC | Age: 78
End: 2023-08-22
Payer: MEDICARE

## 2023-08-22 DIAGNOSIS — L29.9 PRURITUS: Primary | ICD-10-CM

## 2023-08-22 DIAGNOSIS — K63.1 PERFORATION OF COLON (HCC): ICD-10-CM

## 2023-08-22 DIAGNOSIS — I25.119 ATHEROSCLEROSIS OF NATIVE CORONARY ARTERY OF NATIVE HEART WITH ANGINA PECTORIS (HCC): ICD-10-CM

## 2023-08-22 DIAGNOSIS — E55.9 VITAMIN D DEFICIENCY: ICD-10-CM

## 2023-08-22 PROBLEM — L76.34 POSTOPERATIVE SEROMA OF SUBCUTANEOUS TISSUE AFTER NON-DERMATOLOGIC PROCEDURE: Status: RESOLVED | Noted: 2022-02-11 | Resolved: 2023-08-22

## 2023-08-22 PROCEDURE — 99422 OL DIG E/M SVC 11-20 MIN: CPT | Performed by: STUDENT IN AN ORGANIZED HEALTH CARE EDUCATION/TRAINING PROGRAM

## 2023-08-22 RX ORDER — GABAPENTIN 100 MG/1
100 CAPSULE ORAL 3 TIMES DAILY PRN
Qty: 90 CAPSULE | Refills: 2 | Status: SHIPPED | OUTPATIENT
Start: 2023-08-22 | End: 2023-11-20

## 2023-08-22 RX ORDER — SERTRALINE HYDROCHLORIDE 25 MG/1
25 TABLET, FILM COATED ORAL DAILY
Qty: 90 TABLET | Refills: 1 | Status: SHIPPED | OUTPATIENT
Start: 2023-08-22

## 2023-08-22 ASSESSMENT — ENCOUNTER SYMPTOMS: SHORTNESS OF BREATH: 0

## 2023-08-22 NOTE — PROGRESS NOTES
Nuvia Radford Case (:  1945) is seen via virtual visit today for evaluation of the following:   Chief Complaint   Patient presents with    Follow-up   . HPI:    Pruritis: better, still present somewhat. No rash, skin lesions. Changes position locations including the thighs, back of arms, back. Gabapentin made her sleepy but taking this nightly. Saw derm yesterday Dr. 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 The itching is not every day, did not feel any yesterday but present on the thighs today. Not felt to be chemo related by oncology. Borderline low B12: taking 25 mcg multivitamin and   1000 mcg     Vitamin D deficiency: 26.4 on 23. Taking 1000 U daily in women's multi, and additional 2000 U. Other medical history:  -Colon adenocarcinoma stage III: Diagnosed 2022, s/p subsequent right extended colectomy with ileo-transverse staped anastomosis with Dr. Antoni Diehl. Path showed mod-poorly diff adenocarcinoma - [id number] ( LN +). Had 11 cycles total with FOLFOX, held off on her last treatment due to cytopenia, fatigue, neuropathy. Currently on surveillance.  -Corneal dystrophy status post corneal transplant, Dr. Lukasz Ramsay  -CAD s/p AYDEE to LAD in 2016: sees cardiology Dr. Kofi Vasquez. On atorvastatin, baby aspirin. -GERD, IBS: Controlled, takes gaviscon as needed.   -Low vitamin D: On supplement  -Osteopenia: T score of femoral neck -1.7, L spine -1.1 noted on DEXA scan from 22. Healthcare maintenance: had colonoscopy 3/2/23, normal, repeat . saw GI, getting EGD -> 23 hiatal hernia otherwise within normal limits. mammo 2023 negative.     The following portions of the patient's history were reviewed and updated as appropriate:      Patient Active Problem List   Diagnosis    Iron deficiency anemia    Precordial pain    Irritable bowel syndrome    Redness of skin    Corneal dystrophy    Hyperlipidemia

## 2023-10-19 ENCOUNTER — HOSPITAL ENCOUNTER (OUTPATIENT)
Dept: LAB | Age: 78
Discharge: HOME | End: 2023-10-19
Payer: MEDICARE

## 2023-10-19 ENCOUNTER — OFFICE VISIT (OUTPATIENT)
Dept: ONCOLOGY | Age: 78
End: 2023-10-19
Payer: MEDICARE

## 2023-10-19 VITALS
DIASTOLIC BLOOD PRESSURE: 65 MMHG | SYSTOLIC BLOOD PRESSURE: 121 MMHG | RESPIRATION RATE: 18 BRPM | TEMPERATURE: 98 F | HEIGHT: 62 IN | HEART RATE: 72 BPM | BODY MASS INDEX: 24.38 KG/M2 | OXYGEN SATURATION: 97 % | WEIGHT: 132.5 LBS

## 2023-10-19 DIAGNOSIS — R53.83 OTHER FATIGUE: ICD-10-CM

## 2023-10-19 DIAGNOSIS — C18.2 PRIMARY ADENOCARCINOMA OF ASCENDING COLON (HCC): ICD-10-CM

## 2023-10-19 DIAGNOSIS — C18.2 PRIMARY ADENOCARCINOMA OF ASCENDING COLON (HCC): Primary | ICD-10-CM

## 2023-10-19 DIAGNOSIS — L29.9 ITCHING: ICD-10-CM

## 2023-10-19 LAB
ALBUMIN SERPL-MCNC: 4 G/DL (ref 3.2–4.6)
ALBUMIN/GLOB SERPL: 1.2 (ref 0.4–1.6)
ALP SERPL-CCNC: 82 U/L (ref 50–136)
ALT SERPL-CCNC: 40 U/L (ref 12–65)
ANION GAP SERPL CALC-SCNC: 3 MMOL/L (ref 2–11)
AST SERPL-CCNC: 29 U/L (ref 15–37)
BASOPHILS # BLD: 0 K/UL (ref 0–0.2)
BASOPHILS NFR BLD: 1 % (ref 0–2)
BILIRUB SERPL-MCNC: 0.5 MG/DL (ref 0.2–1.1)
BUN SERPL-MCNC: 14 MG/DL (ref 8–23)
CALCIUM SERPL-MCNC: 9.2 MG/DL (ref 8.3–10.4)
CEA SERPL-MCNC: 1.1 NG/ML (ref 0–3)
CHLORIDE SERPL-SCNC: 107 MMOL/L (ref 101–110)
CO2 SERPL-SCNC: 30 MMOL/L (ref 21–32)
CREAT SERPL-MCNC: 1 MG/DL (ref 0.6–1)
DIFFERENTIAL METHOD BLD: ABNORMAL
EOSINOPHIL # BLD: 0.1 K/UL (ref 0–0.8)
EOSINOPHIL NFR BLD: 3 % (ref 0.5–7.8)
ERYTHROCYTE [DISTWIDTH] IN BLOOD BY AUTOMATED COUNT: 12.3 % (ref 11.9–14.6)
GLOBULIN SER CALC-MCNC: 3.4 G/DL (ref 2.8–4.5)
GLUCOSE SERPL-MCNC: 132 MG/DL (ref 65–100)
HCT VFR BLD AUTO: 42.5 % (ref 35.8–46.3)
HGB BLD-MCNC: 14 G/DL (ref 11.7–15.4)
IMM GRANULOCYTES # BLD AUTO: 0 K/UL (ref 0–0.5)
IMM GRANULOCYTES NFR BLD AUTO: 0 % (ref 0–5)
LYMPHOCYTES # BLD: 1.1 K/UL (ref 0.5–4.6)
LYMPHOCYTES NFR BLD: 34 % (ref 13–44)
MCH RBC QN AUTO: 29.7 PG (ref 26.1–32.9)
MCHC RBC AUTO-ENTMCNC: 32.9 G/DL (ref 31.4–35)
MCV RBC AUTO: 90.2 FL (ref 82–102)
MONOCYTES # BLD: 0.4 K/UL (ref 0.1–1.3)
MONOCYTES NFR BLD: 11 % (ref 4–12)
NEUTS SEG # BLD: 1.7 K/UL (ref 1.7–8.2)
NEUTS SEG NFR BLD: 51 % (ref 43–78)
NRBC # BLD: 0 K/UL (ref 0–0.2)
PLATELET # BLD AUTO: 176 K/UL (ref 150–450)
PMV BLD AUTO: 11.1 FL (ref 9.4–12.3)
POTASSIUM SERPL-SCNC: 4.2 MMOL/L (ref 3.5–5.1)
PROT SERPL-MCNC: 7.4 G/DL (ref 6.3–8.2)
RBC # BLD AUTO: 4.71 M/UL (ref 4.05–5.2)
SODIUM SERPL-SCNC: 140 MMOL/L (ref 133–143)
WBC # BLD AUTO: 3.3 K/UL (ref 4.3–11.1)

## 2023-10-19 PROCEDURE — 82378 CARCINOEMBRYONIC ANTIGEN: CPT

## 2023-10-19 PROCEDURE — 85025 COMPLETE CBC W/AUTO DIFF WBC: CPT

## 2023-10-19 PROCEDURE — 99214 OFFICE O/P EST MOD 30 MIN: CPT

## 2023-10-19 PROCEDURE — 1123F ACP DISCUSS/DSCN MKR DOCD: CPT

## 2023-10-19 PROCEDURE — 80053 COMPREHEN METABOLIC PANEL: CPT

## 2023-10-19 PROCEDURE — 36415 COLL VENOUS BLD VENIPUNCTURE: CPT

## 2023-10-19 ASSESSMENT — ENCOUNTER SYMPTOMS
EYES NEGATIVE: 1
ABDOMINAL DISTENTION: 0
EYE DISCHARGE: 0
SINUS PAIN: 0
ABDOMINAL PAIN: 0
DIARRHEA: 0
TROUBLE SWALLOWING: 0
BLOOD IN STOOL: 0
NAUSEA: 0
SHORTNESS OF BREATH: 0
COUGH: 0
ANAL BLEEDING: 0
SORE THROAT: 0
CONSTIPATION: 0
BACK PAIN: 0
VOMITING: 0

## 2023-10-19 ASSESSMENT — PATIENT HEALTH QUESTIONNAIRE - PHQ9
SUM OF ALL RESPONSES TO PHQ9 QUESTIONS 1 & 2: 0
SUM OF ALL RESPONSES TO PHQ QUESTIONS 1-9: 0
SUM OF ALL RESPONSES TO PHQ QUESTIONS 1-9: 0
2. FEELING DOWN, DEPRESSED OR HOPELESS: 0
SUM OF ALL RESPONSES TO PHQ QUESTIONS 1-9: 0
1. LITTLE INTEREST OR PLEASURE IN DOING THINGS: 0
SUM OF ALL RESPONSES TO PHQ QUESTIONS 1-9: 0

## 2023-10-19 NOTE — PROGRESS NOTES
[facility] Hematology and Oncology: Office Visit Established Patient    Chief Complaint   Patient presents with    Follow-up      History of Present Illness:  Ms. Martell is a  65 y.o. female who presents today for  follow up regarding colon cancer. she was admitted on 1/22/22 with peritonitis, colon mass, obstruction and bowel perforation. PMhx: anxiety,  corneal dystrophy s/p transplants (bilat) on steroid drops, CAD, GERD, IBS, HLD. She p/w abd pain x2 days. CT AP c/w 2.3cm mass in mid-ascending colon and LAD with obstruction and perforation. S/p subsequent right extended colectomy with ileo-transverse staped anastomosis. Path c/w mod-poorly diff adenocarcinoma - [id number] (2/14 LN +). We were consulted for recs. S/p FOLFOX - adjuvantly, omitted last cycle. Ultimately after discussion of pros and cons of proceeding with last treatment, she elected not to proceed with treatment due to increased risk of infection, with persistent cytopenias, fatigue and neuropathy. She was comfortable with this plan. She understood that taking the last treatment will not necessarily prevent disease in the future. She also understood that her risk of recurrent disease with tx adjustments made along the way to make it tolerable/dose adjusted. We discussed surveillance per NCCN guidelines. She wishes to go back into the community to her [employer] but is considering holding off for now due to immune compromise. She wished to switch primary care physicians and was referred. Today, pt is here for FU. She has been doing well overall. She states her itching has improved. Fatigue is the same. No fevers or infectious sx. No respiratory sx. She denies any pain. She said she is easily constipated or with diarrhea and she has continued to manage this well. VS and labs reviewed. CT CAP 12/29 with FU with Dr. Abdi Rojas 1/11/24.        Chronological Events:  1/22/22 admitted with abd pain/perforation, dx'ed w colon ca    2/9/22 HFU -

## 2023-10-24 ENCOUNTER — OFFICE VISIT (OUTPATIENT)
Dept: INTERNAL MEDICINE CLINIC | Facility: CLINIC | Age: 78
End: 2023-10-24
Payer: MEDICARE

## 2023-10-24 VITALS
DIASTOLIC BLOOD PRESSURE: 60 MMHG | WEIGHT: 135 LBS | BODY MASS INDEX: 24.84 KG/M2 | HEART RATE: 80 BPM | OXYGEN SATURATION: 95 % | SYSTOLIC BLOOD PRESSURE: 110 MMHG | HEIGHT: 62 IN

## 2023-10-24 DIAGNOSIS — E78.5 HYPERLIPIDEMIA, UNSPECIFIED HYPERLIPIDEMIA TYPE: ICD-10-CM

## 2023-10-24 DIAGNOSIS — Z95.5 PRESENCE OF STENT IN LAD CORONARY ARTERY: ICD-10-CM

## 2023-10-24 DIAGNOSIS — E55.9 VITAMIN D DEFICIENCY: ICD-10-CM

## 2023-10-24 DIAGNOSIS — Z94.7 CORNEAL TRANSPLANT STATUS: ICD-10-CM

## 2023-10-24 DIAGNOSIS — M85.859 OSTEOPENIA OF NECK OF FEMUR, UNSPECIFIED LATERALITY: ICD-10-CM

## 2023-10-24 DIAGNOSIS — C18.2 PRIMARY ADENOCARCINOMA OF ASCENDING COLON (HCC): ICD-10-CM

## 2023-10-24 DIAGNOSIS — I25.119 ATHEROSCLEROSIS OF NATIVE CORONARY ARTERY OF NATIVE HEART WITH ANGINA PECTORIS (HCC): ICD-10-CM

## 2023-10-24 DIAGNOSIS — L29.9 PRURITUS: Primary | ICD-10-CM

## 2023-10-24 DIAGNOSIS — K58.9 IRRITABLE BOWEL SYNDROME, UNSPECIFIED TYPE: ICD-10-CM

## 2023-10-24 DIAGNOSIS — H18.509 CORNEAL DYSTROPHY: ICD-10-CM

## 2023-10-24 PROCEDURE — 1090F PRES/ABSN URINE INCON ASSESS: CPT | Performed by: STUDENT IN AN ORGANIZED HEALTH CARE EDUCATION/TRAINING PROGRAM

## 2023-10-24 PROCEDURE — 1123F ACP DISCUSS/DSCN MKR DOCD: CPT | Performed by: STUDENT IN AN ORGANIZED HEALTH CARE EDUCATION/TRAINING PROGRAM

## 2023-10-24 PROCEDURE — G8399 PT W/DXA RESULTS DOCUMENT: HCPCS | Performed by: STUDENT IN AN ORGANIZED HEALTH CARE EDUCATION/TRAINING PROGRAM

## 2023-10-24 PROCEDURE — G8427 DOCREV CUR MEDS BY ELIG CLIN: HCPCS | Performed by: STUDENT IN AN ORGANIZED HEALTH CARE EDUCATION/TRAINING PROGRAM

## 2023-10-24 PROCEDURE — G8420 CALC BMI NORM PARAMETERS: HCPCS | Performed by: STUDENT IN AN ORGANIZED HEALTH CARE EDUCATION/TRAINING PROGRAM

## 2023-10-24 PROCEDURE — G8484 FLU IMMUNIZE NO ADMIN: HCPCS | Performed by: STUDENT IN AN ORGANIZED HEALTH CARE EDUCATION/TRAINING PROGRAM

## 2023-10-24 PROCEDURE — 99214 OFFICE O/P EST MOD 30 MIN: CPT | Performed by: STUDENT IN AN ORGANIZED HEALTH CARE EDUCATION/TRAINING PROGRAM

## 2023-10-24 PROCEDURE — 1036F TOBACCO NON-USER: CPT | Performed by: STUDENT IN AN ORGANIZED HEALTH CARE EDUCATION/TRAINING PROGRAM

## 2023-10-24 ASSESSMENT — PATIENT HEALTH QUESTIONNAIRE - PHQ9
SUM OF ALL RESPONSES TO PHQ QUESTIONS 1-9: 0
1. LITTLE INTEREST OR PLEASURE IN DOING THINGS: 0
SUM OF ALL RESPONSES TO PHQ9 QUESTIONS 1 & 2: 0
SUM OF ALL RESPONSES TO PHQ QUESTIONS 1-9: 0
2. FEELING DOWN, DEPRESSED OR HOPELESS: 0
SUM OF ALL RESPONSES TO PHQ QUESTIONS 1-9: 0
SUM OF ALL RESPONSES TO PHQ QUESTIONS 1-9: 0

## 2023-10-24 NOTE — PROGRESS NOTES
FOLLOW UP VISIT    Subjective:    Amaury Martell (: 1945) is a 77 y.o., female,   Chief Complaint   Patient presents with    Pruritis       HPI:    Decided to not take zoloft due to risk of glaucoma, however pruritis is better, about 50%. Not using anything for pruritis currently. Derm changed type of lotion (to lubriderm)    Gabapentin even 100 mg at night made her too sleepy. D deficiency: taking 2000 U daily per pt. Pneumonia vaccine , we discussed additional, she would like to hold off. Discussed flu shot. The following portions of the patient's history were reviewed and updated as appropriate:      Patient Active Problem List    Diagnosis Date Noted    Osteopenia of neck of femur 10/18/2022    Hypokalemia 2022    Diarrhea 2022    Mucositis 2022    Presence of stent in LAD coronary artery 10/24/2023    Other fatigue 2023    Itching 2023    Pruritus 2023    Gastro-esophageal reflux disease without esophagitis 2023    Diaphragmatic hernia without obstruction or gangrene 2023    Vitamin D deficiency 2022    Hypomagnesemia 2022    Iron deficiency anemia 2022    Redness of skin 02/15/2022    Corneal transplant status 02/15/2022    Primary adenocarcinoma of ascending colon ([address]) 2022    Precordial pain 2017    Atherosclerosis of native coronary artery of native heart with angina pectoris ([address])     Irritable bowel syndrome 2015    Corneal dystrophy 2015    Hyperlipidemia 2015      Past Medical History:   Diagnosis Date    Actinic keratosis     Adjustment disorder with mixed anxiety and depressed mood 2015    Adverse effect of anesthesia     cystoscopy - was awake but could not move - dont remember the anesthesetic given    Arthritis     osteoarthritis    Corneal dystrophy 2015    eye drops.  Dr Van Wayne    Coronary atherosclerosis of native coronary artery

## 2023-10-25 ENCOUNTER — OFFICE VISIT (OUTPATIENT)
Age: 78
End: 2023-10-25
Payer: MEDICARE

## 2023-10-25 VITALS
BODY MASS INDEX: 24.66 KG/M2 | WEIGHT: 134 LBS | HEART RATE: 76 BPM | DIASTOLIC BLOOD PRESSURE: 70 MMHG | SYSTOLIC BLOOD PRESSURE: 126 MMHG | HEIGHT: 62 IN

## 2023-10-25 DIAGNOSIS — E78.5 HYPERLIPIDEMIA, UNSPECIFIED HYPERLIPIDEMIA TYPE: ICD-10-CM

## 2023-10-25 DIAGNOSIS — I25.119 ATHEROSCLEROSIS OF NATIVE CORONARY ARTERY OF NATIVE HEART WITH ANGINA PECTORIS (HCC): Primary | ICD-10-CM

## 2023-10-25 PROCEDURE — G8428 CUR MEDS NOT DOCUMENT: HCPCS | Performed by: INTERNAL MEDICINE

## 2023-10-25 PROCEDURE — 1123F ACP DISCUSS/DSCN MKR DOCD: CPT | Performed by: INTERNAL MEDICINE

## 2023-10-25 PROCEDURE — 99214 OFFICE O/P EST MOD 30 MIN: CPT | Performed by: INTERNAL MEDICINE

## 2023-10-25 PROCEDURE — 1036F TOBACCO NON-USER: CPT | Performed by: INTERNAL MEDICINE

## 2023-10-25 PROCEDURE — G8420 CALC BMI NORM PARAMETERS: HCPCS | Performed by: INTERNAL MEDICINE

## 2023-10-25 PROCEDURE — 1090F PRES/ABSN URINE INCON ASSESS: CPT | Performed by: INTERNAL MEDICINE

## 2023-10-25 PROCEDURE — G8399 PT W/DXA RESULTS DOCUMENT: HCPCS | Performed by: INTERNAL MEDICINE

## 2023-10-25 PROCEDURE — G8484 FLU IMMUNIZE NO ADMIN: HCPCS | Performed by: INTERNAL MEDICINE

## 2023-10-25 RX ORDER — LANOLIN ALCOHOL/MO/W.PET/CERES
1000 CREAM (GRAM) TOPICAL DAILY
COMMUNITY

## 2023-11-13 RX ORDER — PANTOPRAZOLE SODIUM 40 MG/1
TABLET, DELAYED RELEASE ORAL
Qty: 90 TABLET | Refills: 1 | OUTPATIENT
Start: 2023-11-13

## 2023-12-31 DIAGNOSIS — I25.10 ATHEROSCLEROSIS OF NATIVE CORONARY ARTERY OF NATIVE HEART WITHOUT ANGINA PECTORIS: ICD-10-CM

## 2024-01-02 RX ORDER — ATORVASTATIN CALCIUM 40 MG/1
40 TABLET, FILM COATED ORAL EVERY EVENING
Qty: 90 TABLET | Refills: 3 | Status: SHIPPED | OUTPATIENT
Start: 2024-01-02

## 2024-01-09 ENCOUNTER — HOSPITAL ENCOUNTER (OUTPATIENT)
Dept: CT IMAGING | Age: 79
Discharge: HOME | End: 2024-01-12
Attending: INTERNAL MEDICINE
Payer: MEDICARE

## 2024-01-09 DIAGNOSIS — C18.2 PRIMARY ADENOCARCINOMA OF ASCENDING COLON (HCC): ICD-10-CM

## 2024-01-09 LAB — CREAT BLD-MCNC: 0.66 MG/DL (ref 0.8–1.5)

## 2024-01-09 PROCEDURE — 74177 CT ABD & PELVIS W/CONTRAST: CPT

## 2024-01-09 PROCEDURE — 82565 ASSAY OF CREATININE: CPT

## 2024-01-09 PROCEDURE — 6360000004 HC RX CONTRAST MEDICATION: Performed by: INTERNAL MEDICINE

## 2024-01-09 RX ADMIN — DIATRIZOATE MEGLUMINE AND DIATRIZOATE SODIUM 15 ML: 660; 100 LIQUID ORAL; RECTAL at 09:39

## 2024-01-09 RX ADMIN — IOPAMIDOL 100 ML: 755 INJECTION, SOLUTION INTRAVENOUS at 09:39

## 2024-01-09 ASSESSMENT — ENCOUNTER SYMPTOMS
EYES NEGATIVE: 1
VOMITING: 0
SORE THROAT: 0
COUGH: 0
TROUBLE SWALLOWING: 0
SHORTNESS OF BREATH: 0
NAUSEA: 0
ANAL BLEEDING: 0
CONSTIPATION: 0
ABDOMINAL PAIN: 0
DIARRHEA: 0
SINUS PAIN: 0
BLOOD IN STOOL: 0
EYE DISCHARGE: 0
BACK PAIN: 0
ABDOMINAL DISTENTION: 0

## 2024-01-09 NOTE — PROGRESS NOTES
CSE Block  Performed by: Ramesh Piper CRNA  Authorized by: Kyler Esquivel MD     Patient Location:  OR   Pre-op Evaluation Complete, Timeout Performed, Monitors and Equipment Checked, Risks and Benefits Discussed and Patient Identified  CSE:   Patient Position:  Sitting  Prep:  Chlorhexidine gluconate w/ alcohol  Max Sterile Barrier Technique:  Hand Washing, Cap/Mask, Sterile gloves and Sterile drape  Monitoring:  Continuous pulse oximetry, heart rate, FHT's and non-invasive blood pressure  Approach:  Midline  Location:  L3-4  Injection Technique:  ALFREDO air  Ultrasound Used:  No  Epidural Needle:     Needle Type:  Tuohy    Needle Gauge:  17 G    Needle Length:  3.5 in    ALFREDO Depth:  6.5  Spinal Needle:     Needle Type:  Brittany    Needle Gauge:  25 G  Intrathecal Medications:   Intrathecal Medication:  .75 Bupivacaine-Dextrose 8.25%   1.8   mL  Epidural Catheter:     CSF Obtained: No      Catheter Type:  Side hole    Catheter Size:  19 G    Catheter at Skin Depth:  15    Sensory Level:  T6    Heme from needle:  No    Heme aspirated from catheter:  No    Clear CSF from catheter:  No    Painful injection:  No    Paresthesia:  No    Performed By:  CRNA    CRNA:  ALLYSSA Alvarez CRNA  Notes:      Discussed CSE for c section including risk of headache, infection, and nerve injury. Pt understands and wishes to proceed.         adjustments made along the way to make it tolerable/dose adjusted.  She will follow-up in a couple of months with CT prior.  We discussed surveillance per NCCN guidelines.  She did have an appointment scheduled on October 21 but can come back next week to determine if her counts are recovering nicely.  She wishes to go back into the community to her [employer] but is considering holding off for now due to immune compromise.  She is not having any signs and symptoms of infection at this time.    - She wishes to switch primary care physicians and I will refer her to [facility] internal medicine.       - GERD - Pt reported feeling ok when it comes to her GI tract except for some burning.  She stopped PPi few days ago and noted worsening of her GERD symptoms.  Previously well controlled on PPI/Carafate.  She has not seen GI in many years and will be referred to Dr Molina to establish care for for GERD/re need for further workup.    - pain in knuckles - She also reported pain and some swelling in her knuckles, improved now.  Will add on MELINA.  Itermittent self resolving itching for which she take Benadryl.  No rashes.  Can try carnitor.    - pancytopenia - Cbc still with wbc 2.4, ANC up to 1200, Hb 11.3 and plt 106.  She is wearing a mask and still avoiding being with ppl.  Spent Thanksgiving by herself.  We discussed increased risk of infection after chemo but also that normalized counts will not necessarily protect her from any future infections.  Wishes to repeat labs at next visit, which will be after her restaging CT in Dec.   - vit D defic - She is taking 5000 of vitamin D.  If still on same dose, can increase: eg if taking 5 k daily can increase by 2K daily or 2-3 additional 5K doses in a week.  Can also check if different formulation works better for her.      - elevated liver enzymes - resolved   - Continue good oral nutrition and hydration.   - hypokalemia - resolved; can d/c potassium   - here for FU.  Her daughters

## 2024-01-11 ENCOUNTER — HOSPITAL ENCOUNTER (OUTPATIENT)
Dept: LAB | Age: 79
Discharge: HOME | End: 2024-01-11
Payer: MEDICARE

## 2024-01-11 ENCOUNTER — OFFICE VISIT (OUTPATIENT)
Dept: ONCOLOGY | Age: 79
End: 2024-01-11

## 2024-01-11 VITALS
HEART RATE: 73 BPM | TEMPERATURE: 97.9 F | HEIGHT: 62 IN | DIASTOLIC BLOOD PRESSURE: 56 MMHG | OXYGEN SATURATION: 98 % | RESPIRATION RATE: 16 BRPM | WEIGHT: 130.2 LBS | SYSTOLIC BLOOD PRESSURE: 125 MMHG | BODY MASS INDEX: 23.96 KG/M2

## 2024-01-11 DIAGNOSIS — Z12.31 ENCOUNTER FOR SCREENING MAMMOGRAM FOR MALIGNANT NEOPLASM OF BREAST: Primary | ICD-10-CM

## 2024-01-11 DIAGNOSIS — C18.2 PRIMARY ADENOCARCINOMA OF ASCENDING COLON (HCC): ICD-10-CM

## 2024-01-11 LAB
25(OH)D3 SERPL-MCNC: 38.5 NG/ML (ref 30–100)
ALBUMIN SERPL-MCNC: 3.9 G/DL (ref 3.2–4.6)
ALBUMIN/GLOB SERPL: 1.2 (ref 0.4–1.6)
ALP SERPL-CCNC: 84 U/L (ref 50–136)
ALT SERPL-CCNC: 48 U/L (ref 12–65)
ANION GAP SERPL CALC-SCNC: 5 MMOL/L (ref 2–11)
AST SERPL-CCNC: 29 U/L (ref 15–37)
BASOPHILS # BLD: 0 K/UL (ref 0–0.2)
BASOPHILS NFR BLD: 1 % (ref 0–2)
BILIRUB SERPL-MCNC: 0.4 MG/DL (ref 0.2–1.1)
BUN SERPL-MCNC: 13 MG/DL (ref 8–23)
CALCIUM SERPL-MCNC: 8.9 MG/DL (ref 8.3–10.4)
CEA SERPL-MCNC: 1.2 NG/ML (ref 0–3)
CHLORIDE SERPL-SCNC: 107 MMOL/L (ref 103–113)
CHOLEST SERPL-MCNC: 131 MG/DL
CO2 SERPL-SCNC: 27 MMOL/L (ref 21–32)
CREAT SERPL-MCNC: 1.1 MG/DL (ref 0.6–1)
DIFFERENTIAL METHOD BLD: ABNORMAL
EOSINOPHIL # BLD: 0.1 K/UL (ref 0–0.8)
EOSINOPHIL NFR BLD: 1 % (ref 0.5–7.8)
ERYTHROCYTE [DISTWIDTH] IN BLOOD BY AUTOMATED COUNT: 12.7 % (ref 11.9–14.6)
GLOBULIN SER CALC-MCNC: 3.3 G/DL (ref 2.8–4.5)
GLUCOSE SERPL-MCNC: 131 MG/DL (ref 65–100)
HCT VFR BLD AUTO: 40.2 % (ref 35.8–46.3)
HDLC SERPL-MCNC: 52 MG/DL (ref 40–60)
HDLC SERPL: 2.5
HGB BLD-MCNC: 13.6 G/DL (ref 11.7–15.4)
IMM GRANULOCYTES # BLD AUTO: 0 K/UL (ref 0–0.5)
IMM GRANULOCYTES NFR BLD AUTO: 1 % (ref 0–5)
LDLC SERPL CALC-MCNC: 50.6 MG/DL
LYMPHOCYTES # BLD: 1 K/UL (ref 0.5–4.6)
LYMPHOCYTES NFR BLD: 26 % (ref 13–44)
MCH RBC QN AUTO: 29.8 PG (ref 26.1–32.9)
MCHC RBC AUTO-ENTMCNC: 33.8 G/DL (ref 31.4–35)
MCV RBC AUTO: 88 FL (ref 82–102)
MONOCYTES # BLD: 0.4 K/UL (ref 0.1–1.3)
MONOCYTES NFR BLD: 11 % (ref 4–12)
NEUTS SEG # BLD: 2.4 K/UL (ref 1.7–8.2)
NEUTS SEG NFR BLD: 60 % (ref 43–78)
NRBC # BLD: 0 K/UL (ref 0–0.2)
PLATELET # BLD AUTO: 244 K/UL (ref 150–450)
PMV BLD AUTO: 10.3 FL (ref 9.4–12.3)
POTASSIUM SERPL-SCNC: 3.9 MMOL/L (ref 3.5–5.1)
PROT SERPL-MCNC: 7.2 G/DL (ref 6.3–8.2)
RBC # BLD AUTO: 4.57 M/UL (ref 4.05–5.2)
SODIUM SERPL-SCNC: 139 MMOL/L (ref 136–146)
TRIGL SERPL-MCNC: 142 MG/DL (ref 35–150)
TSH W FREE THYROID IF ABNORMAL: 1.68 UIU/ML (ref 0.36–3.74)
VLDLC SERPL CALC-MCNC: 28.4 MG/DL (ref 6–23)
WBC # BLD AUTO: 3.9 K/UL (ref 4.3–11.1)

## 2024-01-11 PROCEDURE — 80053 COMPREHEN METABOLIC PANEL: CPT

## 2024-01-11 PROCEDURE — 82378 CARCINOEMBRYONIC ANTIGEN: CPT

## 2024-01-11 PROCEDURE — 85025 COMPLETE CBC W/AUTO DIFF WBC: CPT

## 2024-01-11 PROCEDURE — 36415 COLL VENOUS BLD VENIPUNCTURE: CPT

## 2024-01-11 ASSESSMENT — PATIENT HEALTH QUESTIONNAIRE - PHQ9
SUM OF ALL RESPONSES TO PHQ QUESTIONS 1-9: 2
2. FEELING DOWN, DEPRESSED OR HOPELESS: 0
3. TROUBLE FALLING OR STAYING ASLEEP: 0
SUM OF ALL RESPONSES TO PHQ QUESTIONS 1-9: 2
SUM OF ALL RESPONSES TO PHQ QUESTIONS 1-9: 2
1. LITTLE INTEREST OR PLEASURE IN DOING THINGS: 0
SUM OF ALL RESPONSES TO PHQ QUESTIONS 1-9: 2
5. POOR APPETITE OR OVEREATING: 0
7. TROUBLE CONCENTRATING ON THINGS, SUCH AS READING THE NEWSPAPER OR WATCHING TELEVISION: 0
9. THOUGHTS THAT YOU WOULD BE BETTER OFF DEAD, OR OF HURTING YOURSELF: 0
6. FEELING BAD ABOUT YOURSELF - OR THAT YOU ARE A FAILURE OR HAVE LET YOURSELF OR YOUR FAMILY DOWN: 0
8. MOVING OR SPEAKING SO SLOWLY THAT OTHER PEOPLE COULD HAVE NOTICED. OR THE OPPOSITE, BEING SO FIGETY OR RESTLESS THAT YOU HAVE BEEN MOVING AROUND A LOT MORE THAN USUAL: 0
SUM OF ALL RESPONSES TO PHQ9 QUESTIONS 1 & 2: 0
4. FEELING TIRED OR HAVING LITTLE ENERGY: 2

## 2024-01-11 ASSESSMENT — ANXIETY QUESTIONNAIRES
1. FEELING NERVOUS, ANXIOUS, OR ON EDGE: 0
6. BECOMING EASILY ANNOYED OR IRRITABLE: 0
5. BEING SO RESTLESS THAT IT IS HARD TO SIT STILL: 0
IF YOU CHECKED OFF ANY PROBLEMS ON THIS QUESTIONNAIRE, HOW DIFFICULT HAVE THESE PROBLEMS MADE IT FOR YOU TO DO YOUR WORK, TAKE CARE OF THINGS AT HOME, OR GET ALONG WITH OTHER PEOPLE: NOT DIFFICULT AT ALL
GAD7 TOTAL SCORE: 0
7. FEELING AFRAID AS IF SOMETHING AWFUL MIGHT HAPPEN: 0
3. WORRYING TOO MUCH ABOUT DIFFERENT THINGS: 0
2. NOT BEING ABLE TO STOP OR CONTROL WORRYING: 0
4. TROUBLE RELAXING: 0

## 2024-01-11 NOTE — PATIENT INSTRUCTIONS
Patient Instructions from Today's Visit    Reason for Visit:  Follow Up    Diagnosis Information:  [email or web address]-[email or web address]-[email or web address]-[email or web address]    Plan:  Labs reviewed.  Symptoms reviewed.  Increase hydration.  Follow up with Dr. Barajas, your cardiologist.    Follow Up:  About 3 months.    Recent Lab Results:  Hospital Outpatient Visit on 01/11/2024   Component Date Value Ref Range Status    WBC 01/11/2024 3.9 (L)  4.3 - 11.1 K/uL Final    RBC 01/11/2024 4.57  4.05 - 5.2 M/uL Final    Hemoglobin 01/11/2024 13.6  11.7 - 15.4 g/dL Final    Hematocrit 01/11/2024 40.2  35.8 - 46.3 % Final    MCV 01/11/2024 88.0  82.0 - 102.0 FL Final    MCH 01/11/2024 29.8  26.1 - 32.9 PG Final    MCHC 01/11/2024 33.8  31.4 - 35.0 g/dL Final    RDW 01/11/2024 12.7  11.9 - 14.6 % Final    Platelets 01/11/2024 244  150 - 450 K/uL Final    MPV 01/11/2024 10.3  9.4 - 12.3 FL Final    nRBC 01/11/2024 0.00  0.0 - 0.2 K/uL Final    **Note: Absolute NRBC parameter is now reported with Hemogram**    Neutrophils % 01/11/2024 60  43 - 78 % Final    Lymphocytes % 01/11/2024 26  13 - 44 % Final    Monocytes % 01/11/2024 11  4.0 - 12.0 % Final    Eosinophils % 01/11/2024 1  0.5 - 7.8 % Final    Basophils % 01/11/2024 1  0.0 - 2.0 % Final    Immature Granulocytes 01/11/2024 1  0.0 - 5.0 % Final    Neutrophils Absolute 01/11/2024 2.4  1.7 - 8.2 K/UL Final    Lymphocytes Absolute 01/11/2024 1.0  0.5 - 4.6 K/UL Final    Monocytes Absolute 01/11/2024 0.4  0.1 - 1.3 K/UL Final    Eosinophils Absolute 01/11/2024 0.1  0.0 - 0.8 K/UL Final    Basophils Absolute 01/11/2024 0.0  0.0 - 0.2 K/UL Final    Absolute Immature Granulocyte 01/11/2024 0.0  0.0 - 0.5 K/UL Final    Differential Type 01/11/2024 AUTOMATED    Final    Sodium 01/11/2024 139  136 - 146 mmol/L Final    Potassium 01/11/2024 3.9  3.5 - 5.1 mmol/L Final    Chloride 01/11/2024 107  103 - 113 mmol/L Final    CO2 01/11/2024 27  21 - 32 mmol/L

## 2024-02-27 RX ORDER — SERTRALINE HYDROCHLORIDE 25 MG/1
25 TABLET, FILM COATED ORAL DAILY
Qty: 90 TABLET | Refills: 1 | OUTPATIENT
Start: 2024-02-27

## 2024-03-04 ENCOUNTER — OFFICE VISIT (OUTPATIENT)
Dept: INTERNAL MEDICINE CLINIC | Facility: CLINIC | Age: 79
End: 2024-03-04
Payer: MEDICARE

## 2024-03-04 VITALS
HEART RATE: 77 BPM | WEIGHT: 130 LBS | DIASTOLIC BLOOD PRESSURE: 62 MMHG | BODY MASS INDEX: 23.92 KG/M2 | SYSTOLIC BLOOD PRESSURE: 120 MMHG | HEIGHT: 62 IN | OXYGEN SATURATION: 96 %

## 2024-03-04 DIAGNOSIS — Z78.0 POSTMENOPAUSAL: ICD-10-CM

## 2024-03-04 DIAGNOSIS — E78.5 HYPERLIPIDEMIA, UNSPECIFIED HYPERLIPIDEMIA TYPE: ICD-10-CM

## 2024-03-04 DIAGNOSIS — R79.89 ELEVATED SERUM CREATININE: ICD-10-CM

## 2024-03-04 DIAGNOSIS — L29.9 ITCHING: ICD-10-CM

## 2024-03-04 DIAGNOSIS — K58.9 IRRITABLE BOWEL SYNDROME, UNSPECIFIED TYPE: ICD-10-CM

## 2024-03-04 DIAGNOSIS — Z00.00 MEDICARE ANNUAL WELLNESS VISIT, SUBSEQUENT: Primary | ICD-10-CM

## 2024-03-04 DIAGNOSIS — Z95.5 PRESENCE OF STENT IN LAD CORONARY ARTERY: ICD-10-CM

## 2024-03-04 PROBLEM — R07.2 PRECORDIAL PAIN: Status: RESOLVED | Noted: 2017-01-12 | Resolved: 2024-03-04

## 2024-03-04 LAB
ANION GAP SERPL CALC-SCNC: 5 MMOL/L (ref 2–11)
BUN SERPL-MCNC: 18 MG/DL (ref 8–23)
CALCIUM SERPL-MCNC: 10.1 MG/DL (ref 8.3–10.4)
CHLORIDE SERPL-SCNC: 109 MMOL/L (ref 103–113)
CO2 SERPL-SCNC: 29 MMOL/L (ref 21–32)
CREAT SERPL-MCNC: 0.9 MG/DL (ref 0.6–1)
GLUCOSE SERPL-MCNC: 79 MG/DL (ref 65–100)
POTASSIUM SERPL-SCNC: 4.1 MMOL/L (ref 3.5–5.1)
SODIUM SERPL-SCNC: 143 MMOL/L (ref 136–146)

## 2024-03-04 PROCEDURE — 1123F ACP DISCUSS/DSCN MKR DOCD: CPT | Performed by: STUDENT IN AN ORGANIZED HEALTH CARE EDUCATION/TRAINING PROGRAM

## 2024-03-04 PROCEDURE — G0439 PPPS, SUBSEQ VISIT: HCPCS | Performed by: STUDENT IN AN ORGANIZED HEALTH CARE EDUCATION/TRAINING PROGRAM

## 2024-03-04 PROCEDURE — G8484 FLU IMMUNIZE NO ADMIN: HCPCS | Performed by: STUDENT IN AN ORGANIZED HEALTH CARE EDUCATION/TRAINING PROGRAM

## 2024-03-04 RX ORDER — MELATONIN
1000 DAILY
Qty: 30 TABLET | Refills: 0
Start: 2024-03-04

## 2024-03-04 SDOH — ECONOMIC STABILITY: INCOME INSECURITY: HOW HARD IS IT FOR YOU TO PAY FOR THE VERY BASICS LIKE FOOD, HOUSING, MEDICAL CARE, AND HEATING?: NOT HARD AT ALL

## 2024-03-04 SDOH — HEALTH STABILITY: PHYSICAL HEALTH: ON AVERAGE, HOW MANY MINUTES DO YOU ENGAGE IN EXERCISE AT THIS LEVEL?: PATIENT DECLINED

## 2024-03-04 SDOH — ECONOMIC STABILITY: FOOD INSECURITY: WITHIN THE PAST 12 MONTHS, YOU WORRIED THAT YOUR FOOD WOULD RUN OUT BEFORE YOU GOT MONEY TO BUY MORE.: NEVER TRUE

## 2024-03-04 SDOH — ECONOMIC STABILITY: FOOD INSECURITY: WITHIN THE PAST 12 MONTHS, THE FOOD YOU BOUGHT JUST DIDN'T LAST AND YOU DIDN'T HAVE MONEY TO GET MORE.: NEVER TRUE

## 2024-03-04 ASSESSMENT — PATIENT HEALTH QUESTIONNAIRE - PHQ9
1. LITTLE INTEREST OR PLEASURE IN DOING THINGS: 0
SUM OF ALL RESPONSES TO PHQ QUESTIONS 1-9: 0
SUM OF ALL RESPONSES TO PHQ9 QUESTIONS 1 & 2: 0
2. FEELING DOWN, DEPRESSED OR HOPELESS: 0
SUM OF ALL RESPONSES TO PHQ QUESTIONS 1-9: 0

## 2024-03-04 ASSESSMENT — LIFESTYLE VARIABLES
HOW OFTEN DO YOU HAVE A DRINK CONTAINING ALCOHOL: NEVER
HOW OFTEN DO YOU HAVE A DRINK CONTAINING ALCOHOL: 1
HOW OFTEN DO YOU HAVE SIX OR MORE DRINKS ON ONE OCCASION: 1
HOW MANY STANDARD DRINKS CONTAINING ALCOHOL DO YOU HAVE ON A TYPICAL DAY: 0
HOW MANY STANDARD DRINKS CONTAINING ALCOHOL DO YOU HAVE ON A TYPICAL DAY: PATIENT DOES NOT DRINK

## 2024-03-04 NOTE — PROGRESS NOTES
Medicare Annual Wellness Visit    Cassia Sanchez Case is here for No chief complaint on file.    Assessment & Plan   1. Medicare annual wellness visit, subsequent  2. Postmenopausal  -     DEXA BONE DENSITY AXIAL SKELETON; Future  3. Elevated serum creatinine  -     Basic Metabolic Panel; Future  4. Hyperlipidemia, unspecified hyperlipidemia type  Overview:  Controlled on current dose of statin  Orders:  -     Lipid Panel; Future  5. Irritable bowel syndrome, unspecified type  Overview:  Has PRN levsin rarely needs to use.  On MiraLAX every other day  6. Itching  Overview:  Controlled with lotion  7. Presence of stent in LAD coronary artery  Overview:  AYDEE to LAD 2016.  On aspirin, statin and sees cardiology.      Recommendations for Preventive Services Due: see orders and patient instructions/AVS.  Recommended screening schedule for the next 5-10 years is provided to the patient in written form: see Patient Instructions/AVS.  Discussed BMI and healthy weight and diet, exercise, and medication compliance.  The patient was counseled regarding screening procedures and recommended schedules for Pap smears, mammography, colon cancer screening, BMD, and regular immunizations.        Return in about 1 year (around 3/4/2025) for medicare AWE subsequent, with fasting labs 1 week prior.     Subjective     She is doing well and no complaints.  Continues to follow with oncology for stage III colon adenocarcinoma she is in remission.    Pruritus is doing fine, using lotion provided by dermatology does not recall the name of this.    Constipation: takes miralax every other day, controlled on this, has not tried Metamucil before.  Discussed that she can try this if she wants to.    Elevated creatinine of 1.1 on 1/11/24. She avoids NSAIDs.    Osteopenia: Takes vitamin D will be due for DEXA.  Discussed weightbearing exercises.    Sees derm for skin checks    HCM: due for flu, PNA, shingles vaccines.  Mammogram 7/2023 was normal.

## 2024-03-05 NOTE — RESULT ENCOUNTER NOTE
Spoke to patient to give Dr. Andre's message as follows:    Kidney function is now normal which is great!

## 2024-04-11 DIAGNOSIS — C18.2 PRIMARY ADENOCARCINOMA OF ASCENDING COLON (HCC): Primary | ICD-10-CM

## 2024-04-19 ENCOUNTER — HOSPITAL ENCOUNTER (OUTPATIENT)
Dept: LAB | Age: 79
End: 2024-04-19
Payer: MEDICARE

## 2024-04-19 ENCOUNTER — OFFICE VISIT (OUTPATIENT)
Dept: ONCOLOGY | Age: 79
End: 2024-04-19
Payer: MEDICARE

## 2024-04-19 VITALS
TEMPERATURE: 98.2 F | BODY MASS INDEX: 23.92 KG/M2 | WEIGHT: 130 LBS | SYSTOLIC BLOOD PRESSURE: 120 MMHG | HEIGHT: 62 IN | RESPIRATION RATE: 16 BRPM | HEART RATE: 64 BPM | OXYGEN SATURATION: 96 % | DIASTOLIC BLOOD PRESSURE: 59 MMHG

## 2024-04-19 DIAGNOSIS — C18.2 PRIMARY ADENOCARCINOMA OF ASCENDING COLON (HCC): ICD-10-CM

## 2024-04-19 DIAGNOSIS — C18.2 PRIMARY ADENOCARCINOMA OF ASCENDING COLON (HCC): Primary | ICD-10-CM

## 2024-04-19 LAB
ALBUMIN SERPL-MCNC: 4.3 G/DL (ref 3.2–4.6)
ALBUMIN/GLOB SERPL: 1.5 (ref 0.4–1.6)
ALP SERPL-CCNC: 87 U/L (ref 50–136)
ALT SERPL-CCNC: 38 U/L (ref 12–65)
ANION GAP SERPL CALC-SCNC: 3 MMOL/L (ref 2–11)
AST SERPL-CCNC: 21 U/L (ref 15–37)
BASOPHILS # BLD: 0 K/UL (ref 0–0.2)
BASOPHILS NFR BLD: 1 % (ref 0–2)
BILIRUB SERPL-MCNC: 0.5 MG/DL (ref 0.2–1.1)
BUN SERPL-MCNC: 15 MG/DL (ref 8–23)
CALCIUM SERPL-MCNC: 9.6 MG/DL (ref 8.3–10.4)
CEA SERPL-MCNC: 1.5 NG/ML (ref 0–3)
CHLORIDE SERPL-SCNC: 107 MMOL/L (ref 103–113)
CO2 SERPL-SCNC: 28 MMOL/L (ref 21–32)
CREAT SERPL-MCNC: 1 MG/DL (ref 0.6–1)
DIFFERENTIAL METHOD BLD: ABNORMAL
EOSINOPHIL # BLD: 0 K/UL (ref 0–0.8)
EOSINOPHIL NFR BLD: 1 % (ref 0.5–7.8)
ERYTHROCYTE [DISTWIDTH] IN BLOOD BY AUTOMATED COUNT: 12.6 % (ref 11.9–14.6)
GLOBULIN SER CALC-MCNC: 2.8 G/DL (ref 2.8–4.5)
GLUCOSE SERPL-MCNC: 104 MG/DL (ref 65–100)
HCT VFR BLD AUTO: 41.8 % (ref 35.8–46.3)
HGB BLD-MCNC: 13.9 G/DL (ref 11.7–15.4)
IMM GRANULOCYTES # BLD AUTO: 0 K/UL (ref 0–0.5)
IMM GRANULOCYTES NFR BLD AUTO: 0 % (ref 0–5)
LYMPHOCYTES # BLD: 1 K/UL (ref 0.5–4.6)
LYMPHOCYTES NFR BLD: 29 % (ref 13–44)
MCH RBC QN AUTO: 29.4 PG (ref 26.1–32.9)
MCHC RBC AUTO-ENTMCNC: 33.3 G/DL (ref 31.4–35)
MCV RBC AUTO: 88.4 FL (ref 82–102)
MONOCYTES # BLD: 0.5 K/UL (ref 0.1–1.3)
MONOCYTES NFR BLD: 13 % (ref 4–12)
NEUTS SEG # BLD: 1.9 K/UL (ref 1.7–8.2)
NEUTS SEG NFR BLD: 56 % (ref 43–78)
NRBC # BLD: 0 K/UL (ref 0–0.2)
PLATELET # BLD AUTO: 179 K/UL (ref 150–450)
PMV BLD AUTO: 10.7 FL (ref 9.4–12.3)
POTASSIUM SERPL-SCNC: 4.4 MMOL/L (ref 3.5–5.1)
PROT SERPL-MCNC: 7.1 G/DL (ref 6.3–8.2)
RBC # BLD AUTO: 4.73 M/UL (ref 4.05–5.2)
SODIUM SERPL-SCNC: 138 MMOL/L (ref 136–146)
WBC # BLD AUTO: 3.4 K/UL (ref 4.3–11.1)

## 2024-04-19 PROCEDURE — G8399 PT W/DXA RESULTS DOCUMENT: HCPCS

## 2024-04-19 PROCEDURE — 82378 CARCINOEMBRYONIC ANTIGEN: CPT

## 2024-04-19 PROCEDURE — 1090F PRES/ABSN URINE INCON ASSESS: CPT

## 2024-04-19 PROCEDURE — 36415 COLL VENOUS BLD VENIPUNCTURE: CPT

## 2024-04-19 PROCEDURE — 1123F ACP DISCUSS/DSCN MKR DOCD: CPT

## 2024-04-19 PROCEDURE — 1036F TOBACCO NON-USER: CPT

## 2024-04-19 PROCEDURE — 80053 COMPREHEN METABOLIC PANEL: CPT

## 2024-04-19 PROCEDURE — G8420 CALC BMI NORM PARAMETERS: HCPCS

## 2024-04-19 PROCEDURE — 99214 OFFICE O/P EST MOD 30 MIN: CPT

## 2024-04-19 PROCEDURE — 85025 COMPLETE CBC W/AUTO DIFF WBC: CPT

## 2024-04-19 PROCEDURE — G8427 DOCREV CUR MEDS BY ELIG CLIN: HCPCS

## 2024-04-19 ASSESSMENT — PATIENT HEALTH QUESTIONNAIRE - PHQ9
2. FEELING DOWN, DEPRESSED OR HOPELESS: NOT AT ALL
1. LITTLE INTEREST OR PLEASURE IN DOING THINGS: NOT AT ALL
SUM OF ALL RESPONSES TO PHQ QUESTIONS 1-9: 0
SUM OF ALL RESPONSES TO PHQ9 QUESTIONS 1 & 2: 0
SUM OF ALL RESPONSES TO PHQ QUESTIONS 1-9: 0

## 2024-04-23 ASSESSMENT — ENCOUNTER SYMPTOMS
VOMITING: 0
EYE DISCHARGE: 0
ABDOMINAL PAIN: 0
TROUBLE SWALLOWING: 0
EYES NEGATIVE: 1
NAUSEA: 0
DIARRHEA: 0
BACK PAIN: 0
SINUS PAIN: 0
BLOOD IN STOOL: 0
CONSTIPATION: 1
SHORTNESS OF BREATH: 0
COUGH: 0
SORE THROAT: 0
ABDOMINAL DISTENTION: 0
ANAL BLEEDING: 0

## 2024-04-24 NOTE — PROGRESS NOTES
[facility]: Office Visit Established Patient    Chief Complaint   Patient presents with    Follow-up      History of Present Illness:  Ms. Martell is a  77 y.o. female who presents today for  follow up regarding colon cancer.  she was admitted on 1/22/22 with peritonitis, colon mass, obstruction and bowel perforation.  PMhx: anxiety,  corneal dystrophy s/p transplants (bilat) on steroid drops, CAD, GERD, IBS, HLD.  She p/w abd pain x2 days.  CT AP c/w 2.3cm mass in mid-ascending colon and LAD with obstruction and perforation.   S/p subsequent right extended colectomy with ileo-transverse staped anastomosis.  Path c/w mod-poorly diff adenocarcinoma - [id number] (2/14 LN +).  We were consulted for recs.    S/p FOLFOX - adjuvantly, omitted last cycle.  Ultimately after discussion of pros and cons of proceeding with last treatment, she elected not to proceed with treatment due to increased risk of infection, with persistent cytopenias, fatigue and neuropathy.  She was comfortable with this plan.  She understood that taking the last treatment will not necessarily prevent disease in the future.  She also understood that her risk of recurrent disease with tx adjustments made along the way to make it tolerable/dose adjusted.  We discussed surveillance per NCCN guidelines.  She wishes to go back into the community to her Worship but is considering holding off for now due to immune compromise.  She wished to switch primary care physicians and was referred.    -She has been doing well overall.  We discussed her CT scan results.  No evid of recurrent disease. She has some calcification of CAD and I will send a note to Dr Barajas to see if she needs a FU regarding this.  Labs reviewed and wbc/ANC better, no anemia and plts normal.  CMP with mild rise in Cr - she will increase fluid intake.  CEA remains low at 1.2.  Age approp screening reviewed.  No current s/s of infection.      She returns today for FU. She has

## 2024-05-06 ENCOUNTER — OFFICE VISIT (OUTPATIENT)
Age: 79
End: 2024-05-06
Payer: MEDICARE

## 2024-05-06 VITALS
BODY MASS INDEX: 23.92 KG/M2 | HEART RATE: 75 BPM | HEIGHT: 62 IN | WEIGHT: 130 LBS | SYSTOLIC BLOOD PRESSURE: 110 MMHG | DIASTOLIC BLOOD PRESSURE: 70 MMHG

## 2024-05-06 DIAGNOSIS — E78.5 HYPERLIPIDEMIA, UNSPECIFIED HYPERLIPIDEMIA TYPE: ICD-10-CM

## 2024-05-06 DIAGNOSIS — Z95.5 PRESENCE OF STENT IN LAD CORONARY ARTERY: ICD-10-CM

## 2024-05-06 DIAGNOSIS — I25.119 ATHEROSCLEROSIS OF NATIVE CORONARY ARTERY OF NATIVE HEART WITH ANGINA PECTORIS (HCC): Primary | ICD-10-CM

## 2024-05-06 PROCEDURE — 1090F PRES/ABSN URINE INCON ASSESS: CPT | Performed by: INTERNAL MEDICINE

## 2024-05-06 PROCEDURE — G8428 CUR MEDS NOT DOCUMENT: HCPCS | Performed by: INTERNAL MEDICINE

## 2024-05-06 PROCEDURE — G8399 PT W/DXA RESULTS DOCUMENT: HCPCS | Performed by: INTERNAL MEDICINE

## 2024-05-06 PROCEDURE — 93000 ELECTROCARDIOGRAM COMPLETE: CPT | Performed by: INTERNAL MEDICINE

## 2024-05-06 PROCEDURE — 1123F ACP DISCUSS/DSCN MKR DOCD: CPT | Performed by: INTERNAL MEDICINE

## 2024-05-06 PROCEDURE — G8420 CALC BMI NORM PARAMETERS: HCPCS | Performed by: INTERNAL MEDICINE

## 2024-05-06 PROCEDURE — 99214 OFFICE O/P EST MOD 30 MIN: CPT | Performed by: INTERNAL MEDICINE

## 2024-05-06 PROCEDURE — 1036F TOBACCO NON-USER: CPT | Performed by: INTERNAL MEDICINE

## 2024-05-06 NOTE — PROGRESS NOTES
[facility] CARDIOLOGY  [address] [place] [address], [address]  [place] [address]  PHONE: [phone]      24    NAME:  Cassia Martell  : 1945  MRN: [id number]       SUBJECTIVE:   Cassia Martell is a 79 y.o. female seen for a follow up visit regarding the following:     Chief Complaint   Patient presents with    Coronary Artery Disease         HPI:    No cp or walker. No orthopnea or pnd. No palpitations or syncope.      Past Medical History, Past Surgical History, Family history, Social History, and Medications were all reviewed with the patient today and updated as necessary.     Current Outpatient Medications   Medication Sig Dispense Refill    vitamin D (VITAMIN D3) 25 MCG (1000 UT) TABS tablet Take 1 tablet by mouth daily 30 tablet 0    Multiple Vitamins-Minerals (ONE-A-DAY WOMENS PO) Take by mouth      atorvastatin (LIPITOR) 40 MG tablet TAKE 1 TABLET BY MOUTH EVERY DAY IN THE EVENING 90 tablet 3    vitamin B-12 (CYANOCOBALAMIN) 1000 MCG tablet Take 1 tablet by mouth daily      Alum Hydroxide-Mag Carbonate (GAVISCON PO) Take 1 tablet by mouth as needed      Lidocaine-Glycerin (PREPARATION H EX) Apply topically      diphenhydrAMINE (BENADRYL) 25 MG tablet Take 1 tablet by mouth every 6 hours as needed for Itching PRN      aspirin 81 MG EC tablet Take 1 tablet by mouth every evening      prednisoLONE acetate (PRED FORTE) 1 % ophthalmic suspension Place 1 drop into both eyes every evening       No current facility-administered medications for this visit.               Social History     Tobacco Use    Smoking status: Never    Smokeless tobacco: Never   Substance Use Topics    Alcohol use: Never              PHYSICAL EXAM:   /70   Pulse 75   Ht 1.575 m (5' 2\")   Wt 59 kg (130 lb)   BMI 23.78 kg/m²    Constitutional: Oriented to person, place, and time. Appears well-developed and well-nourished.   Head: Normocephalic and atraumatic.   Neck: Neck supple.   Cardiovascular: Normal rate

## 2024-07-09 DIAGNOSIS — C18.2 PRIMARY ADENOCARCINOMA OF ASCENDING COLON (HCC): Primary | ICD-10-CM

## 2024-07-09 NOTE — PROGRESS NOTES
disease.    - to start: FOLFOX: A cycle is every 14 days (6mo)    Oxaliplatin   (Eloxatin)  85 mg/m² IV once on day 1 - dose adjusted upfront to 80% of dose to see how tolerates it per  her wishes     Leucovorin  400 mg/m² IV once on day 1    Fluorouracil  400 mg/m² IV once on day 1    Fluorouracil  1,200 mg/m²/day CIV on days 1 and 2. Total dose = 2,400 mg/m²/cycle.  OR CAPoX: A cycle is every 21 days     Capecitabine   (Xeloda)  850 mg/m² orally twice daily on days 1-14, then off 7 days    Oxaliplatin   (Eloxatin)  130 mg/m² IV day 1  - CT chest to complete staging with small RML nodule - ?LN - will monitor on re-imaging, no definite evid of disease.   We discussed the CT results that show small nodule in the lung (per rad likely LN, <1cm abutting fissure).  We reviewed that this could be a metastatic focus.  She has stage III disease, if the lung nodule is cancerous, her tumor staging be stage IV.   Intent of treatment will be palliative and not curative in nature.  Unable to bx lesion due to size.  Daughter and patient aware of this.   - Elevated Alk phos - GGT checked and WNL at 49 6/2022  - She did report some numbness or perception of swelling in the ball of her foot bilaterally, closer to lateral phalanges, and she reported that as baseline prior to chemotherapy.  Now with some worsening.  Able to write, close buttons/zipper.    - nausea - emend starting with C2 and worked well, continue with PO anti-emetics prn - denies recent issues with this  - mucositis - resolved with compounded rinse, Rx provided.  - here for FU with her daughter and other daughter via telephone.  Ultimately after discussion of pros and cons of proceeding with last treatment, she elected not to proceed with treatment due to increased risk of infection with persistent cytopenias.  She is comfortable with this plan.  She understands that taking the last treatment will not necessarily prevent disease in the future.  She also understands

## 2024-07-10 ENCOUNTER — OFFICE VISIT (OUTPATIENT)
Age: 79
End: 2024-07-10
Payer: MEDICARE

## 2024-07-10 DIAGNOSIS — S52.022A OLECRANON FRACTURE, LEFT, CLOSED, INITIAL ENCOUNTER: ICD-10-CM

## 2024-07-10 DIAGNOSIS — M25.522 LEFT ELBOW PAIN: Primary | ICD-10-CM

## 2024-07-10 PROCEDURE — 1036F TOBACCO NON-USER: CPT | Performed by: NURSE PRACTITIONER

## 2024-07-10 PROCEDURE — 1123F ACP DISCUSS/DSCN MKR DOCD: CPT | Performed by: NURSE PRACTITIONER

## 2024-07-10 PROCEDURE — 99203 OFFICE O/P NEW LOW 30 MIN: CPT | Performed by: NURSE PRACTITIONER

## 2024-07-10 PROCEDURE — 1090F PRES/ABSN URINE INCON ASSESS: CPT | Performed by: NURSE PRACTITIONER

## 2024-07-10 PROCEDURE — G8420 CALC BMI NORM PARAMETERS: HCPCS | Performed by: NURSE PRACTITIONER

## 2024-07-10 PROCEDURE — G8427 DOCREV CUR MEDS BY ELIG CLIN: HCPCS | Performed by: NURSE PRACTITIONER

## 2024-07-10 PROCEDURE — G8399 PT W/DXA RESULTS DOCUMENT: HCPCS | Performed by: NURSE PRACTITIONER

## 2024-07-10 NOTE — PROGRESS NOTES
Orthopaedic Hand Clinic Note    Name: Cassia Sanchez Case  YOB: 1945  Gender: female  MRN: [id number]      CC: Patient referred for evaluation of left elbow pain    HPI: Cassia Martell is a 79 y.o. female right hand dominant with a chief complaint of left elbow pain.  She is accompanied by her daughter.  She reports on Thursday July 4, 2024 she was at [employer] in [place].  She slipped and fell landing on bilateral knees and bilateral outstretched hands.  She had left elbow pain.  She was seen at urgent care.  She was x-rayed.  She was placed in an Ace wrap and referred to orthopedics for follow-up.  She reports her pain has improved.    ROS/Meds/PSH/PMH/FH/SH: I personally reviewed the patients standard intake form.  Pertinents are discussed in the HPI    Physical Examination:  General: Awake and alert.  HEENT: Normocephalic, atraumatic  CV/Pulm: Breathing even and unlabored  Skin: No obvious rashes noted.  Lymphatic: No obvious evidence of lymphedema or lymphadenopathy    Musculoskeletal Exam:  Examination on the left upper extremity demonstrates cap refill < 5 seconds in all fingers  Patient has mild swelling to the left elbow.  There is ecchymosis present from the elbow to the dorsal aspect of the left hand.  She lacks about 5 degrees from full extension.  She has 130 degrees of flexion.  She has 90 degrees of pronation and 90 degrees of supination.  She has no pain with resisted elbow flexion but does have discomfort with resisted elbow extension.  She has painless range of motion to the wrist.  She able to make a full composite fist.  She denies paresthesia.    Imaging / Electrodiagnostic Tests:     X-rays include a 3 view left elbow are independently reviewed and interpreted.  Patient has a nondisplaced olecranon fracture.    Assessment:   1. Left elbow pain    2. Olecranon fracture, left, closed, initial encounter        Plan:   We discussed the diagnosis and different treatment

## 2024-07-18 ENCOUNTER — HOSPITAL ENCOUNTER (OUTPATIENT)
Dept: LAB | Age: 79
Discharge: HOME | End: 2024-07-18
Payer: MEDICARE

## 2024-07-18 ENCOUNTER — OFFICE VISIT (OUTPATIENT)
Dept: ONCOLOGY | Age: 79
End: 2024-07-18
Payer: MEDICARE

## 2024-07-18 VITALS
DIASTOLIC BLOOD PRESSURE: 67 MMHG | TEMPERATURE: 97.8 F | OXYGEN SATURATION: 98 % | BODY MASS INDEX: 24.44 KG/M2 | SYSTOLIC BLOOD PRESSURE: 127 MMHG | HEART RATE: 69 BPM | RESPIRATION RATE: 16 BRPM | HEIGHT: 62 IN | WEIGHT: 132.8 LBS

## 2024-07-18 DIAGNOSIS — C18.2 PRIMARY ADENOCARCINOMA OF ASCENDING COLON (HCC): Primary | ICD-10-CM

## 2024-07-18 DIAGNOSIS — C18.2 PRIMARY ADENOCARCINOMA OF ASCENDING COLON (HCC): ICD-10-CM

## 2024-07-18 DIAGNOSIS — E55.9 VITAMIN D DEFICIENCY: ICD-10-CM

## 2024-07-18 LAB
ALBUMIN SERPL-MCNC: 4 G/DL (ref 3.2–4.6)
ALBUMIN/GLOB SERPL: 1.5 (ref 1–1.9)
ALP SERPL-CCNC: 83 U/L (ref 35–104)
ALT SERPL-CCNC: 31 U/L (ref 12–65)
ANION GAP SERPL CALC-SCNC: 12 MMOL/L (ref 9–18)
AST SERPL-CCNC: 27 U/L (ref 15–37)
BASOPHILS # BLD: 0 K/UL (ref 0–0.2)
BASOPHILS NFR BLD: 1 % (ref 0–2)
BILIRUB SERPL-MCNC: 0.4 MG/DL (ref 0–1.2)
BUN SERPL-MCNC: 15 MG/DL (ref 8–23)
CALCIUM SERPL-MCNC: 9.2 MG/DL (ref 8.8–10.2)
CEA SERPL-MCNC: 1.4 NG/ML (ref 0–3.8)
CHLORIDE SERPL-SCNC: 103 MMOL/L (ref 98–107)
CO2 SERPL-SCNC: 25 MMOL/L (ref 20–28)
CREAT SERPL-MCNC: 0.91 MG/DL (ref 0.6–1.1)
DIFFERENTIAL METHOD BLD: ABNORMAL
EOSINOPHIL # BLD: 0 K/UL (ref 0–0.8)
EOSINOPHIL NFR BLD: 1 % (ref 0.5–7.8)
ERYTHROCYTE [DISTWIDTH] IN BLOOD BY AUTOMATED COUNT: 12.8 % (ref 11.9–14.6)
GLOBULIN SER CALC-MCNC: 2.6 G/DL (ref 2.3–3.5)
GLUCOSE SERPL-MCNC: 134 MG/DL (ref 70–99)
HCT VFR BLD AUTO: 40.6 % (ref 35.8–46.3)
HGB BLD-MCNC: 13.4 G/DL (ref 11.7–15.4)
IMM GRANULOCYTES # BLD AUTO: 0 K/UL (ref 0–0.5)
IMM GRANULOCYTES NFR BLD AUTO: 0 % (ref 0–5)
LYMPHOCYTES # BLD: 1 K/UL (ref 0.5–4.6)
LYMPHOCYTES NFR BLD: 29 % (ref 13–44)
MCH RBC QN AUTO: 29 PG (ref 26.1–32.9)
MCHC RBC AUTO-ENTMCNC: 33 G/DL (ref 31.4–35)
MCV RBC AUTO: 87.9 FL (ref 82–102)
MONOCYTES # BLD: 0.4 K/UL (ref 0.1–1.3)
MONOCYTES NFR BLD: 12 % (ref 4–12)
NEUTS SEG # BLD: 2 K/UL (ref 1.7–8.2)
NEUTS SEG NFR BLD: 57 % (ref 43–78)
NRBC # BLD: 0 K/UL (ref 0–0.2)
PLATELET # BLD AUTO: 185 K/UL (ref 150–450)
PMV BLD AUTO: 10.1 FL (ref 9.4–12.3)
POTASSIUM SERPL-SCNC: 4 MMOL/L (ref 3.5–5.1)
PROT SERPL-MCNC: 6.6 G/DL (ref 6.3–8.2)
RBC # BLD AUTO: 4.62 M/UL (ref 4.05–5.2)
SODIUM SERPL-SCNC: 140 MMOL/L (ref 136–145)
WBC # BLD AUTO: 3.5 K/UL (ref 4.3–11.1)

## 2024-07-18 PROCEDURE — 1090F PRES/ABSN URINE INCON ASSESS: CPT | Performed by: INTERNAL MEDICINE

## 2024-07-18 PROCEDURE — 82378 CARCINOEMBRYONIC ANTIGEN: CPT

## 2024-07-18 PROCEDURE — 1036F TOBACCO NON-USER: CPT | Performed by: INTERNAL MEDICINE

## 2024-07-18 PROCEDURE — G8427 DOCREV CUR MEDS BY ELIG CLIN: HCPCS | Performed by: INTERNAL MEDICINE

## 2024-07-18 PROCEDURE — G8399 PT W/DXA RESULTS DOCUMENT: HCPCS | Performed by: INTERNAL MEDICINE

## 2024-07-18 PROCEDURE — 80053 COMPREHEN METABOLIC PANEL: CPT

## 2024-07-18 PROCEDURE — 36415 COLL VENOUS BLD VENIPUNCTURE: CPT

## 2024-07-18 PROCEDURE — 1123F ACP DISCUSS/DSCN MKR DOCD: CPT | Performed by: INTERNAL MEDICINE

## 2024-07-18 PROCEDURE — 99214 OFFICE O/P EST MOD 30 MIN: CPT | Performed by: INTERNAL MEDICINE

## 2024-07-18 PROCEDURE — G8420 CALC BMI NORM PARAMETERS: HCPCS | Performed by: INTERNAL MEDICINE

## 2024-07-18 PROCEDURE — 85025 COMPLETE CBC W/AUTO DIFF WBC: CPT

## 2024-07-18 NOTE — PATIENT INSTRUCTIONS
Patient Information from Today's Visit    The members of your [facility]cology Medical Home are listed below:    Physician Provider: Bella Chaparro, Medical Oncologist  Advanced Practice Clinician: Erin Yang NP  Registered Nurse: Kayleigh LAZO RN  Navigator: Brandy BANGURA RN  Medical Assistant: Vanessa JOHNSTON MA  : Danielle TINEO   Supportive Care Services: Ilana RAZA LMSW    Diagnosis: Colon cancer      Follow Up Instructions:   3 months   Follow up with GI in 2026 for colonoscopy      Treatment Summary has been discussed and given to patient:No      Current Labs:   Hospital Outpatient Visit on 07/18/2024   Component Date Value Ref Range Status    Sodium 07/18/2024 140  136 - 145 mmol/L Final    Potassium 07/18/2024 4.0  3.5 - 5.1 mmol/L Final    Chloride 07/18/2024 103  98 - 107 mmol/L Final    CO2 07/18/2024 25  20 - 28 mmol/L Final    Anion Gap 07/18/2024 12  9 - 18 mmol/L Final    Glucose 07/18/2024 134 (H)  70 - 99 mg/dL Final    Comment: <70 mg/dL Consistent with, but not fully diagnostic of hypoglycemia.  100 - 125 mg/dL Impaired fasting glucose/consistent with pre-diabetes mellitus.  > 126 mg/dl Fasting glucose consistent with overt diabetes mellitus      BUN 07/18/2024 15  8 - 23 MG/DL Final    Creatinine 07/18/2024 0.91  0.60 - 1.10 MG/DL Final    Est, Glom Filt Rate 07/18/2024 64  >60 ml/min/1.73m2 Final    Comment:    Pediatric calculator link: https://www.kidney.org/professionals/kdoqi/gfr_calculatorped     These results are not intended for use in patients <18 years of age.     eGFR results are calculated without a race factor using  the 2021 CKD-EPI equation. Careful clinical correlation is recommended, particularly when comparing to results calculated using previous equations.  The CKD-EPI equation is less accurate in patients with extremes of muscle mass, extra-renal metabolism of creatinine, excessive creatine ingestion, or following therapy that affects renal tubular secretion.      Calcium

## 2024-08-15 ENCOUNTER — OFFICE VISIT (OUTPATIENT)
Age: 79
End: 2024-08-15
Payer: MEDICARE

## 2024-08-15 DIAGNOSIS — S52.022A OLECRANON FRACTURE, LEFT, CLOSED, INITIAL ENCOUNTER: Primary | ICD-10-CM

## 2024-08-15 PROCEDURE — G8420 CALC BMI NORM PARAMETERS: HCPCS | Performed by: NURSE PRACTITIONER

## 2024-08-15 PROCEDURE — G8427 DOCREV CUR MEDS BY ELIG CLIN: HCPCS | Performed by: NURSE PRACTITIONER

## 2024-08-15 PROCEDURE — 1123F ACP DISCUSS/DSCN MKR DOCD: CPT | Performed by: NURSE PRACTITIONER

## 2024-08-15 PROCEDURE — 1090F PRES/ABSN URINE INCON ASSESS: CPT | Performed by: NURSE PRACTITIONER

## 2024-08-15 PROCEDURE — 99213 OFFICE O/P EST LOW 20 MIN: CPT | Performed by: NURSE PRACTITIONER

## 2024-08-15 PROCEDURE — 1036F TOBACCO NON-USER: CPT | Performed by: NURSE PRACTITIONER

## 2024-08-15 PROCEDURE — G8399 PT W/DXA RESULTS DOCUMENT: HCPCS | Performed by: NURSE PRACTITIONER

## 2024-08-21 ENCOUNTER — HOSPITAL ENCOUNTER (OUTPATIENT)
Dept: MAMMOGRAPHY | Age: 79
Discharge: HOME | End: 2024-08-24
Attending: STUDENT IN AN ORGANIZED HEALTH CARE EDUCATION/TRAINING PROGRAM
Payer: MEDICARE

## 2024-08-21 ENCOUNTER — HOSPITAL ENCOUNTER (OUTPATIENT)
Dept: MAMMOGRAPHY | Age: 79
Discharge: HOME | End: 2024-08-24
Attending: INTERNAL MEDICINE
Payer: MEDICARE

## 2024-08-21 DIAGNOSIS — Z78.0 POSTMENOPAUSAL: ICD-10-CM

## 2024-08-21 DIAGNOSIS — Z12.31 ENCOUNTER FOR SCREENING MAMMOGRAM FOR MALIGNANT NEOPLASM OF BREAST: ICD-10-CM

## 2024-08-21 PROCEDURE — 77080 DXA BONE DENSITY AXIAL: CPT

## 2024-08-22 ENCOUNTER — TRANSCRIBE ORDERS (OUTPATIENT)
Dept: SCHEDULING | Age: 79
End: 2024-08-22

## 2024-08-22 DIAGNOSIS — Z12.31 SCREENING MAMMOGRAM FOR HIGH-RISK PATIENT: Primary | ICD-10-CM

## 2024-08-23 ENCOUNTER — TELEPHONE (OUTPATIENT)
Dept: INTERNAL MEDICINE CLINIC | Facility: CLINIC | Age: 79
End: 2024-08-23

## 2024-08-23 DIAGNOSIS — L29.9 PRURITUS: Primary | ICD-10-CM

## 2024-08-23 NOTE — TELEPHONE ENCOUNTER
Called pt and adv per Dr Andre that she would like to try a low dose of Gabapentin 50mg and pt agreed to give it a try for the internal itching. Please send to the [facility] [place]...

## 2024-08-23 NOTE — TELEPHONE ENCOUNTER
Please adv     Pt states that she has itching all over her body since she has had the cancer and you are aware of this, She says that she has been to dermatology and has tried otc itch creams but she describes it as more of a \" internal itch\" and wants to know if there is something you can prescribe prn?

## 2024-08-28 ENCOUNTER — HOSPITAL ENCOUNTER (OUTPATIENT)
Dept: MAMMOGRAPHY | Age: 79
Discharge: HOME | End: 2024-08-31
Attending: STUDENT IN AN ORGANIZED HEALTH CARE EDUCATION/TRAINING PROGRAM
Payer: MEDICARE

## 2024-08-28 VITALS — BODY MASS INDEX: 24.29 KG/M2 | WEIGHT: 132 LBS | HEIGHT: 62 IN

## 2024-08-28 PROCEDURE — 77063 BREAST TOMOSYNTHESIS BI: CPT

## 2024-11-01 NOTE — PROGRESS NOTES
Arrived to the [facility]. Folfox completed. Patient tolerated well. Any issues or concerns during appointment: none. Patient aware of next infusion appointment on 7/14/22 at 1500. Patient aware of next lab and [facility] office visit on 7/26/22 at 0900. Patient instructed to call provider with temperature of 100.4 or greater or nausea/vomiting/ diarrhea or pain not controlled by medications  Discharged amb.
denies pain/discomfort (Rating = 0)

## 2024-11-07 ENCOUNTER — OFFICE VISIT (OUTPATIENT)
Age: 79
End: 2024-11-07

## 2024-11-07 VITALS
SYSTOLIC BLOOD PRESSURE: 126 MMHG | WEIGHT: 135.8 LBS | DIASTOLIC BLOOD PRESSURE: 70 MMHG | HEIGHT: 62 IN | HEART RATE: 70 BPM | BODY MASS INDEX: 24.99 KG/M2

## 2024-11-07 DIAGNOSIS — I25.119 ATHEROSCLEROSIS OF NATIVE CORONARY ARTERY OF NATIVE HEART WITH ANGINA PECTORIS (HCC): Primary | ICD-10-CM

## 2024-11-07 DIAGNOSIS — E78.5 HYPERLIPIDEMIA, UNSPECIFIED HYPERLIPIDEMIA TYPE: ICD-10-CM

## 2024-11-07 NOTE — PROGRESS NOTES
[facility] CARDIOLOGY  [address] [place] [address], [place] [address]  PHONE: [phone]      24    NAME:  Cassia Martell  : 1945  MRN: [id number]       SUBJECTIVE:   Cassia Martell is a 79 y.o. female seen for a follow up visit regarding the following:     Chief Complaint   Patient presents with    Coronary Artery Disease         HPI:    No cp or walker. No orthopnea or pnd. No palpitations or syncope.      Past Medical History, Past Surgical History, Family history, Social History, and Medications were all reviewed with the patient today and updated as necessary.     Current Outpatient Medications   Medication Sig Dispense Refill    Gabapentin 50 MG TABS Take 50 mg by mouth nightly as needed (itching) 30 tablet 2    vitamin D (VITAMIN D3) 25 MCG (1000 UT) TABS tablet Take 1 tablet by mouth daily 30 tablet 0    Multiple Vitamins-Minerals (ONE-A-DAY WOMENS PO) Take by mouth      atorvastatin (LIPITOR) 40 MG tablet TAKE 1 TABLET BY MOUTH EVERY DAY IN THE EVENING 90 tablet 3    vitamin B-12 (CYANOCOBALAMIN) 1000 MCG tablet Take 1 tablet by mouth daily      Alum Hydroxide-Mag Carbonate (GAVISCON PO) Take 1 tablet by mouth as needed      Lidocaine-Glycerin (PREPARATION H EX) Apply topically      diphenhydrAMINE (BENADRYL) 25 MG tablet Take 1 tablet by mouth every 6 hours as needed for Itching PRN      aspirin 81 MG EC tablet Take 1 tablet by mouth every evening      prednisoLONE acetate (PRED FORTE) 1 % ophthalmic suspension Place 1 drop into both eyes every evening       Current Facility-Administered Medications   Medication Dose Route Frequency Provider Last Rate Last Admin    diatrizoate meglumine-sodium (GASTROGRAFIN) 66-10 % solution 30 mL  30 mL Oral ONCE PRN Bella Chaparro MD   30 mL at 24 1330               Social History     Tobacco Use    Smoking status: Never    Smokeless tobacco: Never   Substance Use Topics    Alcohol use: Never              PHYSICAL EXAM:   BP

## 2024-11-08 ENCOUNTER — OFFICE VISIT (OUTPATIENT)
Dept: ONCOLOGY | Age: 79
End: 2024-11-08
Payer: MEDICARE

## 2024-11-08 ENCOUNTER — HOSPITAL ENCOUNTER (OUTPATIENT)
Dept: LAB | Age: 79
Discharge: HOME | End: 2024-11-08
Payer: MEDICARE

## 2024-11-08 VITALS
RESPIRATION RATE: 16 BRPM | DIASTOLIC BLOOD PRESSURE: 58 MMHG | HEIGHT: 62 IN | OXYGEN SATURATION: 95 % | SYSTOLIC BLOOD PRESSURE: 128 MMHG | BODY MASS INDEX: 24.84 KG/M2 | HEART RATE: 67 BPM | WEIGHT: 135 LBS | TEMPERATURE: 97.7 F

## 2024-11-08 DIAGNOSIS — C18.2 PRIMARY ADENOCARCINOMA OF ASCENDING COLON (HCC): Primary | ICD-10-CM

## 2024-11-08 DIAGNOSIS — C18.2 PRIMARY ADENOCARCINOMA OF ASCENDING COLON (HCC): ICD-10-CM

## 2024-11-08 DIAGNOSIS — L29.9 ITCHING: ICD-10-CM

## 2024-11-08 DIAGNOSIS — D61.818 PANCYTOPENIA (HCC): ICD-10-CM

## 2024-11-08 LAB
ALBUMIN SERPL-MCNC: 3.8 G/DL (ref 3.2–4.6)
ALBUMIN/GLOB SERPL: 1.4 (ref 1–1.9)
ALP SERPL-CCNC: 78 U/L (ref 35–104)
ALT SERPL-CCNC: 34 U/L (ref 8–45)
ANION GAP SERPL CALC-SCNC: 9 MMOL/L (ref 7–16)
AST SERPL-CCNC: 26 U/L (ref 15–37)
BASOPHILS # BLD: 0 K/UL (ref 0–0.2)
BASOPHILS NFR BLD: 1 % (ref 0–2)
BILIRUB SERPL-MCNC: 0.5 MG/DL (ref 0–1.2)
BUN SERPL-MCNC: 16 MG/DL (ref 8–23)
CALCIUM SERPL-MCNC: 8.7 MG/DL (ref 8.8–10.2)
CEA SERPL-MCNC: 1.3 NG/ML (ref 0–3.8)
CHLORIDE SERPL-SCNC: 104 MMOL/L (ref 98–107)
CO2 SERPL-SCNC: 24 MMOL/L (ref 20–29)
CREAT SERPL-MCNC: 0.93 MG/DL (ref 0.6–1.1)
DIFFERENTIAL METHOD BLD: ABNORMAL
EOSINOPHIL # BLD: 0 K/UL (ref 0–0.8)
EOSINOPHIL NFR BLD: 1 % (ref 0.5–7.8)
ERYTHROCYTE [DISTWIDTH] IN BLOOD BY AUTOMATED COUNT: 12.5 % (ref 11.9–14.6)
GLOBULIN SER CALC-MCNC: 2.7 G/DL (ref 2.3–3.5)
GLUCOSE SERPL-MCNC: 120 MG/DL (ref 70–99)
HCT VFR BLD AUTO: 38.9 % (ref 35.8–46.3)
HGB BLD-MCNC: 12.9 G/DL (ref 11.7–15.4)
IMM GRANULOCYTES # BLD AUTO: 0 K/UL (ref 0–0.5)
IMM GRANULOCYTES NFR BLD AUTO: 0 % (ref 0–5)
LYMPHOCYTES # BLD: 1 K/UL (ref 0.5–4.6)
LYMPHOCYTES NFR BLD: 22 % (ref 13–44)
MCH RBC QN AUTO: 29.7 PG (ref 26.1–32.9)
MCHC RBC AUTO-ENTMCNC: 33.2 G/DL (ref 31.4–35)
MCV RBC AUTO: 89.6 FL (ref 82–102)
MONOCYTES # BLD: 0.6 K/UL (ref 0.1–1.3)
MONOCYTES NFR BLD: 14 % (ref 4–12)
NEUTS SEG # BLD: 2.7 K/UL (ref 1.7–8.2)
NEUTS SEG NFR BLD: 62 % (ref 43–78)
NRBC # BLD: 0 K/UL (ref 0–0.2)
PLATELET # BLD AUTO: 169 K/UL (ref 150–450)
PMV BLD AUTO: 10.6 FL (ref 9.4–12.3)
POTASSIUM SERPL-SCNC: 4.1 MMOL/L (ref 3.5–5.1)
PROT SERPL-MCNC: 6.6 G/DL (ref 6.3–8.2)
RBC # BLD AUTO: 4.34 M/UL (ref 4.05–5.2)
SODIUM SERPL-SCNC: 137 MMOL/L (ref 136–145)
WBC # BLD AUTO: 4.3 K/UL (ref 4.3–11.1)

## 2024-11-08 PROCEDURE — 36415 COLL VENOUS BLD VENIPUNCTURE: CPT

## 2024-11-08 PROCEDURE — 80053 COMPREHEN METABOLIC PANEL: CPT

## 2024-11-08 PROCEDURE — 82378 CARCINOEMBRYONIC ANTIGEN: CPT

## 2024-11-08 PROCEDURE — 99213 OFFICE O/P EST LOW 20 MIN: CPT

## 2024-11-08 PROCEDURE — G8427 DOCREV CUR MEDS BY ELIG CLIN: HCPCS

## 2024-11-08 PROCEDURE — G8399 PT W/DXA RESULTS DOCUMENT: HCPCS

## 2024-11-08 PROCEDURE — 1036F TOBACCO NON-USER: CPT

## 2024-11-08 PROCEDURE — 1090F PRES/ABSN URINE INCON ASSESS: CPT

## 2024-11-08 PROCEDURE — G8420 CALC BMI NORM PARAMETERS: HCPCS

## 2024-11-08 PROCEDURE — G8484 FLU IMMUNIZE NO ADMIN: HCPCS

## 2024-11-08 PROCEDURE — 85025 COMPLETE CBC W/AUTO DIFF WBC: CPT

## 2024-11-08 PROCEDURE — 1126F AMNT PAIN NOTED NONE PRSNT: CPT

## 2024-11-08 PROCEDURE — 1123F ACP DISCUSS/DSCN MKR DOCD: CPT

## 2024-11-08 ASSESSMENT — PATIENT HEALTH QUESTIONNAIRE - PHQ9
SUM OF ALL RESPONSES TO PHQ QUESTIONS 1-9: 0
SUM OF ALL RESPONSES TO PHQ9 QUESTIONS 1 & 2: 0
2. FEELING DOWN, DEPRESSED OR HOPELESS: NOT AT ALL
1. LITTLE INTEREST OR PLEASURE IN DOING THINGS: NOT AT ALL
SUM OF ALL RESPONSES TO PHQ QUESTIONS 1-9: 0

## 2024-11-18 PROBLEM — D61.818 PANCYTOPENIA (HCC): Status: ACTIVE | Noted: 2024-11-18

## 2024-11-18 ASSESSMENT — ENCOUNTER SYMPTOMS
NAUSEA: 0
CONSTIPATION: 1
ABDOMINAL DISTENTION: 0
EYES NEGATIVE: 1
ABDOMINAL PAIN: 0
SHORTNESS OF BREATH: 0
EYE DISCHARGE: 0
VOMITING: 0
ANAL BLEEDING: 0
SORE THROAT: 0
BACK PAIN: 0
BLOOD IN STOOL: 0
COUGH: 0
TROUBLE SWALLOWING: 0
DIARRHEA: 0
SINUS PAIN: 0

## 2024-11-18 NOTE — PROGRESS NOTES
[facility]: Office Visit Established Patient    Chief Complaint   Patient presents with    Follow-up      History of Present Illness:  Ms. Martell is an  79 y.o. female who presents today for  follow up regarding colon cancer.  she was admitted on 1/22/22 with peritonitis, colon mass, obstruction and bowel perforation.  PMhx: anxiety, corneal dystrophy s/p transplants (bilat) on steroid drops, CAD, GERD, IBS, HLD.  She p/w abd pain x2 days.  CT AP c/w 2.3cm mass in mid-ascending colon and LAD with obstruction and perforation.   S/p subsequent right extended colectomy with ileo-transverse staped anastomosis.  Path c/w mod-poorly diff adenocarcinoma - [id number] (2/14 LN +).  We were consulted for recs.    S/p FOLFOX - adjuvantly, omitted last cycle.  Ultimately after discussion of pros and cons of proceeding with last treatment, she elected not to proceed with treatment due to increased risk of infection, with persistent cytopenias, fatigue and neuropathy.  She was comfortable with this plan.  She understood that taking the last treatment will not necessarily prevent disease in the future.  She also understood that her risk of recurrent disease with tx adjustments made along the way to make it tolerable/dose adjusted.  We discussed surveillance per NCCN guidelines.  She wishes to go back into the community to her [employer] but is considering holding off for now due to immune compromise.  She wished to switch primary care physicians and was referred.    -She has been doing well overall.  We discussed her CT scan results.  No evid of recurrent disease. She has some calcification of CAD and I will send a note to Dr Barajas to see if she needs a FU regarding this.  Labs reviewed and wbc/ANC better, no anemia and plts normal.  CMP with mild rise in Cr - she will increase fluid intake.  CEA remains low at 1.2.  Age approp screening reviewed.  No current s/s of infection.   - FU. She has been doing well. She

## 2025-01-02 DIAGNOSIS — I25.10 ATHEROSCLEROSIS OF NATIVE CORONARY ARTERY OF NATIVE HEART WITHOUT ANGINA PECTORIS: ICD-10-CM

## 2025-01-02 RX ORDER — ATORVASTATIN CALCIUM 40 MG/1
40 TABLET, FILM COATED ORAL EVERY EVENING
Qty: 90 TABLET | Refills: 4 | Status: SHIPPED | OUTPATIENT
Start: 2025-01-02

## 2025-01-23 DIAGNOSIS — C18.2 PRIMARY ADENOCARCINOMA OF ASCENDING COLON (HCC): Primary | ICD-10-CM

## 2025-01-23 NOTE — PROGRESS NOTES
worsening of her GERD symptoms.  Previously well controlled on PPI/Carafate.  She has not seen GI in many years and will be referred to Dr Molina to establish care for for GERD/re need for further workup.    - pain in knuckles - She also reported pain and some swelling in her knuckles, improved now.  Will add on MELINA.  Itermittent self resolving itching for which she take Benadryl.  No rashes.  Can try carnitor.    - pancytopenia - Cbc still with wbc 2.4, ANC up to 1200, Hb 11.3 and plt 106.  She is wearing a mask and still avoiding being with ppl.  Spent Thanksgiving by herself.  We discussed increased risk of infection after chemo but also that normalized counts will not necessarily protect her from any future infections.  Wishes to repeat labs at next visit, which will be after her restaging CT in Dec.   - vit D defic - She is taking 5000 of vitamin D.  If still on same dose, can increase: eg if taking 5 k daily can increase by 2K daily or 2-3 additional 5K doses in a week.  Can also check if different formulation works better for her.      - elevated liver enzymes - resolved   - Continue good oral nutrition and hydration.   - hypokalemia - resolved; can d/c potassium   - here for FU.  Her daughters are present on the phone for the visit.  Pt stated she is feeling normal.  No acute issues or concerns at this time.    - Seeing Dr Parks for colonoscopy in March and wishes to keep her port until then.  - GERD - She has remained on PPI since last visit, with improvement in her symptoms.  She is scheduled with GI early in 2023.  - pancytopenia slowly improving.  Will check smear.  Will also check nutritional labs.  - surveillance - CT CAP discussed and with no evid of disease recurrence and resolution of pulm fissure/nodule.    - Some changes on the tongue - can try MMW again.    - Plan for EGD next month for GERD w GI.  CT end of June.    - itching without rash - She is planning to see dermatologist.  itching - occurs

## 2025-01-27 ENCOUNTER — HOSPITAL ENCOUNTER (OUTPATIENT)
Dept: CT IMAGING | Age: 80
Discharge: HOME | End: 2025-01-30
Attending: INTERNAL MEDICINE
Payer: MEDICARE

## 2025-01-27 DIAGNOSIS — C18.2 PRIMARY ADENOCARCINOMA OF ASCENDING COLON (HCC): ICD-10-CM

## 2025-01-27 LAB — CREAT BLD-MCNC: 0.86 MG/DL (ref 0.8–1.5)

## 2025-01-27 PROCEDURE — 6360000004 HC RX CONTRAST MEDICATION: Performed by: INTERNAL MEDICINE

## 2025-01-27 PROCEDURE — 74177 CT ABD & PELVIS W/CONTRAST: CPT

## 2025-01-27 PROCEDURE — 82565 ASSAY OF CREATININE: CPT

## 2025-01-27 RX ORDER — IOPAMIDOL 755 MG/ML
100 INJECTION, SOLUTION INTRAVASCULAR
Status: COMPLETED | OUTPATIENT
Start: 2025-01-27 | End: 2025-01-27

## 2025-01-27 RX ORDER — DIATRIZOATE MEGLUMINE AND DIATRIZOATE SODIUM 660; 100 MG/ML; MG/ML
15 SOLUTION ORAL; RECTAL
Status: DISCONTINUED | OUTPATIENT
Start: 2025-01-27 | End: 2025-01-31 | Stop reason: HOSPADM

## 2025-01-27 RX ADMIN — IOPAMIDOL 100 ML: 755 INJECTION, SOLUTION INTRAVENOUS at 10:40

## 2025-01-27 RX ADMIN — DIATRIZOATE MEGLUMINE AND DIATRIZOATE SODIUM 15 ML: 660; 100 LIQUID ORAL; RECTAL at 10:40

## 2025-01-31 ENCOUNTER — HOSPITAL ENCOUNTER (OUTPATIENT)
Dept: LAB | Age: 80
Discharge: HOME | End: 2025-01-31
Payer: MEDICARE

## 2025-01-31 ENCOUNTER — OFFICE VISIT (OUTPATIENT)
Dept: ONCOLOGY | Age: 80
End: 2025-01-31
Payer: MEDICARE

## 2025-01-31 VITALS
BODY MASS INDEX: 25.4 KG/M2 | TEMPERATURE: 97.8 F | DIASTOLIC BLOOD PRESSURE: 64 MMHG | WEIGHT: 138 LBS | HEART RATE: 78 BPM | OXYGEN SATURATION: 95 % | RESPIRATION RATE: 16 BRPM | HEIGHT: 62 IN | SYSTOLIC BLOOD PRESSURE: 120 MMHG

## 2025-01-31 DIAGNOSIS — C18.2 PRIMARY ADENOCARCINOMA OF ASCENDING COLON (HCC): Primary | ICD-10-CM

## 2025-01-31 DIAGNOSIS — C18.2 PRIMARY ADENOCARCINOMA OF ASCENDING COLON (HCC): ICD-10-CM

## 2025-01-31 DIAGNOSIS — K21.9 GASTROESOPHAGEAL REFLUX DISEASE, UNSPECIFIED WHETHER ESOPHAGITIS PRESENT: ICD-10-CM

## 2025-01-31 PROBLEM — D61.818 PANCYTOPENIA (HCC): Status: RESOLVED | Noted: 2024-11-18 | Resolved: 2025-01-31

## 2025-01-31 LAB
ALBUMIN SERPL-MCNC: 3.6 G/DL (ref 3.2–4.6)
ALBUMIN/GLOB SERPL: 1.1 (ref 1–1.9)
ALP SERPL-CCNC: 80 U/L (ref 35–104)
ALT SERPL-CCNC: 22 U/L (ref 8–45)
ANION GAP SERPL CALC-SCNC: 13 MMOL/L (ref 7–16)
AST SERPL-CCNC: 24 U/L (ref 15–37)
BASOPHILS # BLD: 0.03 K/UL (ref 0–0.2)
BASOPHILS NFR BLD: 0.7 % (ref 0–2)
BILIRUB SERPL-MCNC: 0.2 MG/DL (ref 0–1.2)
BUN SERPL-MCNC: 17 MG/DL (ref 8–23)
CALCIUM SERPL-MCNC: 9.1 MG/DL (ref 8.8–10.2)
CEA SERPL-MCNC: 1.4 NG/ML (ref 0–3.8)
CHLORIDE SERPL-SCNC: 105 MMOL/L (ref 98–107)
CO2 SERPL-SCNC: 22 MMOL/L (ref 20–29)
CREAT SERPL-MCNC: 1.11 MG/DL (ref 0.6–1.1)
DIFFERENTIAL METHOD BLD: ABNORMAL
EOSINOPHIL # BLD: 0.03 K/UL (ref 0–0.8)
EOSINOPHIL NFR BLD: 0.7 % (ref 0.5–7.8)
ERYTHROCYTE [DISTWIDTH] IN BLOOD BY AUTOMATED COUNT: 12.5 % (ref 11.9–14.6)
GLOBULIN SER CALC-MCNC: 3.4 G/DL (ref 2.3–3.5)
GLUCOSE SERPL-MCNC: 119 MG/DL (ref 70–99)
HCT VFR BLD AUTO: 42 % (ref 35.8–46.3)
HGB BLD-MCNC: 14 G/DL (ref 11.7–15.4)
IMM GRANULOCYTES # BLD AUTO: 0.02 K/UL (ref 0–0.5)
IMM GRANULOCYTES NFR BLD AUTO: 0.5 % (ref 0–5)
LYMPHOCYTES # BLD: 1.1 K/UL (ref 0.5–4.6)
LYMPHOCYTES NFR BLD: 27.4 % (ref 13–44)
MCH RBC QN AUTO: 29.7 PG (ref 26.1–32.9)
MCHC RBC AUTO-ENTMCNC: 33.3 G/DL (ref 31.4–35)
MCV RBC AUTO: 89.2 FL (ref 82–102)
MONOCYTES # BLD: 0.45 K/UL (ref 0.1–1.3)
MONOCYTES NFR BLD: 11.2 % (ref 4–12)
NEUTS SEG # BLD: 2.38 K/UL (ref 1.7–8.2)
NEUTS SEG NFR BLD: 59.5 % (ref 43–78)
NRBC # BLD: 0 K/UL (ref 0–0.2)
PLATELET # BLD AUTO: 196 K/UL (ref 150–450)
PMV BLD AUTO: 10.5 FL (ref 9.4–12.3)
POTASSIUM SERPL-SCNC: 3.8 MMOL/L (ref 3.5–5.1)
PROT SERPL-MCNC: 7 G/DL (ref 6.3–8.2)
RBC # BLD AUTO: 4.71 M/UL (ref 4.05–5.2)
SODIUM SERPL-SCNC: 140 MMOL/L (ref 136–145)
WBC # BLD AUTO: 4 K/UL (ref 4.3–11.1)

## 2025-01-31 PROCEDURE — G8427 DOCREV CUR MEDS BY ELIG CLIN: HCPCS | Performed by: INTERNAL MEDICINE

## 2025-01-31 PROCEDURE — G8419 CALC BMI OUT NRM PARAM NOF/U: HCPCS | Performed by: INTERNAL MEDICINE

## 2025-01-31 PROCEDURE — 82378 CARCINOEMBRYONIC ANTIGEN: CPT

## 2025-01-31 PROCEDURE — G8399 PT W/DXA RESULTS DOCUMENT: HCPCS | Performed by: INTERNAL MEDICINE

## 2025-01-31 PROCEDURE — 85025 COMPLETE CBC W/AUTO DIFF WBC: CPT

## 2025-01-31 PROCEDURE — 1123F ACP DISCUSS/DSCN MKR DOCD: CPT | Performed by: INTERNAL MEDICINE

## 2025-01-31 PROCEDURE — 80053 COMPREHEN METABOLIC PANEL: CPT

## 2025-01-31 PROCEDURE — 1160F RVW MEDS BY RX/DR IN RCRD: CPT | Performed by: INTERNAL MEDICINE

## 2025-01-31 PROCEDURE — 36415 COLL VENOUS BLD VENIPUNCTURE: CPT

## 2025-01-31 PROCEDURE — 1036F TOBACCO NON-USER: CPT | Performed by: INTERNAL MEDICINE

## 2025-01-31 PROCEDURE — 1159F MED LIST DOCD IN RCRD: CPT | Performed by: INTERNAL MEDICINE

## 2025-01-31 PROCEDURE — 1126F AMNT PAIN NOTED NONE PRSNT: CPT | Performed by: INTERNAL MEDICINE

## 2025-01-31 PROCEDURE — 99214 OFFICE O/P EST MOD 30 MIN: CPT | Performed by: INTERNAL MEDICINE

## 2025-01-31 PROCEDURE — 1090F PRES/ABSN URINE INCON ASSESS: CPT | Performed by: INTERNAL MEDICINE

## 2025-01-31 RX ORDER — OMEPRAZOLE 40 MG/1
40 CAPSULE, DELAYED RELEASE ORAL
Qty: 90 CAPSULE | Refills: 1 | Status: SHIPPED | OUTPATIENT
Start: 2025-01-31 | End: 2025-01-31

## 2025-01-31 ASSESSMENT — PATIENT HEALTH QUESTIONNAIRE - PHQ9
SUM OF ALL RESPONSES TO PHQ QUESTIONS 1-9: 0
SUM OF ALL RESPONSES TO PHQ QUESTIONS 1-9: 0
SUM OF ALL RESPONSES TO PHQ9 QUESTIONS 1 & 2: 0
1. LITTLE INTEREST OR PLEASURE IN DOING THINGS: NOT AT ALL
2. FEELING DOWN, DEPRESSED OR HOPELESS: NOT AT ALL
SUM OF ALL RESPONSES TO PHQ QUESTIONS 1-9: 0
SUM OF ALL RESPONSES TO PHQ QUESTIONS 1-9: 0

## 2025-01-31 NOTE — PATIENT INSTRUCTIONS
Patient Information from Today's Visit    The members of your [facility]cology Medical [facility] are listed below:    Physician Provider: Bella Chaparro, Medical Oncologist  Advanced Practice Clinician: Erin Yang NP  Registered Nurse: Kayleigh LAZO RN  Navigator: Brandy BANGURA RN  Medical Assistant: Vanessa JOHNSTON MA  : Danielle TINEO   Supportive Care Services: Ilana RAZA LMSW    Diagnosis: colon cancer      Follow Up Instructions:     CT reviewed.  Omeprazole prescription sent to pharmacy.  Follow up in 3 months.    - surveillance per NCCN guidelines:   H&P every 3 to 6 months for 2 years, then every 6 months for total of 5 years  CEA every 3 to 6 months for 2 years and every 6 months for total of 5 years  Chest abdomen pelvis CT every 6 to 12 months for total of 5 years  Colonoscopy in 1 year after surgery except if no preoperative colonoscopy due to obstructing lesion, colonoscopy in 2 to 6 months if advanced adenoma, repeat in 1 year; if no advanced adenoma, repeat in 3 years, then every 5 years.  PET scan is not indicated    Treatment Summary has been discussed and given to patient:N/A      Current Labs:   Hospital Outpatient Visit on 01/31/2025   Component Date Value Ref Range Status    Sodium 01/31/2025 140  136 - 145 mmol/L Final    Potassium 01/31/2025 3.8  3.5 - 5.1 mmol/L Final    Chloride 01/31/2025 105  98 - 107 mmol/L Final    CO2 01/31/2025 22  20 - 29 mmol/L Final    Anion Gap 01/31/2025 13  7 - 16 mmol/L Final    Glucose 01/31/2025 119 (H)  70 - 99 mg/dL Final    Comment: <70 mg/dL Consistent with, but not fully diagnostic of hypoglycemia.  100 - 125 mg/dL Impaired fasting glucose/consistent with pre-diabetes mellitus.  > 126 mg/dl Fasting glucose consistent with overt diabetes mellitus      BUN 01/31/2025 17  8 - 23 MG/DL Final    Creatinine 01/31/2025 1.11 (H)  0.60 - 1.10 MG/DL Final    Est, Glom Filt Rate 01/31/2025 50 (L)  >60 ml/min/1.73m2 Final    Comment:    Pediatric calculator link:

## 2025-02-07 PROBLEM — K21.9 GASTROESOPHAGEAL REFLUX DISEASE: Status: ACTIVE | Noted: 2025-02-07

## 2025-02-07 RX ORDER — OMEPRAZOLE 40 MG/1
40 CAPSULE, DELAYED RELEASE ORAL
Qty: 30 CAPSULE | Refills: 0 | Status: SHIPPED | OUTPATIENT
Start: 2025-02-07

## 2025-03-24 RX ORDER — OMEPRAZOLE 40 MG/1
40 CAPSULE, DELAYED RELEASE ORAL
Qty: 90 CAPSULE | Refills: 1 | OUTPATIENT
Start: 2025-03-24

## 2025-04-08 ENCOUNTER — RESULTS FOLLOW-UP (OUTPATIENT)
Dept: INTERNAL MEDICINE CLINIC | Facility: CLINIC | Age: 80
End: 2025-04-08

## 2025-04-08 ENCOUNTER — OFFICE VISIT (OUTPATIENT)
Dept: INTERNAL MEDICINE CLINIC | Facility: CLINIC | Age: 80
End: 2025-04-08
Payer: MEDICARE

## 2025-04-08 VITALS
BODY MASS INDEX: 25.28 KG/M2 | SYSTOLIC BLOOD PRESSURE: 136 MMHG | HEART RATE: 74 BPM | DIASTOLIC BLOOD PRESSURE: 70 MMHG | OXYGEN SATURATION: 96 % | WEIGHT: 138.2 LBS

## 2025-04-08 DIAGNOSIS — E78.5 HYPERLIPIDEMIA, UNSPECIFIED HYPERLIPIDEMIA TYPE: ICD-10-CM

## 2025-04-08 DIAGNOSIS — R42 DIZZINESS: ICD-10-CM

## 2025-04-08 DIAGNOSIS — R51.9 NONINTRACTABLE HEADACHE, UNSPECIFIED CHRONICITY PATTERN, UNSPECIFIED HEADACHE TYPE: ICD-10-CM

## 2025-04-08 DIAGNOSIS — R42 DIZZINESS: Primary | ICD-10-CM

## 2025-04-08 LAB
ALBUMIN SERPL-MCNC: 3.9 G/DL (ref 3.2–4.6)
ALBUMIN/GLOB SERPL: 1.3 (ref 1–1.9)
ALP SERPL-CCNC: 65 U/L (ref 35–104)
ALT SERPL-CCNC: 24 U/L (ref 8–45)
ANION GAP SERPL CALC-SCNC: 10 MMOL/L (ref 7–16)
AST SERPL-CCNC: 27 U/L (ref 15–37)
BASOPHILS # BLD: 0.02 K/UL (ref 0–0.2)
BASOPHILS NFR BLD: 0.5 % (ref 0–2)
BILIRUB SERPL-MCNC: 0.3 MG/DL (ref 0–1.2)
BUN SERPL-MCNC: 12 MG/DL (ref 8–23)
CALCIUM SERPL-MCNC: 9.4 MG/DL (ref 8.8–10.2)
CHLORIDE SERPL-SCNC: 103 MMOL/L (ref 98–107)
CO2 SERPL-SCNC: 25 MMOL/L (ref 20–29)
CREAT SERPL-MCNC: 0.89 MG/DL (ref 0.6–1.1)
DIFFERENTIAL METHOD BLD: ABNORMAL
EOSINOPHIL # BLD: 0.02 K/UL (ref 0–0.8)
EOSINOPHIL NFR BLD: 0.5 % (ref 0.5–7.8)
ERYTHROCYTE [DISTWIDTH] IN BLOOD BY AUTOMATED COUNT: 12.6 % (ref 11.9–14.6)
GLOBULIN SER CALC-MCNC: 3 G/DL (ref 2.3–3.5)
GLUCOSE SERPL-MCNC: 85 MG/DL (ref 70–99)
HCT VFR BLD AUTO: 41.3 % (ref 35.8–46.3)
HGB BLD-MCNC: 13.4 G/DL (ref 11.7–15.4)
IMM GRANULOCYTES # BLD AUTO: 0.01 K/UL (ref 0–0.5)
IMM GRANULOCYTES NFR BLD AUTO: 0.3 % (ref 0–5)
LYMPHOCYTES # BLD: 1.05 K/UL (ref 0.5–4.6)
LYMPHOCYTES NFR BLD: 27.8 % (ref 13–44)
MCH RBC QN AUTO: 29.3 PG (ref 26.1–32.9)
MCHC RBC AUTO-ENTMCNC: 32.4 G/DL (ref 31.4–35)
MCV RBC AUTO: 90.4 FL (ref 82–102)
MONOCYTES # BLD: 0.45 K/UL (ref 0.1–1.3)
MONOCYTES NFR BLD: 11.9 % (ref 4–12)
NEUTS SEG # BLD: 2.23 K/UL (ref 1.7–8.2)
NEUTS SEG NFR BLD: 59 % (ref 43–78)
NRBC # BLD: 0 K/UL (ref 0–0.2)
PLATELET # BLD AUTO: 202 K/UL (ref 150–450)
PMV BLD AUTO: 11.2 FL (ref 9.4–12.3)
POTASSIUM SERPL-SCNC: 4 MMOL/L (ref 3.5–5.1)
PROT SERPL-MCNC: 6.9 G/DL (ref 6.3–8.2)
RBC # BLD AUTO: 4.57 M/UL (ref 4.05–5.2)
SODIUM SERPL-SCNC: 138 MMOL/L (ref 136–145)
TSH, 3RD GENERATION: 1.33 UIU/ML (ref 0.27–4.2)
WBC # BLD AUTO: 3.8 K/UL (ref 4.3–11.1)

## 2025-04-08 PROCEDURE — 99214 OFFICE O/P EST MOD 30 MIN: CPT | Performed by: PHYSICIAN ASSISTANT

## 2025-04-08 PROCEDURE — 1159F MED LIST DOCD IN RCRD: CPT | Performed by: PHYSICIAN ASSISTANT

## 2025-04-08 PROCEDURE — G8419 CALC BMI OUT NRM PARAM NOF/U: HCPCS | Performed by: PHYSICIAN ASSISTANT

## 2025-04-08 PROCEDURE — 1090F PRES/ABSN URINE INCON ASSESS: CPT | Performed by: PHYSICIAN ASSISTANT

## 2025-04-08 PROCEDURE — 1123F ACP DISCUSS/DSCN MKR DOCD: CPT | Performed by: PHYSICIAN ASSISTANT

## 2025-04-08 PROCEDURE — 1160F RVW MEDS BY RX/DR IN RCRD: CPT | Performed by: PHYSICIAN ASSISTANT

## 2025-04-08 PROCEDURE — G8427 DOCREV CUR MEDS BY ELIG CLIN: HCPCS | Performed by: PHYSICIAN ASSISTANT

## 2025-04-08 PROCEDURE — G8399 PT W/DXA RESULTS DOCUMENT: HCPCS | Performed by: PHYSICIAN ASSISTANT

## 2025-04-08 PROCEDURE — 1036F TOBACCO NON-USER: CPT | Performed by: PHYSICIAN ASSISTANT

## 2025-04-08 SDOH — ECONOMIC STABILITY: FOOD INSECURITY: WITHIN THE PAST 12 MONTHS, THE FOOD YOU BOUGHT JUST DIDN'T LAST AND YOU DIDN'T HAVE MONEY TO GET MORE.: NEVER TRUE

## 2025-04-08 SDOH — ECONOMIC STABILITY: FOOD INSECURITY: WITHIN THE PAST 12 MONTHS, YOU WORRIED THAT YOUR FOOD WOULD RUN OUT BEFORE YOU GOT MONEY TO BUY MORE.: NEVER TRUE

## 2025-04-08 ASSESSMENT — ENCOUNTER SYMPTOMS
EYE PAIN: 0
COUGH: 0
VOMITING: 0
CHEST TIGHTNESS: 0
COLOR CHANGE: 0
DIARRHEA: 0
SHORTNESS OF BREATH: 0
NAUSEA: 0
VOICE CHANGE: 0
CONSTIPATION: 0
WHEEZING: 0
SORE THROAT: 0
ABDOMINAL PAIN: 0

## 2025-04-08 NOTE — PROGRESS NOTES
PROGRESS NOTE    Chief Complaint   Patient presents with    Dizziness     Dizziness, headache and nausea on 4/6/25 but nothing since           HPI  History of Present Illness  The patient is an 80-year-old female who presents to discuss her recent health issues.    Dizziness  - The patient has been experiencing dizziness for the past couple of days.    Headaches  - The patient has been experiencing headaches for the past couple of days.      Past Medical History, Past Surgical History, Family history, Social History, and Medications were all reviewed with the patient today and updated as necessary.       Current Outpatient Medications   Medication Sig Dispense Refill    vitamin D (VITAMIN D3) 25 MCG (1000 UT) TABS tablet Take 1 tablet by mouth daily 30 tablet 0    vitamin B-12 (CYANOCOBALAMIN) 1000 MCG tablet Take 1 tablet by mouth daily      Alum Hydroxide-Mag Carbonate (GAVISCON PO) Take 1 tablet by mouth as needed      diphenhydrAMINE (BENADRYL) 25 MG tablet Take 1 tablet by mouth every 6 hours as needed for Itching PRN      aspirin 81 MG EC tablet Take 1 tablet by mouth every evening      prednisoLONE acetate (PRED FORTE) 1 % ophthalmic suspension Place 1 drop into both eyes every evening      omeprazole (PRILOSEC) 40 MG delayed release capsule Take 1 capsule by mouth every morning (before breakfast) (Patient not taking: Reported on 4/8/2025) 30 capsule 0    atorvastatin (LIPITOR) 40 MG tablet TAKE 1 TABLET BY MOUTH EVERY DAY IN THE EVENING (Patient not taking: Reported on 4/8/2025) 90 tablet 4    Lidocaine-Glycerin (PREPARATION H EX) Apply topically (Patient not taking: Reported on 4/8/2025)       Current Facility-Administered Medications   Medication Dose Route Frequency Provider Last Rate Last Admin    diatrizoate meglumine-sodium (GASTROGRAFIN) 66-10 % solution 30 mL  30 mL Oral ONCE PRN Bella Chaparro MD   30 mL at 07/12/24 1330     Allergies   Allergen Reactions    Prunus Persica     Sulfa Antibiotics

## 2025-05-01 NOTE — PROGRESS NOTES
[facility] Note    Name: Cassia Sanchez Case  YOB: 1945  Gender: female  MRN: [id number]      Follow up visit:   1. Olecranon fracture, left, closed, initial encounter        HPI: Cassia Martell is a 79 y.o. female who is following up for left olecranon fracture.  She is 6 weeks out from injury today.  She states that she is doing much better.  She has been working on home exercises.  She has been compliant with her restrictions.    ROS/Meds/PSH/PMH/FH/SH: I personally reviewed the patients standard intake form.  Pertinents are discussed in the HPI    Physical Examination:    Musculoskeletal Examination:  Examination on the left upper extremity demonstrates cap refill < 5 seconds in all fingers  Patient has mild swelling over the left elbow, much improved since her last visit.  She is only slightly tender to deep palpation directly over the olecranon.  She has full range of motion.  She denies paresthesia.  She has good capillary refill.    Imaging / Electrodiagnostic Tests:     X-rays include a 3 view left elbow are reviewed.  Fractures maintaining alignment and healing    Assessment:     ICD-10-CM    1. Olecranon fracture, left, closed, initial encounter  S52.022A XR ELBOW LEFT (MIN 3 VIEWS)          Plan:   We discussed the diagnosis and different treatment options. We discussed observation, therapy, antiinflammatory medications and other pertinent treatment modalities.    After discussing in detail the patient elects to proceed with progression to activities as tolerated.  She will continue home exercises.  I will see her back in 2 months if she is having any issues.     Patient voiced accordance and understanding of the information provided and the formulated plan. All questions were answered to the patient's satisfaction during the encounter.    3 This is an acute uncomplicated problem/injury  Treatment at this time:  Home exercises, activities as tolerated    Radha SHOEMAKER 
No

## 2025-05-08 ENCOUNTER — OFFICE VISIT (OUTPATIENT)
Age: 80
End: 2025-05-08
Payer: MEDICARE

## 2025-05-08 VITALS
DIASTOLIC BLOOD PRESSURE: 72 MMHG | HEIGHT: 62 IN | SYSTOLIC BLOOD PRESSURE: 128 MMHG | HEART RATE: 71 BPM | WEIGHT: 140 LBS | BODY MASS INDEX: 25.76 KG/M2

## 2025-05-08 DIAGNOSIS — I25.10 CORONARY ARTERY DISEASE INVOLVING NATIVE CORONARY ARTERY OF NATIVE HEART WITHOUT ANGINA PECTORIS: ICD-10-CM

## 2025-05-08 DIAGNOSIS — I25.119 ATHEROSCLEROSIS OF NATIVE CORONARY ARTERY OF NATIVE HEART WITH ANGINA PECTORIS: Primary | ICD-10-CM

## 2025-05-08 DIAGNOSIS — E78.5 HYPERLIPIDEMIA, UNSPECIFIED HYPERLIPIDEMIA TYPE: ICD-10-CM

## 2025-05-08 PROCEDURE — 99214 OFFICE O/P EST MOD 30 MIN: CPT | Performed by: INTERNAL MEDICINE

## 2025-05-08 PROCEDURE — G8428 CUR MEDS NOT DOCUMENT: HCPCS | Performed by: INTERNAL MEDICINE

## 2025-05-08 PROCEDURE — G8419 CALC BMI OUT NRM PARAM NOF/U: HCPCS | Performed by: INTERNAL MEDICINE

## 2025-05-08 PROCEDURE — G8399 PT W/DXA RESULTS DOCUMENT: HCPCS | Performed by: INTERNAL MEDICINE

## 2025-05-08 PROCEDURE — 93000 ELECTROCARDIOGRAM COMPLETE: CPT | Performed by: INTERNAL MEDICINE

## 2025-05-08 PROCEDURE — 1036F TOBACCO NON-USER: CPT | Performed by: INTERNAL MEDICINE

## 2025-05-08 PROCEDURE — 1090F PRES/ABSN URINE INCON ASSESS: CPT | Performed by: INTERNAL MEDICINE

## 2025-05-08 PROCEDURE — 1123F ACP DISCUSS/DSCN MKR DOCD: CPT | Performed by: INTERNAL MEDICINE

## 2025-05-08 PROCEDURE — 1126F AMNT PAIN NOTED NONE PRSNT: CPT | Performed by: INTERNAL MEDICINE

## 2025-05-08 NOTE — PROGRESS NOTES
[facility] CARDIOLOGY  [address] [place] [address], [facility] [address]  [place] [address]  PHONE: [phone]      25    NAME:  Cassia Martell  : 1945  MRN: [id number]       SUBJECTIVE:   Cassia Martell is a 80 y.o. female seen for a follow up visit regarding the following:     Chief Complaint   Patient presents with    Coronary Artery Disease         HPI:    No cp or walker. No orthopnea or pnd. No palpitations or syncope.      Past Medical History, Past Surgical History, Family history, Social History, and Medications were all reviewed with the patient today and updated as necessary.     Current Outpatient Medications   Medication Sig Dispense Refill    vitamin D (VITAMIN D3) 25 MCG (1000 UT) TABS tablet Take 1 tablet by mouth daily 30 tablet 0    vitamin B-12 (CYANOCOBALAMIN) 1000 MCG tablet Take 1 tablet by mouth daily      Alum Hydroxide-Mag Carbonate (GAVISCON PO) Take 1 tablet by mouth as needed      diphenhydrAMINE (BENADRYL) 25 MG tablet Take 1 tablet by mouth every 6 hours as needed for Itching PRN      aspirin 81 MG EC tablet Take 1 tablet by mouth every evening      prednisoLONE acetate (PRED FORTE) 1 % ophthalmic suspension Place 1 drop into both eyes every evening      omeprazole (PRILOSEC) 40 MG delayed release capsule Take 1 capsule by mouth every morning (before breakfast) (Patient not taking: Reported on 2025) 30 capsule 0    Lidocaine-Glycerin (PREPARATION H EX) Apply topically (Patient not taking: Reported on 2025)       Current Facility-Administered Medications   Medication Dose Route Frequency Provider Last Rate Last Admin    diatrizoate meglumine-sodium (GASTROGRAFIN) 66-10 % solution 30 mL  30 mL Oral ONCE PRN Bella Chaparro MD   30 mL at 24 1330               Social History     Tobacco Use    Smoking status: Never    Smokeless tobacco: Never   Substance Use Topics    Alcohol use: Never              PHYSICAL EXAM:   /72   Pulse 71   Ht 1.575 m (5' 2\")

## 2025-07-30 DIAGNOSIS — C18.2 PRIMARY ADENOCARCINOMA OF ASCENDING COLON (HCC): Primary | ICD-10-CM

## 2025-08-04 SDOH — HEALTH STABILITY: PHYSICAL HEALTH: ON AVERAGE, HOW MANY MINUTES DO YOU ENGAGE IN EXERCISE AT THIS LEVEL?: PATIENT DECLINED

## 2025-08-04 ASSESSMENT — LIFESTYLE VARIABLES
HOW MANY STANDARD DRINKS CONTAINING ALCOHOL DO YOU HAVE ON A TYPICAL DAY: PATIENT DOES NOT DRINK
HOW OFTEN DO YOU HAVE A DRINK CONTAINING ALCOHOL: NEVER
HOW MANY STANDARD DRINKS CONTAINING ALCOHOL DO YOU HAVE ON A TYPICAL DAY: 0
HOW OFTEN DO YOU HAVE A DRINK CONTAINING ALCOHOL: 1
HOW OFTEN DO YOU HAVE SIX OR MORE DRINKS ON ONE OCCASION: 1

## 2025-08-04 ASSESSMENT — PATIENT HEALTH QUESTIONNAIRE - PHQ9
SUM OF ALL RESPONSES TO PHQ QUESTIONS 1-9: 0
SUM OF ALL RESPONSES TO PHQ QUESTIONS 1-9: 0
1. LITTLE INTEREST OR PLEASURE IN DOING THINGS: NOT AT ALL
2. FEELING DOWN, DEPRESSED OR HOPELESS: NOT AT ALL
SUM OF ALL RESPONSES TO PHQ QUESTIONS 1-9: 0
SUM OF ALL RESPONSES TO PHQ QUESTIONS 1-9: 0

## 2025-08-05 DIAGNOSIS — C18.2 PRIMARY ADENOCARCINOMA OF ASCENDING COLON (HCC): ICD-10-CM

## 2025-08-05 LAB
ALBUMIN SERPL-MCNC: 3.6 G/DL (ref 3.2–4.6)
ALBUMIN/GLOB SERPL: 1.2 (ref 1–1.9)
ALP SERPL-CCNC: 68 U/L (ref 35–104)
ALT SERPL-CCNC: 22 U/L (ref 8–45)
ANION GAP SERPL CALC-SCNC: 9 MMOL/L (ref 7–16)
AST SERPL-CCNC: 21 U/L (ref 15–37)
BASOPHILS # BLD: 0.02 K/UL (ref 0–0.2)
BASOPHILS NFR BLD: 0.6 % (ref 0–2)
BILIRUB SERPL-MCNC: <0.2 MG/DL (ref 0–1.2)
BUN SERPL-MCNC: 22 MG/DL (ref 8–23)
CALCIUM SERPL-MCNC: 9.2 MG/DL (ref 8.8–10.2)
CEA SERPL-MCNC: 1.1 NG/ML (ref 0–3.8)
CHLORIDE SERPL-SCNC: 106 MMOL/L (ref 98–107)
CO2 SERPL-SCNC: 26 MMOL/L (ref 20–29)
CREAT SERPL-MCNC: 1.07 MG/DL (ref 0.6–1.1)
DIFFERENTIAL METHOD BLD: ABNORMAL
EOSINOPHIL # BLD: 0.04 K/UL (ref 0–0.8)
EOSINOPHIL NFR BLD: 1.2 % (ref 0.5–7.8)
ERYTHROCYTE [DISTWIDTH] IN BLOOD BY AUTOMATED COUNT: 12.8 % (ref 11.9–14.6)
GLOBULIN SER CALC-MCNC: 2.9 G/DL (ref 2.3–3.5)
GLUCOSE SERPL-MCNC: 110 MG/DL (ref 70–99)
HCT VFR BLD AUTO: 39.7 % (ref 35.8–46.3)
HGB BLD-MCNC: 13.4 G/DL (ref 11.7–15.4)
IMM GRANULOCYTES # BLD AUTO: 0.01 K/UL (ref 0–0.5)
IMM GRANULOCYTES NFR BLD AUTO: 0.3 % (ref 0–5)
LYMPHOCYTES # BLD: 1.09 K/UL (ref 0.5–4.6)
LYMPHOCYTES NFR BLD: 32.5 % (ref 13–44)
MCH RBC QN AUTO: 30.3 PG (ref 26.1–32.9)
MCHC RBC AUTO-ENTMCNC: 33.8 G/DL (ref 31.4–35)
MCV RBC AUTO: 89.8 FL (ref 82–102)
MONOCYTES # BLD: 0.42 K/UL (ref 0.1–1.3)
MONOCYTES NFR BLD: 12.5 % (ref 4–12)
NEUTS SEG # BLD: 1.77 K/UL (ref 1.7–8.2)
NEUTS SEG NFR BLD: 52.9 % (ref 43–78)
NRBC # BLD: 0 K/UL (ref 0–0.2)
PLATELET # BLD AUTO: 194 K/UL (ref 150–450)
PMV BLD AUTO: 10.8 FL (ref 9.4–12.3)
POTASSIUM SERPL-SCNC: 4.2 MMOL/L (ref 3.5–5.1)
PROT SERPL-MCNC: 6.4 G/DL (ref 6.3–8.2)
RBC # BLD AUTO: 4.42 M/UL (ref 4.05–5.2)
SODIUM SERPL-SCNC: 141 MMOL/L (ref 136–145)
WBC # BLD AUTO: 3.4 K/UL (ref 4.3–11.1)

## 2025-08-06 ENCOUNTER — TRANSCRIBE ORDERS (OUTPATIENT)
Dept: SCHEDULING | Age: 80
End: 2025-08-06

## 2025-08-06 DIAGNOSIS — Z12.31 ENCOUNTER FOR SCREENING MAMMOGRAM FOR MALIGNANT NEOPLASM OF BREAST: Primary | ICD-10-CM

## 2025-08-07 ENCOUNTER — OFFICE VISIT (OUTPATIENT)
Dept: ONCOLOGY | Age: 80
End: 2025-08-07
Payer: MEDICARE

## 2025-08-07 ENCOUNTER — OFFICE VISIT (OUTPATIENT)
Dept: INTERNAL MEDICINE CLINIC | Facility: CLINIC | Age: 80
End: 2025-08-07
Payer: MEDICARE

## 2025-08-07 VITALS
OXYGEN SATURATION: 98 % | RESPIRATION RATE: 16 BRPM | TEMPERATURE: 98.1 F | SYSTOLIC BLOOD PRESSURE: 107 MMHG | HEIGHT: 62 IN | DIASTOLIC BLOOD PRESSURE: 63 MMHG | BODY MASS INDEX: 25.51 KG/M2 | WEIGHT: 138.6 LBS | HEART RATE: 62 BPM

## 2025-08-07 VITALS
DIASTOLIC BLOOD PRESSURE: 70 MMHG | OXYGEN SATURATION: 98 % | WEIGHT: 138.4 LBS | SYSTOLIC BLOOD PRESSURE: 122 MMHG | HEART RATE: 70 BPM | HEIGHT: 62 IN | BODY MASS INDEX: 25.47 KG/M2

## 2025-08-07 DIAGNOSIS — C18.2 PRIMARY ADENOCARCINOMA OF ASCENDING COLON (HCC): Primary | ICD-10-CM

## 2025-08-07 DIAGNOSIS — M19.042 PRIMARY OSTEOARTHRITIS OF BOTH HANDS: ICD-10-CM

## 2025-08-07 DIAGNOSIS — M19.041 PRIMARY OSTEOARTHRITIS OF BOTH HANDS: ICD-10-CM

## 2025-08-07 DIAGNOSIS — J30.9 ALLERGIC RHINITIS, UNSPECIFIED SEASONALITY, UNSPECIFIED TRIGGER: ICD-10-CM

## 2025-08-07 DIAGNOSIS — Z00.00 MEDICARE ANNUAL WELLNESS VISIT, SUBSEQUENT: Primary | ICD-10-CM

## 2025-08-07 DIAGNOSIS — E78.5 HYPERLIPIDEMIA, UNSPECIFIED HYPERLIPIDEMIA TYPE: ICD-10-CM

## 2025-08-07 PROBLEM — K21.9 GASTROESOPHAGEAL REFLUX DISEASE: Status: RESOLVED | Noted: 2025-02-07 | Resolved: 2025-08-07

## 2025-08-07 PROCEDURE — 1159F MED LIST DOCD IN RCRD: CPT | Performed by: STUDENT IN AN ORGANIZED HEALTH CARE EDUCATION/TRAINING PROGRAM

## 2025-08-07 PROCEDURE — 1123F ACP DISCUSS/DSCN MKR DOCD: CPT | Performed by: STUDENT IN AN ORGANIZED HEALTH CARE EDUCATION/TRAINING PROGRAM

## 2025-08-07 PROCEDURE — 1036F TOBACCO NON-USER: CPT | Performed by: STUDENT IN AN ORGANIZED HEALTH CARE EDUCATION/TRAINING PROGRAM

## 2025-08-07 PROCEDURE — 1123F ACP DISCUSS/DSCN MKR DOCD: CPT | Performed by: NURSE PRACTITIONER

## 2025-08-07 PROCEDURE — G8399 PT W/DXA RESULTS DOCUMENT: HCPCS | Performed by: STUDENT IN AN ORGANIZED HEALTH CARE EDUCATION/TRAINING PROGRAM

## 2025-08-07 PROCEDURE — G8427 DOCREV CUR MEDS BY ELIG CLIN: HCPCS | Performed by: STUDENT IN AN ORGANIZED HEALTH CARE EDUCATION/TRAINING PROGRAM

## 2025-08-07 PROCEDURE — G0439 PPPS, SUBSEQ VISIT: HCPCS | Performed by: STUDENT IN AN ORGANIZED HEALTH CARE EDUCATION/TRAINING PROGRAM

## 2025-08-07 PROCEDURE — 99214 OFFICE O/P EST MOD 30 MIN: CPT | Performed by: NURSE PRACTITIONER

## 2025-08-07 PROCEDURE — 1159F MED LIST DOCD IN RCRD: CPT | Performed by: NURSE PRACTITIONER

## 2025-08-07 PROCEDURE — 1090F PRES/ABSN URINE INCON ASSESS: CPT | Performed by: STUDENT IN AN ORGANIZED HEALTH CARE EDUCATION/TRAINING PROGRAM

## 2025-08-07 PROCEDURE — G8399 PT W/DXA RESULTS DOCUMENT: HCPCS | Performed by: NURSE PRACTITIONER

## 2025-08-07 PROCEDURE — G8419 CALC BMI OUT NRM PARAM NOF/U: HCPCS | Performed by: STUDENT IN AN ORGANIZED HEALTH CARE EDUCATION/TRAINING PROGRAM

## 2025-08-07 PROCEDURE — 1090F PRES/ABSN URINE INCON ASSESS: CPT | Performed by: NURSE PRACTITIONER

## 2025-08-07 PROCEDURE — G8419 CALC BMI OUT NRM PARAM NOF/U: HCPCS | Performed by: NURSE PRACTITIONER

## 2025-08-07 PROCEDURE — 99214 OFFICE O/P EST MOD 30 MIN: CPT | Performed by: STUDENT IN AN ORGANIZED HEALTH CARE EDUCATION/TRAINING PROGRAM

## 2025-08-07 PROCEDURE — G8427 DOCREV CUR MEDS BY ELIG CLIN: HCPCS | Performed by: NURSE PRACTITIONER

## 2025-08-07 PROCEDURE — 1036F TOBACCO NON-USER: CPT | Performed by: NURSE PRACTITIONER

## 2025-08-07 ASSESSMENT — ENCOUNTER SYMPTOMS
NAUSEA: 0
ABDOMINAL DISTENTION: 0
SINUS PAIN: 0
TROUBLE SWALLOWING: 0
DIARRHEA: 0
BLOOD IN STOOL: 0
SORE THROAT: 0
COUGH: 0
ABDOMINAL PAIN: 0
SHORTNESS OF BREATH: 0
VOMITING: 0
ANAL BLEEDING: 0
BACK PAIN: 0
CONSTIPATION: 1
EYES NEGATIVE: 1
EYE DISCHARGE: 0

## (undated) DEVICE — CONNECTOR TBNG OD5-7MM O2 END DISP

## (undated) DEVICE — SYRINGE MED 3ML CLR PLAS STD N CTRL LUERLOCK TIP DISP

## (undated) DEVICE — CANNULA NSL ORAL AD FOR CAPNOFLEX CO2 O2 AIRLFE

## (undated) DEVICE — INSTRUMENT REPROC SEAL/DIVIDE OPEN LIGASURE IMPACT CRV LG JAW NANO-COAT 18CM

## (undated) DEVICE — KENDALL RADIOLUCENT FOAM MONITORING ELECTRODE RECTANGULAR SHAPE: Brand: KENDALL

## (undated) DEVICE — SUTURE PDS + SZ 1 L96IN ABSRB VLT L65MM TP-1 1/2 CIR PDP880G

## (undated) DEVICE — SUTURE PERMAHAND SZ 2-0 L12X18IN NONABSORBABLE BLK SILK A185H

## (undated) DEVICE — RELOAD STPL L75MM OPN H3.8MM CLS 1.5MM WIRE DIA0.2MM REG

## (undated) DEVICE — SYRINGE MED 10ML LUERLOCK TIP W/O SFTY DISP

## (undated) DEVICE — LUBE JELLY FOIL PACK 1.4 OZ: Brand: MEDLINE INDUSTRIES, INC.

## (undated) DEVICE — WOUND RETRACTOR AND PROTECTOR: Brand: ALEXIS O WOUND PROTECTOR-RETRACTOR

## (undated) DEVICE — SINGLE PORT MANIFOLD: Brand: NEPTUNE 2

## (undated) DEVICE — STAPLER INT L75MM CUT LN L73MM STPL LN L77MM BLU B FRM 8

## (undated) DEVICE — AIRLIFE™ OXYGEN TUBING 7 FEET (2.1 M) CRUSH RESISTANT OXYGEN TUBING, VINYL TIPPED: Brand: AIRLIFE™

## (undated) DEVICE — SPONGE LAP 18X18IN STRL -- 5/PK

## (undated) DEVICE — SUTURE PERMAHAND SZ 3-0 L18IN NONABSORBABLE BLK L26MM SH C013D

## (undated) DEVICE — NEEDLE SYR 18GA L1.5IN RED PLAS HUB S STL BLNT FILL W/O

## (undated) DEVICE — SUTURE PERMAHAND SZ 2-0 L18IN NONABSORBABLE BLK L26MM SH C012D

## (undated) DEVICE — GAUZE,SPONGE,4"X4",12PLY,WOVEN,NS,LF: Brand: MEDLINE